# Patient Record
Sex: MALE | Race: WHITE | NOT HISPANIC OR LATINO | Employment: OTHER | URBAN - METROPOLITAN AREA
[De-identification: names, ages, dates, MRNs, and addresses within clinical notes are randomized per-mention and may not be internally consistent; named-entity substitution may affect disease eponyms.]

---

## 2017-02-13 ENCOUNTER — TELEPHONE (OUTPATIENT)
Dept: FAMILY MEDICINE | Facility: CLINIC | Age: 62
End: 2017-02-13

## 2017-02-13 NOTE — TELEPHONE ENCOUNTER
----- Message from Yinka Adkins sent at 2/13/2017  1:42 PM CST -----  Contact: Moustapha   Patient is wanting to be seen before Friday if possible. He is experiencing blood in his urine. No pain. Would prefer to only see his pcp. Please call him [phone]. Thank you!

## 2017-02-14 ENCOUNTER — OFFICE VISIT (OUTPATIENT)
Dept: FAMILY MEDICINE | Facility: CLINIC | Age: 62
End: 2017-02-14
Payer: COMMERCIAL

## 2017-02-14 ENCOUNTER — LAB VISIT (OUTPATIENT)
Dept: LAB | Facility: HOSPITAL | Age: 62
End: 2017-02-14
Attending: FAMILY MEDICINE
Payer: COMMERCIAL

## 2017-02-14 VITALS
WEIGHT: 225.5 LBS | BODY MASS INDEX: 32.28 KG/M2 | TEMPERATURE: 98 F | SYSTOLIC BLOOD PRESSURE: 168 MMHG | DIASTOLIC BLOOD PRESSURE: 80 MMHG | HEART RATE: 78 BPM | HEIGHT: 70 IN

## 2017-02-14 DIAGNOSIS — R31.9 HEMATURIA: Primary | ICD-10-CM

## 2017-02-14 DIAGNOSIS — R31.9 HEMATURIA: ICD-10-CM

## 2017-02-14 LAB
ALBUMIN SERPL BCP-MCNC: 3.8 G/DL
ALP SERPL-CCNC: 132 U/L
ALT SERPL W/O P-5'-P-CCNC: 29 U/L
ANION GAP SERPL CALC-SCNC: 8 MMOL/L
AST SERPL-CCNC: 26 U/L
BASOPHILS # BLD AUTO: 0.05 K/UL
BASOPHILS NFR BLD: 0.5 %
BILIRUB SERPL-MCNC: 0.5 MG/DL
BILIRUB SERPL-MCNC: ABNORMAL MG/DL
BLOOD URINE, POC: 250
BUN SERPL-MCNC: 13 MG/DL
CALCIUM SERPL-MCNC: 9.3 MG/DL
CHLORIDE SERPL-SCNC: 101 MMOL/L
CO2 SERPL-SCNC: 27 MMOL/L
COLOR, POC UA: ABNORMAL
CREAT SERPL-MCNC: 1.2 MG/DL
DIFFERENTIAL METHOD: ABNORMAL
EOSINOPHIL # BLD AUTO: 0.2 K/UL
EOSINOPHIL NFR BLD: 2.1 %
ERYTHROCYTE [DISTWIDTH] IN BLOOD BY AUTOMATED COUNT: 12.8 %
EST. GFR  (AFRICAN AMERICAN): >60 ML/MIN/1.73 M^2
EST. GFR  (NON AFRICAN AMERICAN): >60 ML/MIN/1.73 M^2
GLUCOSE SERPL-MCNC: 100 MG/DL
GLUCOSE UR QL STRIP: ABNORMAL
HCT VFR BLD AUTO: 43.3 %
HGB BLD-MCNC: 14.7 G/DL
KETONES UR QL STRIP: ABNORMAL
LEUKOCYTE ESTERASE URINE, POC: ABNORMAL
LYMPHOCYTES # BLD AUTO: 2.2 K/UL
LYMPHOCYTES NFR BLD: 22.4 %
MCH RBC QN AUTO: 31.2 PG
MCHC RBC AUTO-ENTMCNC: 33.9 %
MCV RBC AUTO: 92 FL
MONOCYTES # BLD AUTO: 0.7 K/UL
MONOCYTES NFR BLD: 7.4 %
NEUTROPHILS # BLD AUTO: 6.5 K/UL
NEUTROPHILS NFR BLD: 67.2 %
NITRITE, POC UA: ABNORMAL
PH, POC UA: 7
PLATELET # BLD AUTO: 265 K/UL
PMV BLD AUTO: 10.6 FL
POTASSIUM SERPL-SCNC: 4.4 MMOL/L
PROT SERPL-MCNC: 7.2 G/DL
PROTEIN, POC: ABNORMAL
RBC # BLD AUTO: 4.71 M/UL
SODIUM SERPL-SCNC: 136 MMOL/L
SPECIFIC GRAVITY, POC UA: 1.01
UROBILINOGEN, POC UA: ABNORMAL
WBC # BLD AUTO: 9.69 K/UL

## 2017-02-14 PROCEDURE — 99999 PR PBB SHADOW E&M-EST. PATIENT-LVL III: CPT | Mod: PBBFAC,,, | Performed by: FAMILY MEDICINE

## 2017-02-14 PROCEDURE — 36415 COLL VENOUS BLD VENIPUNCTURE: CPT | Mod: PO

## 2017-02-14 PROCEDURE — 81002 URINALYSIS NONAUTO W/O SCOPE: CPT | Mod: S$GLB,,, | Performed by: FAMILY MEDICINE

## 2017-02-14 PROCEDURE — 85025 COMPLETE CBC W/AUTO DIFF WBC: CPT

## 2017-02-14 PROCEDURE — 99213 OFFICE O/P EST LOW 20 MIN: CPT | Mod: S$GLB,,, | Performed by: FAMILY MEDICINE

## 2017-02-14 PROCEDURE — 87086 URINE CULTURE/COLONY COUNT: CPT

## 2017-02-14 PROCEDURE — 80053 COMPREHEN METABOLIC PANEL: CPT

## 2017-02-14 NOTE — PROGRESS NOTES
Subjective:       Patient ID: Moustapha Murray is a 61 y.o. male.    Chief Complaint: Hematuria (Since yesterday morning)    HPI Comments: Onset yesterday with some gross blood in the urine.  He may have passed a clot in the urine last night.  Denies any fever.  Denies back pain or abdominal pain.  Denies dysuria or frequency.        Past Medical History   Diagnosis Date    Depression     RLS (restless legs syndrome)        Past Surgical History   Procedure Laterality Date    Cholecystectomy      Cervical fusion      Carpal tunnel release         Review of patient's allergies indicates:   Allergen Reactions    Sulfa (sulfonamide antibiotics)      Other reaction(s): Rash       Social History     Social History    Marital status:      Spouse name: N/A    Number of children: N/A    Years of education: N/A     Occupational History    Not on file.     Social History Main Topics    Smoking status: Former Smoker     Packs/day: 1.50     Types: Cigarettes     Quit date: 10/16/2013    Smokeless tobacco: Never Used    Alcohol use Not on file    Drug use: Not on file    Sexual activity: Not on file     Other Topics Concern    Not on file     Social History Narrative       Current Outpatient Prescriptions on File Prior to Visit   Medication Sig Dispense Refill    omeprazole (PRILOSEC) 40 MG capsule Take 1 capsule (40 mg total) by mouth once daily. 90 capsule 3    pramipexole (MIRAPEX) 0.5 MG tablet Take 1 tablet (0.5 mg total) by mouth every evening. 90 tablet 3    TRANSDERM-SCOP 1.5 mg (1 mg over 3 days) PLACE 1 PATCH (1.5 MG TOTAL) ONTO THE SKIN EVERY 72 HOURS. 10 patch 0     No current facility-administered medications on file prior to visit.        No family history on file.    Review of Systems   Constitutional: Negative for appetite change, chills, fever and unexpected weight change.   HENT: Negative for sore throat and trouble swallowing.    Eyes: Negative for pain and visual disturbance.  "  Respiratory: Negative for cough, chest tightness, shortness of breath and wheezing.    Cardiovascular: Negative for chest pain, palpitations and leg swelling.   Gastrointestinal: Negative for abdominal pain, blood in stool, constipation, diarrhea and nausea.   Genitourinary: Positive for hematuria. Negative for difficulty urinating and dysuria.   Musculoskeletal: Negative for arthralgias, gait problem and neck pain.   Skin: Negative for rash and wound.   Neurological: Negative for dizziness, weakness, light-headedness and headaches.   Hematological: Negative for adenopathy.   Psychiatric/Behavioral: Negative for dysphoric mood.       Objective:      Visit Vitals    BP (!) 168/80 (BP Location: Left arm, Patient Position: Sitting, BP Method: Manual)    Pulse 78    Temp 97.9 °F (36.6 °C) (Oral)    Ht 5' 10" (1.778 m)    Wt 102.3 kg (225 lb 8.5 oz)    BMI 32.36 kg/m2     Physical Exam   Constitutional: He is oriented to person, place, and time. He appears well-developed and well-nourished. He is active. No distress.   HENT:   Head: Normocephalic and atraumatic.   Right Ear: External ear normal.   Left Ear: External ear normal.   Mouth/Throat: Uvula is midline, oropharynx is clear and moist and mucous membranes are normal. No oropharyngeal exudate.   Eyes: Conjunctivae, EOM and lids are normal. Pupils are equal, round, and reactive to light.   Neck: Normal range of motion, full passive range of motion without pain and phonation normal. Neck supple. No thyroid mass and no thyromegaly present.   Cardiovascular: Normal rate, regular rhythm, normal heart sounds and intact distal pulses.  Exam reveals no gallop and no friction rub.    No murmur heard.  Pulmonary/Chest: Effort normal and breath sounds normal. No respiratory distress. He has no wheezes. He has no rales.   Abdominal: Normal appearance. There is no hepatosplenomegaly. There is no tenderness. Hernia confirmed negative in the right inguinal area and " confirmed negative in the left inguinal area.   Genitourinary: Testes normal and penis normal. Right testis shows no mass and no tenderness. Left testis shows no mass and no tenderness. Circumcised.   Musculoskeletal: Normal range of motion.   Lymphadenopathy:     He has no cervical adenopathy.   Neurological: He is alert and oriented to person, place, and time. No cranial nerve deficit. Coordination normal.   Skin: Skin is warm and dry.   Psychiatric: He has a normal mood and affect. His speech is normal and behavior is normal. Judgment and thought content normal.       Assessment:       1. Hematuria        Plan:       Hematuria  -     POCT urine dipstick without microscope  -     CBC auto differential; Future; Expected date: 2/14/17  -     Comprehensive metabolic panel; Future; Expected date: 2/14/17  -     Urine culture; Future; Expected date: 2/14/17  -     US Retroperitoneal Complete (Kidney and; Future; Expected date: 2/14/17        Will get results and contact with further advice  Urology consultation likely needed

## 2017-02-14 NOTE — MR AVS SNAPSHOT
[facility]  [address] [facility] [address]  [place] [address]  Phone: [phone]  Fax: [phone]                  Moustapha Murray   2017 1:20 PM   Office Visit    Description:  Male : 1955   Provider:  Colton Johnson MD   Department:  [facility]           Reason for Visit     Hematuria           Diagnoses this Visit        Comments    Hematuria    -  Primary            To Do List           Future Appointments        Provider Department Dept Phone    2017 2:25 PM LAB, [place] [facility] [phone]    2017 12:30 PM [facility] [phone]      Goals (5 Years of Data)     None      [facility]     [facility] Care [facility] -  Assistance  Registered nurses in the [facility] provide clinical advisement, health education, appointment booking, and other advisory services.  Call for this free service at [phone].             Medications           Message regarding Medications     Verify the changes and/or additions to your medication regime listed below are the same as discussed with your clinician today.  If any of these changes or additions are incorrect, please notify your healthcare provider.        STOP taking these medications     meloxicam (MOBIC) 15 MG tablet Take 1 tablet (15 mg total) by mouth once daily.           Verify that the below list of medications is an accurate representation of the medications you are currently taking.  If none reported, the list may be blank. If incorrect, please contact your healthcare provider. Carry this list with you in case of emergency.           Current Medications     omeprazole (PRILOSEC) 40 MG capsule Take 1 capsule (40 mg total) by mouth once daily.    pramipexole (MIRAPEX) 0.5 MG tablet Take 1 tablet (0.5 mg total) by mouth every evening.    TRANSDERM-SCOP 1.5 mg (1 mg over 3 days) PLACE 1 PATCH (1.5 MG TOTAL) ONTO THE SKIN EVERY  "72 HOURS.           Clinical Reference Information           Your Vitals Were     BP Pulse Temp    168/80 (BP Location: Left arm, Patient Position: Sitting, BP Method: Manual) 78 97.9 °F (36.6 °C) (Oral)    Height Weight BMI    5' 10" (1.778 m) 102.3 kg (225 lb 8.5 oz) 32.36 kg/m2      Blood Pressure          Most Recent Value    BP  (!)  168/80      Allergies as of 2/14/2017     Sulfa (Sulfonamide Antibiotics)      Immunizations Administered on Date of Encounter - 2/14/2017     None      Orders Placed During Today's Visit      Normal Orders This Visit    POCT urine dipstick without microscope     Urine culture     Future Labs/Procedures Expected by Expires    CBC auto differential  2/14/2017 2/14/2018    Comprehensive metabolic panel  2/14/2017 2/14/2018    Urine culture  2/14/2017 4/15/2018    US Retroperitoneal Complete (Kidney and  2/14/2017 2/14/2018 2/14/2017  1:49 PM - Zeny Cabrera MA      Component Results     Component    Color, UA    Kiley    Spec Grav, UA    1.010    pH, UA    7    WBC, UA    Trace    Nitrite, UA    neg    Protein, UA    Trace    Glucose, UA    Norm    Ketones, UA    Neg    Urobilinogen, UA    Norm    Bilirubin, UA    +    Blood, UA    250            [facility] Sign-Up     Activating your [facility] account is as easy as 1-2-3!     1) Visit [email or web address], select Sign Up Now, enter this activation code and your date of birth, then select Next.  [email or web address]  Expires: 3/17/2017 12:21 PM      2) Create a username and password to use when you visit [facility] in the future and select a security question in case you lose your password and select Next.    3) Enter your e-mail address and click Sign Up!    Additional Information  If you have questions, please e-mail [email or web address] or call [phone] to talk to our [facility] staff. Remember, [facility] is NOT to be used for urgent needs. For medical emergencies, dial 911.         Language Assistance Services     ATTENTION: " Language assistance services are available, free of charge. Please call [phone].      ATENCIÓN: Si habla meryl, tiene a kasper disposición servicios gratuitos de asistencia lingüística. Llame al [phone].     CHÚ Ý: N?u b?n nói Ti?ng Vi?t, có các d?ch v? h? tr? ngôn ng? mi?n phí dành cho b?n. G?i s? [phone].         [facility] complies with applicable Federal civil rights laws and does not discriminate on the basis of race, color, national origin, age, disability, or sex.

## 2017-02-15 LAB — BACTERIA UR CULT: NO GROWTH

## 2017-02-16 ENCOUNTER — TELEPHONE (OUTPATIENT)
Dept: FAMILY MEDICINE | Facility: CLINIC | Age: 62
End: 2017-02-16

## 2017-02-16 ENCOUNTER — HOSPITAL ENCOUNTER (OUTPATIENT)
Dept: RADIOLOGY | Facility: HOSPITAL | Age: 62
Discharge: HOME | End: 2017-02-16
Attending: FAMILY MEDICINE
Payer: COMMERCIAL

## 2017-02-16 DIAGNOSIS — R31.9 HEMATURIA: ICD-10-CM

## 2017-02-16 DIAGNOSIS — N28.1 RENAL CYST: ICD-10-CM

## 2017-02-16 DIAGNOSIS — R31.9 HEMATURIA: Primary | ICD-10-CM

## 2017-02-16 PROCEDURE — 76770 US EXAM ABDO BACK WALL COMP: CPT | Mod: 26,,, | Performed by: RADIOLOGY

## 2017-02-16 PROCEDURE — 76770 US EXAM ABDO BACK WALL COMP: CPT | Mod: TC,PO

## 2017-02-16 NOTE — TELEPHONE ENCOUNTER
----- Message from Wendi Ryan sent at 2/15/2017  4:33 PM CST -----  Contact: self  Patient called regarding results from lab. Please contact [phone]

## 2017-02-16 NOTE — TELEPHONE ENCOUNTER
Inform patient that ultrasound showed a large cyst in the right kidney.  This may or may not be the source of the blood in the urine.  I do want him to see urology to evaluate this problem. Referral entered.

## 2017-03-08 ENCOUNTER — OFFICE VISIT (OUTPATIENT)
Dept: UROLOGY | Facility: CLINIC | Age: 62
End: 2017-03-08
Payer: COMMERCIAL

## 2017-03-08 VITALS
HEIGHT: 70 IN | BODY MASS INDEX: 32.54 KG/M2 | HEART RATE: 68 BPM | SYSTOLIC BLOOD PRESSURE: 173 MMHG | WEIGHT: 227.31 LBS | DIASTOLIC BLOOD PRESSURE: 91 MMHG

## 2017-03-08 DIAGNOSIS — N40.1 BENIGN NON-NODULAR PROSTATIC HYPERPLASIA WITH LOWER URINARY TRACT SYMPTOMS: ICD-10-CM

## 2017-03-08 DIAGNOSIS — N28.1 RENAL CYST, ACQUIRED: ICD-10-CM

## 2017-03-08 DIAGNOSIS — R31.9 HEMATURIA: Primary | ICD-10-CM

## 2017-03-08 LAB
BILIRUB SERPL-MCNC: NORMAL MG/DL
BLOOD URINE, POC: NORMAL
COLOR, POC UA: YELLOW
GLUCOSE UR QL STRIP: NORMAL
KETONES UR QL STRIP: NORMAL
LEUKOCYTE ESTERASE URINE, POC: NORMAL
NITRITE, POC UA: NORMAL
PH, POC UA: 5
PROTEIN, POC: NORMAL
SPECIFIC GRAVITY, POC UA: 1.01
UROBILINOGEN, POC UA: NORMAL

## 2017-03-08 PROCEDURE — 99204 OFFICE O/P NEW MOD 45 MIN: CPT | Mod: 25,S$GLB,, | Performed by: UROLOGY

## 2017-03-08 PROCEDURE — 88112 CYTOPATH CELL ENHANCE TECH: CPT | Performed by: PATHOLOGY

## 2017-03-08 PROCEDURE — 1160F RVW MEDS BY RX/DR IN RCRD: CPT | Mod: S$GLB,,, | Performed by: UROLOGY

## 2017-03-08 PROCEDURE — 99999 PR PBB SHADOW E&M-EST. PATIENT-LVL III: CPT | Mod: PBBFAC,,, | Performed by: UROLOGY

## 2017-03-08 PROCEDURE — 81002 URINALYSIS NONAUTO W/O SCOPE: CPT | Mod: S$GLB,,, | Performed by: UROLOGY

## 2017-03-08 PROCEDURE — 88112 CYTOPATH CELL ENHANCE TECH: CPT | Mod: 26,,, | Performed by: PATHOLOGY

## 2017-03-08 NOTE — LETTER
March 8, 2017      Colton Johnson MD  [address] [facility] [address] [place] [address]           [facility]  [address] [facility] [address] [place] [address]  Phone: [phone]          Patient: Moustapha Murray   MR Number: [id number]   YOB: 1955   Date of Visit: 3/8/2017       Dear Dr. Colton Johnson:    Thank you for referring Moustapha Murray to me for evaluation. Attached you will find relevant portions of my assessment and plan of care.    If you have questions, please do not hesitate to call me. I look forward to following Moustapha Murray along with you.    Sincerely,    Greg Claudio MD    Enclosure  CC:  No Recipients    If you would like to receive this communication electronically, please contact [email or web address] or [phone] to request more information on [employer] Link access.    For providers and/or their staff who would like to refer a patient to [facility], please contact us through our one-stop-shop provider referral line, [facility], at [phone].    If you feel you have received this communication in error or would no longer like to receive these types of communications, please e-mail [email or web address]

## 2017-03-08 NOTE — PROGRESS NOTES
URINALYSIS:  Color yellow, SG 10, pH 5.  All chemical determinations negative.    CHIEF COMPLAINT:  Hematuria, renal cyst.    A 61-year-old male who had gross hematuria about three weeks ago, which lasted   one and a half days.  The bleeding then subsided spontaneously.  It consisted of   pinkish to red urine.  There were no sensations of pain or burning during that   time.  The patient had no urination complaints.    Renal ultrasound order as part of the workup for his hematuria showed a 3.7 cm   Bosniak II cyst in the lower pole of the right kidney.  Otherwise, normal   ultrasound of the kidneys and bladder.    PAST MEDICAL HISTORY:  SURGICAL:  Circumcision, cholecystectomy, cervical fusion, carpal tunnel surgery   and appendectomy.    MEDICAL:  Restless legs syndrome.    FAMILIAL:  No family history of genitourinary malignancy or of nephrolithiasis.    SOCIAL:  The patient is a , , lives in [place], [place].    ALLERGIES:  Sulfa.    PHYSICAL EXAMINATION:  ABDOMEN:  Flat.  No masses.  CVA is negative.  No organomegaly or tenderness.    No hernias.  GENITOURINARY:  Penis circumcised.  Meatus normal.  Testes and epididymis   normal.  MARA:  Shows 30 g prostate without indurations or nodules.    IMPRESSION:  Gross hematuria, BPH, renal cyst.    PLAN:  Routine workup for hematuria including cystoscopy, urine cytology,   probably studies of the bladder outlet such as uroflowmetry and bladder scan and   eventually may need a CT scan with contrast.      ERR/PN  dd: 03/08/2017 16:46:38 (CST)  td: 03/09/2017 01:31:09 (CST)  Doc ID   #[id number]  Job ID #[id number]    CC:

## 2017-03-13 ENCOUNTER — TELEPHONE (OUTPATIENT)
Dept: UROLOGY | Facility: CLINIC | Age: 62
End: 2017-03-13

## 2017-03-23 ENCOUNTER — TELEPHONE (OUTPATIENT)
Dept: UROLOGY | Facility: CLINIC | Age: 62
End: 2017-03-23

## 2017-03-23 NOTE — TELEPHONE ENCOUNTER
----- Message from Sarah Stovall sent at 3/23/2017  1:30 PM CDT -----  Contact: self  Patient stated that Nurse does not need to call him back at this time/he is requesting to just cancel the Cystoscopy on Friday 03 31 17 at 9am/I was not sure if I could cancel/please cancel

## 2017-04-13 DIAGNOSIS — K21.9 GASTROESOPHAGEAL REFLUX DISEASE WITHOUT ESOPHAGITIS: ICD-10-CM

## 2017-04-13 RX ORDER — OMEPRAZOLE 40 MG/1
40 CAPSULE, DELAYED RELEASE ORAL DAILY
Qty: 90 CAPSULE | Refills: 3 | Status: SHIPPED | OUTPATIENT
Start: 2017-04-13 | End: 2018-04-14 | Stop reason: SDUPTHER

## 2017-07-07 ENCOUNTER — PATIENT MESSAGE (OUTPATIENT)
Dept: FAMILY MEDICINE | Facility: CLINIC | Age: 62
End: 2017-07-07

## 2017-07-07 RX ORDER — SCOLOPAMINE TRANSDERMAL SYSTEM 1 MG/1
1 PATCH, EXTENDED RELEASE TRANSDERMAL
Qty: 10 PATCH | Refills: 0 | Status: SHIPPED | OUTPATIENT
Start: 2017-07-07 | End: 2018-08-07

## 2017-10-11 RX ORDER — PRAMIPEXOLE DIHYDROCHLORIDE 0.5 MG/1
0.5 TABLET ORAL NIGHTLY
Qty: 90 TABLET | Refills: 3 | Status: SHIPPED | OUTPATIENT
Start: 2017-10-11 | End: 2018-10-12 | Stop reason: SDUPTHER

## 2017-10-18 ENCOUNTER — OFFICE VISIT (OUTPATIENT)
Dept: FAMILY MEDICINE | Facility: CLINIC | Age: 62
End: 2017-10-18
Payer: COMMERCIAL

## 2017-10-18 ENCOUNTER — LAB VISIT (OUTPATIENT)
Dept: LAB | Facility: HOSPITAL | Age: 62
End: 2017-10-18
Attending: FAMILY MEDICINE
Payer: COMMERCIAL

## 2017-10-18 VITALS
BODY MASS INDEX: 32.42 KG/M2 | HEIGHT: 70 IN | HEART RATE: 71 BPM | OXYGEN SATURATION: 95 % | SYSTOLIC BLOOD PRESSURE: 162 MMHG | WEIGHT: 226.44 LBS | DIASTOLIC BLOOD PRESSURE: 90 MMHG

## 2017-10-18 DIAGNOSIS — Z00.00 PREVENTATIVE HEALTH CARE: ICD-10-CM

## 2017-10-18 DIAGNOSIS — R03.0 ELEVATED BLOOD PRESSURE READING: ICD-10-CM

## 2017-10-18 DIAGNOSIS — Z00.00 PREVENTATIVE HEALTH CARE: Primary | ICD-10-CM

## 2017-10-18 DIAGNOSIS — B07.0 PLANTAR WART OF RIGHT FOOT: ICD-10-CM

## 2017-10-18 DIAGNOSIS — M72.0 DUPUYTREN'S CONTRACTURE OF RIGHT HAND: ICD-10-CM

## 2017-10-18 LAB
ALBUMIN SERPL BCP-MCNC: 3.8 G/DL
ALP SERPL-CCNC: 130 U/L
ALT SERPL W/O P-5'-P-CCNC: 30 U/L
ANION GAP SERPL CALC-SCNC: 7 MMOL/L
AST SERPL-CCNC: 27 U/L
BASOPHILS # BLD AUTO: 0.09 K/UL
BASOPHILS NFR BLD: 1.5 %
BILIRUB SERPL-MCNC: 0.8 MG/DL
BUN SERPL-MCNC: 17 MG/DL
CALCIUM SERPL-MCNC: 9.8 MG/DL
CHLORIDE SERPL-SCNC: 103 MMOL/L
CHOLEST SERPL-MCNC: 212 MG/DL
CHOLEST/HDLC SERPL: 4.3 {RATIO}
CO2 SERPL-SCNC: 30 MMOL/L
COMPLEXED PSA SERPL-MCNC: 0.26 NG/ML
CREAT SERPL-MCNC: 1.2 MG/DL
DIFFERENTIAL METHOD: NORMAL
EOSINOPHIL # BLD AUTO: 0.2 K/UL
EOSINOPHIL NFR BLD: 3.6 %
ERYTHROCYTE [DISTWIDTH] IN BLOOD BY AUTOMATED COUNT: 12.5 %
EST. GFR  (AFRICAN AMERICAN): >60 ML/MIN/1.73 M^2
EST. GFR  (NON AFRICAN AMERICAN): >60 ML/MIN/1.73 M^2
GLUCOSE SERPL-MCNC: 99 MG/DL
HCT VFR BLD AUTO: 43.7 %
HDLC SERPL-MCNC: 49 MG/DL
HDLC SERPL: 23.1 %
HGB BLD-MCNC: 14.7 G/DL
IMM GRANULOCYTES # BLD AUTO: 0.03 K/UL
IMM GRANULOCYTES NFR BLD AUTO: 0.5 %
LDLC SERPL CALC-MCNC: 148.8 MG/DL
LYMPHOCYTES # BLD AUTO: 1.6 K/UL
LYMPHOCYTES NFR BLD: 26.5 %
MCH RBC QN AUTO: 30.8 PG
MCHC RBC AUTO-ENTMCNC: 33.6 G/DL
MCV RBC AUTO: 92 FL
MONOCYTES # BLD AUTO: 0.5 K/UL
MONOCYTES NFR BLD: 8.9 %
NEUTROPHILS # BLD AUTO: 3.5 K/UL
NEUTROPHILS NFR BLD: 59 %
NONHDLC SERPL-MCNC: 163 MG/DL
NRBC BLD-RTO: 0 /100 WBC
PLATELET # BLD AUTO: 242 K/UL
PMV BLD AUTO: 11.3 FL
POTASSIUM SERPL-SCNC: 4.4 MMOL/L
PROT SERPL-MCNC: 7.5 G/DL
RBC # BLD AUTO: 4.77 M/UL
SODIUM SERPL-SCNC: 140 MMOL/L
TRIGL SERPL-MCNC: 71 MG/DL
WBC # BLD AUTO: 5.86 K/UL

## 2017-10-18 PROCEDURE — 99396 PREV VISIT EST AGE 40-64: CPT | Mod: 25,S$GLB,, | Performed by: FAMILY MEDICINE

## 2017-10-18 PROCEDURE — 84153 ASSAY OF PSA TOTAL: CPT

## 2017-10-18 PROCEDURE — 80061 LIPID PANEL: CPT

## 2017-10-18 PROCEDURE — 90662 IIV NO PRSV INCREASED AG IM: CPT | Mod: S$GLB,,, | Performed by: FAMILY MEDICINE

## 2017-10-18 PROCEDURE — 36415 COLL VENOUS BLD VENIPUNCTURE: CPT | Mod: PO

## 2017-10-18 PROCEDURE — 93005 ELECTROCARDIOGRAM TRACING: CPT | Mod: S$GLB,,, | Performed by: FAMILY MEDICINE

## 2017-10-18 PROCEDURE — 90471 IMMUNIZATION ADMIN: CPT | Mod: S$GLB,,, | Performed by: FAMILY MEDICINE

## 2017-10-18 PROCEDURE — 85025 COMPLETE CBC W/AUTO DIFF WBC: CPT

## 2017-10-18 PROCEDURE — 93010 ELECTROCARDIOGRAM REPORT: CPT | Mod: S$GLB,,, | Performed by: INTERNAL MEDICINE

## 2017-10-18 PROCEDURE — 80053 COMPREHEN METABOLIC PANEL: CPT

## 2017-10-18 PROCEDURE — 99999 PR PBB SHADOW E&M-EST. PATIENT-LVL IV: CPT | Mod: PBBFAC,,, | Performed by: FAMILY MEDICINE

## 2017-10-18 PROCEDURE — 83036 HEMOGLOBIN GLYCOSYLATED A1C: CPT

## 2017-10-18 NOTE — PROGRESS NOTES
Chief Complaint   Patient presents with    Preventative health care     annual exam         HISTORY OF PRESENT ILLNESS: This is a 62-year-old white male presents today for annual physical exam.   GERD -  He has uses Omeprazole 40mg daily with good control.   RLS - tolerating Mirapex 0.5 mg daily. This is controlled  BP has been elevated, but no related symptoms  Using vaping daily  C/o right 5ht finger progressive Duputreyns contracture with inability to extend finger now  C/o right 4th toe callus/wart that has become bothersome    PAST MEDICAL HISTORY:   restless legs syndrome   colon polyps (colonoscopy 8/27/09)  tinea corporis  GERD    PAST SURGICAL HISTORY:   Cholecystectomy  C4-C5 anterior cervical fusion 2004  right carpal tunnel release     FAMILY HISTORY: cancer in his grandparent.     SOCIAL HISTORY: The patient is .   He has children.   Ex-smoker  He drinks alcoholic beverages occasionally.   He works at a bank    REVIEW OF SYSTEMS:   GENERAL: No fever, chills, fatigability or weight changes.   SKIN/BREASTS: No rashes, sores, itching or changes in color or texture of skin. No changes in moles. No pain, swelling, tenderness, lumps, bleeding or discharge from nipples.   HEAD: No headaches or recent head trauma.   EYES: Visual acuity fine. Denies blurriness, tearing, itching, photophobia, diplopia, or visual changes.   EARS: Denies ear pain, discharge, tinnitus or vertigo. Denies hearing loss.   NOSE: No loss of smell, epistaxis, postnasal drip, discharge, obstruction, or sneezing.   MOUTH & THROAT: No hoarseness, change in voice, swallowing difficulty. No excessive gum bleeding.   HEMATOLOGICAL/NODES: Denies swollen glands. No bleeding or bruising.   CHEST: No CHANDRA, cyanosis, wheezing, cough or sputum production.   CARDIOVASCULAR: Denies chest pain, dyspnea, orthopnea, or palpitations.   GI/ABDOMEN: Appetite fine. No weight loss. Denies nausea, vomiting, diarrhea, constipation, abdominal pain,  "hematemesis or blood in stool.   URINARY: No dysuria,hematuria, nocturia, incontinence, flank pain, urgency, or urinary difficulty.   PERIPHERAL VASCULAR: No claudication, cold intolerance or cyanosis.   MUSCULOSKELETAL: Some intermittent right wrist pain.  NEUROLOGIC: No history of seizures, paralysis, alteration of gait or coordination. Some intermittent numbness in the left 2nd and 3rd fingers with driving that impoves with changes in position.  ENDOCRINE: Sexual maturation. Denies weight change, heat/cold intolerance, hair loss or gain, loss of libido, polyuria, polydipsia, polyphagia, pruritus, unexplained flushing, or excessive sweating.   PSYCH: No crying spells. Denies anxiety symptoms.     IMMUNIZATIONS: Up-to-date. Last tetanus shot was in 2004.     PHYSICAL EXAM:     BP (!) 162/90   Pulse 71   Ht 5' 10" (1.778 m)   Wt 102.7 kg (226 lb 6.6 oz)   SpO2 95%   BMI 32.49 kg/m²   GENERAL: This is a healthy-appearing 62-year-old white male, sitting   upright, in no apparent distress. Alert and oriented x4.   SKIN: Reveals erythematous rash over right breast. No suspicious neoplasms.   NEUROLOGICALLY: Cranial nerves 2-12 are grossly intact with no sensory or motor deficits.   PSYCHOLOGICALLY: Mood and affect are normal. Insight and judgment are   excellent. Appearance is well groomed.   HEENT: Tympanic membranes are clear. Nares are patent with no discharge.   Pupils are equal, round and reactive to light. Extraocular movements are intact.   NECK: Supple. There is no lymphadenopathy, thyromegaly or JVD.   CHEST: Clear to auscultation bilaterally with good respiratory movement.   CARDIOVASCULAR: S1, S2. Regular rate and rhythm. No murmurs, rubs or gallops.   ABDOMEN: Soft, nontender, nondistended. Positive bowel sounds with no hepatosplenomegaly.   EXTREMITIES: Show no cyanosis, clubbing or edema with 2+ distal pulses.   MUSCULOSKELETAL: Reveals full range of motion in all extremities.    RECTAL: " "deferred  Right Hand" 5th finger obvious Duputreyns contracture with inability to fully extend finger  Right foot: lateral 4th toe with 1cm verrucuous papule with surrounding callus formation    Results for orders placed or performed in visit on 03/08/17   POCT URINE DIPSTICK WITHOUT MICROSCOPE   Result Value Ref Range    Color, UA yellow     Spec Grav UA 1.010     pH, UA 5     WBC, UA neg     Nitrite, UA neg     Protein neg     Glucose, UA neg     Ketones, UA neg     Urobilinogen, UA neg     Bilirubin neg     Blood, UA neg      EKG: NSR at 75 bpm; no LVH    ASSESSMENT/PLAN:     Preventative health care  -     Comprehensive metabolic panel; Future; Expected date: 10/18/2017  -     Lipid panel; Future; Expected date: 10/18/2017  -     PSA, Screening; Future; Expected date: 10/18/2017  -     CBC auto differential; Future; Expected date: 10/18/2017  -     Hemoglobin A1c; Future; Expected date: 10/18/2017    Elevated blood pressure reading  -     IN OFFICE EKG 12-LEAD (to [facility])    Plantar wart of right foot  -     Ambulatory referral to Podiatry    Dupuytren's contracture of right hand  -     Ambulatory referral to Orthopedics    Other orders  -     Influenza - High Dose (65+) (PF) (IM)    flu shot  agree with sensible portions diet plan; goal weight of 210  Eliminate vapors  Trial of lifestyle for 3 months and then will schedule for BP recheck in 3 months  Discussed healthy lifestyle measures including quitting smoking, maintenance of a healthy   weight, increasing fruits and vegetables in the diet.   Continue Mirapex to 0.5mg daily  zostavax in 1 month  F/u 3 months or PRN          "

## 2017-10-19 ENCOUNTER — TELEPHONE (OUTPATIENT)
Dept: FAMILY MEDICINE | Facility: CLINIC | Age: 62
End: 2017-10-19

## 2017-10-19 LAB
ESTIMATED AVG GLUCOSE: 114 MG/DL
HBA1C MFR BLD HPLC: 5.6 %

## 2017-10-19 NOTE — TELEPHONE ENCOUNTER
Left message for call back to clinic need to rescheduled an apt for 11/7 needs to be re-scheduled for [facility] or [facility] main [facility] doesn't do hands.

## 2017-10-24 DIAGNOSIS — M79.641 RIGHT HAND PAIN: Primary | ICD-10-CM

## 2017-10-25 ENCOUNTER — OFFICE VISIT (OUTPATIENT)
Dept: ORTHOPEDICS | Facility: CLINIC | Age: 62
End: 2017-10-25
Payer: COMMERCIAL

## 2017-10-25 ENCOUNTER — HOSPITAL ENCOUNTER (OUTPATIENT)
Dept: RADIOLOGY | Facility: HOSPITAL | Age: 62
Discharge: HOME | End: 2017-10-25
Attending: ORTHOPAEDIC SURGERY
Payer: COMMERCIAL

## 2017-10-25 VITALS — HEIGHT: 70 IN | WEIGHT: 226 LBS | BODY MASS INDEX: 32.35 KG/M2

## 2017-10-25 DIAGNOSIS — M79.641 RIGHT HAND PAIN: ICD-10-CM

## 2017-10-25 DIAGNOSIS — M72.0 DUPUYTREN'S CONTRACTURE OF RIGHT HAND: ICD-10-CM

## 2017-10-25 DIAGNOSIS — M79.641 RIGHT HAND PAIN: Primary | ICD-10-CM

## 2017-10-25 PROCEDURE — 99999 PR PBB SHADOW E&M-EST. PATIENT-LVL II: CPT | Mod: PBBFAC,,, | Performed by: ORTHOPAEDIC SURGERY

## 2017-10-25 PROCEDURE — 99243 OFF/OP CNSLTJ NEW/EST LOW 30: CPT | Mod: S$GLB,,, | Performed by: ORTHOPAEDIC SURGERY

## 2017-10-25 PROCEDURE — 73130 X-RAY EXAM OF HAND: CPT | Mod: 26,RT,, | Performed by: RADIOLOGY

## 2017-10-25 PROCEDURE — 73110 X-RAY EXAM OF WRIST: CPT | Mod: TC,PO,RT

## 2017-10-25 PROCEDURE — 73110 X-RAY EXAM OF WRIST: CPT | Mod: 26,RT,, | Performed by: RADIOLOGY

## 2017-10-25 PROCEDURE — 73130 X-RAY EXAM OF HAND: CPT | Mod: TC,PO,RT

## 2017-10-25 NOTE — PROGRESS NOTES
Consult from Dr. Colton Johnson.  Comm. Via Epic    HISTORY OF PRESENT ILLNESS:  62 years old, complaining of a contracture of his   right fifth digit, which he has had now for a couple of years.  It has gotten   progressively worse.  No trauma.    PHYSICAL EXAMINATION:  Today shows he has a cord in line with the fifth digit   involving the MCP joint where the contracture to about 45 degrees.    ASSESSMENT:  Dupuytren disease involving the right fifth MCP joint.    PLAN:  We discussed with him Xiaflex injection.  I think he is a good candidate,   he is going to consider this, and we will be happy to offer him that anytime,   Xiaflex injection to the right fifth MCP.      PBB/HN  dd: 10/25/2017 08:38:23 (CDT)  td: 10/25/2017 23:52:17 (CDT)  Doc ID   #[id number]  Job ID #[id number]    CC:     Further History  Aching pain  Worse with activity  Relieved with rest  No other associated symptoms  No other radiation    Further Exam  Alert and oriented  Pleasant  Contralateral limb has appropriate range of motion for age and condition  Contralateral limb has appropriate strength for age and condition  Contralateral limb has appropriate stability  for age and condition  No adenopathy  Pulses are appropriate for current condition  Skin is intact        Chief Complaint    Chief Complaint   Patient presents with    Right Hand - Pain     dupuytrens contracture        HPI  Moustapha Murray is a 62 y.o.  male who presents with       Past Medical History  Past Medical History:   Diagnosis Date    Depression     RLS (restless legs syndrome)        Past Surgical History  Past Surgical History:   Procedure Laterality Date    APPENDECTOMY      CARPAL TUNNEL RELEASE      CERVICAL FUSION      CHOLECYSTECTOMY      CIRCUMCISION         Medications  Current Outpatient Prescriptions   Medication Sig    omeprazole (PRILOSEC) 40 MG capsule Take 1 capsule (40 mg total) by mouth once daily.    pramipexole (MIRAPEX) 0.5 MG tablet TAKE 1 TABLET  (0.5 MG TOTAL) BY MOUTH EVERY EVENING.    scopolamine (TRANSDERM-SCOP) 1.5 mg (1 mg over 3 days) Place 1 patch (1.5 mg total) onto the skin every 72 hours.     No current facility-administered medications for this visit.        Allergies  Review of patient's allergies indicates:   Allergen Reactions    Sulfa (sulfonamide antibiotics)      Other reaction(s): Rash       Family History  No family history on file.    Social History  Social History     Social History    Marital status:      Spouse name: N/A    Number of children: N/A    Years of education: N/A     Occupational History          Social History Main Topics    Smoking status: Former Smoker     Packs/day: 1.50     Types: Cigarettes     Quit date: 10/16/2013    Smokeless tobacco: Never Used    Alcohol use Not on file    Drug use: Unknown    Sexual activity: Not on file     Other Topics Concern    Not on file     Social History Narrative    No narrative on file               Review of Systems     Constitutional: Negative    HENT: Negative  Eyes: Negative  Respiratory: Negative  Cardiovascular: Negative  Musculoskeletal: HPI  Skin: Negative  Neurological: Negative  Hematological: Negative  Endocrine: Negative                 Physical Exam    There were no vitals filed for this visit.  Body mass index is 32.43 kg/m².  Physical Examination:     General appearance -  well appearing, and in no distress  Mental status - awake  Neck - supple  Chest -  symmetric air entry  Heart - normal rate   Abdomen - soft      Assessment     1. Right hand pain    2. Dupuytren's contracture of right hand          Plan

## 2017-10-25 NOTE — LETTER
October 25, 2017      Colton Johnson MD  [address] [facility] [address] [place] [address]           [facility]  [address] [facility] [address] [place] [address]  Phone: [phone]          Patient: Moustapha Murray   MR Number: [id number]   YOB: 1955   Date of Visit: 10/25/2017       Dear Dr. Colton Johnson:    Thank you for referring Moustapha Murray to me for evaluation. Attached you will find relevant portions of my assessment and plan of care.    If you have questions, please do not hesitate to call me. I look forward to following Moustapha Murray along with you.    Sincerely,    Tello Dos Santos MD    Enclosure  CC:  No Recipients    If you would like to receive this communication electronically, please contact [email or web address] or [phone] to request more information on [employer] Link access.    For providers and/or their staff who would like to refer a patient to [facility], please contact us through our one-stop-shop provider referral line, [facility] , at [phone].    If you feel you have received this communication in error or would no longer like to receive these types of communications, please e-mail [email or web address]

## 2017-11-17 PROCEDURE — 90471 IMMUNIZATION ADMIN: CPT | Mod: S$GLB,,, | Performed by: FAMILY MEDICINE

## 2017-11-17 PROCEDURE — 90736 HZV VACCINE LIVE SUBQ: CPT | Mod: S$GLB,,, | Performed by: FAMILY MEDICINE

## 2017-12-04 ENCOUNTER — TELEPHONE (OUTPATIENT)
Dept: ORTHOPEDICS | Facility: CLINIC | Age: 62
End: 2017-12-04

## 2017-12-04 NOTE — TELEPHONE ENCOUNTER
Called patient and let him know that his xiaflex was approved and that he will have a 20% copay and that I would contact patient when medication come in.

## 2017-12-27 ENCOUNTER — OFFICE VISIT (OUTPATIENT)
Dept: ORTHOPEDICS | Facility: CLINIC | Age: 62
End: 2017-12-27
Payer: COMMERCIAL

## 2017-12-27 VITALS — BODY MASS INDEX: 32.35 KG/M2 | HEIGHT: 70 IN | WEIGHT: 226 LBS

## 2017-12-27 DIAGNOSIS — M79.641 RIGHT HAND PAIN: ICD-10-CM

## 2017-12-27 DIAGNOSIS — Z71.89 ENCOUNTER FOR EDUCATION ABOUT INJECTION: Primary | ICD-10-CM

## 2017-12-27 DIAGNOSIS — M72.0 DUPUYTREN'S CONTRACTURE OF RIGHT HAND: ICD-10-CM

## 2017-12-27 PROCEDURE — 99213 OFFICE O/P EST LOW 20 MIN: CPT | Mod: 25,S$GLB,, | Performed by: ORTHOPAEDIC SURGERY

## 2017-12-27 PROCEDURE — 99999 PR PBB SHADOW E&M-EST. PATIENT-LVL II: CPT | Mod: PBBFAC,,, | Performed by: ORTHOPAEDIC SURGERY

## 2017-12-27 PROCEDURE — 20527 NJX NZM PALMAR FASCIAL CORD: CPT | Mod: RT,S$GLB,, | Performed by: ORTHOPAEDIC SURGERY

## 2017-12-27 NOTE — PROGRESS NOTES
HISTORY OF PRESENT ILLNESS:  Mr. Murray is seen here today as right fifth MCP   Dupuytren contracture.  We went ahead and injected them with Xiaflex today using   a direct approach after obtaining consent and sterile prep into the right hand   and into cord at the area of maximal bowstringing involving the right fifth MCP.    We will see him back in a couple of days' time for manipulation.  The patient   tolerated the procedure well.    Patient's pharmacy supplied the medication    HUGO/OLAF  dd: 12/27/2017 08:51:33 (CST)  td: 12/27/2017 21:51:40 (CST)  Doc ID   #[id number]  Job ID #[id number]    CC:

## 2017-12-29 ENCOUNTER — OFFICE VISIT (OUTPATIENT)
Dept: ORTHOPEDICS | Facility: CLINIC | Age: 62
End: 2017-12-29
Payer: COMMERCIAL

## 2017-12-29 VITALS
WEIGHT: 228.75 LBS | HEART RATE: 62 BPM | SYSTOLIC BLOOD PRESSURE: 185 MMHG | DIASTOLIC BLOOD PRESSURE: 90 MMHG | BODY MASS INDEX: 32.82 KG/M2

## 2017-12-29 DIAGNOSIS — M72.0 DUPUYTREN'S CONTRACTURE OF RIGHT HAND: Primary | ICD-10-CM

## 2017-12-29 PROCEDURE — 99213 OFFICE O/P EST LOW 20 MIN: CPT | Mod: S$GLB,,, | Performed by: ORTHOPAEDIC SURGERY

## 2017-12-29 PROCEDURE — 99999 PR PBB SHADOW E&M-EST. PATIENT-LVL II: CPT | Mod: PBBFAC,,, | Performed by: ORTHOPAEDIC SURGERY

## 2017-12-29 NOTE — PROGRESS NOTES
HISTORY OF PRESENT ILLNESS:  Mr. Murray is couple of days out from his Xiaflex   injection, comes in today for manipulation.  He had actually already moved his   hand and felt a pop.  His motion is already increased.  We did briefly try to   improve it slightly, but it was too uncomfortable for him and really his hand is   pretty close to get flat on the table.  So at this point, we will allow him to   continue with activities to tolerance, and we will see him back here as needed.      HUGO/OLAF  dd: 12/29/2017 08:55:32 (CST)  td: 12/30/2017 00:25:03 (CST)  Doc ID   #[id number]  Job ID #[id number]    CC:

## 2018-01-18 ENCOUNTER — OFFICE VISIT (OUTPATIENT)
Dept: FAMILY MEDICINE | Facility: CLINIC | Age: 63
End: 2018-01-18
Payer: COMMERCIAL

## 2018-01-18 VITALS
WEIGHT: 222.69 LBS | TEMPERATURE: 98 F | DIASTOLIC BLOOD PRESSURE: 92 MMHG | HEART RATE: 64 BPM | BODY MASS INDEX: 31.88 KG/M2 | SYSTOLIC BLOOD PRESSURE: 172 MMHG | HEIGHT: 70 IN

## 2018-01-18 DIAGNOSIS — I10 HYPERTENSION, UNSPECIFIED TYPE: Primary | ICD-10-CM

## 2018-01-18 PROCEDURE — 99213 OFFICE O/P EST LOW 20 MIN: CPT | Mod: S$GLB,,, | Performed by: FAMILY MEDICINE

## 2018-01-18 PROCEDURE — 99999 PR PBB SHADOW E&M-EST. PATIENT-LVL III: CPT | Mod: PBBFAC,,, | Performed by: FAMILY MEDICINE

## 2018-01-18 RX ORDER — LISINOPRIL AND HYDROCHLOROTHIAZIDE 12.5; 2 MG/1; MG/1
1 TABLET ORAL DAILY
Qty: 30 TABLET | Refills: 6 | Status: SHIPPED | OUTPATIENT
Start: 2018-01-18 | End: 2018-02-19 | Stop reason: SDUPTHER

## 2018-01-18 NOTE — PROGRESS NOTES
Chief Complaint   Patient presents with    Follow-up     blood pressure       HISTORY OF PRESENT ILLNESS: This is a 62-year-old white male presents today for f/u on high blood pressure.  He has been following Sensible portion diet for the past 3 months.  Denies HA, CP or dizziness.  Home /100  Some walking 20 minutes a day.  GERD -  He has uses Omeprazole 40mg daily with good control.   RLS - tolerating Mirapex 0.5 mg daily. This is controlled  He has quit vaping.    PAST MEDICAL HISTORY:   restless legs syndrome   colon polyps (colonoscopy 8/27/09)  tinea corporis  GERD    PAST SURGICAL HISTORY:   Cholecystectomy  C4-C5 anterior cervical fusion 2004  right carpal tunnel release     FAMILY HISTORY: cancer in his grandparent.     SOCIAL HISTORY: The patient is .   He has children.   Ex-smoker  He drinks alcoholic beverages occasionally.   He works at a bank    REVIEW OF SYSTEMS:   GENERAL: No fever, chills, fatigability or weight changes.   SKIN/BREASTS: No rashes, sores, itching or changes in color or texture of skin. No changes in moles. No pain, swelling, tenderness, lumps, bleeding or discharge from nipples.   HEAD: No headaches or recent head trauma.   EYES: Visual acuity fine. Denies blurriness, tearing, itching, photophobia, diplopia, or visual changes.   EARS: Denies ear pain, discharge, tinnitus or vertigo. Denies hearing loss.   NOSE: No loss of smell, epistaxis, postnasal drip, discharge, obstruction, or sneezing.   MOUTH & THROAT: No hoarseness, change in voice, swallowing difficulty. No excessive gum bleeding.   HEMATOLOGICAL/NODES: Denies swollen glands. No bleeding or bruising.   CHEST: No CHANDRA, cyanosis, wheezing, cough or sputum production.   CARDIOVASCULAR: Denies chest pain, dyspnea, orthopnea, or palpitations.   GI/ABDOMEN: Appetite fine. No weight loss. Denies nausea, vomiting, diarrhea, constipation, abdominal pain, hematemesis or blood in stool.   URINARY: No dysuria,hematuria,  "nocturia, incontinence, flank pain, urgency, or urinary difficulty.   PERIPHERAL VASCULAR: No claudication, cold intolerance or cyanosis.   MUSCULOSKELETAL: Some intermittent right wrist pain.  NEUROLOGIC: No history of seizures, paralysis, alteration of gait or coordination. Some intermittent numbness in the left 2nd and 3rd fingers with driving that impoves with changes in position.  ENDOCRINE: Sexual maturation. Denies weight change, heat/cold intolerance, hair loss or gain, loss of libido, polyuria, polydipsia, polyphagia, pruritus, unexplained flushing, or excessive sweating.   PSYCH: No crying spells. Denies anxiety symptoms.     IMMUNIZATIONS: Up-to-date. Last tetanus shot was in 2004.     PHYSICAL EXAM:     BP (!) 172/92   Pulse 64   Temp 98.3 °F (36.8 °C) (Oral)   Ht 5' 10" (1.778 m)   Wt 101 kg (222 lb 10.6 oz)   BMI 31.95 kg/m²   GENERAL: This is a healthy-appearing 62-year-old white male, sitting   upright, in no apparent distress. Alert and oriented x4.   SKIN: Reveals erythematous rash over right breast. No suspicious neoplasms.   NEUROLOGICALLY: Cranial nerves 2-12 are grossly intact with no sensory or motor deficits.   PSYCHOLOGICALLY: Mood and affect are normal. Insight and judgment are   excellent. Appearance is well groomed.   Pupils are equal, round and reactive to light. Extraocular movements are intact.   NECK: Supple. There is no lymphadenopathy, thyromegaly or JVD.   CHEST: Clear to auscultation bilaterally with good respiratory movement.   CARDIOVASCULAR: S1, S2. Regular rate and rhythm. No murmurs, rubs or gallops.   EXTREMITIES: Show no cyanosis, clubbing or edema with 2+ distal pulses.   MUSCULOSKELETAL: Reveals full range of motion in all extremities.      Results for orders placed or performed in visit on 10/18/17   Comprehensive metabolic panel   Result Value Ref Range    Sodium 140 136 - 145 mmol/L    Potassium 4.4 3.5 - 5.1 mmol/L    Chloride 103 95 - 110 mmol/L    CO2 30 (H) 23 " - 29 mmol/L    Glucose 99 70 - 110 mg/dL    BUN, Bld 17 6 - 20 mg/dL    Creatinine 1.2 0.5 - 1.4 mg/dL    Calcium 9.8 8.7 - 10.5 mg/dL    Total Protein 7.5 6.0 - 8.4 g/dL    Albumin 3.8 3.5 - 5.2 g/dL    Total Bilirubin 0.8 0.1 - 1.0 mg/dL    Alkaline Phosphatase 130 55 - 135 U/L    AST 27 10 - 40 U/L    ALT 30 10 - 44 U/L    Anion Gap 7 (L) 8 - 16 mmol/L    eGFR if African American >60.0 >60 mL/min/1.73 m^2    eGFR if non African American >60.0 >60 mL/min/1.73 m^2   Lipid panel   Result Value Ref Range    Cholesterol 212 (H) 120 - 199 mg/dL    Triglycerides 71 30 - 150 mg/dL    HDL 49 40 - 75 mg/dL    LDL Cholesterol 148.8 63.0 - 159.0 mg/dL    HDL/Chol Ratio 23.1 20.0 - 50.0 %    Total Cholesterol/HDL Ratio 4.3 2.0 - 5.0    Non-HDL Cholesterol 163 mg/dL   PSA, Screening   Result Value Ref Range    PSA, SCREEN 0.26 0.00 - 4.00 ng/mL   CBC auto differential   Result Value Ref Range    WBC 5.86 3.90 - 12.70 K/uL    RBC 4.77 4.60 - 6.20 M/uL    Hemoglobin 14.7 14.0 - 18.0 g/dL    Hematocrit 43.7 40.0 - 54.0 %    MCV 92 82 - 98 fL    MCH 30.8 27.0 - 31.0 pg    MCHC 33.6 32.0 - 36.0 g/dL    RDW 12.5 11.5 - 14.5 %    Platelets 242 150 - 350 K/uL    MPV 11.3 9.2 - 12.9 fL    Immature Granulocytes 0.5 0.0 - 0.5 %    Gran # 3.5 1.8 - 7.7 K/uL    Immature Grans (Abs) 0.03 0.00 - 0.04 K/uL    Lymph # 1.6 1.0 - 4.8 K/uL    Mono # 0.5 0.3 - 1.0 K/uL    Eos # 0.2 0.0 - 0.5 K/uL    Baso # 0.09 0.00 - 0.20 K/uL    nRBC 0 0 /100 WBC    Gran% 59.0 38.0 - 73.0 %    Lymph% 26.5 18.0 - 48.0 %    Mono% 8.9 4.0 - 15.0 %    Eosinophil% 3.6 0.0 - 8.0 %    Basophil% 1.5 0.0 - 1.9 %    Differential Method Automated    Hemoglobin A1c   Result Value Ref Range    Hemoglobin A1C 5.6 4.0 - 5.6 %    Estimated Avg Glucose 114 68 - 131 mg/dL     ASSESSMENT/PLAN:     Hypertension, unspecified type    Other orders  -     lisinopril-hydrochlorothiazide (PRINZIDE,ZESTORETIC) 20-12.5 mg per tablet; Take 1 tablet by mouth once daily.  Dispense: 30 tablet;  Refill: 6      Begin lisinopril HCT  Continue home BP monitoring and bring cuff to next appt  Discussed healthy lifestyle measures including maintenance of a healthy weight, increasing fruits and vegetables in the diet.   F/u 1 month or PRN

## 2018-02-19 ENCOUNTER — OFFICE VISIT (OUTPATIENT)
Dept: FAMILY MEDICINE | Facility: CLINIC | Age: 63
End: 2018-02-19
Payer: COMMERCIAL

## 2018-02-19 VITALS
RESPIRATION RATE: 18 BRPM | DIASTOLIC BLOOD PRESSURE: 78 MMHG | HEIGHT: 70 IN | WEIGHT: 222 LBS | OXYGEN SATURATION: 96 % | BODY MASS INDEX: 31.78 KG/M2 | SYSTOLIC BLOOD PRESSURE: 110 MMHG | HEART RATE: 63 BPM

## 2018-02-19 DIAGNOSIS — I10 HYPERTENSION, UNSPECIFIED TYPE: Primary | ICD-10-CM

## 2018-02-19 PROCEDURE — 99999 PR PBB SHADOW E&M-EST. PATIENT-LVL III: CPT | Mod: PBBFAC,,, | Performed by: FAMILY MEDICINE

## 2018-02-19 PROCEDURE — 99213 OFFICE O/P EST LOW 20 MIN: CPT | Mod: S$GLB,,, | Performed by: FAMILY MEDICINE

## 2018-02-19 PROCEDURE — 3008F BODY MASS INDEX DOCD: CPT | Mod: S$GLB,,, | Performed by: FAMILY MEDICINE

## 2018-02-19 RX ORDER — LISINOPRIL AND HYDROCHLOROTHIAZIDE 12.5; 2 MG/1; MG/1
1 TABLET ORAL DAILY
Qty: 90 TABLET | Refills: 3 | Status: SHIPPED | OUTPATIENT
Start: 2018-02-19 | End: 2018-11-06 | Stop reason: SDUPTHER

## 2018-02-19 NOTE — PROGRESS NOTES
Chief Complaint   Patient presents with    Follow-up     1 mos       HISTORY OF PRESENT ILLNESS: This is a 62-year-old white male presents today for f/u on high blood pressure.  He has been following Sensible portion diet and doing some walking.  weight stable  Tolerating lisinopril HCT daily for the past month  Denies HA, CP or dizziness.  Home /100  Some walking 20 minutes a day.  GERD -  He has uses Omeprazole 40mg daily with good control.   RLS - tolerating Mirapex 0.5 mg daily. This is controlled    PAST MEDICAL HISTORY:   restless legs syndrome   colon polyps (colonoscopy 8/27/09)  tinea corporis  GERD    PAST SURGICAL HISTORY:   Cholecystectomy  C4-C5 anterior cervical fusion 2004  right carpal tunnel release     FAMILY HISTORY: cancer in his grandparent.     SOCIAL HISTORY: The patient is .   He has children.   Ex-smoker  He drinks alcoholic beverages occasionally.   He works at a bank    REVIEW OF SYSTEMS:   GENERAL: No fever, chills, fatigability or weight changes.   SKIN/BREASTS: No rashes, sores, itching or changes in color or texture of skin. No changes in moles. No pain, swelling, tenderness, lumps, bleeding or discharge from nipples.   HEAD: No headaches or recent head trauma.   EYES: Visual acuity fine. Denies blurriness, tearing, itching, photophobia, diplopia, or visual changes.   EARS: Denies ear pain, discharge, tinnitus or vertigo. Denies hearing loss.   NOSE: No loss of smell, epistaxis, postnasal drip, discharge, obstruction, or sneezing.   MOUTH & THROAT: No hoarseness, change in voice, swallowing difficulty. No excessive gum bleeding.   HEMATOLOGICAL/NODES: Denies swollen glands. No bleeding or bruising.   CHEST: No CHANDRA, cyanosis, wheezing, cough or sputum production.   CARDIOVASCULAR: Denies chest pain, dyspnea, orthopnea, or palpitations.   GI/ABDOMEN: Appetite fine. No weight loss. Denies nausea, vomiting, diarrhea, constipation, abdominal pain, hematemesis or blood in  "stool.   URINARY: No dysuria,hematuria, nocturia, incontinence, flank pain, urgency, or urinary difficulty.   PERIPHERAL VASCULAR: No claudication, cold intolerance or cyanosis.   MUSCULOSKELETAL: Some intermittent right wrist pain.  NEUROLOGIC: No history of seizures, paralysis, alteration of gait or coordination. Some intermittent numbness in the left 2nd and 3rd fingers with driving that impoves with changes in position.  ENDOCRINE: Sexual maturation. Denies weight change, heat/cold intolerance, hair loss or gain, loss of libido, polyuria, polydipsia, polyphagia, pruritus, unexplained flushing, or excessive sweating.   PSYCH: No crying spells. Denies anxiety symptoms.     IMMUNIZATIONS: Up-to-date. Last tetanus shot was in 2004.     PHYSICAL EXAM:     /78   Pulse 63   Resp 18   Ht 5' 10" (1.778 m)   Wt 100.7 kg (222 lb 0.1 oz)   SpO2 96%   BMI 31.85 kg/m²   GENERAL: This is a healthy-appearing 62-year-old white male, sitting   upright, in no apparent distress. Alert and oriented x4.   SKIN: Reveals erythematous rash over right breast. No suspicious neoplasms.   NEUROLOGICALLY: Cranial nerves 2-12 are grossly intact with no sensory or motor deficits.   PSYCHOLOGICALLY: Mood and affect are normal. Insight and judgment are   excellent. Appearance is well groomed.   Pupils are equal, round and reactive to light. Extraocular movements are intact.   NECK: Supple. There is no lymphadenopathy, thyromegaly or JVD.   CHEST: Clear to auscultation bilaterally with good respiratory movement.   CARDIOVASCULAR: S1, S2. Regular rate and rhythm. No murmurs, rubs or gallops.   EXTREMITIES: Show no cyanosis, clubbing or edema with 2+ distal pulses.   MUSCULOSKELETAL: Reveals full range of motion in all extremities.      Results for orders placed or performed in visit on 10/18/17   Comprehensive metabolic panel   Result Value Ref Range    Sodium 140 136 - 145 mmol/L    Potassium 4.4 3.5 - 5.1 mmol/L    Chloride 103 95 - " 110 mmol/L    CO2 30 (H) 23 - 29 mmol/L    Glucose 99 70 - 110 mg/dL    BUN, Bld 17 6 - 20 mg/dL    Creatinine 1.2 0.5 - 1.4 mg/dL    Calcium 9.8 8.7 - 10.5 mg/dL    Total Protein 7.5 6.0 - 8.4 g/dL    Albumin 3.8 3.5 - 5.2 g/dL    Total Bilirubin 0.8 0.1 - 1.0 mg/dL    Alkaline Phosphatase 130 55 - 135 U/L    AST 27 10 - 40 U/L    ALT 30 10 - 44 U/L    Anion Gap 7 (L) 8 - 16 mmol/L    eGFR if African American >60.0 >60 mL/min/1.73 m^2    eGFR if non African American >60.0 >60 mL/min/1.73 m^2   Lipid panel   Result Value Ref Range    Cholesterol 212 (H) 120 - 199 mg/dL    Triglycerides 71 30 - 150 mg/dL    HDL 49 40 - 75 mg/dL    LDL Cholesterol 148.8 63.0 - 159.0 mg/dL    HDL/Chol Ratio 23.1 20.0 - 50.0 %    Total Cholesterol/HDL Ratio 4.3 2.0 - 5.0    Non-HDL Cholesterol 163 mg/dL   PSA, Screening   Result Value Ref Range    PSA, SCREEN 0.26 0.00 - 4.00 ng/mL   CBC auto differential   Result Value Ref Range    WBC 5.86 3.90 - 12.70 K/uL    RBC 4.77 4.60 - 6.20 M/uL    Hemoglobin 14.7 14.0 - 18.0 g/dL    Hematocrit 43.7 40.0 - 54.0 %    MCV 92 82 - 98 fL    MCH 30.8 27.0 - 31.0 pg    MCHC 33.6 32.0 - 36.0 g/dL    RDW 12.5 11.5 - 14.5 %    Platelets 242 150 - 350 K/uL    MPV 11.3 9.2 - 12.9 fL    Immature Granulocytes 0.5 0.0 - 0.5 %    Gran # (ANC) 3.5 1.8 - 7.7 K/uL    Immature Grans (Abs) 0.03 0.00 - 0.04 K/uL    Lymph # 1.6 1.0 - 4.8 K/uL    Mono # 0.5 0.3 - 1.0 K/uL    Eos # 0.2 0.0 - 0.5 K/uL    Baso # 0.09 0.00 - 0.20 K/uL    nRBC 0 0 /100 WBC    Gran% 59.0 38.0 - 73.0 %    Lymph% 26.5 18.0 - 48.0 %    Mono% 8.9 4.0 - 15.0 %    Eosinophil% 3.6 0.0 - 8.0 %    Basophil% 1.5 0.0 - 1.9 %    Differential Method Automated    Hemoglobin A1c   Result Value Ref Range    Hemoglobin A1C 5.6 4.0 - 5.6 %    Estimated Avg Glucose 114 68 - 131 mg/dL     ASSESSMENT/PLAN:     Hypertension, unspecified type  -     lisinopril-hydrochlorothiazide (PRINZIDE,ZESTORETIC) 20-12.5 mg per tablet; Take 1 tablet by mouth once daily.   Dispense: 90 tablet; Refill: 3      Continue lisinopril HCT  Continue home BP monitoring   Discussed healthy lifestyle measures including maintenance of a healthy weight, increasing fruits and vegetables in the diet.   F/u 8 months with labs for physical or PRN

## 2018-04-14 DIAGNOSIS — K21.9 GASTROESOPHAGEAL REFLUX DISEASE WITHOUT ESOPHAGITIS: ICD-10-CM

## 2018-04-14 RX ORDER — OMEPRAZOLE 40 MG/1
40 CAPSULE, DELAYED RELEASE ORAL DAILY
Qty: 90 CAPSULE | Refills: 3 | Status: SHIPPED | OUTPATIENT
Start: 2018-04-14 | End: 2019-03-30 | Stop reason: SDUPTHER

## 2018-08-07 ENCOUNTER — HOSPITAL ENCOUNTER (OUTPATIENT)
Dept: RADIOLOGY | Facility: HOSPITAL | Age: 63
Discharge: HOME | End: 2018-08-07
Attending: NURSE PRACTITIONER
Payer: COMMERCIAL

## 2018-08-07 ENCOUNTER — OFFICE VISIT (OUTPATIENT)
Dept: FAMILY MEDICINE | Facility: CLINIC | Age: 63
End: 2018-08-07
Payer: COMMERCIAL

## 2018-08-07 VITALS
DIASTOLIC BLOOD PRESSURE: 82 MMHG | OXYGEN SATURATION: 97 % | SYSTOLIC BLOOD PRESSURE: 130 MMHG | RESPIRATION RATE: 18 BRPM | WEIGHT: 231.06 LBS | HEART RATE: 67 BPM | TEMPERATURE: 98 F | HEIGHT: 70 IN | BODY MASS INDEX: 33.08 KG/M2

## 2018-08-07 DIAGNOSIS — M54.50 LUMBAR PAIN: ICD-10-CM

## 2018-08-07 DIAGNOSIS — M79.10 MYALGIA: ICD-10-CM

## 2018-08-07 DIAGNOSIS — M54.16 LUMBAR RADICULOPATHY: ICD-10-CM

## 2018-08-07 DIAGNOSIS — M54.16 LUMBAR RADICULOPATHY: Primary | ICD-10-CM

## 2018-08-07 DIAGNOSIS — I10 HYPERTENSION, UNSPECIFIED TYPE: ICD-10-CM

## 2018-08-07 PROCEDURE — 3075F SYST BP GE 130 - 139MM HG: CPT | Mod: CPTII,S$GLB,, | Performed by: NURSE PRACTITIONER

## 2018-08-07 PROCEDURE — 72110 X-RAY EXAM L-2 SPINE 4/>VWS: CPT | Mod: TC,FY,PO

## 2018-08-07 PROCEDURE — 99999 PR PBB SHADOW E&M-EST. PATIENT-LVL IV: CPT | Mod: PBBFAC,,, | Performed by: NURSE PRACTITIONER

## 2018-08-07 PROCEDURE — 3079F DIAST BP 80-89 MM HG: CPT | Mod: CPTII,S$GLB,, | Performed by: NURSE PRACTITIONER

## 2018-08-07 PROCEDURE — 72110 X-RAY EXAM L-2 SPINE 4/>VWS: CPT | Mod: 26,,, | Performed by: RADIOLOGY

## 2018-08-07 PROCEDURE — 99214 OFFICE O/P EST MOD 30 MIN: CPT | Mod: S$GLB,,, | Performed by: NURSE PRACTITIONER

## 2018-08-07 PROCEDURE — 3008F BODY MASS INDEX DOCD: CPT | Mod: CPTII,S$GLB,, | Performed by: NURSE PRACTITIONER

## 2018-08-07 RX ORDER — TIZANIDINE 4 MG/1
4 TABLET ORAL EVERY 8 HOURS PRN
Qty: 20 TABLET | Refills: 0 | Status: SHIPPED | OUTPATIENT
Start: 2018-08-07 | End: 2018-08-20 | Stop reason: SDUPTHER

## 2018-08-07 RX ORDER — METHYLPREDNISOLONE 4 MG/1
TABLET ORAL
Qty: 1 PACKAGE | Refills: 0 | Status: SHIPPED | OUTPATIENT
Start: 2018-08-07 | End: 2018-08-28

## 2018-08-07 NOTE — PROGRESS NOTES
Subjective:       Patient ID: Moustapha Murray is a 63 y.o. male.    Chief Complaint: Back Pain (for about a week, suddenly)    Back Pain   This is a new problem. The current episode started in the past 7 days (can not remember anything that he did that would cause pain - came on suddenly ). The problem occurs constantly. The problem has been gradually worsening since onset. The pain is present in the sacro-iliac and lumbar spine. The quality of the pain is described as shooting. The pain radiates to the right knee. The pain is at a severity of 8/10. The pain is severe. The pain is worse during the day. The symptoms are aggravated by position and standing. Stiffness is present all day. Associated symptoms include leg pain, numbness, paresis and tingling. Pertinent negatives include no abdominal pain, bladder incontinence, bowel incontinence, chest pain, dysuria, fever, headaches, paresthesias, pelvic pain, perianal numbness, weakness or weight loss. He has tried NSAIDs, home exercises and walking for the symptoms. The treatment provided no relief.     Vitals:    08/07/18 0818   BP: 130/82   Pulse: 67   Resp: 18   Temp: 97.5 °F (36.4 °C)     Review of Systems   Constitutional: Negative.  Negative for fatigue, fever and weight loss.   HENT: Negative.    Eyes: Negative.    Respiratory: Negative.  Negative for cough and shortness of breath.    Cardiovascular: Negative.  Negative for chest pain.   Gastrointestinal: Negative.  Negative for abdominal pain, bowel incontinence, diarrhea and nausea.   Endocrine: Negative.    Genitourinary: Negative.  Negative for bladder incontinence, dysuria, hematuria and pelvic pain.   Musculoskeletal: Positive for arthralgias, back pain and myalgias.   Skin: Negative.  Negative for color change and rash.   Allergic/Immunologic: Negative.    Neurological: Positive for tingling and numbness. Negative for weakness, headaches and paresthesias.   Hematological: Negative.        Past Medical  History:   Diagnosis Date    Depression     RLS (restless legs syndrome)      Objective:      Physical Exam   Constitutional: He is oriented to person, place, and time. He appears well-developed and well-nourished.   HENT:   Head: Normocephalic and atraumatic.   Mouth/Throat: Oropharynx is clear and moist.   Eyes: Conjunctivae and EOM are normal. Pupils are equal, round, and reactive to light.   Neck: Normal range of motion. Neck supple.   Cardiovascular: Normal rate, regular rhythm, normal heart sounds and intact distal pulses.  Exam reveals no friction rub.    No murmur heard.  Pulmonary/Chest: Effort normal and breath sounds normal.   Abdominal: Soft. Bowel sounds are normal.   Musculoskeletal:        Lumbar back: He exhibits decreased range of motion, tenderness, pain and spasm.   Pain radiating into right leg with L2-3 dermatome    Neurological: He is alert and oriented to person, place, and time.   Skin: Skin is warm and dry.   Psychiatric: He has a normal mood and affect. His behavior is normal.   Nursing note and vitals reviewed.      Assessment:       1. Lumbar radiculopathy    2. Lumbar pain    3. Myalgia    4. Hypertension, unspecified type        Plan:       Lumbar radiculopathy  -     X-Ray Lumbar Spine Complete 5 View; Future; Expected date: 08/07/2018  -     MRI Lumbar Spine Without Contrast; Future; Expected date: 08/07/2018  -     methylPREDNISolone (MEDROL DOSEPACK) 4 mg tablet; use as directed  Dispense: 1 Package; Refill: 0  -     tiZANidine (ZANAFLEX) 4 MG tablet; Take 1 tablet (4 mg total) by mouth every 8 (eight) hours as needed.  Dispense: 20 tablet; Refill: 0    Lumbar pain  -     X-Ray Lumbar Spine Complete 5 View; Future; Expected date: 08/07/2018  -     MRI Lumbar Spine Without Contrast; Future; Expected date: 08/07/2018  -     methylPREDNISolone (MEDROL DOSEPACK) 4 mg tablet; use as directed  Dispense: 1 Package; Refill: 0  -     tiZANidine (ZANAFLEX) 4 MG tablet; Take 1 tablet (4 mg  total) by mouth every 8 (eight) hours as needed.  Dispense: 20 tablet; Refill: 0    Myalgia  -     X-Ray Lumbar Spine Complete 5 View; Future; Expected date: 08/07/2018  -     MRI Lumbar Spine Without Contrast; Future; Expected date: 08/07/2018  -     methylPREDNISolone (MEDROL DOSEPACK) 4 mg tablet; use as directed  Dispense: 1 Package; Refill: 0  -     tiZANidine (ZANAFLEX) 4 MG tablet; Take 1 tablet (4 mg total) by mouth every 8 (eight) hours as needed.  Dispense: 20 tablet; Refill: 0    Hypertension, unspecified type  Stable   Discussed to monitor           FU if not better   Will call with imaging

## 2018-08-13 RX ORDER — LISINOPRIL AND HYDROCHLOROTHIAZIDE 12.5; 2 MG/1; MG/1
1 TABLET ORAL DAILY
Qty: 30 TABLET | Refills: 5 | Status: ON HOLD | OUTPATIENT
Start: 2018-08-13 | End: 2019-07-17 | Stop reason: SDUPTHER

## 2018-08-17 ENCOUNTER — HOSPITAL ENCOUNTER (OUTPATIENT)
Dept: RADIOLOGY | Facility: HOSPITAL | Age: 63
Discharge: HOME | End: 2018-08-17
Attending: NURSE PRACTITIONER
Payer: COMMERCIAL

## 2018-08-17 DIAGNOSIS — M79.10 MYALGIA: ICD-10-CM

## 2018-08-17 DIAGNOSIS — M54.16 LUMBAR RADICULOPATHY: ICD-10-CM

## 2018-08-17 DIAGNOSIS — M54.50 LUMBAR PAIN: ICD-10-CM

## 2018-08-17 PROCEDURE — 72148 MRI LUMBAR SPINE W/O DYE: CPT | Mod: 26,,, | Performed by: RADIOLOGY

## 2018-08-17 PROCEDURE — 72148 MRI LUMBAR SPINE W/O DYE: CPT | Mod: TC,PO

## 2018-08-19 ENCOUNTER — TELEPHONE (OUTPATIENT)
Dept: FAMILY MEDICINE | Facility: CLINIC | Age: 63
End: 2018-08-19

## 2018-08-19 DIAGNOSIS — M79.604 RIGHT LEG PAIN: ICD-10-CM

## 2018-08-19 DIAGNOSIS — M54.16 LUMBAR RADICULOPATHY: Primary | ICD-10-CM

## 2018-08-19 NOTE — TELEPHONE ENCOUNTER
Please call him and tell him that There is moderate-to-marked right foraminal stenosis and there is contact with the right L3 root by the foraminal disc protrusion.  This is the area that is giving him trouble.  He needs to see Pain mgt and referral to PT   Referral to pain clinic and [facility]

## 2018-08-20 ENCOUNTER — PATIENT MESSAGE (OUTPATIENT)
Dept: FAMILY MEDICINE | Facility: CLINIC | Age: 63
End: 2018-08-20

## 2018-08-20 DIAGNOSIS — M54.50 LUMBAR PAIN: ICD-10-CM

## 2018-08-20 DIAGNOSIS — M79.10 MYALGIA: ICD-10-CM

## 2018-08-20 DIAGNOSIS — M54.16 LUMBAR RADICULOPATHY: ICD-10-CM

## 2018-08-20 RX ORDER — TIZANIDINE 4 MG/1
TABLET ORAL
Qty: 20 TABLET | Refills: 0 | Status: ON HOLD | OUTPATIENT
Start: 2018-08-20 | End: 2018-09-17 | Stop reason: SDUPTHER

## 2018-08-20 NOTE — TELEPHONE ENCOUNTER
Notified patient of results and patient verbalized understanding. Scheduled with Dr Lam's first available.

## 2018-08-29 ENCOUNTER — OFFICE VISIT (OUTPATIENT)
Dept: PAIN MEDICINE | Facility: CLINIC | Age: 63
End: 2018-08-29
Payer: COMMERCIAL

## 2018-08-29 VITALS
HEIGHT: 70 IN | SYSTOLIC BLOOD PRESSURE: 107 MMHG | BODY MASS INDEX: 31.5 KG/M2 | HEART RATE: 76 BPM | DIASTOLIC BLOOD PRESSURE: 68 MMHG | WEIGHT: 220 LBS

## 2018-08-29 DIAGNOSIS — M54.16 LUMBAR RADICULITIS: ICD-10-CM

## 2018-08-29 DIAGNOSIS — M51.36 DDD (DEGENERATIVE DISC DISEASE), LUMBAR: Primary | ICD-10-CM

## 2018-08-29 PROCEDURE — 99244 OFF/OP CNSLTJ NEW/EST MOD 40: CPT | Mod: S$GLB,,, | Performed by: ANESTHESIOLOGY

## 2018-08-29 PROCEDURE — 99999 PR PBB SHADOW E&M-EST. PATIENT-LVL III: CPT | Mod: PBBFAC,,, | Performed by: ANESTHESIOLOGY

## 2018-08-29 RX ORDER — ZOSTER VACCINE RECOMBINANT, ADJUVANTED 50 MCG/0.5
KIT INTRAMUSCULAR
Refills: 0 | COMMUNITY
Start: 2018-08-16 | End: 2020-12-15

## 2018-08-29 NOTE — PROGRESS NOTES
This note was completed with dictation software and grammatical errors may exist.    Referring Physician: Yumiko Shelley, *    PCP: Colton Johnson MD      CC:  Right leg pain    HPI:   Moustapha Murray is a 63 y.o. male referred to us for right leg pain.  Pain has gradually worsened over the past 6 weeks.  No recent traumatic incident.  He has intermittent burning, sharp pain from his lower back.  Pain radiates to his anterior thigh.  Pain sometimes travels to his lateral thigh in his calf as well. Pain worsens with standing, getting up and walking.  Pain improves with rest.  He has tried physical therapy with minimal benefit.  He recently had MRI of the lumbar spine which showed L3-4 disc protrusion.  He has taken NSAIDs with minimal benefit.  He denies any worsening weakness.  No bowel bladder changes    ROS:  CONSTITUTIONAL: No fevers, chills, night sweats, wt. loss, appetite changes  SKIN: no rashes or itching  ENT: No headaches, head trauma, vision changes, or eye pain  LYMPH NODES: None noticed   CV: No chest pain, palpitations.   RESP: No shortness of breath, dyspnea on exertion, cough, wheezing, or hemoptysis  GI: No nausea, emesis, diarrhea, constipation, melena, hematochezia, pain.    : No dysuria, hematuria, urgency, or frequency   HEME: No easy bruising, bleeding problems  PSYCHIATRIC: No depression, anxiety, psychosis, hallucinations.  NEURO: No seizures, memory loss, dizziness or difficulty sleeping  MSK:  Positive HPI      Past Medical History:   Diagnosis Date    Depression     RLS (restless legs syndrome)      Past Surgical History:   Procedure Laterality Date    APPENDECTOMY      CARPAL TUNNEL RELEASE      CERVICAL FUSION      CHOLECYSTECTOMY      CIRCUMCISION       History reviewed. No pertinent family history.  Social History     Socioeconomic History    Marital status:      Spouse name: None    Number of children: None    Years of education: None    Highest education  "level: None   Social Needs    Financial resource strain: None    Food insecurity - worry: None    Food insecurity - inability: None    Transportation needs - medical: None    Transportation needs - non-medical: None   Occupational History    Occupation:    Tobacco Use    Smoking status: Former Smoker     Packs/day: 1.50     Types: Cigarettes     Last attempt to quit: 10/16/2013     Years since quittin.8    Smokeless tobacco: Never Used   Substance and Sexual Activity    Alcohol use: None    Drug use: None    Sexual activity: None   Other Topics Concern    None   Social History Narrative    None         Medications/Allergies: See med card    Vitals:    18 0840   BP: 107/68   Pulse: 76   Weight: 99.8 kg (220 lb)   Height: 5' 10" (1.778 m)   PainSc:   4   PainLoc: Back         Physical exam:    GENERAL: A and O x3, the patient appears well groomed and is in no acute distress.  Skin: No rashes or obvious lesions  HEENT: normocephalic, atraumatic  CARDIOVASCULAR:  Palpable peripheral pulses  LUNGS: easy work of breathing  ABDOMEN: soft, nontender   UPPER EXTREMITIES: Normal alignment, normal range of motion, no atrophy, no skin changes,  hair growth and nail growth normal and equal bilaterally. No swelling, no tenderness.    LOWER EXTREMITIES:  Normal alignment, normal range of motion, no atrophy, no skin changes,  hair growth and nail growth normal and equal bilaterally. No swelling, no tenderness.    LUMBAR SPINE  Lumbar spine: ROM is full with flexion extension and oblique extension with no increased pain.    Scot's test causes no increased pain on either side.    Supine straight leg raise is negative bilaterally.   +Femoral Stretch on right  Internal and external rotation of the hip causes no increased pain on either side.  Myofascial exam: No tenderness to palpation across lumbar paraspinous muscles.      MENTAL STATUS: normal orientation, speech, language, and fund of " knowledge for social situation.  Emotional state appropriate.    CRANIAL NERVES:  II:  PERRL bilaterally,   III,IV,VI: EOMI.    V:  Facial sensation equal bilaterally  VII:  Facial motor function normal.  VIII:  Hearing equal to finger rub bilaterally  IX/X: Gag normal, palate symmetric  XI:  Shoulder shrug equal, head turn equal  XII:  Tongue midline without fasciculations      MOTOR: Tone and bulk: normal bilateral upper and lower Strength: normal   Delt Bi Tri WE WF     R 5 5 5 5 5 5   L 5 5 5 5 5 5     IP ADD ABD Quad TA Gas HAM  R 5 5 5 5 5 5 5  L 5 5 5 5 5 5 5    SENSATION: Light touch and pinprick intact bilaterally  REFLEXES: normal, symmetric, nonbrisk.  Toes down, no clonus. No hoffmans.  GAIT: normal rise, base, steps, and arm swing.        Imaging:  MRI L-spine 8/17/18  L2-3: There is a mild disc bulge with dorsal annular fissure.  There is mild facet joint arthropathy.  There is no spinal canal or significant foraminal stenosis.    L3-4: There is a diffuse disc bulge with superimposed right foraminal disc protrusion.  There is mild facet joint arthropathy.  There is no spinal stenosis.  There is moderate-to-marked right foraminal stenosis and there is contact with the right L3 root by the foraminal disc protrusion.  There is mild left foraminal stenosis.    L4-5: There is trace retrolisthesis of L4 on L5.  There is a diffuse disc bulge with superimposed central disc protrusion with annular fissure.  There is mild facet joint arthropathy.  There is borderline spinal stenosis.  There is mild-to-moderate bilateral foraminal stenosis.    L5-S1: There is grade 1 spondylolisthesis with bilateral spondylolysis.  There is moderate-to-marked facet joint arthropathy.  There is unroofing of a diffuse disc bulge.  There is no significant spinal stenosis but there is severe bilateral foraminal stenosis.    Assessment:  Patient referred for right leg pain.  1. DDD (degenerative disc disease), lumbar    2. Lumbar  radiculitis          Plan:  1. I have stressed the importance of physical activity and exercise to improve overall health  2. I think that the patient's back pain and radicular leg symptoms are due to degenerative disc disease and have recommended a lumbar epidural steroid injection to the right L3-4 and L4-5 level(s).    3. Reviewed MRI findings with patient  4. Follow up after procedure      Thank you for referring this interesting patient, and I look forward to continuing to collaborate in his care.

## 2018-09-04 DIAGNOSIS — M54.16 LUMBAR RADICULOPATHY: Primary | ICD-10-CM

## 2018-09-11 ENCOUNTER — PATIENT MESSAGE (OUTPATIENT)
Dept: ADMINISTRATIVE | Facility: OTHER | Age: 63
End: 2018-09-11

## 2018-09-14 RX ORDER — NAPROXEN SODIUM 220 MG
220 TABLET ORAL 2 TIMES DAILY PRN
Status: ON HOLD | COMMUNITY
End: 2023-02-01 | Stop reason: HOSPADM

## 2018-09-17 ENCOUNTER — HOSPITAL ENCOUNTER (OUTPATIENT)
Facility: AMBULARY SURGERY CENTER | Age: 63
Discharge: HOME | End: 2018-09-17
Attending: ANESTHESIOLOGY | Admitting: ANESTHESIOLOGY
Payer: COMMERCIAL

## 2018-09-17 DIAGNOSIS — M54.16 LUMBAR RADICULOPATHY: ICD-10-CM

## 2018-09-17 DIAGNOSIS — M54.16 LUMBAR RADICULITIS: Primary | ICD-10-CM

## 2018-09-17 DIAGNOSIS — M54.50 LUMBAR PAIN: ICD-10-CM

## 2018-09-17 DIAGNOSIS — M79.10 MYALGIA: ICD-10-CM

## 2018-09-17 PROCEDURE — 64483 NJX AA&/STRD TFRM EPI L/S 1: CPT | Performed by: ANESTHESIOLOGY

## 2018-09-17 PROCEDURE — 64484 NJX AA&/STRD TFRM EPI L/S EA: CPT | Mod: RT,,, | Performed by: ANESTHESIOLOGY

## 2018-09-17 PROCEDURE — 64484 NJX AA&/STRD TFRM EPI L/S EA: CPT | Performed by: ANESTHESIOLOGY

## 2018-09-17 PROCEDURE — 64483 NJX AA&/STRD TFRM EPI L/S 1: CPT | Mod: RT,,, | Performed by: ANESTHESIOLOGY

## 2018-09-17 PROCEDURE — 99152 MOD SED SAME PHYS/QHP 5/>YRS: CPT | Mod: ,,, | Performed by: ANESTHESIOLOGY

## 2018-09-17 RX ORDER — MIDAZOLAM HYDROCHLORIDE 1 MG/ML
INJECTION INTRAMUSCULAR; INTRAVENOUS
Status: DISCONTINUED | OUTPATIENT
Start: 2018-09-17 | End: 2018-09-17 | Stop reason: HOSPADM

## 2018-09-17 RX ORDER — MIDAZOLAM HYDROCHLORIDE 1 MG/ML
INJECTION INTRAMUSCULAR; INTRAVENOUS
Status: DISCONTINUED
Start: 2018-09-17 | End: 2018-09-17 | Stop reason: HOSPADM

## 2018-09-17 RX ORDER — METHYLPREDNISOLONE ACETATE 80 MG/ML
INJECTION, SUSPENSION INTRA-ARTICULAR; INTRALESIONAL; INTRAMUSCULAR; SOFT TISSUE
Status: DISCONTINUED | OUTPATIENT
Start: 2018-09-17 | End: 2018-09-17 | Stop reason: HOSPADM

## 2018-09-17 RX ORDER — SODIUM CHLORIDE, SODIUM LACTATE, POTASSIUM CHLORIDE, CALCIUM CHLORIDE 600; 310; 30; 20 MG/100ML; MG/100ML; MG/100ML; MG/100ML
INJECTION, SOLUTION INTRAVENOUS ONCE AS NEEDED
Status: COMPLETED | OUTPATIENT
Start: 2018-09-17 | End: 2018-09-17

## 2018-09-17 RX ORDER — LIDOCAINE HYDROCHLORIDE 10 MG/ML
INJECTION, SOLUTION EPIDURAL; INFILTRATION; INTRACAUDAL; PERINEURAL
Status: DISCONTINUED
Start: 2018-09-17 | End: 2018-09-17 | Stop reason: HOSPADM

## 2018-09-17 RX ORDER — METHYLPREDNISOLONE ACETATE 80 MG/ML
INJECTION, SUSPENSION INTRA-ARTICULAR; INTRALESIONAL; INTRAMUSCULAR; SOFT TISSUE
Status: DISCONTINUED
Start: 2018-09-17 | End: 2018-09-17 | Stop reason: HOSPADM

## 2018-09-17 RX ORDER — TIZANIDINE 4 MG/1
4 TABLET ORAL EVERY 8 HOURS PRN
Qty: 60 TABLET | Refills: 0 | Status: SHIPPED | OUTPATIENT
Start: 2018-09-17 | End: 2018-11-06 | Stop reason: SDUPTHER

## 2018-09-17 RX ORDER — LIDOCAINE HYDROCHLORIDE 10 MG/ML
INJECTION, SOLUTION EPIDURAL; INFILTRATION; INTRACAUDAL; PERINEURAL
Status: DISCONTINUED | OUTPATIENT
Start: 2018-09-17 | End: 2018-09-17 | Stop reason: HOSPADM

## 2018-09-17 RX ORDER — FENTANYL CITRATE 50 UG/ML
INJECTION, SOLUTION INTRAMUSCULAR; INTRAVENOUS
Status: DISCONTINUED
Start: 2018-09-17 | End: 2018-09-17 | Stop reason: HOSPADM

## 2018-09-17 RX ORDER — FENTANYL CITRATE 50 UG/ML
INJECTION, SOLUTION INTRAMUSCULAR; INTRAVENOUS
Status: DISCONTINUED | OUTPATIENT
Start: 2018-09-17 | End: 2018-09-17 | Stop reason: HOSPADM

## 2018-09-17 RX ADMIN — SODIUM CHLORIDE, SODIUM LACTATE, POTASSIUM CHLORIDE, CALCIUM CHLORIDE: 600; 310; 30; 20 INJECTION, SOLUTION INTRAVENOUS at 01:09

## 2018-09-17 NOTE — INTERVAL H&P NOTE
The patient has been examined and the H&P has been reviewed:    I concur with the findings and no changes have occurred since H&P was written.   This patient has been cleared for surgery in an ambulatory surgical facility    ASA 3,  Mallampatti Score 3  No history of anesthetic complications  Plan for RN IV sedation      Anesthesia/Surgery risks, benefits and alternative options discussed and understood by patient/family.          Active Hospital Problems    Diagnosis  POA    Lumbar radiculitis [M54.16]  Yes      Resolved Hospital Problems   No resolved problems to display.     
Lee Torres), Orthopaedic Surgery  [address]  [address] [place] [address]  Phone: [phone]  Fax: [phone]

## 2018-09-17 NOTE — OP NOTE
PROCEDURE DATE: 9/17/2018    PROCEDURE: Right L3-4 and L4-5 transforaminal epidural steroid injection under fluoroscopy    DIAGNOSIS: Lumbar disc displacement without myelopathy  Post op diagnosis: Same    PHYSICIAN: Chris Palencia MD    MEDICATIONS INJECTED:  Dexamethasone 5mg (0.5ml) and 1.5ml 0.25% bupivicaine at each nerve root.     LOCAL ANESTHETIC INJECTED:  Lidocaine 1%. 2 ml per site.    SEDATION MEDICATIONS: RN IV sedation    ESTIMATED BLOOD LOSS:  None    COMPLICATIONS:  None    TECHNIQUE:   A time-out was taken to identify patient and procedure side prior to starting the procedure. The patient was placed in a prone position, prepped and draped in the usual sterile fashion using ChloraPrep and sterile towels.  The area to be injected was determined under fluoroscopic guidance in AP and oblique view.  Local anesthetic was given by raising a wheal and going down to the hub of a 25-gauge 1.5 inch needle.  In oblique view, a 3.5 inch 22-gauge bent-tip spinal needle was introduced towards 6 oclock position of the pedicle of each above named nerve root level.  The needle was walked medially then hinged into the neural foramen and position was confirmed in AP and lateral views.  1ml contrast dye was injected to confirm appropriate placement and that there was no vascular uptake.  After negative aspiration for blood or CSF, the medication was then injected. This was performed at the right L3-4 and L4-5 level(s). The patient tolerated the procedure well.    The patient was monitored after the procedure.  Patient was given post procedure and discharge instructions to follow at home. The patient was discharged in a stable condition.

## 2018-09-17 NOTE — DISCHARGE SUMMARY
[facility]  Discharge Note  Short Stay    Admit Date: 9/17/2018    Discharge Date and Time: 9/17/2018    Attending Physician: Chris Palencia MD     Discharge Provider: Chris Palencia    Diagnoses:  Active Hospital Problems    Diagnosis  POA    *Lumbar radiculitis [M54.16]  Yes      Resolved Hospital Problems   No resolved problems to display.       Hospital Course: Lumbar Shubham  Discharged Condition: Good    Final Diagnoses:   Active Hospital Problems    Diagnosis  POA    *Lumbar radiculitis [M54.16]  Yes      Resolved Hospital Problems   No resolved problems to display.       Disposition: Home or Self Care    Follow up/Patient Instructions:    Medications:  Reconciled Home Medications:      Medication List      CONTINUE taking these medications    * lisinopril-hydrochlorothiazide 20-12.5 mg per tablet  Commonly known as:  PRINZIDE,ZESTORETIC  Take 1 tablet by mouth once daily.     * lisinopril-hydrochlorothiazide 20-12.5 mg per tablet  Commonly known as:  PRINZIDE,ZESTORETIC  TAKE 1 TABLET BY MOUTH ONCE DAILY.     naproxen sodium 220 MG tablet  Commonly known as:  ANAPROX  Take 220 mg by mouth every 12 (twelve) hours.     omeprazole 40 MG capsule  Commonly known as:  PRILOSEC  TAKE 1 CAPSULE (40 MG TOTAL) BY MOUTH ONCE DAILY.     pramipexole 0.5 MG tablet  Commonly known as:  MIRAPEX  TAKE 1 TABLET (0.5 MG TOTAL) BY MOUTH EVERY EVENING.     SHINGRIX (PF) 50 mcg/0.5 mL injection  Generic drug:  varicella-zoster [id number] (PF)  TO BE ADMINISTERED BY PHARMACIST FOR IMMUNIZATION     tiZANidine 4 MG tablet  Commonly known as:  ZANAFLEX  TAKE 1 TABLET BY MOUTH EVERY 8 HOURS AS NEEDED         * This list has 2 medication(s) that are the same as other medications prescribed for you. Read the directions carefully, and ask your doctor or other care provider to review them with you.              Discharge Procedure Orders   Call MD for:  temperature >100.4     Call MD for:  persistent nausea and vomiting or diarrhea      Call MD for:  severe uncontrolled pain     Call MD for:  redness, tenderness, or signs of infection (pain, swelling, redness, odor or green/yellow discharge around incision site)     Call MD for:  difficulty breathing or increased cough     Call MD for:  severe persistent headache        Follow up with MD in 2-3 weeks    Discharge Procedure Orders (must include Diet, Follow-up, Activity):   Discharge Procedure Orders (must include Diet, Follow-up, Activity)   Call MD for:  temperature >100.4     Call MD for:  persistent nausea and vomiting or diarrhea     Call MD for:  severe uncontrolled pain     Call MD for:  redness, tenderness, or signs of infection (pain, swelling, redness, odor or green/yellow discharge around incision site)     Call MD for:  difficulty breathing or increased cough     Call MD for:  severe persistent headache

## 2018-09-17 NOTE — DISCHARGE INSTRUCTIONS
Recovery After Procedural Sedation (Adult)  You have been given medicine by vein to make you sleep during your surgery. This may have included both a pain medicine and sleeping medicine. Most of the effects have worn off. But you may still have some drowsiness for the next 6 to 8 hours.  Home care  Follow these guidelines when you get home:  · For the next 8 hours, you should be watched by a responsible adult. This person should make sure your condition is not getting worse.  · Don't drink any alcohol for the next 24 hours.  · Don't drive, operate dangerous machinery, or make important business or personal decisions during the next 24 hours.  Note: Your healthcare provider may tell you not to take any medicine by mouth for pain or sleep in the next 4 hours. These medicines may react with the medicines you were given in the hospital. This could cause a much stronger response than usual.  Follow-up care  Follow up with your healthcare provider if you are not alert and back to your usual level of activity within 12 hours.  When to seek medical advice  Call your healthcare provider right away if any of these occur:  · Drowsiness gets worse  · Weakness or dizziness gets worse  · Repeated vomiting  · You can't be awakened   Date Last Reviewed: 10/18/2016  © 4915-2544 [employer]. [address] [place] [address], [place] [address]. All rights reserved. This information is not intended as a substitute for professional medical care. Always follow your healthcare professional's instructions.      Pain injection instructions:     Steroids take about a 2 weeks to relieve pain.  Initially you may get pain relief from the local anesthetic but this may wear off before the steroid works.    No driving for 24 hrs.   Activity as tolerated- gradually increase activities.  Dont lift over 10 lbs for 24 hrs   No heat at injection sites x 2 days. No heating pads, hot tubs, saunas, or swimming in any body of water or pool for 2  days.  Use ice pack for mild swelling and for comfort , apply for 20 minutes, remove for 20 minute intervals. No direct contact of ice itself  to skin.  May shower today. No tub baths for two days.      Resume Aspirin, Plavix, or Coumadin the day after the procedure unless otherwise instructed.   If diabetic,monitor your glucose carefully as steroids can increase your glucose level    Seek immediate medical help for:   Severe increase in your usual pain or appearance of new pain.  Prolonged (mor than 8 hours) or increasing weakness or numbness in the legs or arms.    - Numbing medicine was injected that affects nerves that carry information from       muscles to the  brain and the brain to the muscles.  This numbness can last 6-8 hrs so be very careful and get assistance when standing or walking.    Fever above 101 ,Drainage,redness,active bleeding, or increased swelling at the injection site.  Headache, shortness of breath, chest pain, or breathing problems.

## 2018-09-18 VITALS
OXYGEN SATURATION: 93 % | BODY MASS INDEX: 31.5 KG/M2 | RESPIRATION RATE: 18 BRPM | SYSTOLIC BLOOD PRESSURE: 124 MMHG | TEMPERATURE: 98 F | HEIGHT: 70 IN | WEIGHT: 220 LBS | DIASTOLIC BLOOD PRESSURE: 65 MMHG | HEART RATE: 71 BPM

## 2018-10-12 RX ORDER — PRAMIPEXOLE DIHYDROCHLORIDE 0.5 MG/1
0.5 TABLET ORAL NIGHTLY
Qty: 90 TABLET | Refills: 3 | Status: SHIPPED | OUTPATIENT
Start: 2018-10-12 | End: 2019-10-16 | Stop reason: SDUPTHER

## 2018-10-26 ENCOUNTER — OFFICE VISIT (OUTPATIENT)
Dept: PAIN MEDICINE | Facility: CLINIC | Age: 63
End: 2018-10-26
Payer: COMMERCIAL

## 2018-10-26 VITALS
HEART RATE: 67 BPM | SYSTOLIC BLOOD PRESSURE: 127 MMHG | HEIGHT: 70 IN | BODY MASS INDEX: 31.5 KG/M2 | DIASTOLIC BLOOD PRESSURE: 74 MMHG | WEIGHT: 220 LBS

## 2018-10-26 DIAGNOSIS — M47.819 FACET ARTHROPATHY: ICD-10-CM

## 2018-10-26 DIAGNOSIS — M51.36 DDD (DEGENERATIVE DISC DISEASE), LUMBAR: ICD-10-CM

## 2018-10-26 DIAGNOSIS — M54.16 LUMBAR RADICULOPATHY: Primary | ICD-10-CM

## 2018-10-26 PROCEDURE — 99999 PR PBB SHADOW E&M-EST. PATIENT-LVL III: CPT | Mod: PBBFAC,,, | Performed by: PHYSICIAN ASSISTANT

## 2018-10-26 PROCEDURE — 99214 OFFICE O/P EST MOD 30 MIN: CPT | Mod: S$GLB,,, | Performed by: PHYSICIAN ASSISTANT

## 2018-10-26 PROCEDURE — 3074F SYST BP LT 130 MM HG: CPT | Mod: CPTII,S$GLB,, | Performed by: PHYSICIAN ASSISTANT

## 2018-10-26 PROCEDURE — 3008F BODY MASS INDEX DOCD: CPT | Mod: CPTII,S$GLB,, | Performed by: PHYSICIAN ASSISTANT

## 2018-10-26 PROCEDURE — 3078F DIAST BP <80 MM HG: CPT | Mod: CPTII,S$GLB,, | Performed by: PHYSICIAN ASSISTANT

## 2018-10-26 RX ORDER — TIZANIDINE 4 MG/1
4 TABLET ORAL EVERY 8 HOURS PRN
Qty: 60 TABLET | Refills: 0 | Status: SHIPPED | OUTPATIENT
Start: 2018-10-26 | End: 2018-11-25

## 2018-10-26 NOTE — PATIENT INSTRUCTIONS
Exercises To Strengthen Your Lower Back  Strong lower-back and abdominal muscles work together to support your spine. These exercises will help strengthen the muscles of the lower back. It is important that you begin exercising slowly and increase levels gradually.  Always begin any exercise program with stretching. If you feel pain while doing any of these exercises, stop and talk to your doctor about a more specific exercise program that suits your condition better.  Low Back Stretch  · Lie on your back with your knees bent and both feet on the ground.  ·   Slowly raise your left knee to your chest as you flatten your lower back against the floor. Hold for 5 seconds.  · Relax and repeat the exercise with your right knee.  · Do 10 of these exercises for each leg.  · Repeat hugging both knees to your chest at the same time.  Building Lower Back Strength  1. Kneeling Lumbar Extension: Begin on your hands and knees. Simultaneously raise and straighten your right arm and left leg until they are parallel to the ground. Hold for 2 seconds and come back slowly to a starting position. Repeat with left arm and right leg, alternating 10 times.  2. Prone Lumbar Extension: Lie face down, arms extended overhead, palms on the floor. Simultaneously raise your right arm and left leg as high as comfortably possible. Hold for 10 seconds and slowly return to start. Repeat with left arm and right leg, alternating 10 times. Gradually build up to 20 times. (Advanced: Repeat this exercise raising both arms and both legs a few inches off the floor at the same time. Hold for 5 seconds and release.)  3. Pelvic Tilt: Lie on the floor on your back with your knees bent at 90 degrees. Your feet should be flat on the floor. Inhale, exhale, then slowly contract your abdominal muscles bringing your navel toward your spine. Let your pelvis rock back until your lower back is flat on the floor. Hold for 10 seconds while breathing  smoothly.  4. Abdominal Crunch: Perform a Pelvic Tilt (above) flattening your lower back against the floor. Holding the tension in your abdominal muscles, take another breath and raise your shoulder blades off the ground (this is not a full sit-up). Keep your head in line with your body (dont bend your neck forward). Hold for 2 seconds, then slowly lower.      Back Exercises: Abdominal Lift  The Abdominal Lift strengthens your lower abdominal muscles, helping you keep your pelvis and back stable.  · Lie on the floor with both knees bent. Put your feet flat on the floor and your arms by your sides. Tighten your abdominal muscles.  · Lift one bent knee and move it toward your upper body. Keep your abdominal muscles tight and your back flat on the floor. Hold for 10 seconds.  · Repeat 3 times. Then, switch legs.         Back Exercises: Bridge  The Bridge exercise strengthens your abdominal, buttocks, and hamstring muscles. This helps keep your back stable and aligned when you walk.  · Lie on the floor with your back and palms flat. Bend your knees. Keep your feet flat on the floor.  · Contract your abdominal and buttocks muscles. Slowly lift your buttocks off the floor until there is a straight line from your knees to your shoulders.  · Hold for 5  seconds. Repeat 10 times.          Back Exercises: Hip Lift        To start, lie on your back with your knees bent and feet flat on the floor. Dont press your neck or lower back to the floor. Breathe deeply. You should feel comfortable and relaxed in this position.  · Slowly raise your hips upward.  · Tighten your abdomen and buttocks. Be careful not to arch your back.  · Hold for 5 seconds. Lower your hips to the floor.  · Repeat 10 times.  For your safety, check with your healthcare provider before starting an exercise program.       Back Exercises: Hip Rotator Stretch        To start, lie on your back with your knees bent and feet flat on the floor. Dont press your  neck or lower back to the floor. Breathe deeply. You should feel comfortable and relaxed in this position.  · Rest your right ankle on your left knee.  · Place a towel behind your left thigh and use it to pull the knee toward your chest. Feel the stretch in your buttocks.  · Hold for 30-60 seconds. Release.  · Repeat 2 times.  · Switch legs.   For your safety, check with your healthcare provider before starting an exercise program.       Back Exercises: Knee Lift        To start, lie on your back with your knees bent and feet flat on the floor. Dont press your neck or lower back to the floor. Breathe deeply. You should feel comfortable and relaxed in this position.  · Lift one bent knee and move it toward your upper body. Keep your abdominal muscles tight and your back flat on the floor.  · Hold for 10 seconds. Return to start position.  · Repeat 3 times.  · Switch legs.        Back Exercises: Leg Pull        To start, lie on your back with your knees bent and feet flat on the floor. Dont press your neck or lower back to the floor. Breathe deeply. You should feel comfortable and relaxed in this position.  · Pull one knee to your chest.  · Hold for 30-60 seconds. Return to starting position.  · Repeat 2 times.  · Switch legs.  · For a double leg pull, pull both legs to your chest at the same time. Repeat 2 times.  For your safety, check with your healthcare provider before starting an exercise program.       Back Exercises: Leg Reach        Do this exercise on your hands and knees. Keep your knees under your hips and your hands under your shoulders. Keep your spine in a neutral position (not arched or sagging). Be sure to maintain your necks natural curve.  · Extend one leg straight back. Dont arch your back or let your head or body sag.  · Hold for 5 seconds. Return to starting position.  · Repeat 5 times.  · Switch legs.       Back Exercises: Lower Back Rotation  To start, lie on your back with your knees bent  and feet flat on the floor. Dont press your neck or lower back to the floor. Breathe deeply. You should feel comfortable and relaxed in this position.  · Drop both knees to one side. Turn your head to the other side. Keep your shoulders flat on the floor.  · Hold for 20 seconds.  · Slowly switch sides.  · Repeat 2 times.                                   Back Exercises: Lower Back Stretch                        To start, sit in a chair with your feet flat on the floor. Shift your weight slightly forward to avoid rounding your back. Relax, and keep your ears, shoulders, and hips aligned.  · Sit with your feet well apart.  · Bend forward and touch the floor with the backs of your hands. Relax and let your body drop.  · Hold for 20 seconds. Return to starting position.  · Repeat 2 times.       Back Exercises: Partial Curl-Ups        To start, lie on your back with your knees bent and feet flat on the floor. Dont press your neck or lower back to the floor. Breathe deeply. You should feel comfortable and relaxed in this position.  · Cross your arms loosely.  · Tighten your abdomen and curl CH[facility] up, keeping your head in line with your shoulders.  · Hold for 5 seconds. Uncurl to lie down.  · Repeat 5 times.       Back Exercises: Pelvic Tilt  To start, lie on your back with your knees bent and feet flat on the floor. Dont press your neck or lower back to the floor. Breathe deeply. You should feel comfortable and relaxed in this position.  · Tighten your abdomen and buttocks, and press your lower back toward the floor. This should be a small, subtle movement.  · Hold for 5 seconds. Release.  · Repeat 5 times.                           Back Exercises: Seated Rotation      To start, sit in a chair with your feet flat on the floor. Shift your weight slightly forward to avoid rounding your back. Relax, and keep your ears, shoulders, and hips aligned.  · Fold your arms, elbows just below shoulder height.  · Turn from the  waist with hips forward. Turn your head last.  · Hold for a count of 5. Return to starting position.  · Repeat 5 times on one side. Then switch sides.          Back Exercises: Side Stretch  To start, sit in a chair with your feet flat on the floor. Shift your weight slightly forward to avoid rounding your back. Relax. Keep your ears, shoulders, and hips aligned.  · Stretch your right arm overhead.  · Slowly bend to the left. Dont twist your torso.  · Hold for 20 seconds. Return to starting position.  · Repeat 2 times. Then, switch to the other side.      © 0674-6206 [employer]. [address] [place] [address], [place] [address]. All rights reserved. This information is not intended as a substitute for professional medical care. Always follow your healthcare professional's instructions.

## 2018-10-26 NOTE — PROGRESS NOTES
Referring Physician: No ref. provider found    PCP: Colton Johnson MD      CC:  Right leg pain    Interval History:  Moustapha Murray is a 63 y.o. male with right leg and low back pain who presents today for f/u s/p right L3-4 and L4-5. Reports 70% relief. Continues to c/o some mild low back pain on the right. Pain is tolerable. He is happy with results. He takes Tizanidine with benefit. He does requests a refill. Denies worsening weakness or b/b changes. Pain today is rated 3/10.  Pt has been seen in the clinic before, however pt is new to me.     History below per Dr. Palencia    HPI:   Moustapha Murray is a 63 y.o. male referred to us for right leg pain.  Pain has gradually worsened over the past 6 weeks.  No recent traumatic incident.  He has intermittent burning, sharp pain from his lower back.  Pain radiates to his anterior thigh.  Pain sometimes travels to his lateral thigh in his calf as well. Pain worsens with standing, getting up and walking.  Pain improves with rest.  He has tried physical therapy with minimal benefit.  He recently had MRI of the lumbar spine which showed L3-4 disc protrusion.  He has taken NSAIDs with minimal benefit.  He denies any worsening weakness.  No bowel bladder changes    ROS:  CONSTITUTIONAL: No fevers, chills, night sweats, wt. loss, appetite changes  SKIN: no rashes or itching  ENT: No headaches, head trauma, vision changes, or eye pain  LYMPH NODES: None noticed   CV: No chest pain, palpitations.   RESP: No shortness of breath, dyspnea on exertion, cough, wheezing, or hemoptysis  GI: No nausea, emesis, diarrhea, constipation, melena, hematochezia, pain.    : No dysuria, hematuria, urgency, or frequency   HEME: No easy bruising, bleeding problems  PSYCHIATRIC: No depression, anxiety, psychosis, hallucinations.  NEURO: No seizures, memory loss, dizziness or difficulty sleeping  MSK:  Positive HPI      Past Medical History:   Diagnosis Date    Depression     RLS  "(restless legs syndrome)      Past Surgical History:   Procedure Laterality Date    APPENDECTOMY      CARPAL TUNNEL RELEASE      CERVICAL FUSION      CHOLECYSTECTOMY      CIRCUMCISION      Injection,steroid,epidural,transforaminal approach Right 2018    Performed by Chris Palencia MD at [facility] OR     History reviewed. No pertinent family history.  Social History     Socioeconomic History    Marital status:      Spouse name: None    Number of children: None    Years of education: None    Highest education level: None   Social Needs    Financial resource strain: None    Food insecurity - worry: None    Food insecurity - inability: None    Transportation needs - medical: None    Transportation needs - non-medical: None   Occupational History    Occupation: [employer]   Tobacco Use    Smoking status: Former Smoker     Packs/day: 1.50     Types: Cigarettes     Last attempt to quit: 10/16/2013     Years since quittin.0    Smokeless tobacco: Never Used   Substance and Sexual Activity    Alcohol use: None    Drug use: None    Sexual activity: None   Other Topics Concern    None   Social History Narrative    None         Medications/Allergies: See med card    Vitals:    10/26/18 0909   BP: 127/74   Pulse: 67   Weight: 99.8 kg (220 lb)   Height: 5' 10" (1.778 m)   PainSc:   3   PainLoc: Back         Physical exam:    GENERAL: A and O x3, the patient appears well groomed and is in no acute distress.  Skin: No rashes or obvious lesions  HEENT: normocephalic, atraumatic  CARDIOVASCULAR:  RRR  LUNGS: non labored breathing  ABDOMEN: soft, nontender   UPPER EXTREMITIES: Normal alignment, normal range of motion, no atrophy, no skin changes,  hair growth and nail growth normal and equal bilaterally. No swelling, no tenderness.    LOWER EXTREMITIES:  Normal alignment, normal range of motion, no atrophy, no skin changes,  hair growth and nail growth normal and equal bilaterally. No swelling, no " tenderness.    LUMBAR SPINE  Lumbar spine: ROM is full with flexion extension and oblique extension with no increased pain.    Scot's test causes no increased pain on either side.    Supine straight leg raise is negative bilaterally.     Internal and external rotation of the hip causes no increased pain on either side.  Myofascial exam: mild tenderness to palpation across lumbar paraspinous muscles on right      MENTAL STATUS: normal orientation, speech, language, and fund of knowledge for social situation.  Emotional state appropriate.    CRANIAL NERVES:  II:  PERRL bilaterally,   III,IV,VI: EOMI.    V:  Facial sensation equal bilaterally  VII:  Facial motor function normal.  VIII:  Hearing equal to finger rub bilaterally  IX/X: Gag normal, palate symmetric  XI:  Shoulder shrug equal, head turn equal  XII:  Tongue midline without fasciculations      MOTOR: Tone and bulk: normal bilateral upper and lower Strength: normal   Delt Bi Tri WE WF     R 5 5 5 5 5 5   L 5 5 5 5 5 5     IP ADD ABD Quad TA Gas HAM  R 5 5 5 5 5 5 5  L 5 5 5 5 5 5 5    SENSATION: Light touch and pinprick intact bilaterally  REFLEXES: normal, symmetric, nonbrisk.  Toes down, no clonus. No hoffmans.  GAIT: normal rise, base, steps, and arm swing.        Imaging:  MRI L-spine 8/17/18  L2-3: There is a mild disc bulge with dorsal annular fissure.  There is mild facet joint arthropathy.  There is no spinal canal or significant foraminal stenosis.    L3-4: There is a diffuse disc bulge with superimposed right foraminal disc protrusion.  There is mild facet joint arthropathy.  There is no spinal stenosis.  There is moderate-to-marked right foraminal stenosis and there is contact with the right L3 root by the foraminal disc protrusion.  There is mild left foraminal stenosis.    L4-5: There is trace retrolisthesis of L4 on L5.  There is a diffuse disc bulge with superimposed central disc protrusion with annular fissure.  There is mild facet joint  arthropathy.  There is borderline spinal stenosis.  There is mild-to-moderate bilateral foraminal stenosis.    L5-S1: There is grade 1 spondylolisthesis with bilateral spondylolysis.  There is moderate-to-marked facet joint arthropathy.  There is unroofing of a diffuse disc bulge.  There is no significant spinal stenosis but there is severe bilateral foraminal stenosis.    Assessment:  Moustapha Murray is a 63 y.o. male with   1. Lumbar radiculopathy    2. DDD (degenerative disc disease), lumbar    3. Facet arthropathy      Plan:  1. I have stressed the importance of physical activity and exercise to improve overall health  2. Monitor progress and consider repeat lumbar epidural steroid injection to the right L3-4 and L4-5 level in the future.    3. Discussed MBB for low back pain in the future. He will consider this.   4. Zanaflex 4 mg q 8 h prn #60 1 refill  5. F/u prn

## 2018-11-06 ENCOUNTER — LAB VISIT (OUTPATIENT)
Dept: LAB | Facility: HOSPITAL | Age: 63
End: 2018-11-06
Attending: FAMILY MEDICINE
Payer: COMMERCIAL

## 2018-11-06 ENCOUNTER — OFFICE VISIT (OUTPATIENT)
Dept: FAMILY MEDICINE | Facility: CLINIC | Age: 63
End: 2018-11-06
Payer: COMMERCIAL

## 2018-11-06 VITALS
RESPIRATION RATE: 18 BRPM | WEIGHT: 229.06 LBS | HEIGHT: 70 IN | HEART RATE: 82 BPM | SYSTOLIC BLOOD PRESSURE: 118 MMHG | DIASTOLIC BLOOD PRESSURE: 86 MMHG | BODY MASS INDEX: 32.79 KG/M2 | OXYGEN SATURATION: 95 %

## 2018-11-06 DIAGNOSIS — Z00.00 PREVENTATIVE HEALTH CARE: ICD-10-CM

## 2018-11-06 DIAGNOSIS — Z00.00 PREVENTATIVE HEALTH CARE: Primary | ICD-10-CM

## 2018-11-06 LAB
ALBUMIN SERPL BCP-MCNC: 3.9 G/DL
ALP SERPL-CCNC: 102 U/L
ALT SERPL W/O P-5'-P-CCNC: 35 U/L
ANION GAP SERPL CALC-SCNC: 6 MMOL/L
AST SERPL-CCNC: 28 U/L
BILIRUB SERPL-MCNC: 0.7 MG/DL
BUN SERPL-MCNC: 20 MG/DL
CALCIUM SERPL-MCNC: 9.4 MG/DL
CHLORIDE SERPL-SCNC: 104 MMOL/L
CHOLEST SERPL-MCNC: 220 MG/DL
CHOLEST/HDLC SERPL: 4.6 {RATIO}
CO2 SERPL-SCNC: 28 MMOL/L
COMPLEXED PSA SERPL-MCNC: 0.22 NG/ML
CREAT SERPL-MCNC: 1 MG/DL
EST. GFR  (AFRICAN AMERICAN): >60 ML/MIN/1.73 M^2
EST. GFR  (NON AFRICAN AMERICAN): >60 ML/MIN/1.73 M^2
ESTIMATED AVG GLUCOSE: 123 MG/DL
GLUCOSE SERPL-MCNC: 108 MG/DL
HBA1C MFR BLD HPLC: 5.9 %
HDLC SERPL-MCNC: 48 MG/DL
HDLC SERPL: 21.8 %
LDLC SERPL CALC-MCNC: 147 MG/DL
NONHDLC SERPL-MCNC: 172 MG/DL
POTASSIUM SERPL-SCNC: 3.8 MMOL/L
PROT SERPL-MCNC: 7.1 G/DL
SODIUM SERPL-SCNC: 138 MMOL/L
TRIGL SERPL-MCNC: 125 MG/DL

## 2018-11-06 PROCEDURE — 3079F DIAST BP 80-89 MM HG: CPT | Mod: CPTII,S$GLB,, | Performed by: FAMILY MEDICINE

## 2018-11-06 PROCEDURE — 83036 HEMOGLOBIN GLYCOSYLATED A1C: CPT

## 2018-11-06 PROCEDURE — 84153 ASSAY OF PSA TOTAL: CPT

## 2018-11-06 PROCEDURE — 80061 LIPID PANEL: CPT

## 2018-11-06 PROCEDURE — 99999 PR PBB SHADOW E&M-EST. PATIENT-LVL III: CPT | Mod: PBBFAC,,, | Performed by: FAMILY MEDICINE

## 2018-11-06 PROCEDURE — 36415 COLL VENOUS BLD VENIPUNCTURE: CPT | Mod: PO

## 2018-11-06 PROCEDURE — 90471 IMMUNIZATION ADMIN: CPT | Mod: S$GLB,,, | Performed by: FAMILY MEDICINE

## 2018-11-06 PROCEDURE — 99396 PREV VISIT EST AGE 40-64: CPT | Mod: 25,S$GLB,, | Performed by: FAMILY MEDICINE

## 2018-11-06 PROCEDURE — 90686 IIV4 VACC NO PRSV 0.5 ML IM: CPT | Mod: S$GLB,,, | Performed by: FAMILY MEDICINE

## 2018-11-06 PROCEDURE — 3074F SYST BP LT 130 MM HG: CPT | Mod: CPTII,S$GLB,, | Performed by: FAMILY MEDICINE

## 2018-11-06 PROCEDURE — 80053 COMPREHEN METABOLIC PANEL: CPT

## 2018-11-06 NOTE — PROGRESS NOTES
Chief Complaint   Patient presents with    Preventative health care     Flu shot       HISTORY OF PRESENT ILLNESS: This is a 62-year-old white male presents today for annual exam.    HTN - Tolerating lisinopril HCT daily. Denies HA, CP or dizziness. Home /80  GERD -  He has uses Omeprazole 40mg daily with good control.   RLS - tolerating Mirapex 0.5 mg daily. This is controlled  Lumbar DDD - stable  Walking daily    PAST MEDICAL HISTORY:   restless legs syndrome   colon polyps (colonoscopy 8/27/09)  tinea corporis  GERD    PAST SURGICAL HISTORY:   Cholecystectomy  C4-C5 anterior cervical fusion 2004  right carpal tunnel release     FAMILY HISTORY: cancer in his grandparent.     SOCIAL HISTORY: The patient is .   He has children.   Ex-smoker  He drinks alcoholic beverages occasionally.   He works at a bank    REVIEW OF SYSTEMS:   GENERAL: No fever, chills, fatigability or weight changes.   SKIN/BREASTS: No rashes, sores, itching or changes in color or texture of skin. No changes in moles. No pain, swelling, tenderness, lumps, bleeding or discharge from nipples.   HEAD: No headaches or recent head trauma.   EYES: Visual acuity fine. Denies blurriness, tearing, itching, photophobia, diplopia, or visual changes.   EARS: Denies ear pain, discharge, tinnitus or vertigo. Denies hearing loss.   NOSE: No loss of smell, epistaxis, postnasal drip, discharge, obstruction, or sneezing.   MOUTH & THROAT: No hoarseness, change in voice, swallowing difficulty. No excessive gum bleeding.   HEMATOLOGICAL/NODES: Denies swollen glands. No bleeding or bruising.   CHEST: No CHANDRA, cyanosis, wheezing, cough or sputum production.   CARDIOVASCULAR: Denies chest pain, dyspnea, orthopnea, or palpitations.   GI/ABDOMEN: Appetite fine. No weight loss. Denies nausea, vomiting, diarrhea, constipation, abdominal pain, hematemesis or blood in stool.   URINARY: No dysuria,hematuria, nocturia, incontinence, flank pain, urgency, or urinary  "difficulty.   PERIPHERAL VASCULAR: No claudication, cold intolerance or cyanosis.   MUSCULOSKELETAL: Some intermittent right wrist pain, low back pain.  NEUROLOGIC: No history of seizures, paralysis, alteration of gait or coordination. Some intermittent numbness in the left 2nd and 3rd fingers with driving that impoves with changes in position.  ENDOCRINE: Sexual maturation. Denies weight change, heat/cold intolerance, hair loss or gain, loss of libido, polyuria, polydipsia, polyphagia, pruritus, unexplained flushing, or excessive sweating.   PSYCH: No crying spells. Denies anxiety symptoms.     IMMUNIZATIONS: Up-to-date.    PHYSICAL EXAM:     /86   Pulse 82   Resp 18   Ht 5' 10" (1.778 m)   Wt 103.9 kg (229 lb 0.9 oz)   SpO2 95%   BMI 32.87 kg/m²   GENERAL: This is a healthy-appearing 63-year-old white male, sitting   upright, in no apparent distress. Alert and oriented x4.   SKIN: Reveals erythematous rash over right breast. No suspicious neoplasms.   NEUROLOGICALLY: Cranial nerves 2-12 are grossly intact with no sensory or motor deficits.   PSYCHOLOGICALLY: Mood and affect are normal. Insight and judgment are   excellent. Appearance is well groomed.   Pupils are equal, round and reactive to light. Extraocular movements are intact.   NECK: Supple. There is no lymphadenopathy, thyromegaly or JVD.   CHEST: Clear to auscultation bilaterally with good respiratory movement.   CARDIOVASCULAR: S1, S2. Regular rate and rhythm. No murmurs, rubs or gallops.   EXTREMITIES: Show no cyanosis, clubbing or edema with 2+ distal pulses.   MUSCULOSKELETAL: Reveals full range of motion in all extremities.      Results for orders placed or performed in visit on 10/18/17   Comprehensive metabolic panel   Result Value Ref Range    Sodium 140 136 - 145 mmol/L    Potassium 4.4 3.5 - 5.1 mmol/L    Chloride 103 95 - 110 mmol/L    CO2 30 (H) 23 - 29 mmol/L    Glucose 99 70 - 110 mg/dL    BUN, Bld 17 6 - 20 mg/dL    Creatinine " 1.2 0.5 - 1.4 mg/dL    Calcium 9.8 8.7 - 10.5 mg/dL    Total Protein 7.5 6.0 - 8.4 g/dL    Albumin 3.8 3.5 - 5.2 g/dL    Total Bilirubin 0.8 0.1 - 1.0 mg/dL    Alkaline Phosphatase 130 55 - 135 U/L    AST 27 10 - 40 U/L    ALT 30 10 - 44 U/L    Anion Gap 7 (L) 8 - 16 mmol/L    eGFR if African American >60.0 >60 mL/min/1.73 m^2    eGFR if non African American >60.0 >60 mL/min/1.73 m^2   Lipid panel   Result Value Ref Range    Cholesterol 212 (H) 120 - 199 mg/dL    Triglycerides 71 30 - 150 mg/dL    HDL 49 40 - 75 mg/dL    LDL Cholesterol 148.8 63.0 - 159.0 mg/dL    HDL/Chol Ratio 23.1 20.0 - 50.0 %    Total Cholesterol/HDL Ratio 4.3 2.0 - 5.0    Non-HDL Cholesterol 163 mg/dL   PSA, Screening   Result Value Ref Range    PSA, SCREEN 0.26 0.00 - 4.00 ng/mL   CBC auto differential   Result Value Ref Range    WBC 5.86 3.90 - 12.70 K/uL    RBC 4.77 4.60 - 6.20 M/uL    Hemoglobin 14.7 14.0 - 18.0 g/dL    Hematocrit 43.7 40.0 - 54.0 %    MCV 92 82 - 98 fL    MCH 30.8 27.0 - 31.0 pg    MCHC 33.6 32.0 - 36.0 g/dL    RDW 12.5 11.5 - 14.5 %    Platelets 242 150 - 350 K/uL    MPV 11.3 9.2 - 12.9 fL    Immature Granulocytes 0.5 0.0 - 0.5 %    Gran # (ANC) 3.5 1.8 - 7.7 K/uL    Immature Grans (Abs) 0.03 0.00 - 0.04 K/uL    Lymph # 1.6 1.0 - 4.8 K/uL    Mono # 0.5 0.3 - 1.0 K/uL    Eos # 0.2 0.0 - 0.5 K/uL    Baso # 0.09 0.00 - 0.20 K/uL    nRBC 0 0 /100 WBC    Gran% 59.0 38.0 - 73.0 %    Lymph% 26.5 18.0 - 48.0 %    Mono% 8.9 4.0 - 15.0 %    Eosinophil% 3.6 0.0 - 8.0 %    Basophil% 1.5 0.0 - 1.9 %    Differential Method Automated    Hemoglobin A1c   Result Value Ref Range    Hemoglobin A1C 5.6 4.0 - 5.6 %    Estimated Avg Glucose 114 68 - 131 mg/dL     ASSESSMENT/PLAN:     [facility]  -     Comprehensive metabolic panel; Future; Expected date: 11/06/2018  -     Hemoglobin A1c; Future; Expected date: 11/06/2018  -     Lipid panel; Future; Expected date: 11/06/2018  -     PSA, Screening; Future; Expected date:  11/06/2018      Continue lisinopril HCT  Continue omeprazole  Flu shot  Continue home BP monitoring   Discussed healthy lifestyle measures including maintenance of a healthy weight, increasing fruits and vegetables in the diet.   F/u 12 months with labs for physical or PRN

## 2019-03-30 DIAGNOSIS — K21.9 GASTROESOPHAGEAL REFLUX DISEASE WITHOUT ESOPHAGITIS: ICD-10-CM

## 2019-04-01 RX ORDER — OMEPRAZOLE 40 MG/1
40 CAPSULE, DELAYED RELEASE ORAL DAILY
Qty: 90 CAPSULE | Refills: 1 | Status: SHIPPED | OUTPATIENT
Start: 2019-04-01 | End: 2019-09-23 | Stop reason: SDUPTHER

## 2019-04-01 NOTE — PROGRESS NOTES
Refill Authorization Note     is requesting a refill authorization.    Brief assessment and rationale for refill: ROUTE: op   Name and strength of medication: omeprazole (PRILOSEC) 40 MG capsule       Medication Therapy Plan: GERD- tolerates omeprazole 40mg with good control; lco(lov);     Medication reconciliation completed: No                         Comments:   APPOINTMENTS (past 12 m or future 3m authorizing provider)  LAST VISIT DATE  Colton Johnson MD 11/6/2018         NEXT VISIT DATE  Colton Johnson MD Visit date not found

## 2019-05-08 DIAGNOSIS — I10 HYPERTENSION, UNSPECIFIED TYPE: ICD-10-CM

## 2019-05-08 RX ORDER — LISINOPRIL AND HYDROCHLOROTHIAZIDE 12.5; 2 MG/1; MG/1
1 TABLET ORAL DAILY
Qty: 90 TABLET | Refills: 1 | Status: SHIPPED | OUTPATIENT
Start: 2019-05-08 | End: 2019-08-28 | Stop reason: SDUPTHER

## 2019-07-01 ENCOUNTER — OFFICE VISIT (OUTPATIENT)
Dept: PAIN MEDICINE | Facility: CLINIC | Age: 64
End: 2019-07-01
Payer: COMMERCIAL

## 2019-07-01 VITALS
BODY MASS INDEX: 32.78 KG/M2 | DIASTOLIC BLOOD PRESSURE: 74 MMHG | HEART RATE: 68 BPM | HEIGHT: 70 IN | WEIGHT: 229 LBS | SYSTOLIC BLOOD PRESSURE: 137 MMHG

## 2019-07-01 DIAGNOSIS — M47.896 OTHER SPONDYLOSIS, LUMBAR REGION: ICD-10-CM

## 2019-07-01 DIAGNOSIS — M54.16 LUMBAR RADICULITIS: ICD-10-CM

## 2019-07-01 DIAGNOSIS — M54.16 LUMBAR RADICULOPATHY: Primary | ICD-10-CM

## 2019-07-01 DIAGNOSIS — M51.36 DDD (DEGENERATIVE DISC DISEASE), LUMBAR: Primary | ICD-10-CM

## 2019-07-01 PROCEDURE — 99214 PR OFFICE/OUTPT VISIT, EST, LEVL IV, 30-39 MIN: ICD-10-PCS | Mod: S$GLB,,, | Performed by: ANESTHESIOLOGY

## 2019-07-01 PROCEDURE — 99214 OFFICE O/P EST MOD 30 MIN: CPT | Mod: S$GLB,,, | Performed by: ANESTHESIOLOGY

## 2019-07-01 PROCEDURE — 3075F SYST BP GE 130 - 139MM HG: CPT | Mod: CPTII,S$GLB,, | Performed by: ANESTHESIOLOGY

## 2019-07-01 PROCEDURE — 3008F PR BODY MASS INDEX (BMI) DOCUMENTED: ICD-10-PCS | Mod: CPTII,S$GLB,, | Performed by: ANESTHESIOLOGY

## 2019-07-01 PROCEDURE — 99999 PR PBB SHADOW E&M-EST. PATIENT-LVL III: CPT | Mod: PBBFAC,,, | Performed by: ANESTHESIOLOGY

## 2019-07-01 PROCEDURE — 3008F BODY MASS INDEX DOCD: CPT | Mod: CPTII,S$GLB,, | Performed by: ANESTHESIOLOGY

## 2019-07-01 PROCEDURE — 99999 PR PBB SHADOW E&M-EST. PATIENT-LVL III: ICD-10-PCS | Mod: PBBFAC,,, | Performed by: ANESTHESIOLOGY

## 2019-07-01 PROCEDURE — 3078F PR MOST RECENT DIASTOLIC BLOOD PRESSURE < 80 MM HG: ICD-10-PCS | Mod: CPTII,S$GLB,, | Performed by: ANESTHESIOLOGY

## 2019-07-01 PROCEDURE — 3075F PR MOST RECENT SYSTOLIC BLOOD PRESS GE 130-139MM HG: ICD-10-PCS | Mod: CPTII,S$GLB,, | Performed by: ANESTHESIOLOGY

## 2019-07-01 PROCEDURE — 3078F DIAST BP <80 MM HG: CPT | Mod: CPTII,S$GLB,, | Performed by: ANESTHESIOLOGY

## 2019-07-01 NOTE — H&P (VIEW-ONLY)
Referring Physician: No ref. provider found    PCP: Colton Johnson MD      CC:  Right leg pain    Interval History:  Moustapha Murray is a 64 y.o. male returns to our clinic.  His last seen October 2018.  He had radiating right leg pain.  Pain improved with lumbar CHANEL.  Pain has since recurred over the past 2 months.  Presents status with radiating pain down his anterior thigh into his right lateral calf.  Pain worsens standing and walking.  Pain improves with rest.  He desires repeat procedure with us.  He denies any worsening of his pain no bowel bladder changes    Prior HPI:   Moustapha Murray is a 64 y.o. male referred to us for right leg pain.  Pain has gradually worsened over the past 6 weeks.  No recent traumatic incident.  He has intermittent burning, sharp pain from his lower back.  Pain radiates to his anterior thigh.  Pain sometimes travels to his lateral thigh in his calf as well. Pain worsens with standing, getting up and walking.  Pain improves with rest.  He has tried physical therapy with minimal benefit.  He recently had MRI of the lumbar spine which showed L3-4 disc protrusion.  He has taken NSAIDs with minimal benefit.  He denies any worsening weakness.  No bowel bladder changes    Interventional history:  Status post right L3-4 and L4-5 TFESI on 09/2018 with 70% relief.    ROS:  CONSTITUTIONAL: No fevers, chills, night sweats, wt. loss, appetite changes  SKIN: no rashes or itching  ENT: No headaches, head trauma, vision changes, or eye pain  LYMPH NODES: None noticed   CV: No chest pain, palpitations.   RESP: No shortness of breath, dyspnea on exertion, cough, wheezing, or hemoptysis  GI: No nausea, emesis, diarrhea, constipation, melena, hematochezia, pain.    : No dysuria, hematuria, urgency, or frequency   HEME: No easy bruising, bleeding problems  PSYCHIATRIC: No depression, anxiety, psychosis, hallucinations.  NEURO: No seizures, memory loss, dizziness or difficulty  "sleeping  MSK:  Positive HPI      Past Medical History:   Diagnosis Date    DDD (degenerative disc disease), lumbar     Depression     RLS (restless legs syndrome)      Past Surgical History:   Procedure Laterality Date    APPENDECTOMY      CARPAL TUNNEL RELEASE      CERVICAL FUSION      CHOLECYSTECTOMY      CIRCUMCISION      Injection,steroid,epidural,transforaminal approach Right 2018    [facility]     History reviewed. No pertinent family history.  Social History     Socioeconomic History    Marital status:      Spouse name: Not on file    Number of children: Not on file    Years of education: Not on file    Highest education level: Not on file   Occupational History    Occupation: [employer]   Social Needs    Financial resource strain: Not on file    Food insecurity:     Worry: Not on file     Inability: Not on file    Transportation needs:     Medical: Not on file     Non-medical: Not on file   Tobacco Use    Smoking status: Former Smoker     Packs/day: 1.50     Types: Cigarettes     Last attempt to quit: 10/16/2013     Years since quittin.7    Smokeless tobacco: Never Used   Substance and Sexual Activity    Alcohol use: Not on file    Drug use: Not on file    Sexual activity: Not on file   Lifestyle    Physical activity:     Days per week: Not on file     Minutes per session: Not on file    Stress: Not on file   Relationships    Social connections:     Talks on phone: Not on file     Gets together: Not on file     Attends Sabianism service: Not on file     Active member of club or organization: Not on file     Attends meetings of clubs or organizations: Not on file     Relationship status: Not on file   Other Topics Concern    Not on file   Social History Narrative    Not on file         Medications/Allergies: See med card    Vitals:    19 1028   BP: 137/74   Pulse: 68   Weight: 103.9 kg (229 lb)   Height: 5' 10" (1.778 m) "   PainSc:   5   PainLoc: Back         Physical exam:    GENERAL: A and O x3, the patient appears well groomed and is in no acute distress.  Skin: No rashes or obvious lesions  HEENT: normocephalic, atraumatic  CARDIOVASCULAR:  RRR  LUNGS: non labored breathing  ABDOMEN: soft, nontender   UPPER EXTREMITIES: Normal alignment, normal range of motion, no atrophy, no skin changes,  hair growth and nail growth normal and equal bilaterally. No swelling, no tenderness.    LOWER EXTREMITIES:  Normal alignment, normal range of motion, no atrophy, no skin changes,  hair growth and nail growth normal and equal bilaterally. No swelling, no tenderness.    LUMBAR SPINE  Lumbar spine: ROM is full with flexion extension and oblique extension with no increased pain.    Scot's test causes no increased pain on either side.    Supine straight leg raise is negative bilaterally.     Internal and external rotation of the hip causes no increased pain on either side.  Myofascial exam: mild tenderness to palpation across lumbar paraspinous muscles on right      MENTAL STATUS: normal orientation, speech, language, and fund of knowledge for social situation.  Emotional state appropriate.    CRANIAL NERVES:  II:  PERRL bilaterally,   III,IV,VI: EOMI.    V:  Facial sensation equal bilaterally  VII:  Facial motor function normal.  VIII:  Hearing equal to finger rub bilaterally  IX/X: Gag normal, palate symmetric  XI:  Shoulder shrug equal, head turn equal  XII:  Tongue midline without fasciculations      MOTOR: Tone and bulk: normal bilateral upper and lower Strength: normal   Delt Bi Tri WE WF     R 5 5 5 5 5 5   L 5 5 5 5 5 5     IP ADD ABD Quad TA Gas HAM  R 5 5 5 5 5 5 5  L 5 5 5 5 5 5 5    SENSATION: Light touch and pinprick intact bilaterally  REFLEXES: normal, symmetric, nonbrisk.  Toes down, no clonus. No hoffmans.  GAIT: normal rise, base, steps, and arm swing.        Imaging:  MRI L-spine 8/17/18  L2-3: There is a mild disc bulge  with dorsal annular fissure.  There is mild facet joint arthropathy.  There is no spinal canal or significant foraminal stenosis.    L3-4: There is a diffuse disc bulge with superimposed right foraminal disc protrusion.  There is mild facet joint arthropathy.  There is no spinal stenosis.  There is moderate-to-marked right foraminal stenosis and there is contact with the right L3 root by the foraminal disc protrusion.  There is mild left foraminal stenosis.    L4-5: There is trace retrolisthesis of L4 on L5.  There is a diffuse disc bulge with superimposed central disc protrusion with annular fissure.  There is mild facet joint arthropathy.  There is borderline spinal stenosis.  There is mild-to-moderate bilateral foraminal stenosis.    L5-S1: There is grade 1 spondylolisthesis with bilateral spondylolysis.  There is moderate-to-marked facet joint arthropathy.  There is unroofing of a diffuse disc bulge.  There is no significant spinal stenosis but there is severe bilateral foraminal stenosis.    Assessment:  Moustapha Murray is a 64 y.o. male with   1. DDD (degenerative disc disease), lumbar    2. Lumbar radiculitis    3. Other spondylosis, lumbar region      Plan:  1. I have stressed the importance of physical activity and exercise to improve overall health  2.  Reviewed imaging with patient.  3.  Schedule repeat right L3-4 and L4-5 TFESI.  4.  Follow-up after the procedure

## 2019-07-01 NOTE — PROGRESS NOTES
Referring Physician: No ref. provider found    PCP: Colton Johnson MD      CC:  Right leg pain    Interval History:  Moustapha Murray is a 64 y.o. male returns to our clinic.  His last seen October 2018.  He had radiating right leg pain.  Pain improved with lumbar CHANEL.  Pain has since recurred over the past 2 months.  Presents status with radiating pain down his anterior thigh into his right lateral calf.  Pain worsens standing and walking.  Pain improves with rest.  He desires repeat procedure with us.  He denies any worsening of his pain no bowel bladder changes    Prior HPI:   Moustapha Murray is a 64 y.o. male referred to us for right leg pain.  Pain has gradually worsened over the past 6 weeks.  No recent traumatic incident.  He has intermittent burning, sharp pain from his lower back.  Pain radiates to his anterior thigh.  Pain sometimes travels to his lateral thigh in his calf as well. Pain worsens with standing, getting up and walking.  Pain improves with rest.  He has tried physical therapy with minimal benefit.  He recently had MRI of the lumbar spine which showed L3-4 disc protrusion.  He has taken NSAIDs with minimal benefit.  He denies any worsening weakness.  No bowel bladder changes    Interventional history:  Status post right L3-4 and L4-5 TFESI on 09/2018 with 70% relief.    ROS:  CONSTITUTIONAL: No fevers, chills, night sweats, wt. loss, appetite changes  SKIN: no rashes or itching  ENT: No headaches, head trauma, vision changes, or eye pain  LYMPH NODES: None noticed   CV: No chest pain, palpitations.   RESP: No shortness of breath, dyspnea on exertion, cough, wheezing, or hemoptysis  GI: No nausea, emesis, diarrhea, constipation, melena, hematochezia, pain.    : No dysuria, hematuria, urgency, or frequency   HEME: No easy bruising, bleeding problems  PSYCHIATRIC: No depression, anxiety, psychosis, hallucinations.  NEURO: No seizures, memory loss, dizziness or difficulty  "sleeping  MSK:  Positive HPI      Past Medical History:   Diagnosis Date    DDD (degenerative disc disease), lumbar     Depression     RLS (restless legs syndrome)      Past Surgical History:   Procedure Laterality Date    APPENDECTOMY      CARPAL TUNNEL RELEASE      CERVICAL FUSION      CHOLECYSTECTOMY      CIRCUMCISION      Injection,steroid,epidural,transforaminal approach Right 2018    Performed by Chris Palencia MD at [facility]     History reviewed. No pertinent family history.  Social History     Socioeconomic History    Marital status:      Spouse name: Not on file    Number of children: Not on file    Years of education: Not on file    Highest education level: Not on file   Occupational History    Occupation: [employer]   Social Needs    Financial resource strain: Not on file    Food insecurity:     Worry: Not on file     Inability: Not on file    Transportation needs:     Medical: Not on file     Non-medical: Not on file   Tobacco Use    Smoking status: Former Smoker     Packs/day: 1.50     Types: Cigarettes     Last attempt to quit: 10/16/2013     Years since quittin.7    Smokeless tobacco: Never Used   Substance and Sexual Activity    Alcohol use: Not on file    Drug use: Not on file    Sexual activity: Not on file   Lifestyle    Physical activity:     Days per week: Not on file     Minutes per session: Not on file    Stress: Not on file   Relationships    Social connections:     Talks on phone: Not on file     Gets together: Not on file     Attends Latter-day service: Not on file     Active member of club or organization: Not on file     Attends meetings of clubs or organizations: Not on file     Relationship status: Not on file   Other Topics Concern    Not on file   Social History Narrative    Not on file         Medications/Allergies: See med card    Vitals:    19 1028   BP: 137/74   Pulse: 68   Weight: 103.9 kg (229 lb)   Height: 5' 10" (1.778 m) "   PainSc:   5   PainLoc: Back         Physical exam:    GENERAL: A and O x3, the patient appears well groomed and is in no acute distress.  Skin: No rashes or obvious lesions  HEENT: normocephalic, atraumatic  CARDIOVASCULAR:  RRR  LUNGS: non labored breathing  ABDOMEN: soft, nontender   UPPER EXTREMITIES: Normal alignment, normal range of motion, no atrophy, no skin changes,  hair growth and nail growth normal and equal bilaterally. No swelling, no tenderness.    LOWER EXTREMITIES:  Normal alignment, normal range of motion, no atrophy, no skin changes,  hair growth and nail growth normal and equal bilaterally. No swelling, no tenderness.    LUMBAR SPINE  Lumbar spine: ROM is full with flexion extension and oblique extension with no increased pain.    Scot's test causes no increased pain on either side.    Supine straight leg raise is negative bilaterally.     Internal and external rotation of the hip causes no increased pain on either side.  Myofascial exam: mild tenderness to palpation across lumbar paraspinous muscles on right      MENTAL STATUS: normal orientation, speech, language, and fund of knowledge for social situation.  Emotional state appropriate.    CRANIAL NERVES:  II:  PERRL bilaterally,   III,IV,VI: EOMI.    V:  Facial sensation equal bilaterally  VII:  Facial motor function normal.  VIII:  Hearing equal to finger rub bilaterally  IX/X: Gag normal, palate symmetric  XI:  Shoulder shrug equal, head turn equal  XII:  Tongue midline without fasciculations      MOTOR: Tone and bulk: normal bilateral upper and lower Strength: normal   Delt Bi Tri WE WF     R 5 5 5 5 5 5   L 5 5 5 5 5 5     IP ADD ABD Quad TA Gas HAM  R 5 5 5 5 5 5 5  L 5 5 5 5 5 5 5    SENSATION: Light touch and pinprick intact bilaterally  REFLEXES: normal, symmetric, nonbrisk.  Toes down, no clonus. No hoffmans.  GAIT: normal rise, base, steps, and arm swing.        Imaging:  MRI L-spine 8/17/18  L2-3: There is a mild disc bulge  with dorsal annular fissure.  There is mild facet joint arthropathy.  There is no spinal canal or significant foraminal stenosis.    L3-4: There is a diffuse disc bulge with superimposed right foraminal disc protrusion.  There is mild facet joint arthropathy.  There is no spinal stenosis.  There is moderate-to-marked right foraminal stenosis and there is contact with the right L3 root by the foraminal disc protrusion.  There is mild left foraminal stenosis.    L4-5: There is trace retrolisthesis of L4 on L5.  There is a diffuse disc bulge with superimposed central disc protrusion with annular fissure.  There is mild facet joint arthropathy.  There is borderline spinal stenosis.  There is mild-to-moderate bilateral foraminal stenosis.    L5-S1: There is grade 1 spondylolisthesis with bilateral spondylolysis.  There is moderate-to-marked facet joint arthropathy.  There is unroofing of a diffuse disc bulge.  There is no significant spinal stenosis but there is severe bilateral foraminal stenosis.    Assessment:  Moustapha Murray is a 64 y.o. male with   1. DDD (degenerative disc disease), lumbar    2. Lumbar radiculitis    3. Other spondylosis, lumbar region      Plan:  1. I have stressed the importance of physical activity and exercise to improve overall health  2.  Reviewed imaging with patient.  3.  Schedule repeat right L3-4 and L4-5 TFESI.  4.  Follow-up after the procedure

## 2019-07-16 NOTE — DISCHARGE INSTRUCTIONS
Recovery After Procedural Sedation (Adult)  You have been given medicine by vein to make you sleep during your surgery. This may have included both a pain medicine and sleeping medicine. Most of the effects have worn off. But you may still have some drowsiness for the next 6 to 8 hours.  Home care  Follow these guidelines when you get home:  · For the next 8 hours, you should be watched by a responsible adult. This person should make sure your condition is not getting worse.  · Don't drink any alcohol for the next 24 hours.  · Don't drive, operate dangerous machinery, or make important business or personal decisions during the next 24 hours.  Note: Your healthcare provider may tell you not to take any medicine by mouth for pain or sleep in the next 4 hours. These medicines may react with the medicines you were given in the hospital. This could cause a much stronger response than usual.  Follow-up care  Follow up with your healthcare provider if you are not alert and back to your usual level of activity within 12 hours.  When to seek medical advice  Call your healthcare provider right away if any of these occur:  · Drowsiness gets worse  · Weakness or dizziness gets worse  · Repeated vomiting  · You can't be awakened   Date Last Reviewed: 10/18/2016  © 5869-8619 [employer]. [address] [place] [address], [place] [address]. All rights reserved. This information is not intended as a substitute for professional medical care. Always follow your healthcare professional's instructions.      Pain injection instructions:     This procedure may take a couple weeks to relieve pain  You may get some pain relief from the local anesthetic initally.    No driving for 24 hrs.   Activity as tolerated- gradually increase activities.  Dont lift over 10 lbs for 24 hrs   No heat at injection sites x 2 days. No heating pads, hot tubs, saunas, or swimming in any body of water or pool for 2 days.  Use ice pack for mild swelling  and for comfort , apply for 20 minutes, remove for 20 minute intervals. No direct contact of ice itself  to skin.  May shower today. No tub baths for two days.      Resume Aspirin, Plavix, or Coumadin the day after the procedure unless otherwise instructed.   If diabetic,monitor your glucose carefully as steroids can increase your glucose level    Seek immediate medical help for:   Severe increase in your usual pain or appearance of new pain.  Prolonged (mor than 8 hours) or increasing weakness or numbness in the legs or arms. Numbing medicine was injected and can affect the messages to and from the brain and legs or arms.  .    Fever above 101 ,Drainage,redness,active bleeding, or increased swelling at the injection site.  Headache, shortness of breath, chest pain, or breathing problems.  Before leaving, please make sure you have all your personal belongings such as glasses, purses, wallets, keys, cell phones, jewelry, jackets etc

## 2019-07-17 ENCOUNTER — HOSPITAL ENCOUNTER (OUTPATIENT)
Facility: AMBULARY SURGERY CENTER | Age: 64
Discharge: HOME | End: 2019-07-17
Attending: ANESTHESIOLOGY | Admitting: ANESTHESIOLOGY
Payer: COMMERCIAL

## 2019-07-17 DIAGNOSIS — M54.16 LUMBAR RADICULITIS: Primary | ICD-10-CM

## 2019-07-17 PROCEDURE — 64483 PR EPIDURAL INJ, ANES/STEROID, TRANSFORAMINAL, LUMB/SACR, SNGL LEVL: ICD-10-PCS | Mod: RT,,, | Performed by: ANESTHESIOLOGY

## 2019-07-17 PROCEDURE — 64484 PRA INJECT ANES/STEROID FORAMEN LUMBAR/SACRAL W IMG GUIDE ,EA ADD LEVEL: ICD-10-PCS | Mod: RT,,, | Performed by: ANESTHESIOLOGY

## 2019-07-17 PROCEDURE — 64484 NJX AA&/STRD TFRM EPI L/S EA: CPT | Mod: RT,,, | Performed by: ANESTHESIOLOGY

## 2019-07-17 PROCEDURE — 64483 NJX AA&/STRD TFRM EPI L/S 1: CPT | Performed by: ANESTHESIOLOGY

## 2019-07-17 PROCEDURE — 99152 MOD SED SAME PHYS/QHP 5/>YRS: CPT | Mod: ,,, | Performed by: ANESTHESIOLOGY

## 2019-07-17 PROCEDURE — 64483 NJX AA&/STRD TFRM EPI L/S 1: CPT | Mod: RT,,, | Performed by: ANESTHESIOLOGY

## 2019-07-17 PROCEDURE — 99152 PR MOD CONSCIOUS SEDATION, SAME PHYS, 5+ YRS, FIRST 15 MIN: ICD-10-PCS | Mod: ,,, | Performed by: ANESTHESIOLOGY

## 2019-07-17 PROCEDURE — 64484 NJX AA&/STRD TFRM EPI L/S EA: CPT | Performed by: ANESTHESIOLOGY

## 2019-07-17 RX ORDER — MIDAZOLAM HYDROCHLORIDE 1 MG/ML
INJECTION INTRAMUSCULAR; INTRAVENOUS
Status: DISCONTINUED | OUTPATIENT
Start: 2019-07-17 | End: 2019-07-17 | Stop reason: HOSPADM

## 2019-07-17 RX ORDER — LIDOCAINE HYDROCHLORIDE 10 MG/ML
INJECTION, SOLUTION EPIDURAL; INFILTRATION; INTRACAUDAL; PERINEURAL
Status: DISCONTINUED | OUTPATIENT
Start: 2019-07-17 | End: 2019-07-17 | Stop reason: HOSPADM

## 2019-07-17 RX ORDER — SODIUM CHLORIDE, SODIUM LACTATE, POTASSIUM CHLORIDE, CALCIUM CHLORIDE 600; 310; 30; 20 MG/100ML; MG/100ML; MG/100ML; MG/100ML
INJECTION, SOLUTION INTRAVENOUS ONCE AS NEEDED
Status: COMPLETED | OUTPATIENT
Start: 2019-07-17 | End: 2019-07-17

## 2019-07-17 RX ORDER — BUPIVACAINE HYDROCHLORIDE 2.5 MG/ML
INJECTION, SOLUTION EPIDURAL; INFILTRATION; INTRACAUDAL
Status: DISCONTINUED | OUTPATIENT
Start: 2019-07-17 | End: 2019-07-17 | Stop reason: HOSPADM

## 2019-07-17 RX ORDER — METHYLPREDNISOLONE ACETATE 80 MG/ML
INJECTION, SUSPENSION INTRA-ARTICULAR; INTRALESIONAL; INTRAMUSCULAR; SOFT TISSUE
Status: DISCONTINUED | OUTPATIENT
Start: 2019-07-17 | End: 2019-07-17 | Stop reason: HOSPADM

## 2019-07-17 RX ORDER — FENTANYL CITRATE 50 UG/ML
INJECTION, SOLUTION INTRAMUSCULAR; INTRAVENOUS
Status: DISCONTINUED | OUTPATIENT
Start: 2019-07-17 | End: 2019-07-17 | Stop reason: HOSPADM

## 2019-07-17 RX ADMIN — SODIUM CHLORIDE, SODIUM LACTATE, POTASSIUM CHLORIDE, CALCIUM CHLORIDE: 600; 310; 30; 20 INJECTION, SOLUTION INTRAVENOUS at 11:07

## 2019-07-17 NOTE — OP NOTE
PROCEDURE DATE: 7/17/2019    PROCEDURE: Right L3-4 and L4-5 transforaminal epidural steroid injection under fluoroscopy    DIAGNOSIS: Lumbar disc displacement without myelopathy  Post op diagnosis: Same    PHYSICIAN: Chris Palencia MD    MEDICATIONS INJECTED:  Dexamethasone 5mg (0.5ml) and 1.5ml 0.25% bupivicaine at each nerve root.     LOCAL ANESTHETIC INJECTED:  Lidocaine 1%. 2 ml per site.    SEDATION MEDICATIONS: RN IV sedation    ESTIMATED BLOOD LOSS:  None    COMPLICATIONS:  None    TECHNIQUE:   A time-out was taken to identify patient and procedure side prior to starting the procedure. The patient was placed in a prone position, prepped and draped in the usual sterile fashion using ChloraPrep and sterile towels.  The area to be injected was determined under fluoroscopic guidance in AP and oblique view.  Local anesthetic was given by raising a wheal and going down to the hub of a 25-gauge 1.5 inch needle.  In oblique view, a 3.5 inch 22-gauge bent-tip spinal needle was introduced towards 6 oclock position of the pedicle of each above named nerve root level.  The needle was walked medially then hinged into the neural foramen and position was confirmed in AP and lateral views.  1ml contrast dye was injected to confirm appropriate placement and that there was no vascular uptake.  After negative aspiration for blood or CSF, the medication was then injected. This was performed at the right L3-4 and L4-5 level(s). The patient tolerated the procedure well.    The patient was monitored after the procedure.  Patient was given post procedure and discharge instructions to follow at home. The patient was discharged in a stable condition.

## 2019-07-17 NOTE — DISCHARGE SUMMARY
[facility]  Discharge Note  Short Stay    Admit Date: 7/17/2019    Discharge Date and Time: 7/17/2019    Attending Physician: Chris Palencia MD     Discharge Provider: Chris Palencia    Diagnoses:  Active Hospital Problems    Diagnosis  POA    *Lumbar radiculitis [M54.16]  Yes      Resolved Hospital Problems   No resolved problems to display.       Hospital Course: Lumbar CHANEL  Discharged Condition: Good    Final Diagnoses:   Active Hospital Problems    Diagnosis  POA    *Lumbar radiculitis [M54.16]  Yes      Resolved Hospital Problems   No resolved problems to display.       Disposition: Home or Self Care    Follow up/Patient Instructions:    Medications:  Reconciled Home Medications:      Medication List      CONTINUE taking these medications    lisinopril-hydrochlorothiazide 20-12.5 mg per tablet  Commonly known as:  PRINZIDE,ZESTORETIC  TAKE 1 TABLET BY MOUTH ONCE DAILY.     naproxen sodium 220 MG tablet  Commonly known as:  ANAPROX  Take 220 mg by mouth every 12 (twelve) hours.     omeprazole 40 MG capsule  Commonly known as:  PRILOSEC  TAKE 1 CAPSULE (40 MG TOTAL) BY MOUTH ONCE DAILY.     pramipexole 0.5 MG tablet  Commonly known as:  MIRAPEX  TAKE 1 TABLET (0.5 MG TOTAL) BY MOUTH EVERY EVENING.     SHINGRIX (PF) 50 mcg/0.5 mL injection  Generic drug:  varicella-zoster [id number] (PF)  TO BE ADMINISTERED BY PHARMACIST FOR IMMUNIZATION          Discharge Procedure Orders   Call MD for:  temperature >100.4     Call MD for:  persistent nausea and vomiting or diarrhea     Call MD for:  severe uncontrolled pain     Call MD for:  redness, tenderness, or signs of infection (pain, swelling, redness, odor or green/yellow discharge around incision site)     Call MD for:  difficulty breathing or increased cough     Call MD for:  severe persistent headache        Follow up with MD in 2-3 weeks    Discharge Procedure Orders (must include Diet, Follow-up, Activity):   Discharge Procedure Orders (must include Diet,  Follow-up, Activity)   Call MD for:  temperature >100.4     Call MD for:  persistent nausea and vomiting or diarrhea     Call MD for:  severe uncontrolled pain     Call MD for:  redness, tenderness, or signs of infection (pain, swelling, redness, odor or green/yellow discharge around incision site)     Call MD for:  difficulty breathing or increased cough     Call MD for:  severe persistent headache

## 2019-07-17 NOTE — PLAN OF CARE
Pt states ready to go home , stable, armida po fluids, ambulatory, denies pain, Leg raises performed in bed without diff and instructed on fall risk. PT  verbalized understanding

## 2019-07-18 VITALS
BODY MASS INDEX: 32.79 KG/M2 | HEART RATE: 74 BPM | RESPIRATION RATE: 18 BRPM | TEMPERATURE: 98 F | OXYGEN SATURATION: 96 % | WEIGHT: 229.06 LBS | HEIGHT: 70 IN | SYSTOLIC BLOOD PRESSURE: 125 MMHG | DIASTOLIC BLOOD PRESSURE: 67 MMHG

## 2019-08-07 ENCOUNTER — OFFICE VISIT (OUTPATIENT)
Dept: PAIN MEDICINE | Facility: CLINIC | Age: 64
End: 2019-08-07
Payer: COMMERCIAL

## 2019-08-07 VITALS
WEIGHT: 229 LBS | BODY MASS INDEX: 32.78 KG/M2 | HEIGHT: 70 IN | DIASTOLIC BLOOD PRESSURE: 64 MMHG | HEART RATE: 73 BPM | SYSTOLIC BLOOD PRESSURE: 110 MMHG

## 2019-08-07 DIAGNOSIS — M47.896 OTHER SPONDYLOSIS, LUMBAR REGION: ICD-10-CM

## 2019-08-07 DIAGNOSIS — M51.36 DDD (DEGENERATIVE DISC DISEASE), LUMBAR: ICD-10-CM

## 2019-08-07 DIAGNOSIS — M54.16 LUMBAR RADICULOPATHY: Primary | ICD-10-CM

## 2019-08-07 PROCEDURE — 3008F PR BODY MASS INDEX (BMI) DOCUMENTED: ICD-10-PCS | Mod: CPTII,S$GLB,, | Performed by: PHYSICIAN ASSISTANT

## 2019-08-07 PROCEDURE — 3008F BODY MASS INDEX DOCD: CPT | Mod: CPTII,S$GLB,, | Performed by: PHYSICIAN ASSISTANT

## 2019-08-07 PROCEDURE — 3074F PR MOST RECENT SYSTOLIC BLOOD PRESSURE < 130 MM HG: ICD-10-PCS | Mod: CPTII,S$GLB,, | Performed by: PHYSICIAN ASSISTANT

## 2019-08-07 PROCEDURE — 99213 PR OFFICE/OUTPT VISIT, EST, LEVL III, 20-29 MIN: ICD-10-PCS | Mod: S$GLB,,, | Performed by: PHYSICIAN ASSISTANT

## 2019-08-07 PROCEDURE — 99213 OFFICE O/P EST LOW 20 MIN: CPT | Mod: S$GLB,,, | Performed by: PHYSICIAN ASSISTANT

## 2019-08-07 PROCEDURE — 3074F SYST BP LT 130 MM HG: CPT | Mod: CPTII,S$GLB,, | Performed by: PHYSICIAN ASSISTANT

## 2019-08-07 PROCEDURE — 99999 PR PBB SHADOW E&M-EST. PATIENT-LVL III: ICD-10-PCS | Mod: PBBFAC,,, | Performed by: PHYSICIAN ASSISTANT

## 2019-08-07 PROCEDURE — 3078F DIAST BP <80 MM HG: CPT | Mod: CPTII,S$GLB,, | Performed by: PHYSICIAN ASSISTANT

## 2019-08-07 PROCEDURE — 99999 PR PBB SHADOW E&M-EST. PATIENT-LVL III: CPT | Mod: PBBFAC,,, | Performed by: PHYSICIAN ASSISTANT

## 2019-08-07 PROCEDURE — 3078F PR MOST RECENT DIASTOLIC BLOOD PRESSURE < 80 MM HG: ICD-10-PCS | Mod: CPTII,S$GLB,, | Performed by: PHYSICIAN ASSISTANT

## 2019-08-07 NOTE — H&P (VIEW-ONLY)
Referring Physician: No ref. provider found    PCP: Colton Johnson MD      CC:  Right leg pain    Interval History:  Moustapha Murray is a 64 y.o. male with chronic recurrent radiating right leg braeden who presents for f/u s/p right TF CHANEL at L3-4 and L4-5. He has experienced 40% relief.He continues to have radiating pain down his anterior thigh into his right lateral calf.  Pain worsens standing and walking.  Pain improves with rest.  He desires repeat procedure with us.  He denies any worsening of his pain no bowel bladder changes. Pain today is rated 4/10.    Prior HPI:   Moustapha Murray is a 64 y.o. male referred to us for right leg pain.  Pain has gradually worsened over the past 6 weeks.  No recent traumatic incident.  He has intermittent burning, sharp pain from his lower back.  Pain radiates to his anterior thigh.  Pain sometimes travels to his lateral thigh in his calf as well. Pain worsens with standing, getting up and walking.  Pain improves with rest.  He has tried physical therapy with minimal benefit.  He recently had MRI of the lumbar spine which showed L3-4 disc protrusion.  He has taken NSAIDs with minimal benefit.  He denies any worsening weakness.  No bowel bladder changes    Interventional history:  Status post right L3-4 and L4-5 TFESI on 09/2018 with 70% relief.  S/p right TF CHANEL L3-4 and L4-5 on 7/17/19 with 40% relief    ROS:  CONSTITUTIONAL: No fevers, chills, night sweats, wt. loss, appetite changes  SKIN: no rashes or itching  ENT: No headaches, head trauma, vision changes, or eye pain  LYMPH NODES: None noticed   CV: No chest pain, palpitations.   RESP: No shortness of breath, dyspnea on exertion, cough, wheezing, or hemoptysis  GI: No nausea, emesis, diarrhea, constipation, melena, hematochezia, pain.    : No dysuria, hematuria, urgency, or frequency   HEME: No easy bruising, bleeding problems  PSYCHIATRIC: No depression, anxiety, psychosis, hallucinations.  NEURO: No  seizures, memory loss, dizziness or difficulty sleeping  MSK:  Positive HPI      Past Medical History:   Diagnosis Date    DDD (degenerative disc disease), lumbar     Depression     RLS (restless legs syndrome)      Past Surgical History:   Procedure Laterality Date    APPENDECTOMY      CARPAL TUNNEL RELEASE      CERVICAL FUSION      CHOLECYSTECTOMY      CIRCUMCISION      Injection,steroid,epidural,transforaminal approach Right 2018    Performed by Chris Palencia MD at [facility]    Injection,steroid,epidural,transforaminal approach L3-4, L4-5 Right 2019    Performed by Chris Palencia MD at [facility]     History reviewed. No pertinent family history.  Social History     Socioeconomic History    Marital status:      Spouse name: Not on file    Number of children: Not on file    Years of education: Not on file    Highest education level: Not on file   Occupational History    Occupation: [employer]   Social Needs    Financial resource strain: Not on file    Food insecurity:     Worry: Not on file     Inability: Not on file    Transportation needs:     Medical: Not on file     Non-medical: Not on file   Tobacco Use    Smoking status: Former Smoker     Packs/day: 1.50     Types: Cigarettes     Last attempt to quit: 10/16/2013     Years since quittin.8    Smokeless tobacco: Never Used   Substance and Sexual Activity    Alcohol use: Not on file    Drug use: Not on file    Sexual activity: Not on file   Lifestyle    Physical activity:     Days per week: Not on file     Minutes per session: Not on file    Stress: Not on file   Relationships    Social connections:     Talks on phone: Not on file     Gets together: Not on file     Attends Gnosticist service: Not on file     Active member of club or organization: Not on file     Attends meetings of clubs or organizations: Not on file     Relationship status: Not on file   Other Topics Concern    Not on file   Social History Narrative  "   Not on file         Medications/Allergies: See med card    Vitals:    08/07/19 0825   BP: 110/64   Pulse: 73   Weight: 103.9 kg (229 lb)   Height: 5' 10" (1.778 m)   PainSc:   4   PainLoc: Back         Physical exam:    GENERAL: A and O x3, the patient appears well groomed and is in no acute distress.  Skin: No rashes or obvious lesions  HEENT: normocephalic, atraumatic  CARDIOVASCULAR:  RRR  LUNGS: non labored breathing  ABDOMEN: soft, nontender   UPPER EXTREMITIES: Normal alignment, normal range of motion, no atrophy, no skin changes,  hair growth and nail growth normal and equal bilaterally. No swelling, no tenderness.    LOWER EXTREMITIES:  Normal alignment, normal range of motion, no atrophy, no skin changes,  hair growth and nail growth normal and equal bilaterally. No swelling, no tenderness.    LUMBAR SPINE  Lumbar spine: ROM is full with flexion extension and oblique extension with no increased pain.    Scot's test causes no increased pain on either side.    Supine straight leg raise is negative bilaterally.     Internal and external rotation of the hip causes no increased pain on either side.  Myofascial exam: mild tenderness to palpation across lumbar paraspinous muscles on right      MENTAL STATUS: normal orientation, speech, language, and fund of knowledge for social situation.  Emotional state appropriate.    CRANIAL NERVES:  II:  PERRL bilaterally,   III,IV,VI: EOMI.    V:  Facial sensation equal bilaterally  VII:  Facial motor function normal.  VIII:  Hearing equal to finger rub bilaterally  IX/X: Gag normal, palate symmetric  XI:  Shoulder shrug equal, head turn equal  XII:  Tongue midline without fasciculations      MOTOR: Tone and bulk: normal bilateral upper and lower Strength: normal   Delt Bi Tri WE WF     R 5 5 5 5 5 5   L 5 5 5 5 5 5     IP ADD ABD Quad TA Gas HAM  R 5 5 5 5 5 5 5  L 5 5 5 5 5 5 5    SENSATION: Light touch and pinprick intact bilaterally  REFLEXES: normal, " symmetric, nonbrisk.  Toes down, no clonus. No hoffmans.  GAIT: normal rise, base, steps, and arm swing.        Imaging:  MRI L-spine 8/17/18  L2-3: There is a mild disc bulge with dorsal annular fissure.  There is mild facet joint arthropathy.  There is no spinal canal or significant foraminal stenosis.    L3-4: There is a diffuse disc bulge with superimposed right foraminal disc protrusion.  There is mild facet joint arthropathy.  There is no spinal stenosis.  There is moderate-to-marked right foraminal stenosis and there is contact with the right L3 root by the foraminal disc protrusion.  There is mild left foraminal stenosis.    L4-5: There is trace retrolisthesis of L4 on L5.  There is a diffuse disc bulge with superimposed central disc protrusion with annular fissure.  There is mild facet joint arthropathy.  There is borderline spinal stenosis.  There is mild-to-moderate bilateral foraminal stenosis.    L5-S1: There is grade 1 spondylolisthesis with bilateral spondylolysis.  There is moderate-to-marked facet joint arthropathy.  There is unroofing of a diffuse disc bulge.  There is no significant spinal stenosis but there is severe bilateral foraminal stenosis.    Assessment:  Moustapha Murray is a 64 y.o. male with   1. Lumbar radiculopathy    2. DDD (degenerative disc disease), lumbar    3. Other spondylosis, lumbar region      Plan:  1. I have stressed the importance of physical activity and exercise to improve overall health  2.  Reviewed imaging with patient.  3.  Schedule repeat right L3-4 and L4-5 TFESI. I have explained the risks, benefits, and alternatives of the procedure in detail. The patient voices understanding and all questions have been answered. The patient agrees to proceed as planned. Written Consent obtained.   4.  Discussed MBB workup for continued low back pain. Back pain is tolerable.   5. If no further improvement with TF CHANEL, will update imaging and refer to neurosurgery for  consult  6. F/u s/p TF CHANEL

## 2019-08-07 NOTE — PROGRESS NOTES
Referring Physician: No ref. provider found    PCP: Colton Johnson MD      CC:  Right leg pain    Interval History:  Moustapha uMrray is a 64 y.o. male with chronic recurrent radiating right leg braeden who presents for f/u s/p right TF CHANEL at L3-4 and L4-5. He has experienced 40% relief.He continues to have radiating pain down his anterior thigh into his right lateral calf.  Pain worsens standing and walking.  Pain improves with rest.  He desires repeat procedure with us.  He denies any worsening of his pain no bowel bladder changes. Pain today is rated 4/10.    Prior HPI:   Moustapha Murray is a 64 y.o. male referred to us for right leg pain.  Pain has gradually worsened over the past 6 weeks.  No recent traumatic incident.  He has intermittent burning, sharp pain from his lower back.  Pain radiates to his anterior thigh.  Pain sometimes travels to his lateral thigh in his calf as well. Pain worsens with standing, getting up and walking.  Pain improves with rest.  He has tried physical therapy with minimal benefit.  He recently had MRI of the lumbar spine which showed L3-4 disc protrusion.  He has taken NSAIDs with minimal benefit.  He denies any worsening weakness.  No bowel bladder changes    Interventional history:  Status post right L3-4 and L4-5 TFESI on 09/2018 with 70% relief.  S/p right TF CHANEL L3-4 and L4-5 on 7/17/19 with 40% relief    ROS:  CONSTITUTIONAL: No fevers, chills, night sweats, wt. loss, appetite changes  SKIN: no rashes or itching  ENT: No headaches, head trauma, vision changes, or eye pain  LYMPH NODES: None noticed   CV: No chest pain, palpitations.   RESP: No shortness of breath, dyspnea on exertion, cough, wheezing, or hemoptysis  GI: No nausea, emesis, diarrhea, constipation, melena, hematochezia, pain.    : No dysuria, hematuria, urgency, or frequency   HEME: No easy bruising, bleeding problems  PSYCHIATRIC: No depression, anxiety, psychosis, hallucinations.  NEURO: No  seizures, memory loss, dizziness or difficulty sleeping  MSK:  Positive HPI      Past Medical History:   Diagnosis Date    DDD (degenerative disc disease), lumbar     Depression     RLS (restless legs syndrome)      Past Surgical History:   Procedure Laterality Date    APPENDECTOMY      CARPAL TUNNEL RELEASE      CERVICAL FUSION      CHOLECYSTECTOMY      CIRCUMCISION      Injection,steroid,epidural,transforaminal approach Right 2018    Performed by [facility] at [facility]    Injection,steroid,epidural,transforaminal approach L3-4, L4-5 Right 2019    Performed by Chris [facility]encia [facility] at [facility]     History reviewed. No pertinent family history.  Social History     Socioeconomic History    Marital status:      Spouse name: Not on file    Number of children: Not on file    Years of education: Not on file    Highest education level: Not on file   Occupational History    Occupation: [employer]   Social Needs    Financial resource strain: Not on file    Food insecurity:     Worry: Not on file     Inability: Not on file    Transportation needs:     Medical: Not on file     Non-medical: Not on file   Tobacco Use    Smoking status: Former Smoker     Packs/day: 1.50     Types: Cigarettes     Last attempt to quit: 10/16/2013     Years since quittin.8    Smokeless tobacco: Never Used   Substance and Sexual Activity    Alcohol use: Not on file    Drug use: Not on file    Sexual activity: Not on file   Lifestyle    Physical activity:     Days per week: Not on file     Minutes per session: Not on file    Stress: Not on file   Relationships    Social connections:     Talks on phone: Not on file     Gets together: Not on file     Attends Yazidi service: Not on file     Active member of club or organization: Not on file     Attends meetings of clubs or organizations: Not on file     Relationship status: Not on file   Other Topics Concern    Not on file   Social History Narrative  "   Not on file         Medications/Allergies: See med card    Vitals:    08/07/19 0825   BP: 110/64   Pulse: 73   Weight: 103.9 kg (229 lb)   Height: 5' 10" (1.778 m)   PainSc:   4   PainLoc: Back         Physical exam:    GENERAL: A and O x3, the patient appears well groomed and is in no acute distress.  Skin: No rashes or obvious lesions  HEENT: normocephalic, atraumatic  CARDIOVASCULAR:  RRR  LUNGS: non labored breathing  ABDOMEN: soft, nontender   UPPER EXTREMITIES: Normal alignment, normal range of motion, no atrophy, no skin changes,  hair growth and nail growth normal and equal bilaterally. No swelling, no tenderness.    LOWER EXTREMITIES:  Normal alignment, normal range of motion, no atrophy, no skin changes,  hair growth and nail growth normal and equal bilaterally. No swelling, no tenderness.    LUMBAR SPINE  Lumbar spine: ROM is full with flexion extension and oblique extension with no increased pain.    Scot's test causes no increased pain on either side.    Supine straight leg raise is negative bilaterally.     Internal and external rotation of the hip causes no increased pain on either side.  Myofascial exam: mild tenderness to palpation across lumbar paraspinous muscles on right      MENTAL STATUS: normal orientation, speech, language, and fund of knowledge for social situation.  Emotional state appropriate.    CRANIAL NERVES:  II:  PERRL bilaterally,   III,IV,VI: EOMI.    V:  Facial sensation equal bilaterally  VII:  Facial motor function normal.  VIII:  Hearing equal to finger rub bilaterally  IX/X: Gag normal, palate symmetric  XI:  Shoulder shrug equal, head turn equal  XII:  Tongue midline without fasciculations      MOTOR: Tone and bulk: normal bilateral upper and lower Strength: normal   Delt Bi Tri WE WF     R 5 5 5 5 5 5   L 5 5 5 5 5 5     IP ADD ABD Quad TA Gas HAM  R 5 5 5 5 5 5 5  L 5 5 5 5 5 5 5    SENSATION: Light touch and pinprick intact bilaterally  REFLEXES: normal, " symmetric, nonbrisk.  Toes down, no clonus. No hoffmans.  GAIT: normal rise, base, steps, and arm swing.        Imaging:  MRI L-spine 8/17/18  L2-3: There is a mild disc bulge with dorsal annular fissure.  There is mild facet joint arthropathy.  There is no spinal canal or significant foraminal stenosis.    L3-4: There is a diffuse disc bulge with superimposed right foraminal disc protrusion.  There is mild facet joint arthropathy.  There is no spinal stenosis.  There is moderate-to-marked right foraminal stenosis and there is contact with the right L3 root by the foraminal disc protrusion.  There is mild left foraminal stenosis.    L4-5: There is trace retrolisthesis of L4 on L5.  There is a diffuse disc bulge with superimposed central disc protrusion with annular fissure.  There is mild facet joint arthropathy.  There is borderline spinal stenosis.  There is mild-to-moderate bilateral foraminal stenosis.    L5-S1: There is grade 1 spondylolisthesis with bilateral spondylolysis.  There is moderate-to-marked facet joint arthropathy.  There is unroofing of a diffuse disc bulge.  There is no significant spinal stenosis but there is severe bilateral foraminal stenosis.    Assessment:  Moustapha Murray is a 64 y.o. male with   1. Lumbar radiculopathy    2. DDD (degenerative disc disease), lumbar    3. Other spondylosis, lumbar region      Plan:  1. I have stressed the importance of physical activity and exercise to improve overall health  2.  Reviewed imaging with patient.  3.  Schedule repeat right L3-4 and L4-5 TFESI. I have explained the risks, benefits, and alternatives of the procedure in detail. The patient voices understanding and all questions have been answered. The patient agrees to proceed as planned. Written Consent obtained.   4.  Discussed MBB workup for continued low back pain. Back pain is tolerable.   5. If no further improvement with TF CHANEL, will update imaging and refer to neurosurgery for  consult  6. F/u s/p TF CHANEL

## 2019-08-14 DIAGNOSIS — M54.16 LUMBAR RADICULOPATHY: Primary | ICD-10-CM

## 2019-08-28 ENCOUNTER — HOSPITAL ENCOUNTER (OUTPATIENT)
Facility: AMBULARY SURGERY CENTER | Age: 64
Discharge: HOME | End: 2019-08-28
Attending: ANESTHESIOLOGY | Admitting: ANESTHESIOLOGY
Payer: COMMERCIAL

## 2019-08-28 DIAGNOSIS — I10 HYPERTENSION, UNSPECIFIED TYPE: ICD-10-CM

## 2019-08-28 DIAGNOSIS — M54.16 LUMBAR RADICULITIS: Primary | ICD-10-CM

## 2019-08-28 PROCEDURE — 99152 MOD SED SAME PHYS/QHP 5/>YRS: CPT | Mod: ,,, | Performed by: ANESTHESIOLOGY

## 2019-08-28 PROCEDURE — 64484 NJX AA&/STRD TFRM EPI L/S EA: CPT | Mod: RT,,, | Performed by: ANESTHESIOLOGY

## 2019-08-28 PROCEDURE — 64483 PR EPIDURAL INJ, ANES/STEROID, TRANSFORAMINAL, LUMB/SACR, SNGL LEVL: ICD-10-PCS | Mod: RT,,, | Performed by: ANESTHESIOLOGY

## 2019-08-28 PROCEDURE — 64484 NJX AA&/STRD TFRM EPI L/S EA: CPT | Performed by: ANESTHESIOLOGY

## 2019-08-28 PROCEDURE — 64484 PRA INJECT ANES/STEROID FORAMEN LUMBAR/SACRAL W IMG GUIDE ,EA ADD LEVEL: ICD-10-PCS | Mod: RT,,, | Performed by: ANESTHESIOLOGY

## 2019-08-28 PROCEDURE — 99152 PR MOD CONSCIOUS SEDATION, SAME PHYS, 5+ YRS, FIRST 15 MIN: ICD-10-PCS | Mod: ,,, | Performed by: ANESTHESIOLOGY

## 2019-08-28 PROCEDURE — 64483 NJX AA&/STRD TFRM EPI L/S 1: CPT | Mod: RT,,, | Performed by: ANESTHESIOLOGY

## 2019-08-28 PROCEDURE — 64483 NJX AA&/STRD TFRM EPI L/S 1: CPT | Performed by: ANESTHESIOLOGY

## 2019-08-28 RX ORDER — FENTANYL CITRATE 50 UG/ML
INJECTION, SOLUTION INTRAMUSCULAR; INTRAVENOUS
Status: DISCONTINUED | OUTPATIENT
Start: 2019-08-28 | End: 2019-08-28 | Stop reason: HOSPADM

## 2019-08-28 RX ORDER — SODIUM CHLORIDE, SODIUM LACTATE, POTASSIUM CHLORIDE, CALCIUM CHLORIDE 600; 310; 30; 20 MG/100ML; MG/100ML; MG/100ML; MG/100ML
INJECTION, SOLUTION INTRAVENOUS ONCE AS NEEDED
Status: COMPLETED | OUTPATIENT
Start: 2019-08-28 | End: 2019-08-28

## 2019-08-28 RX ORDER — BUPIVACAINE HYDROCHLORIDE 2.5 MG/ML
INJECTION, SOLUTION EPIDURAL; INFILTRATION; INTRACAUDAL
Status: DISCONTINUED | OUTPATIENT
Start: 2019-08-28 | End: 2019-08-28 | Stop reason: HOSPADM

## 2019-08-28 RX ORDER — MIDAZOLAM HYDROCHLORIDE 1 MG/ML
INJECTION INTRAMUSCULAR; INTRAVENOUS
Status: DISCONTINUED | OUTPATIENT
Start: 2019-08-28 | End: 2019-08-28 | Stop reason: HOSPADM

## 2019-08-28 RX ORDER — LIDOCAINE HYDROCHLORIDE 10 MG/ML
INJECTION, SOLUTION EPIDURAL; INFILTRATION; INTRACAUDAL; PERINEURAL
Status: DISCONTINUED | OUTPATIENT
Start: 2019-08-28 | End: 2019-08-28 | Stop reason: HOSPADM

## 2019-08-28 RX ORDER — DEXAMETHASONE SODIUM PHOSPHATE 10 MG/ML
INJECTION INTRAMUSCULAR; INTRAVENOUS
Status: DISCONTINUED | OUTPATIENT
Start: 2019-08-28 | End: 2019-08-28 | Stop reason: HOSPADM

## 2019-08-28 RX ADMIN — SODIUM CHLORIDE, SODIUM LACTATE, POTASSIUM CHLORIDE, CALCIUM CHLORIDE: 600; 310; 30; 20 INJECTION, SOLUTION INTRAVENOUS at 12:08

## 2019-08-28 NOTE — OP NOTE
PROCEDURE DATE: 8/28/2019    PROCEDURE: Right L3-4 and L4-5 transforaminal epidural steroid injection under fluoroscopy    DIAGNOSIS: Lumbar disc displacement without myelopathy  Post op diagnosis: Same    PHYSICIAN: Chris Palencia MD    MEDICATIONS INJECTED:  Dexamethasone 5mg (0.5ml) and 1.5ml 0.25% bupivicaine at each nerve root.     LOCAL ANESTHETIC INJECTED:  Lidocaine 1%. 2 ml per site.    SEDATION MEDICATIONS: RN IV sedation    ESTIMATED BLOOD LOSS:  None    COMPLICATIONS:  None    TECHNIQUE:   A time-out was taken to identify patient and procedure side prior to starting the procedure. The patient was placed in a prone position, prepped and draped in the usual sterile fashion using ChloraPrep and sterile towels.  The area to be injected was determined under fluoroscopic guidance in AP and oblique view.  Local anesthetic was given by raising a wheal and going down to the hub of a 25-gauge 1.5 inch needle.  In oblique view, a 3.5 inch 22-gauge bent-tip spinal needle was introduced towards 6 oclock position of the pedicle of each above named nerve root level.  The needle was walked medially then hinged into the neural foramen and position was confirmed in AP and lateral views.  1ml contrast dye was injected to confirm appropriate placement and that there was no vascular uptake.  After negative aspiration for blood or CSF, the medication was then injected. This was performed at the right L3-4 and L4-5 level(s). The patient tolerated the procedure well.    The patient was monitored after the procedure.  Patient was given post procedure and discharge instructions to follow at home. The patient was discharged in a stable condition.

## 2019-08-28 NOTE — DISCHARGE SUMMARY
[facility]  Discharge Note  Short Stay    Admit Date: 8/28/2019    Discharge Date and Time: 8/28/2019    Attending Physician: Chris Palencia MD     Discharge Provider: Chris Palencia    Diagnoses:  Active Hospital Problems    Diagnosis  POA    *Lumbar radiculitis [M54.16]  Yes      Resolved Hospital Problems   No resolved problems to display.       Hospital Course: Lumbar CHANEL  Discharged Condition: Good    Final Diagnoses:   Active Hospital Problems    Diagnosis  POA    *Lumbar radiculitis [M54.16]  Yes      Resolved Hospital Problems   No resolved problems to display.       Disposition: Home or Self Care    Follow up/Patient Instructions:    Medications:  Reconciled Home Medications:      Medication List      CONTINUE taking these medications    lisinopril-hydrochlorothiazide 20-12.5 mg per tablet  Commonly known as:  PRINZIDE,ZESTORETIC  TAKE 1 TABLET BY MOUTH ONCE DAILY.     naproxen sodium 220 MG tablet  Commonly known as:  ANAPROX  Take 220 mg by mouth every 12 (twelve) hours.     omeprazole 40 MG capsule  Commonly known as:  PRILOSEC  TAKE 1 CAPSULE (40 MG TOTAL) BY MOUTH ONCE DAILY.     pramipexole 0.5 MG tablet  Commonly known as:  MIRAPEX  TAKE 1 TABLET (0.5 MG TOTAL) BY MOUTH EVERY EVENING.     SHINGRIX (PF) 50 mcg/0.5 mL injection  Generic drug:  varicella-zoster [id number] (PF)  TO BE ADMINISTERED BY PHARMACIST FOR IMMUNIZATION          Discharge Procedure Orders   Call MD for:  temperature >100.4     Call MD for:  persistent nausea and vomiting or diarrhea     Call MD for:  severe uncontrolled pain     Call MD for:  redness, tenderness, or signs of infection (pain, swelling, redness, odor or green/yellow discharge around incision site)     Call MD for:  difficulty breathing or increased cough     Call MD for:  severe persistent headache        Follow up with MD in 2-3 weeks    Discharge Procedure Orders (must include Diet, Follow-up, Activity):   Discharge Procedure Orders (must include Diet,  Follow-up, Activity)   Call MD for:  temperature >100.4     Call MD for:  persistent nausea and vomiting or diarrhea     Call MD for:  severe uncontrolled pain     Call MD for:  redness, tenderness, or signs of infection (pain, swelling, redness, odor or green/yellow discharge around incision site)     Call MD for:  difficulty breathing or increased cough     Call MD for:  severe persistent headache

## 2019-08-28 NOTE — DISCHARGE INSTRUCTIONS
Before leaving, please make sure you have all your personal belongings such as glasses, purses, wallets, keys, cell phones, jewelry, jackets etc    Anesthesia information    Anesthesia Safety      You have been given medicine  to sedate you during your procedure today. This may have included both a pain medicine and sleeping medicine. Most of the effects have worn off; however, you may continue to have some drowsiness for the next  24 hours. Anesthesia and pain medicines can cause nausea, sleepiness, dizziness and  constipation.    HOME CARE:  1) For the next EIGHT HOURS, you should be watched by a responsible adult to look for any worsening of your condition.  2) DO NOT DRINK any ALCOHOL for the next 24 HOURS.  3) DO NOT DRIVE or operate dangerous machinery during the next 24 HOURS.  FOLLOW UP with your doctor or this facility if you are not alert and back to your usual level of activity within 24 hrs.  GET PROMPT MEDICAL ATTENTION if any of the following occur:  -- Increased drowsiness  -- Increased weakness or dizziness  -- Repeated vomiting  -- If you cannot be awakened      Pain injection instructions:     This procedure may take a couple weeks to relieve pain  You may get some pain relief from the local anesthetic initally.    No driving for 24 hrs.   Activity as tolerated- gradually increase activities.  Dont lift over 10 lbs for 24 hrs   No heat at injection sites x 2 days. No heating pads, hot tubs, saunas, or swimming in any body of water or pool for 2 days.  Use ice pack for mild swelling and for comfort , apply for 20 minutes, remove for 20 minute intervals. No direct contact of ice itself  to skin.  May shower today. No tub baths for two days.      Resume Aspirin, Plavix, or Coumadin the day after the procedure unless otherwise instructed.   If diabetic,monitor your glucose carefully as steroids can increase your glucose level    Seek immediate medical help for:   Severe increase in your usual pain or  appearance of new pain.  Prolonged (mor than 8 hours) or increasing weakness or numbness in the legs or arms. Numbing medicine was injected and can affect the messages to and from the brain and legs or arms.  .    Fever above 101 ,Drainage,redness,active bleeding, or increased swelling at the injection site.  Headache, shortness of breath, chest pain, or breathing problems.

## 2019-08-29 VITALS
DIASTOLIC BLOOD PRESSURE: 60 MMHG | TEMPERATURE: 98 F | WEIGHT: 229.06 LBS | BODY MASS INDEX: 32.79 KG/M2 | HEIGHT: 70 IN | SYSTOLIC BLOOD PRESSURE: 111 MMHG | RESPIRATION RATE: 18 BRPM | HEART RATE: 74 BPM | OXYGEN SATURATION: 96 %

## 2019-08-29 RX ORDER — LISINOPRIL AND HYDROCHLOROTHIAZIDE 12.5; 2 MG/1; MG/1
1 TABLET ORAL DAILY
Qty: 90 TABLET | Refills: 0 | Status: SHIPPED | OUTPATIENT
Start: 2019-08-29 | End: 2019-10-24

## 2019-08-29 NOTE — PROGRESS NOTES
Refill Authorization Note     is requesting a refill authorization.    Brief assessment and rationale for refill: APPROVE; prr  Name and strength of medication: LISINOPRIL-HCTZ 20-12.5 MG TAB       Medication Therapy Plan: BP controlled; labs wnl; donna 3 more                   How patient will take medication: t1t po daily           Comments:   BP Readings from Last 3 Encounters:   08/28/19 111/60   08/07/19 110/64   07/17/19 125/67     Lab Results   Component Value Date    CREATININE 1.0 11/06/2018    BUN 20 11/06/2018     11/06/2018    K 3.8 11/06/2018     11/06/2018    CO2 28 11/06/2018

## 2019-09-23 DIAGNOSIS — K21.9 GASTROESOPHAGEAL REFLUX DISEASE WITHOUT ESOPHAGITIS: ICD-10-CM

## 2019-09-23 RX ORDER — OMEPRAZOLE 40 MG/1
40 CAPSULE, DELAYED RELEASE ORAL DAILY
Qty: 90 CAPSULE | Refills: 1 | Status: SHIPPED | OUTPATIENT
Start: 2019-09-23 | End: 2020-07-08 | Stop reason: SDUPTHER

## 2019-09-23 NOTE — PROGRESS NOTES
Refill Authorization Note     is requesting a refill authorization.    Brief assessment and rationale for refill: ROUTE: op   Name and strength of medication: OMEPRAZOLE DR 40 MG CAPSULE       Medication Therapy Plan: LOV/LCO-GERD- tolerates omeprazole 40mg with good control(11/18); medication outside protocol; route to you     Medication reconciliation completed: No              How patient will take medication: t1c po qd           Comments:   Appointments past 12m or future 3m    Date Provider   Last Visit   11/6/2018 Colton Johnson MD   Next Visit   Visit date not found Colton Johnson MD

## 2019-09-23 NOTE — TELEPHONE ENCOUNTER
Please see the following assessment. This refill request still requires some action on your part. Omeprazole is outside of our protocol. Routing to you.  Thank you.

## 2019-10-16 RX ORDER — PRAMIPEXOLE DIHYDROCHLORIDE 0.5 MG/1
0.5 TABLET ORAL NIGHTLY
Qty: 90 TABLET | Refills: 3 | Status: SHIPPED | OUTPATIENT
Start: 2019-10-16 | End: 2021-02-11 | Stop reason: SDUPTHER

## 2019-10-16 NOTE — PROGRESS NOTES
Refill Authorization Note     is requesting a refill authorization.    Brief assessment and rationale for refill: ROUTE: op   Name and strength of medication: PRAMIPEXOLE 0.5 MG TABLET       Medication Therapy Plan: LCO/LOV-RLS controlled(11/18); medication outside of protocol; route to you     Medication reconciliation completed: No              How patient will take medication: t1t po qpm          Comments:   Appointments past 12m or future 3m    Date Provider   Last Visit   11/6/2018 Colton Johnson MD   Next Visit   10/24/2019 Colton Johnson MD   \

## 2019-10-24 ENCOUNTER — LAB VISIT (OUTPATIENT)
Dept: LAB | Facility: HOSPITAL | Age: 64
End: 2019-10-24
Attending: FAMILY MEDICINE
Payer: COMMERCIAL

## 2019-10-24 ENCOUNTER — OFFICE VISIT (OUTPATIENT)
Dept: FAMILY MEDICINE | Facility: CLINIC | Age: 64
End: 2019-10-24
Payer: COMMERCIAL

## 2019-10-24 VITALS
WEIGHT: 234.13 LBS | OXYGEN SATURATION: 98 % | HEART RATE: 70 BPM | BODY MASS INDEX: 33.52 KG/M2 | SYSTOLIC BLOOD PRESSURE: 128 MMHG | DIASTOLIC BLOOD PRESSURE: 78 MMHG | HEIGHT: 70 IN | TEMPERATURE: 98 F

## 2019-10-24 DIAGNOSIS — Z00.00 PREVENTATIVE HEALTH CARE: ICD-10-CM

## 2019-10-24 DIAGNOSIS — Z12.11 COLON CANCER SCREENING: ICD-10-CM

## 2019-10-24 DIAGNOSIS — F32.A DEPRESSION, UNSPECIFIED DEPRESSION TYPE: ICD-10-CM

## 2019-10-24 DIAGNOSIS — I10 HYPERTENSION, UNSPECIFIED TYPE: ICD-10-CM

## 2019-10-24 DIAGNOSIS — Z00.00 PREVENTATIVE HEALTH CARE: Primary | ICD-10-CM

## 2019-10-24 LAB
ALBUMIN SERPL BCP-MCNC: 4 G/DL (ref 3.5–5.2)
ALP SERPL-CCNC: 101 U/L (ref 55–135)
ALT SERPL W/O P-5'-P-CCNC: 41 U/L (ref 10–44)
ANION GAP SERPL CALC-SCNC: 10 MMOL/L (ref 8–16)
AST SERPL-CCNC: 29 U/L (ref 10–40)
BILIRUB SERPL-MCNC: 0.7 MG/DL (ref 0.1–1)
BUN SERPL-MCNC: 15 MG/DL (ref 8–23)
CALCIUM SERPL-MCNC: 9.9 MG/DL (ref 8.7–10.5)
CHLORIDE SERPL-SCNC: 101 MMOL/L (ref 95–110)
CHOLEST SERPL-MCNC: 207 MG/DL (ref 120–199)
CHOLEST/HDLC SERPL: 4.1 {RATIO} (ref 2–5)
CO2 SERPL-SCNC: 28 MMOL/L (ref 23–29)
COMPLEXED PSA SERPL-MCNC: 0.25 NG/ML (ref 0–4)
CREAT SERPL-MCNC: 1.1 MG/DL (ref 0.5–1.4)
EST. GFR  (AFRICAN AMERICAN): >60 ML/MIN/1.73 M^2
EST. GFR  (NON AFRICAN AMERICAN): >60 ML/MIN/1.73 M^2
ESTIMATED AVG GLUCOSE: 120 MG/DL (ref 68–131)
GLUCOSE SERPL-MCNC: 106 MG/DL (ref 70–110)
HBA1C MFR BLD HPLC: 5.8 % (ref 4–5.6)
HDLC SERPL-MCNC: 50 MG/DL (ref 40–75)
HDLC SERPL: 24.2 % (ref 20–50)
LDLC SERPL CALC-MCNC: 140.2 MG/DL (ref 63–159)
NONHDLC SERPL-MCNC: 157 MG/DL
POTASSIUM SERPL-SCNC: 4.7 MMOL/L (ref 3.5–5.1)
PROT SERPL-MCNC: 7.1 G/DL (ref 6–8.4)
SODIUM SERPL-SCNC: 139 MMOL/L (ref 136–145)
TRIGL SERPL-MCNC: 84 MG/DL (ref 30–150)

## 2019-10-24 PROCEDURE — 83036 HEMOGLOBIN GLYCOSYLATED A1C: CPT

## 2019-10-24 PROCEDURE — 3074F SYST BP LT 130 MM HG: CPT | Mod: CPTII,S$GLB,, | Performed by: FAMILY MEDICINE

## 2019-10-24 PROCEDURE — 3074F PR MOST RECENT SYSTOLIC BLOOD PRESSURE < 130 MM HG: ICD-10-PCS | Mod: CPTII,S$GLB,, | Performed by: FAMILY MEDICINE

## 2019-10-24 PROCEDURE — 90471 IMMUNIZATION ADMIN: CPT | Mod: S$GLB,,, | Performed by: FAMILY MEDICINE

## 2019-10-24 PROCEDURE — 99396 PR PREVENTIVE VISIT,EST,40-64: ICD-10-PCS | Mod: 25,S$GLB,, | Performed by: FAMILY MEDICINE

## 2019-10-24 PROCEDURE — 99999 PR PBB SHADOW E&M-EST. PATIENT-LVL III: CPT | Mod: PBBFAC,,, | Performed by: FAMILY MEDICINE

## 2019-10-24 PROCEDURE — 84153 ASSAY OF PSA TOTAL: CPT

## 2019-10-24 PROCEDURE — 3078F PR MOST RECENT DIASTOLIC BLOOD PRESSURE < 80 MM HG: ICD-10-PCS | Mod: CPTII,S$GLB,, | Performed by: FAMILY MEDICINE

## 2019-10-24 PROCEDURE — 90471 FLU VACCINE (QUAD) GREATER THAN OR EQUAL TO 3YO PRESERVATIVE FREE IM: ICD-10-PCS | Mod: S$GLB,,, | Performed by: FAMILY MEDICINE

## 2019-10-24 PROCEDURE — 80061 LIPID PANEL: CPT

## 2019-10-24 PROCEDURE — 80053 COMPREHEN METABOLIC PANEL: CPT

## 2019-10-24 PROCEDURE — 36415 COLL VENOUS BLD VENIPUNCTURE: CPT | Mod: PO

## 2019-10-24 PROCEDURE — 90686 FLU VACCINE (QUAD) GREATER THAN OR EQUAL TO 3YO PRESERVATIVE FREE IM: ICD-10-PCS | Mod: S$GLB,,, | Performed by: FAMILY MEDICINE

## 2019-10-24 PROCEDURE — 99396 PREV VISIT EST AGE 40-64: CPT | Mod: 25,S$GLB,, | Performed by: FAMILY MEDICINE

## 2019-10-24 PROCEDURE — 3078F DIAST BP <80 MM HG: CPT | Mod: CPTII,S$GLB,, | Performed by: FAMILY MEDICINE

## 2019-10-24 PROCEDURE — 99999 PR PBB SHADOW E&M-EST. PATIENT-LVL III: ICD-10-PCS | Mod: PBBFAC,,, | Performed by: FAMILY MEDICINE

## 2019-10-24 PROCEDURE — 90686 IIV4 VACC NO PRSV 0.5 ML IM: CPT | Mod: S$GLB,,, | Performed by: FAMILY MEDICINE

## 2019-10-24 RX ORDER — TELMISARTAN AND HYDROCHLORTHIAZIDE 40; 12.5 MG/1; MG/1
1 TABLET ORAL DAILY
Qty: 90 TABLET | Refills: 3 | Status: SHIPPED | OUTPATIENT
Start: 2019-10-24 | End: 2020-11-22 | Stop reason: SDUPTHER

## 2019-10-24 RX ORDER — ESCITALOPRAM OXALATE 10 MG/1
10 TABLET ORAL DAILY
Qty: 90 TABLET | Refills: 3 | Status: SHIPPED | OUTPATIENT
Start: 2019-10-24 | End: 2020-11-16 | Stop reason: SDUPTHER

## 2019-10-24 NOTE — PROGRESS NOTES
Chief Complaint   Patient presents with    Annual Exam       HISTORY OF PRESENT ILLNESS: This is a 64-year-old white male presents today for annual exam.    HTN - Tolerating lisinopril HCT daily. Denies HA, CP or dizziness. Home /80. C/o some regular cough for the last year.  GERD -  He has uses Omeprazole 40mg daily with good control.   RLS - tolerating Mirapex 0.5 mg daily. This is controlled  Lumbar DDD - stable  Walking daily  C/o some recurrence of depressed mood and apathy since death of mother 3 months ago.    PAST MEDICAL HISTORY:   restless legs syndrome   colon polyps (colonoscopy 8/27/09)  GERD  Lumbar DDD    PAST SURGICAL HISTORY:   Cholecystectomy  C4-C5 anterior cervical fusion 2004  right carpal tunnel release     FAMILY HISTORY: cancer in his grandparent.     SOCIAL HISTORY: The patient is .   He has children.   Ex-smoker  He drinks alcoholic beverages occasionally.   He works at a bank    REVIEW OF SYSTEMS:   GENERAL: No fever, chills, fatigability or weight changes.   SKIN/BREASTS: No rashes, sores, itching or changes in color or texture of skin. No changes in moles. No pain, swelling, tenderness, lumps, bleeding or discharge from nipples.   HEAD: No headaches or recent head trauma.   EYES: Visual acuity fine. Denies blurriness, tearing, itching, photophobia, diplopia, or visual changes.   EARS: Denies ear pain, discharge, tinnitus or vertigo. Denies hearing loss.   NOSE: No loss of smell, epistaxis, postnasal drip, discharge, obstruction, or sneezing.   MOUTH & THROAT: No hoarseness, change in voice, swallowing difficulty. No excessive gum bleeding.   HEMATOLOGICAL/NODES: Denies swollen glands. No bleeding or bruising.   CHEST: No CHANDRA, cyanosis, wheezing, cough or sputum production.   CARDIOVASCULAR: Denies chest pain, dyspnea, orthopnea, or palpitations.   GI/ABDOMEN: Appetite fine. No weight loss. Denies nausea, vomiting, diarrhea, constipation, abdominal pain, hematemesis or blood  "in stool.   URINARY: No dysuria,hematuria, nocturia, incontinence, flank pain, urgency, or urinary difficulty.   PERIPHERAL VASCULAR: No claudication, cold intolerance or cyanosis.   MUSCULOSKELETAL: Some intermittent right wrist pain, low back pain.  NEUROLOGIC: No history of seizures, paralysis, alteration of gait or coordination. Some intermittent numbness in the left 2nd and 3rd fingers with driving that impoves with changes in position.  ENDOCRINE: Sexual maturation. Denies weight change, heat/cold intolerance, hair loss or gain, loss of libido, polyuria, polydipsia, polyphagia, pruritus, unexplained flushing, or excessive sweating.   PSYCH: No crying spells. Denies anxiety symptoms.     IMMUNIZATIONS: Up-to-date.    PHYSICAL EXAM:     /78 (BP Location: Right arm, Patient Position: Sitting)   Pulse 70   Temp 97.7 °F (36.5 °C) (Oral)   Ht 5' 10" (1.778 m)   Wt 106.2 kg (234 lb 2.1 oz)   SpO2 98%   BMI 33.59 kg/m²   GENERAL: This is a healthy-appearing 64-year-old white male, sitting   upright, in no apparent distress. Alert and oriented x4.   SKIN: Reveals erythematous rash over right breast. No suspicious neoplasms.   NEUROLOGICALLY: Cranial nerves 2-12 are grossly intact with no sensory or motor deficits.   PSYCHOLOGICALLY: Mood and affect are normal. Insight and judgment are   excellent. Appearance is well groomed.   Pupils are equal, round and reactive to light. Extraocular movements are intact.   NECK: Supple. There is no lymphadenopathy, thyromegaly or JVD.   CHEST: Clear to auscultation bilaterally with good respiratory movement.   CARDIOVASCULAR: S1, S2. Regular rate and rhythm. No murmurs, rubs or gallops.   EXTREMITIES: Show no cyanosis, clubbing or edema with 2+ distal pulses.   MUSCULOSKELETAL: Reveals full range of motion in all extremities.      Results for orders placed or performed in visit on 11/06/18   Comprehensive metabolic panel   Result Value Ref Range    Sodium 138 136 - 145 " mmol/L    Potassium 3.8 3.5 - 5.1 mmol/L    Chloride 104 95 - 110 mmol/L    CO2 28 23 - 29 mmol/L    Glucose 108 70 - 110 mg/dL    BUN, Bld 20 8 - 23 mg/dL    Creatinine 1.0 0.5 - 1.4 mg/dL    Calcium 9.4 8.7 - 10.5 mg/dL    Total Protein 7.1 6.0 - 8.4 g/dL    Albumin 3.9 3.5 - 5.2 g/dL    Total Bilirubin 0.7 0.1 - 1.0 mg/dL    Alkaline Phosphatase 102 55 - 135 U/L    AST 28 10 - 40 U/L    ALT 35 10 - 44 U/L    Anion Gap 6 (L) 8 - 16 mmol/L    eGFR if African American >60.0 >60 mL/min/1.73 m^2    eGFR if non African American >60.0 >60 mL/min/1.73 m^2   Hemoglobin A1c   Result Value Ref Range    Hemoglobin A1C 5.9 (H) 4.0 - 5.6 %    Estimated Avg Glucose 123 68 - 131 mg/dL   Lipid panel   Result Value Ref Range    Cholesterol 220 (H) 120 - 199 mg/dL    Triglycerides 125 30 - 150 mg/dL    HDL 48 40 - 75 mg/dL    LDL Cholesterol 147.0 63.0 - 159.0 mg/dL    Hdl/Cholesterol Ratio 21.8 20.0 - 50.0 %    Total Cholesterol/HDL Ratio 4.6 2.0 - 5.0    Non-HDL Cholesterol 172 mg/dL   PSA, Screening   Result Value Ref Range    PSA, SCREEN 0.22 0.00 - 4.00 ng/mL     ASSESSMENT/PLAN:     Preventative health care  -     Hemoglobin A1c; Future; Expected date: 10/24/2019  -     Comprehensive metabolic panel; Future; Expected date: 10/24/2019  -     Lipid panel; Future; Expected date: 10/24/2019  -     PSA, Screening; Future; Expected date: 10/24/2019    Hypertension, unspecified type  -     telmisartan-hydrochlorothiazide (MICARDIS HCT) 40-12.5 mg per tablet; Take 1 tablet by mouth once daily.  Dispense: 90 tablet; Refill: 3    Colon cancer screening  -     Case request GI: COLONOSCOPY    Depression, unspecified depression type  -     escitalopram oxalate (LEXAPRO) 10 MG tablet; Take 1 tablet (10 mg total) by mouth once daily.  Dispense: 90 tablet; Refill: 3      discontinue lisinopril HCT due to cough  Begin micardis HCT  Begin Lexapro 10mg  Continue omeprazole  Flu shot  Continue home BP monitoring   Discussed healthy lifestyle  measures including maintenance of a healthy weight, increasing fruits and vegetables in the diet.   F/u 1 months

## 2019-10-29 ENCOUNTER — OFFICE VISIT (OUTPATIENT)
Dept: DERMATOLOGY | Facility: CLINIC | Age: 64
End: 2019-10-29
Payer: COMMERCIAL

## 2019-10-29 VITALS — HEIGHT: 70 IN | BODY MASS INDEX: 33.5 KG/M2 | WEIGHT: 234 LBS

## 2019-10-29 DIAGNOSIS — L40.0 PSORIASIS VULGARIS: Primary | ICD-10-CM

## 2019-10-29 PROCEDURE — 99999 PR PBB SHADOW E&M-EST. PATIENT-LVL III: ICD-10-PCS | Mod: PBBFAC,,, | Performed by: DERMATOLOGY

## 2019-10-29 PROCEDURE — 99203 OFFICE O/P NEW LOW 30 MIN: CPT | Mod: S$GLB,,, | Performed by: DERMATOLOGY

## 2019-10-29 PROCEDURE — 99203 PR OFFICE/OUTPT VISIT, NEW, LEVL III, 30-44 MIN: ICD-10-PCS | Mod: S$GLB,,, | Performed by: DERMATOLOGY

## 2019-10-29 PROCEDURE — 3008F BODY MASS INDEX DOCD: CPT | Mod: CPTII,S$GLB,, | Performed by: DERMATOLOGY

## 2019-10-29 PROCEDURE — 3008F PR BODY MASS INDEX (BMI) DOCUMENTED: ICD-10-PCS | Mod: CPTII,S$GLB,, | Performed by: DERMATOLOGY

## 2019-10-29 PROCEDURE — 99999 PR PBB SHADOW E&M-EST. PATIENT-LVL III: CPT | Mod: PBBFAC,,, | Performed by: DERMATOLOGY

## 2019-10-29 RX ORDER — FLUOCINONIDE TOPICAL SOLUTION USP, 0.05% 0.5 MG/ML
SOLUTION TOPICAL 2 TIMES DAILY
Qty: 30 ML | Refills: 1 | Status: SHIPPED | OUTPATIENT
Start: 2019-10-29 | End: 2019-12-16 | Stop reason: SDUPTHER

## 2019-10-29 RX ORDER — TRIAMCINOLONE ACETONIDE 1 MG/G
CREAM TOPICAL 2 TIMES DAILY
Qty: 45 G | Refills: 1 | Status: ON HOLD | OUTPATIENT
Start: 2019-10-29 | End: 2023-02-01 | Stop reason: SDUPTHER

## 2019-11-11 ENCOUNTER — PATIENT OUTREACH (OUTPATIENT)
Dept: ADMINISTRATIVE | Facility: HOSPITAL | Age: 64
End: 2019-11-11

## 2019-11-19 ENCOUNTER — TELEPHONE (OUTPATIENT)
Dept: FAMILY MEDICINE | Facility: CLINIC | Age: 64
End: 2019-11-19

## 2019-11-25 ENCOUNTER — OFFICE VISIT (OUTPATIENT)
Dept: FAMILY MEDICINE | Facility: CLINIC | Age: 64
End: 2019-11-25
Payer: COMMERCIAL

## 2019-11-25 ENCOUNTER — TELEPHONE (OUTPATIENT)
Dept: GASTROENTEROLOGY | Facility: CLINIC | Age: 64
End: 2019-11-25

## 2019-11-25 VITALS
DIASTOLIC BLOOD PRESSURE: 78 MMHG | TEMPERATURE: 98 F | BODY MASS INDEX: 33.21 KG/M2 | WEIGHT: 231.94 LBS | SYSTOLIC BLOOD PRESSURE: 124 MMHG | HEIGHT: 70 IN | HEART RATE: 69 BPM | OXYGEN SATURATION: 97 %

## 2019-11-25 DIAGNOSIS — I10 HYPERTENSION, UNSPECIFIED TYPE: Primary | ICD-10-CM

## 2019-11-25 DIAGNOSIS — F32.A DEPRESSION, UNSPECIFIED DEPRESSION TYPE: ICD-10-CM

## 2019-11-25 PROCEDURE — 3008F PR BODY MASS INDEX (BMI) DOCUMENTED: ICD-10-PCS | Mod: CPTII,S$GLB,, | Performed by: FAMILY MEDICINE

## 2019-11-25 PROCEDURE — 3078F PR MOST RECENT DIASTOLIC BLOOD PRESSURE < 80 MM HG: ICD-10-PCS | Mod: CPTII,S$GLB,, | Performed by: FAMILY MEDICINE

## 2019-11-25 PROCEDURE — 3078F DIAST BP <80 MM HG: CPT | Mod: CPTII,S$GLB,, | Performed by: FAMILY MEDICINE

## 2019-11-25 PROCEDURE — 3074F SYST BP LT 130 MM HG: CPT | Mod: CPTII,S$GLB,, | Performed by: FAMILY MEDICINE

## 2019-11-25 PROCEDURE — 3008F BODY MASS INDEX DOCD: CPT | Mod: CPTII,S$GLB,, | Performed by: FAMILY MEDICINE

## 2019-11-25 PROCEDURE — 99213 PR OFFICE/OUTPT VISIT, EST, LEVL III, 20-29 MIN: ICD-10-PCS | Mod: S$GLB,,, | Performed by: FAMILY MEDICINE

## 2019-11-25 PROCEDURE — 99213 OFFICE O/P EST LOW 20 MIN: CPT | Mod: S$GLB,,, | Performed by: FAMILY MEDICINE

## 2019-11-25 PROCEDURE — 99999 PR PBB SHADOW E&M-EST. PATIENT-LVL III: ICD-10-PCS | Mod: PBBFAC,,, | Performed by: FAMILY MEDICINE

## 2019-11-25 PROCEDURE — 99999 PR PBB SHADOW E&M-EST. PATIENT-LVL III: CPT | Mod: PBBFAC,,, | Performed by: FAMILY MEDICINE

## 2019-11-25 PROCEDURE — 3074F PR MOST RECENT SYSTOLIC BLOOD PRESSURE < 130 MM HG: ICD-10-PCS | Mod: CPTII,S$GLB,, | Performed by: FAMILY MEDICINE

## 2019-11-25 NOTE — TELEPHONE ENCOUNTER
----- Message from Colton Johnson MD sent at 11/25/2019  7:52 AM CST -----  Patient still waiting to schedule colonoscopy.  Please call him to give him an update on this or schedule him

## 2019-11-25 NOTE — PROGRESS NOTES
Chief Complaint   Patient presents with    Follow-up     New Meds       HISTORY OF PRESENT ILLNESS: This is a 64-year-old white male presents today for 1 month f/u    HTN - stopped lisinopril HCT daily and switched to Micardis HCT. Denies HA, CP or dizziness. Home /80.  GERD -  He has uses Omeprazole 40mg daily with good control.   RLS - tolerating Mirapex 0.5 mg daily. This is controlled  Lumbar DDD - stable  Walking daily  Depression - mood improved on Lexapro 10mg; tolerating this without side effects    PAST MEDICAL HISTORY:   restless legs syndrome   colon polyps (colonoscopy 8/27/09)  GERD  Lumbar DDD    PAST SURGICAL HISTORY:   Cholecystectomy  C4-C5 anterior cervical fusion 2004  right carpal tunnel release     FAMILY HISTORY: cancer in his grandparent.     SOCIAL HISTORY: The patient is .   He has children.   Ex-smoker  He drinks alcoholic beverages occasionally.   He works at a bank    REVIEW OF SYSTEMS:   GENERAL: No fever, chills, fatigability or weight changes.   SKIN/BREASTS: No rashes, sores, itching or changes in color or texture of skin. No changes in moles. No pain, swelling, tenderness, lumps, bleeding or discharge from nipples.   HEAD: No headaches or recent head trauma.   EYES: Visual acuity fine. Denies blurriness, tearing, itching, photophobia, diplopia, or visual changes.   EARS: Denies ear pain, discharge, tinnitus or vertigo. Denies hearing loss.   NOSE: No loss of smell, epistaxis, postnasal drip, discharge, obstruction, or sneezing.   MOUTH & THROAT: No hoarseness, change in voice, swallowing difficulty. No excessive gum bleeding.   HEMATOLOGICAL/NODES: Denies swollen glands. No bleeding or bruising.   CHEST: No CHANDRA, cyanosis, wheezing, cough or sputum production.   CARDIOVASCULAR: Denies chest pain, dyspnea, orthopnea, or palpitations.   GI/ABDOMEN: Appetite fine. No weight loss. Denies nausea, vomiting, diarrhea, constipation, abdominal pain, hematemesis or blood in stool.  "  URINARY: No dysuria,hematuria, nocturia, incontinence, flank pain, urgency, or urinary difficulty.   PERIPHERAL VASCULAR: No claudication, cold intolerance or cyanosis.   MUSCULOSKELETAL: Some intermittent right wrist pain, low back pain.  NEUROLOGIC: No history of seizures, paralysis, alteration of gait or coordination. Some intermittent numbness in the left 2nd and 3rd fingers with driving that impoves with changes in position.  ENDOCRINE: Sexual maturation. Denies weight change, heat/cold intolerance, hair loss or gain, loss of libido, polyuria, polydipsia, polyphagia, pruritus, unexplained flushing, or excessive sweating.   PSYCH: No crying spells. Denies anxiety symptoms.     IMMUNIZATIONS: Up-to-date.    PHYSICAL EXAM:     /78 (BP Location: Left arm, Patient Position: Sitting)   Pulse 69   Temp 97.8 °F (36.6 °C) (Oral)   Ht 5' 10" (1.778 m)   Wt 105.2 kg (231 lb 14.8 oz)   SpO2 97%   BMI 33.28 kg/m²   GENERAL: This is a healthy-appearing 64-year-old white male, sitting   upright, in no apparent distress. Alert and oriented x4.   SKIN: Reveals erythematous rash over right breast. No suspicious neoplasms.   NEUROLOGICALLY: Cranial nerves 2-12 are grossly intact with no sensory or motor deficits.   PSYCHOLOGICALLY: Mood and affect are normal. Insight and judgment are   excellent. Appearance is well groomed.   Pupils are equal, round and reactive to light. Extraocular movements are intact.   NECK: Supple. There is no lymphadenopathy, thyromegaly or JVD.   CHEST: Clear to auscultation bilaterally with good respiratory movement.   CARDIOVASCULAR: S1, S2. Regular rate and rhythm. No murmurs, rubs or gallops.   EXTREMITIES: Show no cyanosis, clubbing or edema with 2+ distal pulses.   MUSCULOSKELETAL: Reveals full range of motion in all extremities.      Results for orders placed or performed in visit on 10/24/19   Hemoglobin A1c   Result Value Ref Range    Hemoglobin A1C 5.8 (H) 4.0 - 5.6 %    Estimated " Avg Glucose 120 68 - 131 mg/dL   Comprehensive metabolic panel   Result Value Ref Range    Sodium 139 136 - 145 mmol/L    Potassium 4.7 3.5 - 5.1 mmol/L    Chloride 101 95 - 110 mmol/L    CO2 28 23 - 29 mmol/L    Glucose 106 70 - 110 mg/dL    BUN, Bld 15 8 - 23 mg/dL    Creatinine 1.1 0.5 - 1.4 mg/dL    Calcium 9.9 8.7 - 10.5 mg/dL    Total Protein 7.1 6.0 - 8.4 g/dL    Albumin 4.0 3.5 - 5.2 g/dL    Total Bilirubin 0.7 0.1 - 1.0 mg/dL    Alkaline Phosphatase 101 55 - 135 U/L    AST 29 10 - 40 U/L    ALT 41 10 - 44 U/L    Anion Gap 10 8 - 16 mmol/L    eGFR if African American >60.0 >60 mL/min/1.73 m^2    eGFR if non African American >60.0 >60 mL/min/1.73 m^2   Lipid panel   Result Value Ref Range    Cholesterol 207 (H) 120 - 199 mg/dL    Triglycerides 84 30 - 150 mg/dL    HDL 50 40 - 75 mg/dL    LDL Cholesterol 140.2 63.0 - 159.0 mg/dL    Hdl/Cholesterol Ratio 24.2 20.0 - 50.0 %    Total Cholesterol/HDL Ratio 4.1 2.0 - 5.0    Non-HDL Cholesterol 157 mg/dL   PSA, Screening   Result Value Ref Range    PSA, SCREEN 0.25 0.00 - 4.00 ng/mL     ASSESSMENT/PLAN:     Hypertension, unspecified type    Depression, unspecified depression type        continue micardis HCT  continue Lexapro 10mg  Continue omeprazole  Continue home BP monitoring   Discussed healthy lifestyle measures including maintenance of a healthy weight, increasing fruits and vegetables in the diet.   F/u 10 months

## 2019-11-25 NOTE — TELEPHONE ENCOUNTER
Spoke with pt. Scheduled ordered c-scope. Pt verbalized understanding to date. Prep instructions & reminder letter placed in mail.

## 2019-11-25 NOTE — Clinical Note
Patient still waiting to schedule colonoscopy.  Please call him to give him an update on this or schedule him

## 2019-12-02 DIAGNOSIS — I10 HYPERTENSION, UNSPECIFIED TYPE: ICD-10-CM

## 2019-12-04 RX ORDER — LISINOPRIL AND HYDROCHLOROTHIAZIDE 12.5; 2 MG/1; MG/1
1 TABLET ORAL DAILY
Qty: 90 TABLET | Refills: 0 | OUTPATIENT
Start: 2019-12-04 | End: 2020-12-03

## 2019-12-04 NOTE — TELEPHONE ENCOUNTER
I have reviewed and agree with the assessment below with changes. Zestoretic discontinued in favor of Micardis HCT 10/24/19. Destiny SCHNEIDER.

## 2019-12-04 NOTE — PROGRESS NOTES
Refill Authorization Note     is requesting a refill authorization.    Brief assessment and rationale for refill: REVIEW: last commented as no longer taking          Medication Therapy Plan: d'cd lisinopril hctz and switched telmisartan-hctz due to cough (10/19) by Dr. Johnsno                              Comments:   Requested Prescriptions   Pending Prescriptions Disp Refills    lisinopril-hydrochlorothiazide (PRINZIDE,ZESTORETIC) 20-12.5 mg per tablet [Pharmacy Med Name: LISINOPRIL-HCTZ 20-12.5 MG TAB] 90 tablet 0     Sig: TAKE 1 TABLET BY MOUTH ONCE DAILY.       Cardiovascular:  ACEI + Diuretic Combos Passed - 12/2/2019  2:57 AM        Passed - Patient is at least 18 years old        Passed - Last BP in normal range within 360 days     BP Readings from Last 3 Encounters:   11/25/19 124/78   10/24/19 128/78   08/28/19 111/60              Passed - Office visit in past 12 months or future 90 days     Recent Outpatient Visits            1 week ago Hypertension, unspecified type    [facility] Colton Johnson MD    1 month ago Psoriasis vulgaris    [facility] Pam Méndez MD    1 month ago Preventative health care    [facility] Colton Johnson MD    3 months ago Lumbar radiculopathy    [place] [facility] Donya Julian PA-C    5 months ago DDD (degenerative disc disease), lumbar    [facility] [facility] Chris Palencia MD          Future Appointments              [facility] 1 week [facility]                Passed - Na in normal range and within 180 days     Sodium   Date Value Ref Range Status   10/24/2019 139 136 - 145 mmol/L Final   11/06/2018 138 136 - 145 mmol/L Final   10/18/2017 140 136 - 145 mmol/L Final              Passed - K in normal range and within 180 days     Potassium   Date Value Ref Range Status   10/24/2019 4.7 3.5 - 5.1 mmol/L Final   11/06/2018 3.8 3.5 - 5.1 mmol/L Final    10/18/2017 4.4 3.5 - 5.1 mmol/L Final              Passed - Cr is 1.4 or below and within 180 days     Creatinine   Date Value Ref Range Status   10/24/2019 1.1 0.5 - 1.4 mg/dL Final   11/06/2018 1.0 0.5 - 1.4 mg/dL Final   10/18/2017 1.2 0.5 - 1.4 mg/dL Final              Passed - Ca in normal range and within 360 days     Calcium   Date Value Ref Range Status   10/24/2019 9.9 8.7 - 10.5 mg/dL Final   11/06/2018 9.4 8.7 - 10.5 mg/dL Final   10/18/2017 9.8 8.7 - 10.5 mg/dL Final              Passed - eGFR within 360 days     eGFR if non    Date Value Ref Range Status   10/24/2019 >60.0 >60 mL/min/1.73 m^2 Final     Comment:     Calculation used to obtain the estimated glomerular filtration  rate (eGFR) is the CKD-EPI equation.      11/06/2018 >60.0 >60 mL/min/1.73 m^2 Final     Comment:     Calculation used to obtain the estimated glomerular filtration  rate (eGFR) is the CKD-EPI equation.      10/18/2017 >60.0 >60 mL/min/1.73 m^2 Final     Comment:     Calculation used to obtain the estimated glomerular filtration  rate (eGFR) is the CKD-EPI equation. Since race is unknown   in our information system, the eGFR values for   -American and Non--American patients are given   for each creatinine result.

## 2019-12-16 ENCOUNTER — OFFICE VISIT (OUTPATIENT)
Dept: DERMATOLOGY | Facility: CLINIC | Age: 64
End: 2019-12-16
Payer: COMMERCIAL

## 2019-12-16 VITALS — HEIGHT: 70 IN | BODY MASS INDEX: 33.21 KG/M2 | RESPIRATION RATE: 18 BRPM | WEIGHT: 231.94 LBS

## 2019-12-16 DIAGNOSIS — L40.0 PSORIASIS VULGARIS: ICD-10-CM

## 2019-12-16 DIAGNOSIS — L84 CORN OR CALLUS: Primary | ICD-10-CM

## 2019-12-16 PROCEDURE — 3008F PR BODY MASS INDEX (BMI) DOCUMENTED: ICD-10-PCS | Mod: CPTII,S$GLB,, | Performed by: DERMATOLOGY

## 2019-12-16 PROCEDURE — 99999 PR PBB SHADOW E&M-EST. PATIENT-LVL III: ICD-10-PCS | Mod: PBBFAC,,, | Performed by: DERMATOLOGY

## 2019-12-16 PROCEDURE — 99999 PR PBB SHADOW E&M-EST. PATIENT-LVL III: CPT | Mod: PBBFAC,,, | Performed by: DERMATOLOGY

## 2019-12-16 PROCEDURE — 99213 OFFICE O/P EST LOW 20 MIN: CPT | Mod: S$GLB,,, | Performed by: DERMATOLOGY

## 2019-12-16 PROCEDURE — 3008F BODY MASS INDEX DOCD: CPT | Mod: CPTII,S$GLB,, | Performed by: DERMATOLOGY

## 2019-12-16 PROCEDURE — 99213 PR OFFICE/OUTPT VISIT, EST, LEVL III, 20-29 MIN: ICD-10-PCS | Mod: S$GLB,,, | Performed by: DERMATOLOGY

## 2019-12-16 RX ORDER — FLUOCINONIDE TOPICAL SOLUTION USP, 0.05% 0.5 MG/ML
SOLUTION TOPICAL 2 TIMES DAILY
Qty: 30 ML | Refills: 1 | Status: SHIPPED | OUTPATIENT
Start: 2019-12-16 | End: 2020-06-10 | Stop reason: SDUPTHER

## 2019-12-16 NOTE — PROGRESS NOTES
Subjective:       Patient ID:  Moustapha Murray is a 64 y.o. male who presents for   Chief Complaint   Patient presents with    Follow-up     Psoriasis Vulgaris     Warts     R pinky toe     64 y.o. M who presents for a follow up appointment for his Psoriasis. Patient was last seen on 10- where he was prescribed:    fluocinonide (LIDEX) 0.05 % external solution; Apply topically 2 (two) times daily scalp.  Dispense: 30 mL; Refill: 1  -     triamcinolone acetonide 0.1% (KENALOG) 0.1 % cream; Apply topically 2 (two) times daily abdomen.  Dispense: 45 g; Refill: 1    Patient states he is doing great and has no questions or concerns regarding his psoriasis at this time.     Patient would like to talk about a wart on his R pinky toe that has been present for at least a year. Patient has tried compound W on the wart and it does go away but comes back.            Review of Systems   Musculoskeletal: Positive for arthralgias (hands;worse in the mornings).   Skin: Positive for activity-related sunscreen use and wears hat. Negative for daily sunscreen use.   Hematologic/Lymphatic: Does not bruise/bleed easily.       Past Medical History:   Diagnosis Date    DDD (degenerative disc disease), lumbar     Depression     RLS (restless legs syndrome)          Objective:    Physical Exam   Constitutional: He appears well-developed and well-nourished.   HENT:   Mouth/Throat: Lips normal.    Eyes: Lids are normal.    Neurological: He is alert and oriented to person, place, and time.   Psychiatric: He has a normal mood and affect.   Skin:   Areas Examined (abnormalities noted in diagram):   Scalp / Hair Palpated and Inspected  Head / Face Inspection Performed  Neck Inspection Performed  Abdomen Inspection Performed  RLE Inspected                       Diagram Legend     Erythematous scaling macule/papule c/w actinic keratosis       Vascular papule c/w angioma      Pigmented verrucoid papule/plaque c/w seborrheic  keratosis      Yellow umbilicated papule c/w sebaceous hyperplasia      Irregularly shaped tan macule c/w lentigo     1-2 mm smooth white papules consistent with Milia      Movable subcutaneous cyst with punctum c/w epidermal inclusion cyst      Subcutaneous movable cyst c/w pilar cyst      Firm pink to brown papule c/w dermatofibroma      Pedunculated fleshy papule(s) c/w skin tag(s)      Evenly pigmented macule c/w junctional nevus     Mildly variegated pigmented, slightly irregular-bordered macule c/w mildly atypical nevus      Flesh colored to evenly pigmented papule c/w intradermal nevus       Pink pearly papule/plaque c/w basal cell carcinoma      Erythematous hyperkeratotic cursted plaque c/w SCC      Surgical scar with no sign of skin cancer recurrence      Open and closed comedones      Inflammatory papules and pustules      Verrucoid papule consistent consistent with wart     Erythematous eczematous patches and plaques     Dystrophic onycholytic nail with subungual debris c/w onychomycosis     Umbilicated papule    Erythematous-base heme-crusted tan verrucoid plaque consistent with inflamed seborrheic keratosis     Erythematous Silvery Scaling Plaque c/w Psoriasis     See annotation      Assessment / Plan:        Corn or callus  [place] pad daily  Urea, lactic acid, sal acid qhs to soften lesion    Psoriasis vulgaris-2-3 % TBSA; nail; + arthritic symptoms; skin involvement improved after 2 months of topical treatments  -     fluocinonide (LIDEX) 0.05 % external solution; Apply topically 2 (two) times daily scalp.  Dispense: 30 mL; Refill: 1  TAC cream bid prn to abdomen  AAD brochure provided at last OV         Follow up if symptoms worsen or fail to improve on foot.

## 2020-01-27 ENCOUNTER — TELEPHONE (OUTPATIENT)
Dept: GASTROENTEROLOGY | Facility: CLINIC | Age: 65
End: 2020-01-27

## 2020-01-27 NOTE — TELEPHONE ENCOUNTER
----- Message from Danette Verdugo sent at 1/27/2020  9:37 AM CST -----  Contact: Moustapha silva  Type: Needs Medical Advice    Who Called:  Moustapha Ruiz Call Back Number: [phone]  Additional Information: Pls call pt regarding rescheduling his colonoscopy

## 2020-03-16 ENCOUNTER — ANESTHESIA (OUTPATIENT)
Dept: ENDOSCOPY | Facility: HOSPITAL | Age: 65
End: 2020-03-16
Payer: COMMERCIAL

## 2020-03-16 ENCOUNTER — HOSPITAL ENCOUNTER (OUTPATIENT)
Facility: HOSPITAL | Age: 65
Discharge: HOME | End: 2020-03-16
Attending: INTERNAL MEDICINE | Admitting: INTERNAL MEDICINE
Payer: COMMERCIAL

## 2020-03-16 ENCOUNTER — ANESTHESIA EVENT (OUTPATIENT)
Dept: ENDOSCOPY | Facility: HOSPITAL | Age: 65
End: 2020-03-16
Payer: COMMERCIAL

## 2020-03-16 DIAGNOSIS — Z12.11 SCREEN FOR COLON CANCER: ICD-10-CM

## 2020-03-16 PROCEDURE — 88305 TISSUE EXAM BY PATHOLOGIST: ICD-10-PCS | Mod: 26,,, | Performed by: PATHOLOGY

## 2020-03-16 PROCEDURE — 45385 COLONOSCOPY W/LESION REMOVAL: CPT | Mod: 33,,, | Performed by: INTERNAL MEDICINE

## 2020-03-16 PROCEDURE — D9220A PRA ANESTHESIA: Mod: 33,ANES,, | Performed by: ANESTHESIOLOGY

## 2020-03-16 PROCEDURE — 63600175 PHARM REV CODE 636 W HCPCS: Mod: PO | Performed by: INTERNAL MEDICINE

## 2020-03-16 PROCEDURE — 45385 PR COLONOSCOPY,REMV LESN,SNARE: ICD-10-PCS | Mod: 33,,, | Performed by: INTERNAL MEDICINE

## 2020-03-16 PROCEDURE — 88305 TISSUE EXAM BY PATHOLOGIST: CPT | Mod: 26,,, | Performed by: PATHOLOGY

## 2020-03-16 PROCEDURE — 88305 TISSUE EXAM BY PATHOLOGIST: CPT | Performed by: PATHOLOGY

## 2020-03-16 PROCEDURE — 37000008 HC ANESTHESIA 1ST 15 MINUTES: Mod: PO | Performed by: INTERNAL MEDICINE

## 2020-03-16 PROCEDURE — D9220A PRA ANESTHESIA: ICD-10-PCS | Mod: 33,ANES,, | Performed by: ANESTHESIOLOGY

## 2020-03-16 PROCEDURE — D9220A PRA ANESTHESIA: ICD-10-PCS | Mod: 33,CRNA,, | Performed by: NURSE ANESTHETIST, CERTIFIED REGISTERED

## 2020-03-16 PROCEDURE — 45385 COLONOSCOPY W/LESION REMOVAL: CPT | Mod: PO | Performed by: INTERNAL MEDICINE

## 2020-03-16 PROCEDURE — D9220A PRA ANESTHESIA: Mod: 33,CRNA,, | Performed by: NURSE ANESTHETIST, CERTIFIED REGISTERED

## 2020-03-16 PROCEDURE — 27201089 HC SNARE, DISP (ANY): Mod: PO | Performed by: INTERNAL MEDICINE

## 2020-03-16 PROCEDURE — 37000009 HC ANESTHESIA EA ADD 15 MINS: Mod: PO | Performed by: INTERNAL MEDICINE

## 2020-03-16 PROCEDURE — 63600175 PHARM REV CODE 636 W HCPCS: Mod: PO | Performed by: NURSE ANESTHETIST, CERTIFIED REGISTERED

## 2020-03-16 RX ORDER — LIDOCAINE HCL/PF 100 MG/5ML
SYRINGE (ML) INTRAVENOUS
Status: DISCONTINUED | OUTPATIENT
Start: 2020-03-16 | End: 2020-03-16

## 2020-03-16 RX ORDER — SODIUM CHLORIDE, SODIUM LACTATE, POTASSIUM CHLORIDE, CALCIUM CHLORIDE 600; 310; 30; 20 MG/100ML; MG/100ML; MG/100ML; MG/100ML
INJECTION, SOLUTION INTRAVENOUS CONTINUOUS
Status: DISCONTINUED | OUTPATIENT
Start: 2020-03-16 | End: 2020-03-16 | Stop reason: HOSPADM

## 2020-03-16 RX ORDER — PROPOFOL 10 MG/ML
VIAL (ML) INTRAVENOUS
Status: DISCONTINUED | OUTPATIENT
Start: 2020-03-16 | End: 2020-03-16

## 2020-03-16 RX ORDER — SODIUM CHLORIDE 0.9 % (FLUSH) 0.9 %
10 SYRINGE (ML) INJECTION
Status: DISCONTINUED | OUTPATIENT
Start: 2020-03-16 | End: 2020-03-16 | Stop reason: HOSPADM

## 2020-03-16 RX ADMIN — PROPOFOL 25 MG: 10 INJECTION, EMULSION INTRAVENOUS at 07:03

## 2020-03-16 RX ADMIN — PROPOFOL 100 MG: 10 INJECTION, EMULSION INTRAVENOUS at 07:03

## 2020-03-16 RX ADMIN — LIDOCAINE HYDROCHLORIDE 100 MG: 20 INJECTION, SOLUTION INTRAVENOUS at 07:03

## 2020-03-16 RX ADMIN — PROPOFOL 50 MG: 10 INJECTION, EMULSION INTRAVENOUS at 07:03

## 2020-03-16 RX ADMIN — SODIUM CHLORIDE, SODIUM LACTATE, POTASSIUM CHLORIDE, AND CALCIUM CHLORIDE: .6; .31; .03; .02 INJECTION, SOLUTION INTRAVENOUS at 06:03

## 2020-03-16 NOTE — TRANSFER OF CARE
"Anesthesia Transfer of Care Note    Patient: Moustapha Murray    Procedure(s) Performed: Procedure(s) (LRB):  COLONOSCOPY (N/A)    Patient location: PACU    Anesthesia Type: general    Transport from OR: Transported from OR on room air with adequate spontaneous ventilation    Post pain: adequate analgesia    Post assessment: no apparent anesthetic complications and tolerated procedure well    Post vital signs: stable    Level of consciousness: sedated and responds to stimulation    Nausea/Vomiting: no nausea/vomiting    Complications: none    Transfer of care protocol was followed      Last vitals:   Visit Vitals  BP (!) 84/60   Pulse 70   Temp 36.3 °C (97.3 °F) (Skin)   Resp 12   Ht 5' 10" (1.778 m)   Wt 104.3 kg (230 lb)   SpO2 95%   BMI 33.00 kg/m²     "

## 2020-03-16 NOTE — H&P
History & Physical - Short Stay  Gastroenterology      SUBJECTIVE:     Procedure: Colonoscopy    Chief Complaint/Indication for Procedure: Screening    PTA Medications   Medication Sig    escitalopram oxalate (LEXAPRO) 10 MG tablet Take 1 tablet (10 mg total) by mouth once daily.    fluocinonide (LIDEX) 0.05 % external solution Apply topically 2 (two) times daily.    naproxen sodium (ANAPROX) 220 MG tablet Take 220 mg by mouth every 12 (twelve) hours.    omeprazole (PRILOSEC) 40 MG capsule TAKE 1 CAPSULE (40 MG TOTAL) BY MOUTH ONCE DAILY.    pramipexole (MIRAPEX) 0.5 MG tablet TAKE 1 TABLET (0.5 MG TOTAL) BY MOUTH EVERY EVENING.    telmisartan-hydrochlorothiazide (MICARDIS HCT) 40-12.5 mg per tablet Take 1 tablet by mouth once daily.    triamcinolone acetonide 0.1% (KENALOG) 0.1 % cream Apply topically 2 (two) times daily.    SHINGRIX, PF, 50 mcg/0.5 mL injection TO BE ADMINISTERED BY PHARMACIST FOR IMMUNIZATION       Review of patient's allergies indicates:   Allergen Reactions    Sulfa (sulfonamide antibiotics)      Other reaction(s): Rash        Past Medical History:   Diagnosis Date    DDD (degenerative disc disease), lumbar     Depression     Hypertension     RLS (restless legs syndrome)      Past Surgical History:   Procedure Laterality Date    APPENDECTOMY      CARPAL TUNNEL RELEASE      CERVICAL FUSION      CHOLECYSTECTOMY      CIRCUMCISION      TRANSFORAMINAL EPIDURAL INJECTION OF STEROID Right 7/17/2019    Procedure: Injection,steroid,epidural,transforaminal approach L3-4, L4-5;  Surgeon: Chris Palencia MD;  Location: [facility] OR;  Service: Pain Management;  Laterality: Right;    TRANSFORAMINAL EPIDURAL INJECTION OF STEROID Right 8/28/2019    Procedure: Injection,steroid,epidural,transforaminal approach;  Surgeon: Chris Palencia MD;  Location: [facility] OR;  Service: Pain Management;  Laterality: Right;  L3-4, L4-L5     Family History   Problem Relation Age of Onset    Stroke Mother     Cancer Neg  Hx      Social History     Tobacco Use    Smoking status: Former Smoker     Packs/day: 1.50     Types: Cigarettes     Last attempt to quit: 10/16/2013     Years since quittin.4    Smokeless tobacco: Never Used   Substance Use Topics    Alcohol use: Yes     Frequency: 2-4 times a month     Drinks per session: 3 or 4     Binge frequency: Less than monthly     Comment: 2    Drug use: Not Currently         OBJECTIVE:     Vital Signs (Most Recent)  Temp: 97.2 °F (36.2 °C) (20)  Pulse: 65 (20)  Resp: 15 (20)  BP: 135/72 (20)  SpO2: 96 % (20)    Physical Exam                                                        GENERAL:  Comfortable, in no acute distress.                                 HEENT EXAM:  Nonicteric.  No adenopathy.  Oropharynx is clear.               NECK:  Supple.                                                               LUNGS:  Clear.                                                               CARDIAC:  Regular rate and rhythm.  S1, S2.  No murmur.                      ABDOMEN:  Soft, positive bowel sounds, nontender.  No hepatosplenomegaly or masses.  No rebound or guarding.                                             EXTREMITIES:  No edema.     MENTAL STATUS:  Normal, alert and oriented.      ASSESSMENT/PLAN:     Assessment: Colorectal cancer screening    Plan: Colonoscopy    Anesthesia Plan: General    ASA Grade: ASA 2 - Patient with mild systemic disease with no functional limitations    MALLAMPATI SCORE:  I (soft palate, uvula, fauces, and tonsillar pillars visible)

## 2020-03-16 NOTE — DISCHARGE SUMMARY
Discharge Note  Short Stay      SUMMARY     Admit Date: 3/16/2020    Attending Physician: Braulio Best MD     Discharge Physician: Braulio Best MD    Discharge Date: 3/16/2020 7:24 AM    Final Diagnosis: Screening [Z13.9]    Disposition: HOME OR SELF CARE    Patient Instructions:   Current Discharge Medication List      CONTINUE these medications which have NOT CHANGED    Details   escitalopram oxalate (LEXAPRO) 10 MG tablet Take 1 tablet (10 mg total) by mouth once daily.  Qty: 90 tablet, Refills: 3    Associated Diagnoses: Depression, unspecified depression type      fluocinonide (LIDEX) 0.05 % external solution Apply topically 2 (two) times daily.  Qty: 30 mL, Refills: 1    Associated Diagnoses: Psoriasis vulgaris      naproxen sodium (ANAPROX) 220 MG tablet Take 220 mg by mouth every 12 (twelve) hours.      omeprazole (PRILOSEC) 40 MG capsule TAKE 1 CAPSULE (40 MG TOTAL) BY MOUTH ONCE DAILY.  Qty: 90 capsule, Refills: 1    Associated Diagnoses: Gastroesophageal reflux disease without esophagitis      pramipexole (MIRAPEX) 0.5 MG tablet TAKE 1 TABLET (0.5 MG TOTAL) BY MOUTH EVERY EVENING.  Qty: 90 tablet, Refills: 3      telmisartan-hydrochlorothiazide (MICARDIS HCT) 40-12.5 mg per tablet Take 1 tablet by mouth once daily.  Qty: 90 tablet, Refills: 3    Associated Diagnoses: Hypertension, unspecified type      triamcinolone acetonide 0.1% (KENALOG) 0.1 % cream Apply topically 2 (two) times daily.  Qty: 45 g, Refills: 1    Associated Diagnoses: Psoriasis vulgaris      SHINGRIX, PF, 50 mcg/0.5 mL injection TO BE ADMINISTERED BY PHARMACIST FOR IMMUNIZATION  Refills: 0             Discharge Procedure Orders (must include Diet, Follow-up, Activity)    Follow Up:  Follow up with PCP as previously scheduled  Resume routine diet.  Activity as tolerated.    No driving day of procedure.

## 2020-03-16 NOTE — ANESTHESIA POSTPROCEDURE EVALUATION
Anesthesia Post Evaluation    Patient: Moustapha Murray    Procedure(s) Performed: Procedure(s) (LRB):  COLONOSCOPY (N/A)    Final Anesthesia Type: general    Patient location during evaluation: PACU  Patient participation: Yes- Able to Participate  Level of consciousness: awake and alert  Post-procedure vital signs: reviewed and stable  Pain management: adequate  Airway patency: patent    PONV status at discharge: No PONV  Anesthetic complications: no      Cardiovascular status: blood pressure returned to baseline and hemodynamically stable  Respiratory status: unassisted  Hydration status: euvolemic  Follow-up not needed.          Vitals Value Taken Time   BP 92/53 3/16/2020  7:28 AM   Temp 36.3 °C (97.3 °F) 3/16/2020  7:24 AM   Pulse 69 3/16/2020  7:28 AM   Resp 12 3/16/2020  7:28 AM   SpO2 95 % 3/16/2020  7:28 AM         No case tracking events are documented in the log.      Pain/Myles Score: Myles Score: 10 (3/16/2020  7:27 AM)

## 2020-03-16 NOTE — ANESTHESIA PREPROCEDURE EVALUATION
03/16/2020  Moustapha Murray is a 64 y.o., male.    Anesthesia Evaluation    I have reviewed the Patient Summary Reports.    I have reviewed the Nursing Notes.      Review of Systems  Anesthesia Hx:  No problems with previous Anesthesia    Cardiovascular:   Hypertension, well controlled        Physical Exam  General:  Obesity    Airway/Jaw/Neck:  Airway Findings: Mallampati: II                Anesthesia Plan  Type of Anesthesia, risks & benefits discussed:  Anesthesia Type:  general  Patient's Preference:   Intra-op Monitoring Plan:   Intra-op Monitoring Plan Comments:   Post Op Pain Control Plan:   Post Op Pain Control Plan Comments:   Induction:    Beta Blocker:  Patient is not currently on a Beta-Blocker (No further documentation required).       Informed Consent: Patient understands risks and agrees with Anesthesia plan.  Questions answered. Anesthesia consent signed with patient.  ASA Score: 2     Day of Surgery Review of History & Physical:    H&P update referred to the surgeon.         Ready For Surgery From Anesthesia Perspective.

## 2020-03-16 NOTE — DISCHARGE INSTRUCTIONS

## 2020-03-16 NOTE — [employer] PATIENT INSTRUCTIONS
Discharge Summary/Instructions after an Endoscopic Procedure  Patient Name: Moustapha Murray  Patient MRN: [id number]  Patient YOB: 1955  Monday, March 16, 2020  Braulio Best MD  RESTRICTIONS:  During your procedure today, you received medications for sedation.  These   medications may affect your judgment, balance and coordination.  Therefore,   for 24 hours, you have the following restrictions:   - DO NOT drive a car, operate machinery, make legal/financial decisions,   sign important papers or drink alcohol.    ACTIVITY:  Today: no heavy lifting, straining or running due to procedural   sedation/anesthesia.  The following day: return to full activity including work.  DIET:  Eat and drink normally unless instructed otherwise.     TREATMENT FOR COMMON SIDE EFFECTS:  - Mild abdominal pain, nausea, belching, bloating or excessive gas:  rest,   eat lightly and use a heating pad.  - Sore Throat: treat with throat lozenges and/or gargle with warm salt   water.  - Because air was used during the procedure, expelling large amounts of air   from your rectum or belching is normal.  - If a bowel prep was taken, you may not have a bowel movement for 1-3 days.    This is normal.  SYMPTOMS TO WATCH FOR AND REPORT TO YOUR PHYSICIAN:  1. Abdominal pain or bloating, other than gas cramps.  2. Chest pain.  3. Back pain.  4. Signs of infection such as: chills or fever occurring within 24 hours   after the procedure.  5. Rectal bleeding, which would show as bright red, maroon, or black stools.   (A tablespoon of blood from the rectum is not serious, especially if   hemorrhoids are present.)  6. Vomiting.  7. Weakness or dizziness.  GO DIRECTLY TO THE NEAREST EMERGENCY ROOM IF YOU HAVE ANY OF THE FOLLOWING:      Difficulty breathing              Chills and/or fever over 101 F   Persistent vomiting and/or vomiting blood   Severe abdominal pain   Severe chest pain   Black, tarry stools   Bleeding- more than one  tablespoon   Any other symptom or condition that you feel may need urgent attention  Your doctor recommends these additional instructions:  If any biopsies were taken, your doctors clinic will contact you in 1 to 2   weeks with any results.  We are waiting for your pathology results.   Your physician has recommended a repeat colonoscopy in five years for   surveillance based on pathology results.   You are being discharged to home.  For questions, problems or results please call your physician - Braulio Best MD at Work:  [phone].  EMERGENCY PHONE NUMBER: [phone], LAB RESULTS: [phone]  IF A COMPLICATION OR EMERGENCY SITUATION ARISES AND YOU ARE UNABLE TO REACH   YOUR PHYSICIAN - GO DIRECTLY TO THE EMERGENCY ROOM.  ___________________________________________  Nurse Signature  ___________________________________________  Patient/Designated Responsible Party Signature  Braulio Best MD  3/16/2020 7:24:08 AM  This report has been verified and signed electronically.  [employer]

## 2020-03-17 VITALS
DIASTOLIC BLOOD PRESSURE: 70 MMHG | OXYGEN SATURATION: 97 % | BODY MASS INDEX: 32.93 KG/M2 | HEIGHT: 70 IN | SYSTOLIC BLOOD PRESSURE: 126 MMHG | RESPIRATION RATE: 12 BRPM | HEART RATE: 69 BPM | TEMPERATURE: 97 F | WEIGHT: 230 LBS

## 2020-03-23 LAB — FINAL PATHOLOGIC DIAGNOSIS: NORMAL

## 2020-05-05 ENCOUNTER — PATIENT MESSAGE (OUTPATIENT)
Dept: ADMINISTRATIVE | Facility: HOSPITAL | Age: 65
End: 2020-05-05

## 2020-06-10 DIAGNOSIS — L40.0 PSORIASIS VULGARIS: ICD-10-CM

## 2020-06-10 RX ORDER — FLUOCINONIDE TOPICAL SOLUTION USP, 0.05% 0.5 MG/ML
SOLUTION TOPICAL 2 TIMES DAILY
Qty: 30 ML | Refills: 1 | Status: SHIPPED | OUTPATIENT
Start: 2020-06-10 | End: 2022-06-20

## 2020-07-08 ENCOUNTER — PATIENT MESSAGE (OUTPATIENT)
Dept: FAMILY MEDICINE | Facility: CLINIC | Age: 65
End: 2020-07-08

## 2020-07-08 ENCOUNTER — OFFICE VISIT (OUTPATIENT)
Dept: FAMILY MEDICINE | Facility: CLINIC | Age: 65
End: 2020-07-08
Payer: COMMERCIAL

## 2020-07-08 VITALS
DIASTOLIC BLOOD PRESSURE: 84 MMHG | WEIGHT: 240.75 LBS | TEMPERATURE: 98 F | SYSTOLIC BLOOD PRESSURE: 134 MMHG | BODY MASS INDEX: 34.47 KG/M2 | HEART RATE: 63 BPM | HEIGHT: 70 IN

## 2020-07-08 DIAGNOSIS — K21.9 GASTROESOPHAGEAL REFLUX DISEASE WITHOUT ESOPHAGITIS: ICD-10-CM

## 2020-07-08 DIAGNOSIS — R10.32 LLQ ABDOMINAL PAIN: ICD-10-CM

## 2020-07-08 DIAGNOSIS — R19.7 DIARRHEA, UNSPECIFIED TYPE: Primary | ICD-10-CM

## 2020-07-08 PROCEDURE — 99999 PR PBB SHADOW E&M-EST. PATIENT-LVL IV: CPT | Mod: PBBFAC,,, | Performed by: NURSE PRACTITIONER

## 2020-07-08 PROCEDURE — 99214 PR OFFICE/OUTPT VISIT, EST, LEVL IV, 30-39 MIN: ICD-10-PCS | Mod: S$GLB,,, | Performed by: NURSE PRACTITIONER

## 2020-07-08 PROCEDURE — 99214 OFFICE O/P EST MOD 30 MIN: CPT | Mod: S$GLB,,, | Performed by: NURSE PRACTITIONER

## 2020-07-08 PROCEDURE — 99999 PR PBB SHADOW E&M-EST. PATIENT-LVL IV: ICD-10-PCS | Mod: PBBFAC,,, | Performed by: NURSE PRACTITIONER

## 2020-07-08 RX ORDER — OMEPRAZOLE 40 MG/1
40 CAPSULE, DELAYED RELEASE ORAL DAILY
Qty: 90 CAPSULE | Refills: 1 | Status: SHIPPED | OUTPATIENT
Start: 2020-07-08 | End: 2021-01-25

## 2020-07-08 NOTE — PROGRESS NOTES
Subjective:       Patient ID: Moustapha Murray is a 65 y.o. male.    Chief Complaint: Diarrhea    Patient says he started with diarrhea about 6 weeks ago. No other abnormal symptoms. Some abdominal cramps, no abdominal pain, no fever or chills, no appetite change, no nausea or vomiting. No medicine changes since November. Had colonoscopy in March and had no issues after that.   Has diarrhea 5-6 times daily, generally liquid, some days it is more solid but the majority of the time it is liquid. No mucus in the stool, no blood. He has taken imodium and kaopectate with no relief.    No history of diverticulitis. No antibiotics in last 6 months. No travel in last 6 months.   Needs omeprazole refill, controls gerd well.   Past Medical History:  No date: DDD (degenerative disc disease), lumbar  No date: Depression  No date: Hypertension  No date: RLS (restless legs syndrome)    Past Surgical History:  No date: APPENDECTOMY  No date: CARPAL TUNNEL RELEASE  No date: CERVICAL FUSION  No date: CHOLECYSTECTOMY  No date: CIRCUMCISION  3/16/2020: COLONOSCOPY; N/A      Comment:  Procedure: COLONOSCOPY;  Surgeon: Braulio Best MD;  Location: [facility];  Service: Endoscopy;                 Laterality: N/A;  7/17/2019: TRANSFORAMINAL EPIDURAL INJECTION OF STEROID; Right      Comment:  Procedure: Injection,steroid,epidural,transforaminal                approach L3-4, L4-5;  Surgeon: Chris Palencia MD;  Location:               [facility] OR;  Service: Pain Management;  Laterality: Right;  8/28/2019: TRANSFORAMINAL EPIDURAL INJECTION OF STEROID; Right      Comment:  Procedure: Injection,steroid,epidural,transforaminal                approach;  Surgeon: Chris Palencia MD;  Location: [facility] OR;                 Service: Pain Management;  Laterality: Right;  L3-4,                L4-L5    Review of patient's family history indicates:  Problem: Stroke      Relation: Mother          Age of Onset: (Not Specified)  Problem: Cancer       Relation: Neg Hx          Age of Onset: (Not Specified)      Social History    Socioeconomic History      Marital status:       Spouse name: Not on file      Number of children: Not on file      Years of education: Not on file      Highest education level: Not on file    Occupational History      Occupation:     Social Needs      Financial resource strain: Not hard at all      Food insecurity        Worry: Never true        Inability: Never true      Transportation needs        Medical: No        Non-medical: No    Tobacco Use      Smoking status: Former Smoker        Packs/day: 1.50        Types: Cigarettes        Quit date: 10/16/2013        Years since quittin.7      Smokeless tobacco: Never Used    Substance and Sexual Activity      Alcohol use: Yes        Frequency: 2-4 times a month        Drinks per session: 3 or 4        Binge frequency: Less than monthly        Comment: 2      Drug use: Not Currently      Sexual activity: Not on file    Lifestyle      Physical activity        Days per week: 5 days        Minutes per session: 20 min      Stress: Rather much    Relationships      Social connections        Talks on phone: Twice a week        Gets together: Once a week        Attends [employer] service: Not on file        Active member of club or organization: No        Attends meetings of clubs or organizations: Never        Relationship status:     Other Topics      Concerns:        Not on file    Social History Narrative      Not on file      Current Outpatient Medications:  escitalopram oxalate (LEXAPRO) 10 MG tablet, Take 1 tablet (10 mg total) by mouth once daily., Disp: 90 tablet, Rfl: 3  fluocinonide (LIDEX) 0.05 % external solution, Apply topically 2 (two) times daily., Disp: 30 mL, Rfl: 1  naproxen sodium (ANAPROX) 220 MG tablet, Take 220 mg by mouth every 12 (twelve) hours., Disp: , Rfl:   omeprazole (PRILOSEC) 40 MG capsule, TAKE 1 CAPSULE (40 MG TOTAL) BY MOUTH  ONCE DAILY., Disp: 90 capsule, Rfl: 1  pramipexole (MIRAPEX) 0.5 MG tablet, TAKE 1 TABLET (0.5 MG TOTAL) BY MOUTH EVERY EVENING., Disp: 90 tablet, Rfl: 3  telmisartan-hydrochlorothiazide (MICARDIS HCT) 40-12.5 mg per tablet, Take 1 tablet by mouth once daily., Disp: 90 tablet, Rfl: 3  triamcinolone acetonide 0.1% (KENALOG) 0.1 % cream, Apply topically 2 (two) times daily., Disp: 45 g, Rfl: 1  SHINGRIX, PF, 50 mcg/0.5 mL injection, TO BE ADMINISTERED BY PHARMACIST FOR IMMUNIZATION, Disp: , Rfl: 0    No current facility-administered medications for this visit.       Review of patient's allergies indicates:   -- Sulfa (sulfonamide antibiotics)     --  Other reaction(s): Rash     Review of Systems   Constitutional: Positive for fatigue. Negative for chills, fever and unexpected weight change.   HENT: Negative.  Negative for sore throat.    Respiratory: Negative.  Negative for cough and shortness of breath.    Cardiovascular: Negative.  Negative for chest pain and leg swelling.   Gastrointestinal: Positive for change in bowel habit, diarrhea and change in bowel habit. Negative for abdominal pain, anal bleeding, blood in stool, constipation, nausea, rectal pain, vomiting, reflux and fecal incontinence.   Genitourinary: Negative.    Neurological: Negative.          Objective:      Physical Exam  Constitutional:       General: He is not in acute distress.     Appearance: He is not ill-appearing or toxic-appearing.   HENT:      Head: Normocephalic and atraumatic.   Cardiovascular:      Rate and Rhythm: Normal rate and regular rhythm.      Heart sounds: No murmur.   Pulmonary:      Effort: Pulmonary effort is normal. No respiratory distress.      Breath sounds: No stridor.   Abdominal:      General: Bowel sounds are normal.      Tenderness: There is abdominal tenderness in the left lower quadrant.       Musculoskeletal:         General: No swelling.   Psychiatric:         Mood and Affect: Mood normal.         Behavior:  Behavior normal.         Assessment:       1. Diarrhea, unspecified type    2. Gastroesophageal reflux disease without esophagitis    3. LLQ abdominal pain        Plan:     Ct scheudled for am, go to ed immediately for new or worsening symptoms. Start probiotic.   1. Gastroesophageal reflux disease without esophagitis    - omeprazole (PRILOSEC) 40 MG capsule; Take 1 capsule (40 mg total) by mouth once daily.  Dispense: 90 capsule; Refill: 1    2. Diarrhea, unspecified type    - CT Abdomen Pelvis With Contrast; Future  - Stool culture; Future  - Giardia / Cryptosporidum, EIA; Future  - Occult blood x 1, stool; Future  - Stool Exam-Ova,Cysts,Parasites; Future  - Clostridium difficile EIA; Future  - Creatinine, serum; Future    3. LLQ abdominal pain    - CT Abdomen Pelvis With Contrast; Future  - Stool culture; Future  - Giardia / Cryptosporidum, EIA; Future  - Occult blood x 1, stool; Future  - Stool Exam-Ova,Cysts,Parasites; Future  - Clostridium difficile EIA; Future

## 2020-07-09 ENCOUNTER — HOSPITAL ENCOUNTER (OUTPATIENT)
Dept: RADIOLOGY | Facility: HOSPITAL | Age: 65
Discharge: HOME | End: 2020-07-09
Attending: NURSE PRACTITIONER
Payer: COMMERCIAL

## 2020-07-09 DIAGNOSIS — R19.7 DIARRHEA, UNSPECIFIED TYPE: ICD-10-CM

## 2020-07-09 DIAGNOSIS — N32.89 BLADDER MASS: ICD-10-CM

## 2020-07-09 DIAGNOSIS — R10.32 LLQ ABDOMINAL PAIN: ICD-10-CM

## 2020-07-09 DIAGNOSIS — K52.9 COLITIS: Primary | ICD-10-CM

## 2020-07-09 PROCEDURE — 74177 CT ABD & PELVIS W/CONTRAST: CPT | Mod: TC,PO

## 2020-07-09 PROCEDURE — 25500020 PHARM REV CODE 255: Mod: PO | Performed by: NURSE PRACTITIONER

## 2020-07-09 PROCEDURE — 74177 CT ABD & PELVIS W/CONTRAST: CPT | Mod: 26,,, | Performed by: RADIOLOGY

## 2020-07-09 PROCEDURE — 74177 CT ABDOMEN PELVIS WITH CONTRAST: ICD-10-PCS | Mod: 26,,, | Performed by: RADIOLOGY

## 2020-07-09 RX ORDER — METRONIDAZOLE 500 MG/1
500 TABLET ORAL EVERY 12 HOURS
Qty: 20 TABLET | Refills: 0 | Status: SHIPPED | OUTPATIENT
Start: 2020-07-09 | End: 2020-07-19

## 2020-07-09 RX ORDER — CIPROFLOXACIN 500 MG/1
500 TABLET ORAL EVERY 12 HOURS
Qty: 20 TABLET | Refills: 0 | Status: SHIPPED | OUTPATIENT
Start: 2020-07-09 | End: 2020-07-19

## 2020-07-09 RX ADMIN — IOHEXOL 100 ML: 350 INJECTION, SOLUTION INTRAVENOUS at 09:07

## 2020-07-09 RX ADMIN — IOHEXOL 1000 ML: 12 SOLUTION ORAL at 09:07

## 2020-07-14 ENCOUNTER — LAB VISIT (OUTPATIENT)
Dept: LAB | Facility: HOSPITAL | Age: 65
End: 2020-07-14
Attending: NURSE PRACTITIONER
Payer: COMMERCIAL

## 2020-07-14 ENCOUNTER — OFFICE VISIT (OUTPATIENT)
Dept: GASTROENTEROLOGY | Facility: CLINIC | Age: 65
End: 2020-07-14
Payer: COMMERCIAL

## 2020-07-14 ENCOUNTER — OFFICE VISIT (OUTPATIENT)
Dept: UROLOGY | Facility: CLINIC | Age: 65
End: 2020-07-14
Payer: COMMERCIAL

## 2020-07-14 VITALS
WEIGHT: 235.69 LBS | HEIGHT: 70 IN | HEART RATE: 68 BPM | DIASTOLIC BLOOD PRESSURE: 80 MMHG | BODY MASS INDEX: 33.74 KG/M2 | SYSTOLIC BLOOD PRESSURE: 136 MMHG

## 2020-07-14 VITALS — BODY MASS INDEX: 33.74 KG/M2 | WEIGHT: 235.69 LBS | HEIGHT: 70 IN | RESPIRATION RATE: 19 BRPM

## 2020-07-14 DIAGNOSIS — N32.89 BLADDER MASS: Primary | ICD-10-CM

## 2020-07-14 DIAGNOSIS — Z83.79 FAMILY HISTORY OF ULCERATIVE COLITIS: ICD-10-CM

## 2020-07-14 DIAGNOSIS — K52.9 COLITIS: ICD-10-CM

## 2020-07-14 DIAGNOSIS — R10.9 ABDOMINAL PAIN, UNSPECIFIED ABDOMINAL LOCATION: ICD-10-CM

## 2020-07-14 DIAGNOSIS — R10.9 LEFT SIDED ABDOMINAL PAIN: ICD-10-CM

## 2020-07-14 DIAGNOSIS — R93.3 ABNORMAL CT SCAN, GASTROINTESTINAL TRACT: ICD-10-CM

## 2020-07-14 DIAGNOSIS — R19.7 DIARRHEA, UNSPECIFIED TYPE: Primary | ICD-10-CM

## 2020-07-14 LAB
CRP SERPL-MCNC: 5.1 MG/L (ref 0–8.2)
ERYTHROCYTE [DISTWIDTH] IN BLOOD BY AUTOMATED COUNT: 12.8 % (ref 11.5–14.5)
HCT VFR BLD AUTO: 43.1 % (ref 40–54)
HGB BLD-MCNC: 13.7 G/DL (ref 14–18)
MCH RBC QN AUTO: 30.9 PG (ref 27–31)
MCHC RBC AUTO-ENTMCNC: 31.8 G/DL (ref 32–36)
MCV RBC AUTO: 97 FL (ref 82–98)
PLATELET # BLD AUTO: 282 K/UL (ref 150–350)
PMV BLD AUTO: 11.3 FL (ref 9.2–12.9)
RBC # BLD AUTO: 4.43 M/UL (ref 4.6–6.2)
WBC # BLD AUTO: 6.87 K/UL (ref 3.9–12.7)

## 2020-07-14 PROCEDURE — 85027 COMPLETE CBC AUTOMATED: CPT

## 2020-07-14 PROCEDURE — 86140 C-REACTIVE PROTEIN: CPT

## 2020-07-14 PROCEDURE — 99999 PR PBB SHADOW E&M-EST. PATIENT-LVL III: CPT | Mod: PBBFAC,,, | Performed by: UROLOGY

## 2020-07-14 PROCEDURE — 36415 COLL VENOUS BLD VENIPUNCTURE: CPT | Mod: PO

## 2020-07-14 PROCEDURE — 99999 PR PBB SHADOW E&M-EST. PATIENT-LVL III: ICD-10-PCS | Mod: PBBFAC,,, | Performed by: UROLOGY

## 2020-07-14 PROCEDURE — 99999 PR PBB SHADOW E&M-EST. PATIENT-LVL IV: ICD-10-PCS | Mod: PBBFAC,,, | Performed by: NURSE PRACTITIONER

## 2020-07-14 PROCEDURE — 99214 OFFICE O/P EST MOD 30 MIN: CPT | Mod: S$GLB,,, | Performed by: NURSE PRACTITIONER

## 2020-07-14 PROCEDURE — 99214 PR OFFICE/OUTPT VISIT, EST, LEVL IV, 30-39 MIN: ICD-10-PCS | Mod: S$GLB,,, | Performed by: NURSE PRACTITIONER

## 2020-07-14 PROCEDURE — 99999 PR PBB SHADOW E&M-EST. PATIENT-LVL IV: CPT | Mod: PBBFAC,,, | Performed by: NURSE PRACTITIONER

## 2020-07-14 PROCEDURE — 99204 PR OFFICE/OUTPT VISIT, NEW, LEVL IV, 45-59 MIN: ICD-10-PCS | Mod: S$GLB,,, | Performed by: UROLOGY

## 2020-07-14 PROCEDURE — 99204 OFFICE O/P NEW MOD 45 MIN: CPT | Mod: S$GLB,,, | Performed by: UROLOGY

## 2020-07-14 NOTE — PROGRESS NOTES
"UROLOGY [place]  7 14 20    Cc abnormal CT image     Age 65. Sent here today for abnormal bladder image in a recent CT scan done mostly for intestinal problems. Pt has a two-month hx of diarrhea and is being studied for that. The CT scan showed extensive inflammatory changes in the ileum and colon. In addition to that, the study shows an incidental nodule in the bladder lumen near the R uvj. Urologic consultation was recommended.     Pt was seen 3 years ago with a hx of gross hematuria three years previously (ie, 6 years ago), which lasted for a day or so and then subsided on its own. It looks like there was a recurrence three years ago, and again it subsided. At the time, pt was scheduled for a urologic workup for gross hematuria but pt says 'he chickened out".     Pt denies any current problem with hematuria or any pains or burning when voiding. Nocturia x 1-2.        PAST MEDICAL HISTORY:    SURGICAL:  Circumcision, cholecystectomy, cervical fusion, carpal tunnel surgery and appendectomy.     MEDICAL:  Restless legs syndrome.     FAMILIAL:  No family history of genitourinary malignancy or of nephrolithiasis.     SOCIAL:  The patient is a , , lives in [place], [place].     ALLERGIES:  Sulfa.    Current Outpatient Medications on File Prior to Visit   Medication Sig Dispense Refill    ciprofloxacin HCl (CIPRO) 500 MG tablet Take 1 tablet (500 mg ys 20 tablet 0    escitalopram oxalate (LEXAPRO) 10 MG tablet Take 1 tablet (10 mg total) by filiberto 90 tablet 3    fluocinonide (LIDEX) 0.05 % external solution Apply topically 2 (two) times daily. 30 mL 1    Lactobac no.41/Bifidobact no.7 (PROBIOTIC-10 ORAL) Take by mouth once daily.      metroNIDAZOLE (FLA Take 1 tablet (50ays 20 tablet 0    naproxen sodium (ANAPROX) 220 MG tablet Take 220 mg by mouth every      omeprazole (PRILOSEC) 40 MG capsule Take 1 capsule (40 mg tot 90 capsule 1    pramipexole (MIRAPEX) 0.5 MG tablet TAKE 1 " TABLET (0.5 MG  90 tablet 3    SHINGRIX, PF, 50 mcg/0.5 mL injection TO BE ADMI  0    telmisartan-hydrochlorothiazide (MICARDIS HCT) 40-12.5 mg per tablet Take 1 tablet by mouth once daily. 90 tablet 3    triamcinolone acetonide 0.1% (KENALOG) 0.1 % cream Apply topically 2 (two) times daily. 45 g 1     PHYSICAL EXAMINATION:    Pt alert, oriented, no distress  HEENT:  wnl.  Neck: supple, no JVD, no lymphadenopathy  Chest: CV NSR  Lungs: normal chest expansion  ABDOMEN:  Flat.  No masses.  CVA is negative.  No organomegaly or tenderness.    No hernias.  GENITOURINARY:  Penis circumcised.  Meatus normal.  Testes and epididymis   normal.  MARA:  Shows 30 g prostate without indurations or nodules. Tolerated poorly  Extremities: no edema, peripheral pulses nl  Neuro: preserved    CT I have reviewed the images and indeed the bladder shows a wall-dependent soft tissue mass within the bladder on the R side.      IMPRESSION:   Bladder mass  Hx of gross hematuria, transient  BPH, renal renal cyst.     PLAN:  Routine workup for hematuria including cystoscopy, urine cytology, bladder scan

## 2020-07-14 NOTE — LETTER
July 14, 2020      Rahel Kelley NP  [address] [facility] [address] [place] [address]           [facility]  [address] [place] [address]  Phone: [phone]          Patient: Moustapha Murray   MR Number: [id number]   YOB: 1955   Date of Visit: 7/14/2020       Dear Rahel Kelley:    Thank you for referring Moustapha Murray to me for evaluation. Attached you will find relevant portions of my assessment and plan of care.    If you have questions, please do not hesitate to call me. I look forward to following Moustapha Murray along with you.    Sincerely,    Magi Medina, [facility]    Enclosure  CC:  No Recipients    If you would like to receive this communication electronically, please contact [email or web address] or [phone] to request more information on [employer] access.    For providers and/or their staff who would like to refer a patient to [facility], please contact us through our one-stop-shop provider referral line, Christo Stephen, at [phone].    If you feel you have received this communication in error or would no longer like to receive these types of communications, please e-mail [email or web address]

## 2020-07-14 NOTE — PROGRESS NOTES
Subjective:       Patient ID: Moustapha Murray is a 65 y.o. male Body mass index is 33.82 kg/m².    Chief Complaint: Diarrhea (abn ct scan)    This patient is new to me.  Referring Provider: Rahel Kelley NP for [facility].  Established patient of Dr. Best.     Abdominal Pain  The current episode started more than 1 month ago (started ~5/2020 with diarrhea and mild abdominal cramping). The problem occurs rarely. The problem has been rapidly improving. The pain is located in the LUQ and LLQ. The pain is at a severity of 0/10 (currently). Quality: slight cramping. Associated symptoms include diarrhea (reports diarrhea was worse than abdominal pain; improving since on antibiotic; bowel movements 3 times a day (typically after he eats) of soft pasty stool (when diarrhea first started it was watery stool 6 times a day)) and flatus (frequent). Pertinent negatives include no belching, constipation, dysuria, fever, frequency, hematochezia, melena, nausea, vomiting or weight loss. Associated symptoms comments: Denies any recent antibiotics (prior to cipro and flagyl), recent hospitalization, foreign travel, ill contacts, or suspect food intake; reports he takes naproxen prn- rarely. Nothing aggravates the pain. He has tried antibiotics (currently taking cipro and flagyl 10 day courses) for the symptoms. The treatment provided significant relief. Prior diagnostic workup includes CT scan. His past medical history is significant for abdominal surgery and GERD (controlled on prilosec 40 mg daily). There is no history of Crohn's disease, pancreatitis, PUD or ulcerative colitis.     Review of Systems   Constitutional: Negative for appetite change, chills, fatigue, fever and weight loss.   HENT: Negative for sore throat and trouble swallowing.    Respiratory: Negative for cough, choking and shortness of breath.    Cardiovascular: Negative for chest pain.   Gastrointestinal: Positive for abdominal pain, diarrhea (reports  diarrhea was worse than abdominal pain; improving since on antibiotic; bowel movements 3 times a day (typically after he eats) of soft pasty stool (when diarrhea first started it was watery stool 6 times a day)) and flatus (frequent). Negative for anal bleeding, blood in stool, constipation, hematochezia, melena, nausea, rectal pain and vomiting.   Genitourinary: Negative for difficulty urinating, dysuria, flank pain and frequency.   Neurological: Negative for weakness.       Past Medical History:   Diagnosis Date    Colon polyps     DDD (degenerative disc disease), lumbar     Depression     Fatty liver     Hypertension     RLS (restless legs syndrome)      Past Surgical History:   Procedure Laterality Date    APPENDECTOMY      CARPAL TUNNEL RELEASE      CERVICAL FUSION      CHOLECYSTECTOMY      CIRCUMCISION      COLONOSCOPY N/A 3/16/2020    Procedure: COLONOSCOPY;  Surgeon: Braulio Best MD;  Location: [facility];  Service: Endoscopy;  Laterality: N/A; 3 colon polyps removed; biopsy: tubular adenoma and hyperplastic; repeat in 5 years for surveillance    TRANSFORAMINAL EPIDURAL INJECTION OF STEROID Right 7/17/2019    Procedure: Injection,steroid,epidural,transforaminal approach L3-4, L4-5;  Surgeon: Chris Palencia MD;  Location: [facility] OR;  Service: Pain Management;  Laterality: Right;    TRANSFORAMINAL EPIDURAL INJECTION OF STEROID Right 8/28/2019    Procedure: Injection,steroid,epidural,transforaminal approach;  Surgeon: Chris Palencia MD;  Location: [facility] OR;  Service: Pain Management;  Laterality: Right;  L3-4, L4-L5     Family History   Problem Relation Age of Onset    Stroke Mother     Ulcerative colitis Mother     Cancer Neg Hx     Colon cancer Neg Hx     Crohn's disease Neg Hx     Celiac disease Neg Hx      Wt Readings from Last 10 Encounters:   07/14/20 106.9 kg (235 lb 10.8 oz)   07/14/20 106.9 kg (235 lb 10.8 oz)   07/08/20 109.2 kg (240 lb 11.9 oz)   03/13/20 104.3 kg (230 lb)   12/16/19  105.2 kg (231 lb 14.8 oz)   11/25/19 105.2 kg (231 lb 14.8 oz)   10/29/19 106.1 kg (234 lb)   10/24/19 106.2 kg (234 lb 2.1 oz)   08/26/19 103.9 kg (229 lb 0.9 oz)   08/07/19 103.9 kg (229 lb)     Lab Results   Component Value Date    WBC 5.86 10/18/2017    HGB 14.7 10/18/2017    HCT 43.7 10/18/2017    MCV 92 10/18/2017     10/18/2017     CMP  Sodium   Date Value Ref Range Status   10/24/2019 139 136 - 145 mmol/L Final     Potassium   Date Value Ref Range Status   10/24/2019 4.7 3.5 - 5.1 mmol/L Final     Chloride   Date Value Ref Range Status   10/24/2019 101 95 - 110 mmol/L Final     CO2   Date Value Ref Range Status   10/24/2019 28 23 - 29 mmol/L Final     Glucose   Date Value Ref Range Status   10/24/2019 106 70 - 110 mg/dL Final     BUN, Bld   Date Value Ref Range Status   10/24/2019 15 8 - 23 mg/dL Final     Creatinine   Date Value Ref Range Status   07/09/2020 1.3 0.5 - 1.4 mg/dL Final     Calcium   Date Value Ref Range Status   10/24/2019 9.9 8.7 - 10.5 mg/dL Final     Total Protein   Date Value Ref Range Status   10/24/2019 7.1 6.0 - 8.4 g/dL Final     Albumin   Date Value Ref Range Status   10/24/2019 4.0 3.5 - 5.2 g/dL Final     Total Bilirubin   Date Value Ref Range Status   10/24/2019 0.7 0.1 - 1.0 mg/dL Final     Comment:     For infants and newborns, interpretation of results should be based  on gestational age, weight and in agreement with clinical  observations.  Premature Infant recommended reference ranges:  Up to 24 hours.............<8.0 mg/dL  Up to 48 hours............<12.0 mg/dL  3-5 days..................<15.0 mg/dL  6-29 days.................<15.0 mg/dL       Alkaline Phosphatase   Date Value Ref Range Status   10/24/2019 101 55 - 135 U/L Final     AST   Date Value Ref Range Status   10/24/2019 29 10 - 40 U/L Final     ALT   Date Value Ref Range Status   10/24/2019 41 10 - 44 U/L Final     Anion Gap   Date Value Ref Range Status   10/24/2019 10 8 - 16 mmol/L Final     eGFR if African  American   Date Value Ref Range Status   07/09/2020 >60 >60 mL/min/1.73 m^2 Final     eGFR if non    Date Value Ref Range Status   07/09/2020 58 (A) >60 mL/min/1.73 m^2 Final     Comment:     Calculation used to obtain the estimated glomerular filtration  rate (eGFR) is the CKD-EPI equation.        Lab Results   Component Value Date    TSH 1.63 07/06/2011     Lab Visit on 07/09/2020   Component Date Value Ref Range Status    Creatinine 07/09/2020 1.3  0.5 - 1.4 mg/dL Final    eGFR if African American 07/09/2020 >60  >60 mL/min/1.73 m^2 Final    eGFR if non African American 07/09/2020 58* >60 mL/min/1.73 m^2 Final    Comment: Calculation used to obtain the estimated glomerular filtration  rate (eGFR) is the CKD-EPI equation.       Stool Culture 07/09/2020 No Salmonella,Shigella,Vibrio,Campylobacter,Yersinia isolated.   Final    Giardia Antigen - EIA 07/09/2020 Negative  Negative Final    Cryptosporidium Antigen 07/09/2020 Negative  Negative Final    Occult Blood 07/09/2020 Negative  Negative Final    C. diff Antigen 07/09/2020 Negative  Negative Final    C difficile Toxins A+B, EIA 07/09/2020 Negative  Negative Final    Testing not recommended for children <24 months old.    Shiga Toxin 1 E.coli 07/09/2020 Negative   Final    Shiga Toxin 2 E.coli 07/09/2020 Negative   Final     Reviewed prior medical records including radiology report of 7/9/2020 CT abdomen pelvis; & endoscopy history (see surgical history).    Objective:      Physical Exam  Vitals signs and nursing note reviewed.   Constitutional:       General: He is not in acute distress.     Appearance: Normal appearance. He is well-developed. He is not diaphoretic.   HENT:      Mouth/Throat:      Comments: Patient is wearing a face mask, which covers patient's mouth and nose, due to COVID 19 concerns.  Eyes:      General: No scleral icterus.     Conjunctiva/sclera: Conjunctivae normal.      Pupils: Pupils are equal, round, and reactive  to light.   Pulmonary:      Effort: Pulmonary effort is normal. No respiratory distress.      Breath sounds: Normal breath sounds. No wheezing.   Abdominal:      General: Bowel sounds are normal. There is no distension or abdominal bruit.      Palpations: Abdomen is soft. Abdomen is not rigid. There is no mass.      Tenderness: There is no abdominal tenderness. There is no guarding or rebound. Negative signs include Rodriguez's sign and McBurney's sign.   Skin:     General: Skin is warm and dry.      Coloration: Skin is not pale.      Findings: No erythema or rash.      Comments: Non-jaundiced   Neurological:      Mental Status: He is alert and oriented to person, place, and time.   Psychiatric:         Behavior: Behavior normal.         Thought Content: Thought content normal.         Judgment: Judgment normal.         Assessment:       1. Diarrhea, unspecified type    2. Left sided abdominal pain    3. Abnormal CT scan, gastrointestinal tract    4. Colitis    5. Family history of ulcerative colitis        Plan:     discussed case with Dr. Best and he recommended scheduling colonoscopy if patient's symptoms do not continue to improve/worsen; if symptoms continue to improve and resolve, then can hold off on colonoscopy. Discussed recommendations with patient, patient verbalized understanding and patient agreed with management plan. Patient wants to hold off on colonoscopy at this time; informed patient to contact/follow-up with us if his symptoms persist/worsen; patient verbalized understanding and agreed with management plan.    Diarrhea, unspecified type  -     Calprotectin; Future; Expected date: 07/14/2020  -     CBC Without Differential; Future; Expected date: 07/14/2020  -     C-reactive protein; Future; Expected date: 07/14/2020  - CONTINUE OTC probiotic as directed on packaging  - avoid lactose  - Possible colonoscopy pending results of testing and if symptoms persist    Left sided abdominal pain  -     CBC  Without Differential; Future; Expected date: 07/14/2020  - CONTINUE PRILOSEC 40 MG ONCE DAILY AS DIRECTED  - avoid/minimize use of NSAIDs- since they can cause GI upset, bleeding and/or ulcers. If NSAID must be taken, recommend take with food.    Abnormal CT scan, gastrointestinal tract: Colitis  -     Calprotectin; Future; Expected date: 07/14/2020  -     CBC Without Differential; Future; Expected date: 07/14/2020  -     C-reactive protein; Future; Expected date: 07/14/2020  - COMPLETE CIPRO AS PRESCRIBED  - COMPLETE FLAGYL AS PRESCRIBED  - Possible colonoscopy pending results of testing and if symptoms persist    Family history of ulcerative colitis  - Possible colonoscopy pending results of testing and if symptoms persist    Follow up in about 1 month (around 8/14/2020), or if symptoms worsen or fail to improve.      If no improvement in symptoms or symptoms worsen, call/follow-up at clinic or go to ER.

## 2020-07-14 NOTE — PATIENT INSTRUCTIONS
Uncertain Causes of Diarrhea (Adult)    Diarrhea is when stools are loose and watery. This can be caused by:  · Viral infections  · Bacterial infections  · Food poisoning  · Parasites  · Irritable bowel syndrome (IBS)  · Inflammatory bowel diseases such as ulcerative colitis, Crohn's disease, and celiac disease  · Food intolerance, such as to lactose, the sugar found in milk and milk products  · Reaction to medicines like antibiotics, laxatives, cancer drugs, and antacids  Along with diarrhea, you may also have:  · Abdominal pain and cramping  · Nausea and vomiting  · Loss of bowel control  · Fever and chills  · Bloody stools  In some cases, antibiotics may help to treat diarrhea. You may have a stool sample test. This is done to see what is causing your diarrhea, and if antibiotics will help treat it. The results of a stool sample test may take up to 2 days. The healthcare provider may not give you antibiotics until he or she has the stool test results.  Diarrhea can cause dehydration. This is the loss of too much water and other fluids from the body. When this occurs, body fluid must be replaced. This can be done with oral rehydration solutions. Oral rehydration solutions are available at drugstores and grocery stores without a prescription.  Home care  Follow all instructions given by your healthcare provider. Rest at home for the next 24 hours, or until you feel better. Avoid caffeine, tobacco, and alcohol. These can make diarrhea, cramping, and pain worse.  If taking medicines:  · Dont take over-the-counter diarrhea or nausea medicines unless your healthcare provider tells you to.  · You may use acetaminophen or NSAID medicines like ibuprofen or naproxen to reduce pain and fever. Dont use these if you have chronic liver or kidney disease, or ever had a stomach ulcer or gastrointestinal bleeding. Don't use NSAID medicines if you are already taking one for another condition (like arthritis) or are on daily  aspirin therapy (such as for heart disease or after a stroke). Talk with your healthcare provider first.  · If antibiotics were prescribed, be sure you take them until they are finished. Dont stop taking them even when you feel better. Antibiotics must be taken as a full course.  To prevent the spread of illness:  · Remember that washing with soap and water and using alcohol-based  is the best way to prevent the spread of infection.  · Clean the toilet after each use.  · Wash your hands before eating.  · Wash your hands before and after preparing food. Keep in mind that people with diarrhea or vomiting should not prepare food for others.  · Wash your hands after using cutting boards, countertops, and knives that have been in contact with raw foods.  · Wash and then peel fruits and vegetables.  · Keep uncooked meats away from cooked and ready-to-eat foods.  · Use a food thermometer when cooking. Cook poultry to at least 165°F (74°C). Cook ground meat (beef, veal, pork, lamb) to at least 160°F (71°C). Cook fresh beef, veal, lamb, and pork to at least 145°F (63°C).  · Dont eat raw or undercooked eggs (poached or donnie side up), poultry, meat, or unpasteurized milk and juices.  Food and drinks  The main goal while treating vomiting or diarrhea is to prevent dehydration. This is done by taking small amounts of liquids often.  · Keep in mind that liquids are more important than food right now.  · Drink only small amounts of liquids at a time.  · Dont force yourself to eat, especially if you are having cramping, vomiting, or diarrhea. Dont eat large amounts at a time, even if you are hungry.  · If you eat, avoid fatty, greasy, spicy, or fried foods.  · Dont eat dairy foods or drink milk if you have diarrhea. These can make diarrhea worse.  During the first 24 hours you can try:  · Oral rehydration solutions. Do not use sports drinks. They have too much sugar and not enough electrolytes.  · Soft drinks without  caffeine  · Ginger ale  · Water (plain or flavored)  · Decaf tea or coffee  · Clear broth, consommé, or bouillon  · Gelatin, popsicles, or frozen fruit juice bars  The second 24 hours, if you are feeling better, you can add:  · Hot cereal, plain toast, bread, rolls, or crackers  · Plain noodles, rice, mashed potatoes, chicken noodle soup, or rice soup  · Unsweetened canned fruit (no pineapple)  · Bananas  As you recover:  · Limit fat intake to less than 15 grams per day. Dont eat margarine, butter, oils, mayonnaise, sauces, gravies, fried foods, peanut butter, meat, poultry, or fish.  · Limit fiber. Dont eat raw or cooked vegetables, fresh fruits except bananas, or bran cereals.  · Limit caffeine and chocolate.  · Limit dairy.  · Dont use spices or seasonings except salt.  · Go back to your normal diet over time, as you feel better and your symptoms improve.  · If the symptoms come back, go back to a simple diet or clear liquids.  Follow-up care  Follow up with your healthcare provider, or as advised. If a stool sample was taken or cultures were done, call the healthcare provider for the results as instructed.  Call 911  Call 911 if you have any of these symptoms:  · Trouble breathing  · Confusion  · Extreme drowsiness or trouble walking  · Loss of consciousness  · Rapid heart rate  · Chest pain  · Stiff neck  · Seizure  When to seek medical advice  Call your healthcare provider right away if any of these occur:  · Abdominal pain that gets worse  · Constant lower right abdominal pain  · Continued vomiting and inability to keep liquids down  · Diarrhea more than 5 times a day  · Blood in vomit or stool  · Dark urine or no urine for 8 hours, dry mouth and tongue, tiredness, weakness, or dizziness  · Drowsiness  · New rash  · You dont get better in 2 to 3 days  · Fever of 100.4°F (38°C) or higher that doesnt get lower with medicine  Date Last Reviewed: 1/3/2016  © 0696-7740 [employer]. [address] [place] [address]  [place] [address]. All rights reserved. This information is not intended as a substitute for professional medical care. Always follow your healthcare professional's instructions.        GERD (Adult)    The esophagus is a tube that carries food from the mouth to the stomach. A valve at the lower end of the esophagus prevents stomach acid from flowing upward. When this valve doesn't work properly, stomach contents may repeatedly flow back up (reflux) into the esophagus. This is called gastroesophageal reflux disease (GERD). GERD can irritate the esophagus. It can cause problems with swallowing or breathing. In severe cases, GERD can cause recurrent pneumonia or other serious problems.  Symptoms of reflux include burning, pressure or sharp pain in the upper abdomen or mid to lower chest. The pain can spread to the neck, back, or shoulder. There may be belching, an acid taste in the back of the throat, chronic cough, or sore throat or hoarseness. GERD symptoms often occur during the day after a big meal. They can also occur at night when lying down.   Home care  Lifestyle changes can help reduce symptoms. If needed, medicines may be prescribed. Symptoms often improve with treatment, but if treatment is stopped, the symptoms often return after a few months. So most persons with GERD will need to continue treatment.  Lifestyle changes  · Limit or avoid fatty, fried, and spicy foods, as well as coffee, chocolate, mint, and foods with high acid content such as tomatoes and citrus fruit and juices (orange, grapefruit, lemon).  · Dont eat large meals, especially at night. Frequent, smaller meals are best. Do not lie down right after eating. And dont eat anything 3 hours before going to bed.  · Avoid drinking alcohol and smoking. As much as possible, stay away from second hand smoke.  · If you are overweight, losing weight will reduce symptoms.   · Avoid wearing tight clothing around your stomach area.  · If your  "symptoms occur during sleep, use a foam wedge to elevate your upper body (not just your head.) Or, place 4" blocks under the head of your bed.  Medicines  If needed, medicines can help relieve the symptoms of GERD and prevent damage to the esophagus. Discuss a medicine plan with your healthcare provider. This may include one or more of the following medicines:  · Antacids to help neutralize the normal acids in your stomach.  · Acid blockers (H2 blockers) to decrease acid production.  · Acid inhibitors (PPIs) to decrease acid production in a different way than the blockers. They may work better, but can take a little longer to take effect.  Take an antacid 30-60 minutes after eating and at bedtime, but not at the same time as an acid blocker.  Try not to take medicines such as ibuprofen and aspirin. If you are taking aspirin for your heart or other medical reasons, talk to your healthcare provider about stopping it.  Follow-up care  Follow up with your healthcare provider or as advised by our staff.  When to seek medical advice  Call your healthcare provider if any of the following occur:  · Stomach pain gets worse or moves to the lower right abdomen (appendix area)  · Chest pain appears or gets worse, or spreads to the back, neck, shoulder, or arm  · Frequent vomiting (cant keep down liquids)  · Blood in the stool or vomit (red or black in color)  · Feeling weak or dizzy  · Fever of 100.4ºF (38ºC) or higher, or as directed by your healthcare provider  Date Last Reviewed: 6/23/2015  © 8048-3843 [employer]. [address] [place] [address], [place] [address]. All rights reserved. This information is not intended as a substitute for professional medical care. Always follow your healthcare professional's instructions.          What Is a Hiatal Hernia?    Hiatal hernia is when the area where the stomach and esophagus meet bulges up through the diaphragm into the chest cavity. In some cases, part of the stomach may " bulge above the diaphragm. Stomach acid may move up into the esophagus and cause symptoms. The symptoms are often blamed on gastroesophageal reflux disease (GERD). You may only know about the hernia when it shows up on an X-ray taken for other reasons.   What you may feel  The hiatus is a normal hole in the diaphragm. The esophagus passes through this hole and leads to the stomach. In some cases, part of the stomach may bulge above the diaphragm. This bulge is called a hernia. Stomach acid may move up into the esophagus and cause symptoms.  When you eat, the muscle at the hiatus relaxes to allow food to pass into the stomach. It tightens again to keep food and digestive acids in the stomach.  Many people with hiatal hernias have mild symptoms. You may notice the following GERD symptoms:  · Heartburn or other chest discomfort  · A feeling of chest fullness after a meal  · Frequent burping  · Acid taste in the mouth  · Trouble swallowing  Treating symptoms  If you have been diagnosed with hiatal hernia, these suggestions may help improve symptoms:  · Lose excess weight. Extra weight puts pressure on the stomach and esophagus.  · Dont lie down after eating. Sit up for at least an hour after eating. Lying down after eating can increase symptoms.  · Avoid certain foods and drinks. These include fatty foods, chocolate, coffee, mint, and other foods that cause symptoms for you.  · Dont smoke or drink alcohol. These can worsen symptoms.  · Look at your medicines. Discuss your medicines with your healthcare provider. Many medicines can cause symptoms.  · Consider an antacid medicine. Ask your healthcare provider about over-the-counter and prescription medicines that may help.  · Ask about surgery, if needed. Surgery is a treatment choice for some people. Your healthcare provider can determine if surgery is an option for you.    Date Last Reviewed: 10/1/2016  © 6214-7404 [employer]. [address] [place] [address],  [place] [address]. All rights reserved. This information is not intended as a substitute for professional medical care. Always follow your healthcare professional's instructions.

## 2020-07-15 ENCOUNTER — TELEPHONE (OUTPATIENT)
Dept: GASTROENTEROLOGY | Facility: CLINIC | Age: 65
End: 2020-07-15

## 2020-07-15 DIAGNOSIS — Z86.2 HISTORY OF ANEMIA: Primary | ICD-10-CM

## 2020-07-15 NOTE — TELEPHONE ENCOUNTER
Please call to inform & review the results with the patient- blood work showed normal inflammatory marker and blood counts showed normal white blood cell count and mild decreases in red blood cell count and hemoglobin level.  Recommend repeating blood work on 1 month to monitor mild anemia.    Continue with previous recommendations. If no improvement in symptoms or symptoms worsen, call/follow-up at clinic or go to ER.  Please release results to patient's mychart once you have discussed results and recommendations with patient.  Thanks,

## 2020-07-17 ENCOUNTER — LAB VISIT (OUTPATIENT)
Dept: LAB | Facility: HOSPITAL | Age: 65
End: 2020-07-17
Attending: FAMILY MEDICINE
Payer: COMMERCIAL

## 2020-07-17 DIAGNOSIS — R19.7 DIARRHEA, UNSPECIFIED TYPE: ICD-10-CM

## 2020-07-17 DIAGNOSIS — K52.9 COLITIS: ICD-10-CM

## 2020-07-17 PROCEDURE — 83993 ASSAY FOR CALPROTECTIN FECAL: CPT

## 2020-07-20 ENCOUNTER — PATIENT MESSAGE (OUTPATIENT)
Dept: HOME HEALTH SERVICES | Facility: CLINIC | Age: 65
End: 2020-07-20

## 2020-07-20 NOTE — TELEPHONE ENCOUNTER
Please see email sent from patient.  You have provided these for him in the past - med keyed up for refill

## 2020-07-21 RX ORDER — SCOLOPAMINE TRANSDERMAL SYSTEM 1 MG/1
1 PATCH, EXTENDED RELEASE TRANSDERMAL
Qty: 10 PATCH | Refills: 1 | Status: ON HOLD | OUTPATIENT
Start: 2020-07-21 | End: 2023-02-01 | Stop reason: SDUPTHER

## 2020-07-22 ENCOUNTER — TELEPHONE (OUTPATIENT)
Dept: GASTROENTEROLOGY | Facility: CLINIC | Age: 65
End: 2020-07-22

## 2020-07-22 DIAGNOSIS — K52.9 COLITIS: Primary | ICD-10-CM

## 2020-07-22 DIAGNOSIS — R19.5 ABNORMAL STOOL TEST: ICD-10-CM

## 2020-07-22 LAB — CALPROTECTIN STL-MCNT: 112.4 MCG/G

## 2020-07-22 NOTE — TELEPHONE ENCOUNTER
Please call to inform & review the results with the patient- stool study showed borderline elevation in calprotectin. This can indicate inflammation of the colon, likely from the colitis. Recommend repeat in 4-6 weeks. If it remains elevated then we will need to proceed with scheduling colonoscopy.    Continue with previous recommendations. If no improvement in symptoms or symptoms worsen, call/follow-up at clinic or go to ER.  Please release results to patient's mychart once you have discussed results and recommendations with patient.  Thanks,

## 2020-07-28 ENCOUNTER — PROCEDURE VISIT (OUTPATIENT)
Dept: UROLOGY | Facility: CLINIC | Age: 65
End: 2020-07-28
Payer: COMMERCIAL

## 2020-07-28 VITALS
DIASTOLIC BLOOD PRESSURE: 71 MMHG | HEIGHT: 70 IN | HEART RATE: 74 BPM | SYSTOLIC BLOOD PRESSURE: 126 MMHG | BODY MASS INDEX: 33.74 KG/M2 | WEIGHT: 235.69 LBS

## 2020-07-28 DIAGNOSIS — N32.89 BLADDER MASS: ICD-10-CM

## 2020-07-28 DIAGNOSIS — N35.116 POSTINFECTIVE STRICTURE OF OVERLAPPING SITES OF URETHRA IN MALE: Primary | ICD-10-CM

## 2020-07-28 PROCEDURE — 52000 PR CYSTOURETHROSCOPY: ICD-10-PCS | Mod: S$GLB,,, | Performed by: UROLOGY

## 2020-07-28 PROCEDURE — 52000 CYSTOURETHROSCOPY: CPT | Mod: S$GLB,,, | Performed by: UROLOGY

## 2020-07-28 NOTE — PROCEDURES
Procedures   UROLOGY [place]  7 28 20     Cc abnormal CT image     Age 65. Sent here earlier this month for abnormal bladder image in a recent CT scan done mostly for intestinal problems. Pt has a two-month hx of diarrhea and is being studied for that. The CT scan showed extensive inflammatory changes in the ileum and colon. In addition to that, the study shows an incidental nodule in the bladder lumen near the R uvj.     Gives hx of gross hematuria 6 years ago and then again 3 years ago. At the time pt was recommended to have a urologic w/u, and was scheduled for it but did not show.     Pt denies having hematuria that he can see currently.    CT I have reviewed the images and indeed the bladder shows a wall-dependent soft tissue mass within the bladder on the R side.     CYSTOSCOPY olympus flexible. Anterior urethra shows multiple fibrotic rings one after the other. We are able to traverse each one (with minimal resistance), and reach the posterior urethra. The posterior urethra does not show any prostatic blockage.  Bladder cavity distends equally in all directions. Multiple exophytic growths are seen, with papillary surface. I count 15 separate tumors, with the largest being about 6 cm on the R lateral wall of the bladder There is mild trabeculation but no cellulae formation or diverticula. Pt tolerated the procedure well.         IMPRESSION:   Bladder mass  Hx of gross hematuria, transient  BPH, renal renal cyst.     PLAN:  will need cysto, dilatation of urethral strictures, possible visual internal urethrotomy of strictures, turbt, bilateral retro (if possible). will need instillation of mitomycin C. Will need overnight stay.

## 2020-07-31 NOTE — PROCEDURES
Procedures   UROLOGY [place]  7 28 20    Cc bladder mass      CYSTOSCOPY olympus flexible. Anterior urethra shows multiple urethral strictures, but all can be overcome by the scope. Posterior urethra 4 cm, with bilobar hypertrophy. Bladder cavity distends well. There is a large tumor on the R lateral wall, and multiple smaller tumors around. I count a total of 15 exophytic growths, all of them sessile and papillary. There is no bleeding at this time. The L trigone is normal, the r trigone is not well visible. No other lesions seen. Pt tolerated the procedure well.    IMP  Turp at [facility]  Will also need dilatation of multiple urethral strictures, possible viu  Bilateral retrograde pyelography  Instillation of mitomycin C  Renal cyst

## 2020-08-21 ENCOUNTER — LAB VISIT (OUTPATIENT)
Dept: FAMILY MEDICINE | Facility: CLINIC | Age: 65
End: 2020-08-21
Payer: COMMERCIAL

## 2020-08-21 DIAGNOSIS — N32.89 BLADDER MASS: ICD-10-CM

## 2020-08-21 LAB — SARS-COV-2 RNA RESP QL NAA+PROBE: NOT DETECTED

## 2020-08-21 PROCEDURE — U0003 INFECTIOUS AGENT DETECTION BY NUCLEIC ACID (DNA OR RNA); SEVERE ACUTE RESPIRATORY SYNDROME CORONAVIRUS 2 (SARS-COV-2) (CORONAVIRUS DISEASE [COVID-19]), AMPLIFIED PROBE TECHNIQUE, MAKING USE OF HIGH THROUGHPUT TECHNOLOGIES AS DESCRIBED BY CMS-2020-01-R: HCPCS

## 2020-08-24 PROBLEM — N32.89 BLADDER MASS: Status: ACTIVE | Noted: 2020-08-24

## 2020-08-25 ENCOUNTER — TELEPHONE (OUTPATIENT)
Dept: UROLOGY | Facility: CLINIC | Age: 65
End: 2020-08-25

## 2020-08-25 NOTE — TELEPHONE ENCOUNTER
Spoke to pt and booked post op and f/u to have hendricks removed next week. Pt verbalized understanding.

## 2020-08-25 NOTE — TELEPHONE ENCOUNTER
----- Message from Velia James sent at 8/25/2020  1:03 PM CDT -----  Regarding: Sooner Appointment Request  Contact: GULSHAN HERNANDEZ [[id number]]  Type:  Sooner Appointment Request    Caller is requesting a sooner appointment.  Caller  is requesting a message be sent to doctor.    Name of Caller: GULSHAN HERNANDEZ [[id number]]  When is the first available appointment? 9/22/2020  Symptoms: was advised to have catheter removed in 1 week   Would the patient rather a call back or a response via [facility]? Call back   Best Call Back Number: [phone]

## 2020-09-01 ENCOUNTER — PATIENT MESSAGE (OUTPATIENT)
Dept: UROLOGY | Facility: CLINIC | Age: 65
End: 2020-09-01

## 2020-09-01 ENCOUNTER — TELEPHONE (OUTPATIENT)
Dept: UROLOGY | Facility: CLINIC | Age: 65
End: 2020-09-01

## 2020-09-01 ENCOUNTER — CLINICAL SUPPORT (OUTPATIENT)
Dept: UROLOGY | Facility: CLINIC | Age: 65
End: 2020-09-01
Payer: COMMERCIAL

## 2020-09-01 DIAGNOSIS — N32.89 BLADDER MASS: Primary | ICD-10-CM

## 2020-09-01 NOTE — PROGRESS NOTES
Pt was placed on table in supine position draped appropriately and catheter balloon was drained of 30 mL of clear fluid, catheter was then removed and pt was able to site up. Pt tolerated procedure well.

## 2020-09-01 NOTE — TELEPHONE ENCOUNTER
I called pt to let pt know the results of his recent turbt (path report). I left message saying that I would like to talk to him.     What I want to tell him is that the growth he had in his bladder is cancer and it will require initial follow up with cystoscopy in 3 months in the office.     I also left a stent in the ureter and this can stay two weeks and be removed then. I believe he already has an appointment to see me, make sure it is a cysto appointment.

## 2020-09-02 NOTE — TELEPHONE ENCOUNTER
Spoke to pt and rescheduled post-op apt to include a cysto stent removal. Pt verbalized understanding of upcoming apt. I will send this message back to Dr. Claudio to call pt with results. Pt is unaware of path results.

## 2020-09-03 ENCOUNTER — TELEPHONE (OUTPATIENT)
Dept: UROLOGY | Facility: CLINIC | Age: 65
End: 2020-09-03

## 2020-09-03 DIAGNOSIS — N32.89 BLADDER MASS: Primary | ICD-10-CM

## 2020-09-03 NOTE — TELEPHONE ENCOUNTER
----- Message from Alber Williamson sent at 9/3/2020 10:19 AM CDT -----  Type: Needs Medical Advice  Who Called: Patient    Best Call Back Number: [phone]  Additional Information:    Patient states been waiting on a call back regarding lab results from the procedure.

## 2020-09-04 ENCOUNTER — PATIENT MESSAGE (OUTPATIENT)
Dept: UROLOGY | Facility: CLINIC | Age: 65
End: 2020-09-04

## 2020-09-04 NOTE — TELEPHONE ENCOUNTER
Had a long discussion with pt about his pathologic findings. His bladder growth was malignant (as suspected from the very beginning), and I explained that it as described as 'low grade'. Also, that it was invasive from the mucosa to the underlying layer (lamina propria), but not to the layer after that (the muscularis layer or detrusor), which would be more dangerous. I also pointed out that during the procedure we instilled topical chemotherapy to the bladder (mitomycin C) as a form of prevention of future tumors.     Pt was pleased with the report and will be seeing us in the clinic next week for removal of the ureteral stent

## 2020-09-08 ENCOUNTER — PATIENT OUTREACH (OUTPATIENT)
Dept: ADMINISTRATIVE | Facility: OTHER | Age: 65
End: 2020-09-08

## 2020-09-09 ENCOUNTER — PROCEDURE VISIT (OUTPATIENT)
Dept: UROLOGY | Facility: CLINIC | Age: 65
End: 2020-09-09
Payer: COMMERCIAL

## 2020-09-09 VITALS
DIASTOLIC BLOOD PRESSURE: 81 MMHG | HEART RATE: 71 BPM | RESPIRATION RATE: 18 BRPM | BODY MASS INDEX: 33 KG/M2 | SYSTOLIC BLOOD PRESSURE: 141 MMHG | WEIGHT: 230 LBS

## 2020-09-09 DIAGNOSIS — N32.89 BLADDER MASS: ICD-10-CM

## 2020-09-09 DIAGNOSIS — C67.2 MALIGNANT NEOPLASM OF LATERAL WALL OF URINARY BLADDER: Primary | ICD-10-CM

## 2020-09-09 PROCEDURE — 52310 PR CYSTOSCOPY,REMV CALCULUS,SIMPLE: ICD-10-PCS | Mod: S$GLB,,, | Performed by: UROLOGY

## 2020-09-09 PROCEDURE — 52310 CYSTOSCOPY AND TREATMENT: CPT | Mod: S$GLB,,, | Performed by: UROLOGY

## 2020-09-09 NOTE — PROGRESS NOTES
Health Maintenance Due   Topic Date Due    HIV Screening  03/28/1970    Influenza Vaccine (1) 08/01/2020     Updates were requested from care everywhere.  Chart was reviewed for overdue Proactive [facility] Encounters (SUKHJINDER) topics (CRS, Breast Cancer Screening, Eye exam)  Health Maintenance has been updated.  LINKS immunization registry triggered.  Immunizations were reconciled.

## 2020-09-09 NOTE — PROCEDURES
Procedures   UROLOGY [place]  9 9 20    Cc removal of stent    Age 65, had turbt on 8 24 2020 at [facility]. His main tumor was R lateral wall extending into the R trigone, and several smaller 1-cm tumors in the same lateral wall and in posterior wall. The stent was placed to ensure proper healing of the tissue around the resected tumor. We also dilated a urethral stricture and we also instilled mitomycin C in bladder.     CYSTOSCOPY and stent extraction: flexible cystoscope olympus inserted into the urethra following prepping of the genital area and lubrication with xylocaine jelly. Anterior urethra showed various successive fibrotic rings, none of which interfered with entry of the scope into the bladder. Posterior urethra without abnormalities. The bladder cavity distended uniformly in all directions. There is evidence of recent surgery in the bladder on the R lateral wall. The stent was immediately visible and I was able to grab it with a grasping forceps and gently pulled it down and out and removed the stent. The stent was intact and was discarded. The pt tolerated the procedure well.    Pt warned that he might have a bit of blood in his urine for a few days. Can use analgesics as needed.     IMP bladder ca, this was his first finding of a bladder tumor. He gives hx of having bled several times 6 years ago and then 3 years ago, but then there was no bleeding in between so he didn't pursue it. He also had lost his insurance coverage at the time and that worked against pt seeking medical services.     PATH REPORT 8 24 20--PAPILLARY UROTHELIAL CARCINOMA, LOW GRADE, WITH INVASION INTO LAMINA    PROPRIA (SUBUROTHELIAL CONNECTIVE TISSUE).   --MUSCULARIS PROPRIA (DETRUSOR MUSCLE) IS PRESENT AND IS NOT INVOLVED BY NEOPLASM.    will need his next cysto in 3 months in the office.

## 2020-11-16 DIAGNOSIS — F32.A DEPRESSION, UNSPECIFIED DEPRESSION TYPE: ICD-10-CM

## 2020-11-16 RX ORDER — ESCITALOPRAM OXALATE 10 MG/1
10 TABLET ORAL DAILY
Qty: 90 TABLET | Refills: 3 | Status: SHIPPED | OUTPATIENT
Start: 2020-11-16 | End: 2021-11-15

## 2020-11-16 NOTE — TELEPHONE ENCOUNTER
No new care gaps identified.  Powered by [employer]. Reference number: [id number]. 11/16/2020 10:12:54 AM   CST

## 2020-11-22 DIAGNOSIS — I10 HYPERTENSION, UNSPECIFIED TYPE: ICD-10-CM

## 2020-11-23 RX ORDER — TELMISARTAN AND HYDROCHLORTHIAZIDE 40; 12.5 MG/1; MG/1
1 TABLET ORAL DAILY
Qty: 90 TABLET | Refills: 3 | Status: SHIPPED | OUTPATIENT
Start: 2020-11-23 | End: 2021-11-26 | Stop reason: SDUPTHER

## 2020-11-23 NOTE — TELEPHONE ENCOUNTER
Provider Staff:  Action is required for this patient.   Please schedule patient for the following      Appointment:  Annual     Thanks!  [facility]      Appointments past 12m or future 3m with pcp    Date Provider   Last Visit   11/25/2019 Colton Johnson MD   Next Visit   Visit date not found Colton Johnson MD     Note composed: 11/23/2020 7:51 AM

## 2020-11-23 NOTE — TELEPHONE ENCOUNTER
Care Due:                  Date            Visit Type   Department     Provider  --------------------------------------------------------------------------------                                ESTABLISHED                              PATIENT      [facility] FAMILY  Last Visit: 11-      [facility]       KELVIN LUNA  Next Visit: None Scheduled  None         None Found                                                            Last  Test          Frequency    Reason                     Performed    Due Date  --------------------------------------------------------------------------------    Office Visit  12 months..  escitalopram.............  11- 11-    Powered by [employer]. Reference number: [id number]. 11/22/2020 8:46:44 PM CST

## 2020-11-23 NOTE — PROGRESS NOTES
Refill Routing Note   Medication(s) are not appropriate for processing by [facility] for the following reason(s):     - PCP signed     [facility] action(s):  Defer Medication-related problems identified: Requires appointment   Medication Therapy Plan: CDMR. APPT(annual); pcp signed before assessment complete  Medication reconciliation completed: No   Automatic Epic Generated Protocol Data:    Orders Placed This Encounter    telmisartan-hydrochlorothiazide (MICARDIS HCT) 40-12.5 mg per tablet      Requested Prescriptions   Signed Prescriptions Disp Refills    telmisartan-hydrochlorothiazide (MICARDIS HCT) 40-12.5 mg per tablet 90 tablet 3     Sig: Take 1 tablet by mouth once daily.       Cardiovascular: ARB + Diuretic Combos Failed - 11/23/2020  6:13 AM        Failed - Last BP in normal range within 360 days.     BP Readings from Last 3 Encounters:   09/09/20 (!) 141/81   08/25/20 120/74   08/21/20 123/81              Passed - Patient is at least 18 years old        Passed - Office visit in past 12 months or future 90 days     Recent Outpatient Visits            4 months ago Bladder mass    [place] - Urology Greg Claudio MD    4 months ago Diarrhea, unspecified type    [place] - Gastroenterology ELAINE Whyte    4 months ago Diarrhea, unspecified type    [place] - Family Medicine Rahel Kelley NP    11 months ago Curlew or callus    [place] - Dermatology Pam Méndez MD    12 months ago Hypertension, unspecified type    [place] - [facility] Colton Johnson MD          Future Appointments              In 3 weeks Greg Claudio MD [place] [facility] [place]                Passed - K in normal range and within 360 days     Potassium   Date Value Ref Range Status   08/21/2020 4.2 3.5 - 5.1 mmol/L Final   10/24/2019 4.7 3.5 - 5.1 mmol/L Final   11/06/2018 3.8 3.5 - 5.1 mmol/L Final              Passed - Na is between 130 and 148 and within 360 days     Sodium   Date  Value Ref Range Status   08/21/2020 136 136 - 145 mmol/L Final   10/24/2019 139 136 - 145 mmol/L Final   11/06/2018 138 136 - 145 mmol/L Final              Passed - Cr is 1.4 or below and within 360 days     Creatinine   Date Value Ref Range Status   08/21/2020 1.05 0.50 - 1.40 mg/dL Final   07/09/2020 1.3 0.5 - 1.4 mg/dL Final   10/24/2019 1.1 0.5 - 1.4 mg/dL Final              Passed - eGFR within 360 days     eGFR if non    Date Value Ref Range Status   08/21/2020 >60 >60 mL/min/1.73 m^2 Final     Comment:     Calculation used to obtain the estimated glomerular filtration  rate (eGFR) is the CKD-EPI equation.      07/09/2020 58 (A) >60 mL/min/1.73 m^2 Final     Comment:     Calculation used to obtain the estimated glomerular filtration  rate (eGFR) is the CKD-EPI equation.      10/24/2019 >60.0 >60 mL/min/1.73 m^2 Final     Comment:     Calculation used to obtain the estimated glomerular filtration  rate (eGFR) is the CKD-EPI equation.        eGFR if    Date Value Ref Range Status   08/21/2020 >60 >60 mL/min/1.73 m^2 Final   07/09/2020 >60 >60 mL/min/1.73 m^2 Final   10/24/2019 >60.0 >60 mL/min/1.73 m^2 Final                    Appointments  past 12m or future 3m with PCP    Date Provider   Last Visit   11/25/2019 Colton Johnson MD   Next Visit   Visit date not found Colton Johnson MD   ED visits in past 90 days: 0        Note composed:7:51 AM 11/23/2020

## 2020-12-11 ENCOUNTER — OFFICE VISIT (OUTPATIENT)
Dept: FAMILY MEDICINE | Facility: CLINIC | Age: 65
End: 2020-12-11
Payer: COMMERCIAL

## 2020-12-11 ENCOUNTER — LAB VISIT (OUTPATIENT)
Dept: LAB | Facility: HOSPITAL | Age: 65
End: 2020-12-11
Attending: FAMILY MEDICINE
Payer: COMMERCIAL

## 2020-12-11 VITALS
DIASTOLIC BLOOD PRESSURE: 76 MMHG | HEART RATE: 80 BPM | BODY MASS INDEX: 34.97 KG/M2 | HEIGHT: 70 IN | WEIGHT: 244.25 LBS | OXYGEN SATURATION: 96 % | TEMPERATURE: 98 F | SYSTOLIC BLOOD PRESSURE: 130 MMHG

## 2020-12-11 DIAGNOSIS — S46.212A TEAR OF LEFT BICEPS MUSCLE, INITIAL ENCOUNTER: Primary | ICD-10-CM

## 2020-12-11 DIAGNOSIS — Z00.00 PREVENTATIVE HEALTH CARE: ICD-10-CM

## 2020-12-11 DIAGNOSIS — S46.212A TEAR OF LEFT BICEPS MUSCLE, INITIAL ENCOUNTER: ICD-10-CM

## 2020-12-11 DIAGNOSIS — Z00.00 PREVENTATIVE HEALTH CARE: Primary | ICD-10-CM

## 2020-12-11 LAB
ALBUMIN SERPL BCP-MCNC: 3.9 G/DL (ref 3.5–5.2)
ALP SERPL-CCNC: 122 U/L (ref 55–135)
ALT SERPL W/O P-5'-P-CCNC: 37 U/L (ref 10–44)
ANION GAP SERPL CALC-SCNC: 9 MMOL/L (ref 8–16)
AST SERPL-CCNC: 32 U/L (ref 10–40)
BASOPHILS # BLD AUTO: 0.07 K/UL (ref 0–0.2)
BASOPHILS NFR BLD: 0.9 % (ref 0–1.9)
BILIRUB SERPL-MCNC: 0.8 MG/DL (ref 0.1–1)
BUN SERPL-MCNC: 17 MG/DL (ref 8–23)
CALCIUM SERPL-MCNC: 9 MG/DL (ref 8.7–10.5)
CHLORIDE SERPL-SCNC: 100 MMOL/L (ref 95–110)
CHOLEST SERPL-MCNC: 190 MG/DL (ref 120–199)
CHOLEST/HDLC SERPL: 3.9 {RATIO} (ref 2–5)
CO2 SERPL-SCNC: 27 MMOL/L (ref 23–29)
COMPLEXED PSA SERPL-MCNC: 0.29 NG/ML (ref 0–4)
CREAT SERPL-MCNC: 1.4 MG/DL (ref 0.5–1.4)
DIFFERENTIAL METHOD: ABNORMAL
EOSINOPHIL # BLD AUTO: 0.4 K/UL (ref 0–0.5)
EOSINOPHIL NFR BLD: 4.7 % (ref 0–8)
ERYTHROCYTE [DISTWIDTH] IN BLOOD BY AUTOMATED COUNT: 12.7 % (ref 11.5–14.5)
EST. GFR  (AFRICAN AMERICAN): >60 ML/MIN/1.73 M^2
EST. GFR  (NON AFRICAN AMERICAN): 53.1 ML/MIN/1.73 M^2
ESTIMATED AVG GLUCOSE: 123 MG/DL (ref 68–131)
GLUCOSE SERPL-MCNC: 112 MG/DL (ref 70–110)
HBA1C MFR BLD HPLC: 5.9 % (ref 4–5.6)
HCT VFR BLD AUTO: 41.8 % (ref 40–54)
HDLC SERPL-MCNC: 49 MG/DL (ref 40–75)
HDLC SERPL: 25.8 % (ref 20–50)
HGB BLD-MCNC: 13.4 G/DL (ref 14–18)
IMM GRANULOCYTES # BLD AUTO: 0.04 K/UL (ref 0–0.04)
IMM GRANULOCYTES NFR BLD AUTO: 0.5 % (ref 0–0.5)
LDLC SERPL CALC-MCNC: 124.6 MG/DL (ref 63–159)
LYMPHOCYTES # BLD AUTO: 1.4 K/UL (ref 1–4.8)
LYMPHOCYTES NFR BLD: 18.5 % (ref 18–48)
MCH RBC QN AUTO: 31.2 PG (ref 27–31)
MCHC RBC AUTO-ENTMCNC: 32.1 G/DL (ref 32–36)
MCV RBC AUTO: 97 FL (ref 82–98)
MONOCYTES # BLD AUTO: 0.9 K/UL (ref 0.3–1)
MONOCYTES NFR BLD: 11.3 % (ref 4–15)
NEUTROPHILS # BLD AUTO: 4.8 K/UL (ref 1.8–7.7)
NEUTROPHILS NFR BLD: 64.1 % (ref 38–73)
NONHDLC SERPL-MCNC: 141 MG/DL
NRBC BLD-RTO: 0 /100 WBC
PLATELET # BLD AUTO: 241 K/UL (ref 150–350)
PMV BLD AUTO: 11.4 FL (ref 9.2–12.9)
POTASSIUM SERPL-SCNC: 4 MMOL/L (ref 3.5–5.1)
PROT SERPL-MCNC: 7.2 G/DL (ref 6–8.4)
RBC # BLD AUTO: 4.3 M/UL (ref 4.6–6.2)
SODIUM SERPL-SCNC: 136 MMOL/L (ref 136–145)
TRIGL SERPL-MCNC: 82 MG/DL (ref 30–150)
WBC # BLD AUTO: 7.51 K/UL (ref 3.9–12.7)

## 2020-12-11 PROCEDURE — 84153 ASSAY OF PSA TOTAL: CPT

## 2020-12-11 PROCEDURE — 85025 COMPLETE CBC W/AUTO DIFF WBC: CPT

## 2020-12-11 PROCEDURE — 99999 PR PBB SHADOW E&M-EST. PATIENT-LVL IV: ICD-10-PCS | Mod: PBBFAC,,, | Performed by: FAMILY MEDICINE

## 2020-12-11 PROCEDURE — 36415 COLL VENOUS BLD VENIPUNCTURE: CPT | Mod: PO

## 2020-12-11 PROCEDURE — 80061 LIPID PANEL: CPT

## 2020-12-11 PROCEDURE — 99396 PREV VISIT EST AGE 40-64: CPT | Mod: S$GLB,,, | Performed by: FAMILY MEDICINE

## 2020-12-11 PROCEDURE — 99396 PR PREVENTIVE VISIT,EST,40-64: ICD-10-PCS | Mod: S$GLB,,, | Performed by: FAMILY MEDICINE

## 2020-12-11 PROCEDURE — 83036 HEMOGLOBIN GLYCOSYLATED A1C: CPT

## 2020-12-11 PROCEDURE — 80053 COMPREHEN METABOLIC PANEL: CPT

## 2020-12-11 PROCEDURE — 99999 PR PBB SHADOW E&M-EST. PATIENT-LVL IV: CPT | Mod: PBBFAC,,, | Performed by: FAMILY MEDICINE

## 2020-12-11 RX ORDER — INFLUENZA A VIRUS A/VICTORIA/2454/2019 IVR-207 (H1N1) ANTIGEN (PROPIOLACTONE INACTIVATED), INFLUENZA A VIRUS A/HONG KONG/2671/2019 IVR-208 (H3N2) ANTIGEN (PROPIOLACTONE INACTIVATED), INFLUENZA B VIRUS B/VICTORIA/705/2018 BVR-11 ANTIGEN (PROPIOLACTONE INACTIVATED), INFLUENZA B VIRUS B/PHUKET/3073/2013 BVR-1B ANTIGEN (PROPIOLACTONE INACTIVATED) 15; 15; 15; 15 UG/.5ML; UG/.5ML; UG/.5ML; UG/.5ML
INJECTION, SUSPENSION INTRAMUSCULAR
COMMUNITY
Start: 2020-11-22 | End: 2020-12-15
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
## 2020-12-14 ENCOUNTER — HOSPITAL ENCOUNTER (OUTPATIENT)
Dept: RADIOLOGY | Facility: HOSPITAL | Age: 65
Discharge: HOME | End: 2020-12-14
Attending: ORTHOPAEDIC SURGERY
Payer: COMMERCIAL

## 2020-12-14 ENCOUNTER — OFFICE VISIT (OUTPATIENT)
Dept: ORTHOPEDICS | Facility: CLINIC | Age: 65
End: 2020-12-14
Payer: COMMERCIAL

## 2020-12-14 VITALS — HEIGHT: 70 IN | WEIGHT: 244 LBS | BODY MASS INDEX: 34.93 KG/M2

## 2020-12-14 DIAGNOSIS — S46.212A TEAR OF LEFT BICEPS MUSCLE, INITIAL ENCOUNTER: ICD-10-CM

## 2020-12-14 PROCEDURE — 99999 PR PBB SHADOW E&M-EST. PATIENT-LVL IV: CPT | Mod: PBBFAC,,, | Performed by: ORTHOPAEDIC SURGERY

## 2020-12-14 PROCEDURE — 99243 PR OFFICE CONSULTATION,LEVEL III: ICD-10-PCS | Mod: S$GLB,,, | Performed by: ORTHOPAEDIC SURGERY

## 2020-12-14 PROCEDURE — 99999 PR PBB SHADOW E&M-EST. PATIENT-LVL IV: ICD-10-PCS | Mod: PBBFAC,,, | Performed by: ORTHOPAEDIC SURGERY

## 2020-12-14 PROCEDURE — 73060 X-RAY EXAM OF HUMERUS: CPT | Mod: TC,PO,LT

## 2020-12-14 PROCEDURE — 73060 X-RAY EXAM OF HUMERUS: CPT | Mod: 26,LT,, | Performed by: RADIOLOGY

## 2020-12-14 PROCEDURE — 99243 OFF/OP CNSLTJ NEW/EST LOW 30: CPT | Mod: S$GLB,,, | Performed by: ORTHOPAEDIC SURGERY

## 2020-12-14 PROCEDURE — 73060 XR HUMERUS 2 VIEW LEFT: ICD-10-PCS | Mod: 26,LT,, | Performed by: RADIOLOGY

## 2020-12-14 NOTE — PROGRESS NOTES
65 years old complaining of a deformity in his left arm which is nose for 6 months time at a fall still some bruising the forearm shortly thereafter comes in today complaining some decreased strength an arm and cosmetic deformity that is present    Exam shows findings consistent with distal biceps rupture in the left elbow, he is able flex the elbow nicely as well as supinate the forearm well, does have some decreased strength compared to the contralateral side    X-rays are negative    Assessment:  6-month-old distal biceps rupture left    Plan:  We discussed with the patient conservative care, strengthening over time, he may call back to get set up with physical therapy at a later date    Patient seen as a consult from Dr. Martinez, communication via epic    Further History  Aching pain  Worse with activity  Relieved with rest  No other associated symptoms  No other radiation    Further Exam  Alert and oriented  Pleasant  Contralateral limb has appropriate range of motion for age and condition  Contralateral limb has appropriate strength for age and condition  Contralateral limb has appropriate stability  for age and condition  No adenopathy  Pulses are appropriate for current condition  Skin is intact        Chief Complaint    Chief Complaint   Patient presents with    Left Forearm - Injury     Left bicep muscle tear.DOI- 6-8 months ago       HPI  Moustapha Murray is a 65 y.o.  male who presents with       Past Medical History  Past Medical History:   Diagnosis Date    Colon polyps     DDD (degenerative disc disease), lumbar     Depression     Fatty liver     GERD (gastroesophageal reflux disease)     H/O motion sickness     Hypertension     PONV (postoperative nausea and vomiting)     RLS (restless legs syndrome)        Past Surgical History  Past Surgical History:   Procedure Laterality Date    APPENDECTOMY      CARPAL TUNNEL RELEASE      CERVICAL FUSION      CHOLECYSTECTOMY       CIRCUMCISION      COLONOSCOPY N/A 3/16/2020    Procedure: COLONOSCOPY;  Surgeon: Braulio Best MD;  Location: [facility] ENDO;  Service: Endoscopy;  Laterality: N/A; 3 colon polyps removed; biopsy: tubular adenoma and hyperplastic; repeat in 5 years for surveillance    CYSTOSCOPY W/ RETROGRADES Bilateral 8/24/2020    Procedure: CYSTOSCOPY, WITH RETROGRADE PYELOGRAM -WITH URETHRAL DILATION;  Surgeon: Greg Claudio MD;  Location: [facility] OR;  Service: Urology;  Laterality: Bilateral;    CYSTOSCOPY W/ URETERAL STENT PLACEMENT Right 8/24/2020    Procedure: CYSTOSCOPY, WITH URETERAL STENT INSERTION;  Surgeon: Greg Claudio MD;  Location: [facility] OR;  Service: Urology;  Laterality: Right;    INSTILLATION OF URINARY BLADDER N/A 8/24/2020    Procedure: INSTILLATION, BLADDER - Mitomycin;  Surgeon: Grge Claudio MD;  Location: [facility] OR;  Service: Urology;  Laterality: N/A;    TRANSFORAMINAL EPIDURAL INJECTION OF STEROID Right 7/17/2019    Procedure: Injection,steroid,epidural,transforaminal approach L3-4, L4-5;  Surgeon: Chris Palencia MD;  Location: [facility] OR;  Service: Pain Management;  Laterality: Right;    TRANSFORAMINAL EPIDURAL INJECTION OF STEROID Right 8/28/2019    Procedure: Injection,steroid,epidural,transforaminal approach;  Surgeon: Chris Palencia MD;  Location: [facility] OR;  Service: Pain Management;  Laterality: Right;  L3-4, L4-L5    WISDOM TOOTH EXTRACTION         Medications  Current Outpatient Medications   Medication Sig    AFLURIA QD 2020-21,3YR UP,,PF, 60 mcg (15 mcg x 4)/0.5 mL Syrg PHARMACY ADMINISTERED    escitalopram oxalate (LEXAPRO) 10 MG tablet Take 1 tablet (10 mg total) by mouth once daily.    fluocinonide (LIDEX) 0.05 % external solution Apply topically 2 (two) times daily.    Lactobac no.41/Bifidobact no.7 (PROBIOTIC-10 ORAL) Take by mouth once daily. HOLD MORNING OF SURGERY    melatonin 5 mg Cap Take by mouth nightly as needed. TAKE AS SCHEDULED    multivitamin (ONE DAILY  MULTIVITAMIN) per tablet Take 1 tablet by mouth once daily. HOLD MORNING OF SURGERY    naproxen sodium (ANAPROX) 220 MG tablet Take 220 mg by mouth 2 (two) times daily as needed. HOLD ACCORDING TO MD INSTRUCTIONS    omeprazole (PRILOSEC) 40 MG capsule Take 1 capsule (40 mg total) by mouth once daily. (Patient taking differently: Take 40 mg by mouth once daily. TAKE MORNING OF SURGERY)    pramipexole (MIRAPEX) 0.5 MG tablet TAKE 1 TABLET (0.5 MG TOTAL) BY MOUTH EVERY EVENING. (Patient taking differently: Take 0.5 mg by mouth every evening. TAKE AS SCHEDULED)    scopolamine (TRANSDERM-SCOP) 1.3-1.5 mg (1 mg over 3 days) Place 1 patch onto the skin every 72 hours.    SHINGRIX, PF, 50 mcg/0.5 mL injection TO BE ADMINISTERED BY PHARMACIST FOR IMMUNIZATION    telmisartan-hydrochlorothiazide (MICARDIS HCT) 40-12.5 mg per tablet Take 1 tablet by mouth once daily.    triamcinolone acetonide 0.1% (KENALOG) 0.1 % cream Apply topically 2 (two) times daily.     No current facility-administered medications for this visit.      Facility-Administered Medications Ordered in Other Visits   Medication    ondansetron injection 4 mg       Allergies  Review of patient's allergies indicates:   Allergen Reactions    Sulfa (sulfonamide antibiotics) Anaphylaxis       Family History  Family History   Problem Relation Age of Onset    Stroke Mother     Ulcerative colitis Mother     Cancer Neg Hx     Colon cancer Neg Hx     Crohn's disease Neg Hx     Celiac disease Neg Hx        Social History  Social History     Socioeconomic History    Marital status:      Spouse name: Not on file    Number of children: Not on file    Years of education: Not on file    Highest education level: Not on file   Occupational History    Occupation:    Social Needs    Financial resource strain: Not hard at all    Food insecurity     Worry: Never true     Inability: Never true    Transportation needs     Medical: No      Non-medical: No   Tobacco Use    Smoking status: Former Smoker     Packs/day: 1.50     Types: Cigarettes     Quit date: 10/16/2013     Years since quittin.1    Smokeless tobacco: Never Used   Substance and Sexual Activity    Alcohol use: Yes     Alcohol/week: 2.0 standard drinks     Types: 2 Cans of beer per week     Frequency: 2-4 times a month     Drinks per session: 3 or 4     Binge frequency: Less than monthly    Drug use: Not Currently    Sexual activity: Not on file   Lifestyle    Physical activity     Days per week: 5 days     Minutes per session: 20 min    Stress: Rather much   Relationships    Social connections     Talks on phone: Twice a week     Gets together: Once a week     Attends Jew service: Not on file     Active member of club or organization: No     Attends meetings of clubs or organizations: Never     Relationship status:    Other Topics Concern    Not on file   Social History Narrative    Not on file               Review of Systems     Constitutional: Negative    HENT: Negative  Eyes: Negative  Respiratory: Negative  Cardiovascular: Negative  Musculoskeletal: HPI  Skin: Negative  Neurological: Negative  Hematological: Negative  Endocrine: Negative                 Physical Exam    There were no vitals filed for this visit.  Body mass index is 35.01 kg/m².  Physical Examination:     General appearance -  well appearing, and in no distress  Mental status - awake  Neck - supple  Chest -  symmetric air entry  Heart - normal rate   Abdomen - soft      Assessment     1. Tear of left biceps muscle, initial encounter          Plan

## 2020-12-14 NOTE — LETTER
December 14, 2020      Colton Johnson MD  [address] [facility] [address] [place] [address]           [facility] [place]  [address] [place] [address]  Phone: [phone]          Patient: Moustapha Murray   MR Number: [id number]   YOB: 1955   Date of Visit: 12/14/2020       Dear Dr. Colton Johnson:    Thank you for referring Moustapha Murray to me for evaluation. Attached you will find relevant portions of my assessment and plan of care.    If you have questions, please do not hesitate to call me. I look forward to following Moustapha Murray along with you.    Sincerely,    Tello Dos Santos MD    Enclosure  CC:  No Recipients    If you would like to receive this communication electronically, please contact [email or web address] or [phone] to request more information on [employer] access.    For providers and/or their staff who would like to refer a patient to [facility], please contact us through our one-stop-shop provider referral line, [facility] [place], at [phone].    If you feel you have received this communication in error or would no longer like to receive these types of communications, please e-mail [email or web address]

## 2020-12-15 ENCOUNTER — PROCEDURE VISIT (OUTPATIENT)
Dept: UROLOGY | Facility: CLINIC | Age: 65
End: 2020-12-15
Payer: COMMERCIAL

## 2020-12-15 VITALS
SYSTOLIC BLOOD PRESSURE: 118 MMHG | HEIGHT: 70 IN | HEART RATE: 70 BPM | WEIGHT: 243.38 LBS | DIASTOLIC BLOOD PRESSURE: 69 MMHG | BODY MASS INDEX: 34.84 KG/M2

## 2020-12-15 DIAGNOSIS — N32.89 BLADDER MASS: ICD-10-CM

## 2020-12-15 DIAGNOSIS — N40.1 BENIGN PROSTATIC HYPERPLASIA WITH WEAK URINARY STREAM: Primary | ICD-10-CM

## 2020-12-15 DIAGNOSIS — N28.1 RENAL CYST: ICD-10-CM

## 2020-12-15 DIAGNOSIS — R39.12 BENIGN PROSTATIC HYPERPLASIA WITH WEAK URINARY STREAM: Primary | ICD-10-CM

## 2020-12-15 PROCEDURE — 52000 PR CYSTOURETHROSCOPY: ICD-10-PCS | Mod: S$GLB,,, | Performed by: UROLOGY

## 2020-12-15 PROCEDURE — 52000 CYSTOURETHROSCOPY: CPT | Mod: S$GLB,,, | Performed by: UROLOGY

## 2020-12-15 NOTE — PROCEDURES
Procedures     UROLOGY [place]  12 15 20    Cc here for cysto    Age 65, started with gross painless hematuria around feb 2017, which cleared spontaneously. Plans were made for cystoscopy and other routine diagnostic studies but pt did not follow up because of lack of medical insurance coverage. In July 2020 presented with an abnormal bladder image in a CT scan done mostly for intestinal problems. Cystoscopy showed multiple bladder tumors.     On 8 24 20, had surgery at [facility], consisting of turbt of 4 cm R lateral wall tumor with involvement of trigone, and multiple small tumors on R lateral wall and posterior wall. Also had bilateral retrograde  pyelography (which was normal), and needed dilatation of multiple urethral strictures. A stent was left in the R ureter to help heal the ureteral orifice; stent was taken out 2 weeks later.     PATH REPORT 8 24 20--PAPILLARY UROTHELIAL CARCINOMA, LOW GRADE, WITH INVASION INTO LAMINA    PROPRIA (SUBUROTHELIAL CONNECTIVE TISSUE).   --MUSCULARIS PROPRIA (DETRUSOR MUSCLE) IS PRESENT AND IS NOT INVOLVED BY NEOPLASM.     Here for cystoscopy    CYSTOSCOPY [facility] flexible. Anterior urethra shows multiple successive stricture rings. The scope is able to pass through all of those strictures without difficulty. Posterior urethra 4 cm, with bilobar hypertrophy. Bladder cavity distends equally and symmetrically when filled with water. The trigone shows the R orifice to be lateralized and round, consistent with previous resection. L orifice is normallly positioned and shaped. There are areas of whitish scar tissue revealing previous resection. No tumoral recurrences found in the bladder today. There is mild trabeculation. Pt tolerated the procedure well.     IMPRESSION:   Bladder tumor, resected aug 2020, no recurrences today. Can have next cystoscopy in 6 mo.    BPH, on observation    Renal renal cyst,  No action needed.    RTC 6 mo

## 2021-02-11 RX ORDER — PRAMIPEXOLE DIHYDROCHLORIDE 0.5 MG/1
0.5 TABLET ORAL NIGHTLY
Qty: 90 TABLET | Refills: 3 | Status: SHIPPED | OUTPATIENT
Start: 2021-02-11 | End: 2021-11-26 | Stop reason: SDUPTHER

## 2021-03-08 ENCOUNTER — PATIENT MESSAGE (OUTPATIENT)
Dept: FAMILY MEDICINE | Facility: CLINIC | Age: 66
End: 2021-03-08

## 2021-03-09 RX ORDER — ACETAZOLAMIDE 250 MG/1
250 TABLET ORAL 2 TIMES DAILY
Qty: 60 TABLET | Refills: 0 | Status: SHIPPED | OUTPATIENT
Start: 2021-03-09 | End: 2023-01-25

## 2021-03-10 ENCOUNTER — OFFICE VISIT (OUTPATIENT)
Dept: ORTHOPEDICS | Facility: CLINIC | Age: 66
End: 2021-03-10
Payer: COMMERCIAL

## 2021-03-10 VITALS — HEART RATE: 63 BPM | SYSTOLIC BLOOD PRESSURE: 130 MMHG | DIASTOLIC BLOOD PRESSURE: 79 MMHG

## 2021-03-10 DIAGNOSIS — M72.0 DUPUYTREN'S CONTRACTURE OF LEFT HAND: Primary | ICD-10-CM

## 2021-03-10 PROCEDURE — 99203 PR OFFICE/OUTPT VISIT, NEW, LEVL III, 30-44 MIN: ICD-10-PCS | Mod: S$GLB,,, | Performed by: ORTHOPAEDIC SURGERY

## 2021-03-10 PROCEDURE — 99999 PR PBB SHADOW E&M-EST. PATIENT-LVL III: ICD-10-PCS | Mod: PBBFAC,,, | Performed by: ORTHOPAEDIC SURGERY

## 2021-03-10 PROCEDURE — 99999 PR PBB SHADOW E&M-EST. PATIENT-LVL III: CPT | Mod: PBBFAC,,, | Performed by: ORTHOPAEDIC SURGERY

## 2021-03-10 PROCEDURE — 99203 OFFICE O/P NEW LOW 30 MIN: CPT | Mod: S$GLB,,, | Performed by: ORTHOPAEDIC SURGERY

## 2021-03-11 ENCOUNTER — PATIENT MESSAGE (OUTPATIENT)
Dept: FAMILY MEDICINE | Facility: CLINIC | Age: 66
End: 2021-03-11

## 2021-03-11 ENCOUNTER — PATIENT MESSAGE (OUTPATIENT)
Dept: ORTHOPEDICS | Facility: CLINIC | Age: 66
End: 2021-03-11

## 2021-04-09 ENCOUNTER — PATIENT MESSAGE (OUTPATIENT)
Dept: FAMILY MEDICINE | Facility: CLINIC | Age: 66
End: 2021-04-09

## 2021-04-21 ENCOUNTER — CLINICAL SUPPORT (OUTPATIENT)
Dept: REHABILITATION | Facility: HOSPITAL | Age: 66
End: 2021-04-21
Payer: COMMERCIAL

## 2021-04-21 ENCOUNTER — OFFICE VISIT (OUTPATIENT)
Dept: ORTHOPEDICS | Facility: CLINIC | Age: 66
End: 2021-04-21
Payer: COMMERCIAL

## 2021-04-21 VITALS
HEIGHT: 70 IN | SYSTOLIC BLOOD PRESSURE: 123 MMHG | WEIGHT: 243 LBS | DIASTOLIC BLOOD PRESSURE: 77 MMHG | BODY MASS INDEX: 34.79 KG/M2 | HEART RATE: 67 BPM

## 2021-04-21 DIAGNOSIS — M72.0 DUPUYTREN'S CONTRACTURE OF HAND: ICD-10-CM

## 2021-04-21 DIAGNOSIS — M72.0 DUPUYTREN'S CONTRACTURE OF HAND: Primary | ICD-10-CM

## 2021-04-21 DIAGNOSIS — M24.541 CONTRACTURE OF JOINT OF FINGER, RIGHT: Primary | ICD-10-CM

## 2021-04-21 PROCEDURE — 99999 PR PBB SHADOW E&M-EST. PATIENT-LVL IV: ICD-10-PCS | Mod: PBBFAC,,, | Performed by: ORTHOPAEDIC SURGERY

## 2021-04-21 PROCEDURE — 99213 OFFICE O/P EST LOW 20 MIN: CPT | Mod: 57,S$GLB,, | Performed by: ORTHOPAEDIC SURGERY

## 2021-04-21 PROCEDURE — 26040 PR RELEASE PALM CONTRACT,PERCUTANEOUS: ICD-10-PCS | Mod: F4,S$GLB,, | Performed by: ORTHOPAEDIC SURGERY

## 2021-04-21 PROCEDURE — 26040 RELEASE PALM CONTRACTURE: CPT | Mod: F4,S$GLB,, | Performed by: ORTHOPAEDIC SURGERY

## 2021-04-21 PROCEDURE — 99213 PR OFFICE/OUTPT VISIT, EST, LEVL III, 20-29 MIN: ICD-10-PCS | Mod: 57,S$GLB,, | Performed by: ORTHOPAEDIC SURGERY

## 2021-04-21 PROCEDURE — 97110 THERAPEUTIC EXERCISES: CPT | Mod: PO

## 2021-04-21 PROCEDURE — 99999 PR PBB SHADOW E&M-EST. PATIENT-LVL IV: CPT | Mod: PBBFAC,,, | Performed by: ORTHOPAEDIC SURGERY

## 2021-04-21 PROCEDURE — L3913 HFO W/O JOINTS CF: HCPCS | Mod: PO

## 2021-04-21 RX ORDER — LIDOCAINE HYDROCHLORIDE 10 MG/ML
1 INJECTION, SOLUTION EPIDURAL; INFILTRATION; INTRACAUDAL; PERINEURAL
Status: COMPLETED | OUTPATIENT
Start: 2021-04-21 | End: 2021-04-21

## 2021-04-21 RX ADMIN — LIDOCAINE HYDROCHLORIDE 10 MG: 10 INJECTION, SOLUTION EPIDURAL; INFILTRATION; INTRACAUDAL; PERINEURAL at 08:04

## 2021-05-05 ENCOUNTER — OFFICE VISIT (OUTPATIENT)
Dept: ORTHOPEDICS | Facility: CLINIC | Age: 66
End: 2021-05-05
Payer: COMMERCIAL

## 2021-05-05 VITALS
WEIGHT: 243 LBS | BODY MASS INDEX: 34.79 KG/M2 | SYSTOLIC BLOOD PRESSURE: 140 MMHG | DIASTOLIC BLOOD PRESSURE: 78 MMHG | HEART RATE: 95 BPM | HEIGHT: 70 IN

## 2021-05-05 DIAGNOSIS — M24.541 CONTRACTURE OF JOINT OF FINGER, RIGHT: Primary | ICD-10-CM

## 2021-05-05 PROCEDURE — 99213 OFFICE O/P EST LOW 20 MIN: CPT | Mod: S$GLB,,, | Performed by: ORTHOPAEDIC SURGERY

## 2021-05-05 PROCEDURE — 99999 PR PBB SHADOW E&M-EST. PATIENT-LVL III: CPT | Mod: PBBFAC,,, | Performed by: ORTHOPAEDIC SURGERY

## 2021-05-05 PROCEDURE — 99213 PR OFFICE/OUTPT VISIT, EST, LEVL III, 20-29 MIN: ICD-10-PCS | Mod: S$GLB,,, | Performed by: ORTHOPAEDIC SURGERY

## 2021-05-05 PROCEDURE — 99999 PR PBB SHADOW E&M-EST. PATIENT-LVL III: ICD-10-PCS | Mod: PBBFAC,,, | Performed by: ORTHOPAEDIC SURGERY

## 2021-06-08 ENCOUNTER — PROCEDURE VISIT (OUTPATIENT)
Dept: UROLOGY | Facility: CLINIC | Age: 66
End: 2021-06-08
Payer: COMMERCIAL

## 2021-06-08 VITALS — BODY MASS INDEX: 34.78 KG/M2 | WEIGHT: 242.94 LBS | HEIGHT: 70 IN

## 2021-06-08 DIAGNOSIS — N32.89 BLADDER MASS: ICD-10-CM

## 2021-06-08 PROCEDURE — 52000 PR CYSTOURETHROSCOPY: ICD-10-PCS | Mod: S$GLB,,, | Performed by: UROLOGY

## 2021-06-08 PROCEDURE — 52000 CYSTOURETHROSCOPY: CPT | Mod: S$GLB,,, | Performed by: UROLOGY

## 2021-06-08 RX ORDER — METHOCARBAMOL 500 MG/1
500 TABLET, FILM COATED ORAL DAILY PRN
COMMUNITY
Start: 2021-04-06

## 2021-11-13 DIAGNOSIS — F32.A DEPRESSION, UNSPECIFIED DEPRESSION TYPE: ICD-10-CM

## 2021-11-15 ENCOUNTER — TELEPHONE (OUTPATIENT)
Dept: UROLOGY | Facility: CLINIC | Age: 66
End: 2021-11-15
Payer: COMMERCIAL

## 2021-11-15 RX ORDER — ESCITALOPRAM OXALATE 10 MG/1
10 TABLET ORAL DAILY
Qty: 90 TABLET | Refills: 0 | Status: SHIPPED | OUTPATIENT
Start: 2021-11-15 | End: 2022-02-08

## 2021-11-26 ENCOUNTER — OFFICE VISIT (OUTPATIENT)
Dept: FAMILY MEDICINE | Facility: CLINIC | Age: 66
End: 2021-11-26
Payer: COMMERCIAL

## 2021-11-26 ENCOUNTER — LAB VISIT (OUTPATIENT)
Dept: LAB | Facility: HOSPITAL | Age: 66
End: 2021-11-26
Attending: FAMILY MEDICINE
Payer: COMMERCIAL

## 2021-11-26 VITALS
OXYGEN SATURATION: 96 % | BODY MASS INDEX: 35.24 KG/M2 | HEART RATE: 70 BPM | SYSTOLIC BLOOD PRESSURE: 130 MMHG | DIASTOLIC BLOOD PRESSURE: 78 MMHG | TEMPERATURE: 98 F | WEIGHT: 245.56 LBS

## 2021-11-26 DIAGNOSIS — K21.9 GASTROESOPHAGEAL REFLUX DISEASE WITHOUT ESOPHAGITIS: ICD-10-CM

## 2021-11-26 DIAGNOSIS — G25.81 RLS (RESTLESS LEGS SYNDROME): ICD-10-CM

## 2021-11-26 DIAGNOSIS — Z00.00 PREVENTATIVE HEALTH CARE: Primary | ICD-10-CM

## 2021-11-26 DIAGNOSIS — Z00.00 PREVENTATIVE HEALTH CARE: ICD-10-CM

## 2021-11-26 DIAGNOSIS — C67.9 MALIGNANT NEOPLASM OF URINARY BLADDER, UNSPECIFIED SITE: ICD-10-CM

## 2021-11-26 DIAGNOSIS — I10 HYPERTENSION, UNSPECIFIED TYPE: ICD-10-CM

## 2021-11-26 PROBLEM — N32.89 BLADDER MASS: Status: RESOLVED | Noted: 2020-08-24 | Resolved: 2021-11-26

## 2021-11-26 LAB
ALBUMIN SERPL BCP-MCNC: 3.8 G/DL (ref 3.5–5.2)
ALP SERPL-CCNC: 117 U/L (ref 55–135)
ALT SERPL W/O P-5'-P-CCNC: 39 U/L (ref 10–44)
ANION GAP SERPL CALC-SCNC: 8 MMOL/L (ref 8–16)
AST SERPL-CCNC: 31 U/L (ref 10–40)
BASOPHILS # BLD AUTO: 0.09 K/UL (ref 0–0.2)
BASOPHILS NFR BLD: 1.1 % (ref 0–1.9)
BILIRUB SERPL-MCNC: 0.7 MG/DL (ref 0.1–1)
BUN SERPL-MCNC: 19 MG/DL (ref 8–23)
CALCIUM SERPL-MCNC: 9.8 MG/DL (ref 8.7–10.5)
CHLORIDE SERPL-SCNC: 99 MMOL/L (ref 95–110)
CO2 SERPL-SCNC: 26 MMOL/L (ref 23–29)
COMPLEXED PSA SERPL-MCNC: 0.19 NG/ML (ref 0–4)
CREAT SERPL-MCNC: 1.1 MG/DL (ref 0.5–1.4)
DIFFERENTIAL METHOD: ABNORMAL
EOSINOPHIL # BLD AUTO: 0.4 K/UL (ref 0–0.5)
EOSINOPHIL NFR BLD: 5.1 % (ref 0–8)
ERYTHROCYTE [DISTWIDTH] IN BLOOD BY AUTOMATED COUNT: 12.8 % (ref 11.5–14.5)
EST. GFR  (AFRICAN AMERICAN): >60 ML/MIN/1.73 M^2
EST. GFR  (NON AFRICAN AMERICAN): >60 ML/MIN/1.73 M^2
GLUCOSE SERPL-MCNC: 111 MG/DL (ref 70–110)
HCT VFR BLD AUTO: 41.7 % (ref 40–54)
HGB BLD-MCNC: 13.6 G/DL (ref 14–18)
IMM GRANULOCYTES # BLD AUTO: 0.04 K/UL (ref 0–0.04)
IMM GRANULOCYTES NFR BLD AUTO: 0.5 % (ref 0–0.5)
LYMPHOCYTES # BLD AUTO: 1.6 K/UL (ref 1–4.8)
LYMPHOCYTES NFR BLD: 19.2 % (ref 18–48)
MCH RBC QN AUTO: 31.6 PG (ref 27–31)
MCHC RBC AUTO-ENTMCNC: 32.6 G/DL (ref 32–36)
MCV RBC AUTO: 97 FL (ref 82–98)
MONOCYTES # BLD AUTO: 0.8 K/UL (ref 0.3–1)
MONOCYTES NFR BLD: 9.7 % (ref 4–15)
NEUTROPHILS # BLD AUTO: 5.3 K/UL (ref 1.8–7.7)
NEUTROPHILS NFR BLD: 64.4 % (ref 38–73)
NRBC BLD-RTO: 0 /100 WBC
PLATELET # BLD AUTO: 252 K/UL (ref 150–450)
PMV BLD AUTO: 11.3 FL (ref 9.2–12.9)
POTASSIUM SERPL-SCNC: 4.2 MMOL/L (ref 3.5–5.1)
PROT SERPL-MCNC: 6.9 G/DL (ref 6–8.4)
RBC # BLD AUTO: 4.31 M/UL (ref 4.6–6.2)
SODIUM SERPL-SCNC: 133 MMOL/L (ref 136–145)
WBC # BLD AUTO: 8.18 K/UL (ref 3.9–12.7)

## 2021-11-26 PROCEDURE — 99999 PR PBB SHADOW E&M-EST. PATIENT-LVL III: CPT | Mod: PBBFAC,,, | Performed by: FAMILY MEDICINE

## 2021-11-26 PROCEDURE — 85025 COMPLETE CBC W/AUTO DIFF WBC: CPT | Performed by: FAMILY MEDICINE

## 2021-11-26 PROCEDURE — 90471 IMMUNIZATION ADMIN: CPT | Mod: S$GLB,,, | Performed by: FAMILY MEDICINE

## 2021-11-26 PROCEDURE — 99396 PREV VISIT EST AGE 40-64: CPT | Mod: 25,S$GLB,, | Performed by: FAMILY MEDICINE

## 2021-11-26 PROCEDURE — 84153 ASSAY OF PSA TOTAL: CPT | Performed by: FAMILY MEDICINE

## 2021-11-26 PROCEDURE — 90471 FLU VACCINE (QUAD) GREATER THAN OR EQUAL TO 3YO PRESERVATIVE FREE IM: ICD-10-PCS | Mod: S$GLB,,, | Performed by: FAMILY MEDICINE

## 2021-11-26 PROCEDURE — 36415 COLL VENOUS BLD VENIPUNCTURE: CPT | Mod: PO | Performed by: FAMILY MEDICINE

## 2021-11-26 PROCEDURE — 90686 FLU VACCINE (QUAD) GREATER THAN OR EQUAL TO 3YO PRESERVATIVE FREE IM: ICD-10-PCS | Mod: S$GLB,,, | Performed by: FAMILY MEDICINE

## 2021-11-26 PROCEDURE — 80053 COMPREHEN METABOLIC PANEL: CPT | Performed by: FAMILY MEDICINE

## 2021-11-26 PROCEDURE — 90686 IIV4 VACC NO PRSV 0.5 ML IM: CPT | Mod: S$GLB,,, | Performed by: FAMILY MEDICINE

## 2021-11-26 PROCEDURE — 99999 PR PBB SHADOW E&M-EST. PATIENT-LVL III: ICD-10-PCS | Mod: PBBFAC,,, | Performed by: FAMILY MEDICINE

## 2021-11-26 PROCEDURE — 99396 PR PREVENTIVE VISIT,EST,40-64: ICD-10-PCS | Mod: 25,S$GLB,, | Performed by: FAMILY MEDICINE

## 2021-11-26 RX ORDER — OMEPRAZOLE 40 MG/1
40 CAPSULE, DELAYED RELEASE ORAL DAILY
Qty: 90 CAPSULE | Refills: 3 | Status: SHIPPED | OUTPATIENT
Start: 2021-11-26 | End: 2022-12-09

## 2021-11-26 RX ORDER — PRAMIPEXOLE DIHYDROCHLORIDE 1 MG/1
1 TABLET ORAL NIGHTLY
Qty: 90 TABLET | Refills: 3 | Status: SHIPPED | OUTPATIENT
Start: 2021-11-26 | End: 2022-12-07

## 2021-11-26 RX ORDER — TELMISARTAN AND HYDROCHLORTHIAZIDE 40; 12.5 MG/1; MG/1
1 TABLET ORAL DAILY
Qty: 90 TABLET | Refills: 3 | Status: SHIPPED | OUTPATIENT
Start: 2021-11-26 | End: 2022-06-20

## 2021-12-10 ENCOUNTER — IMMUNIZATION (OUTPATIENT)
Dept: FAMILY MEDICINE | Facility: CLINIC | Age: 66
End: 2021-12-10
Payer: COMMERCIAL

## 2021-12-10 DIAGNOSIS — Z23 NEED FOR VACCINATION: Primary | ICD-10-CM

## 2021-12-10 PROCEDURE — 0004A COVID-19, MRNA, LNP-S, PF, 30 MCG/0.3 ML DOSE VACCINE: CPT | Mod: PBBFAC | Performed by: FAMILY MEDICINE

## 2022-01-25 ENCOUNTER — PROCEDURE VISIT (OUTPATIENT)
Dept: UROLOGY | Facility: CLINIC | Age: 67
End: 2022-01-25
Payer: COMMERCIAL

## 2022-01-25 VITALS — HEIGHT: 70 IN | BODY MASS INDEX: 35.24 KG/M2

## 2022-01-25 DIAGNOSIS — R33.9 INCOMPLETE BLADDER EMPTYING: ICD-10-CM

## 2022-01-25 DIAGNOSIS — N40.1 BENIGN PROSTATIC HYPERPLASIA WITH WEAK URINARY STREAM: Primary | ICD-10-CM

## 2022-01-25 DIAGNOSIS — C67.2 MALIGNANT NEOPLASM OF LATERAL WALL OF URINARY BLADDER: ICD-10-CM

## 2022-01-25 DIAGNOSIS — N32.89 BLADDER MASS: ICD-10-CM

## 2022-01-25 DIAGNOSIS — R39.12 BENIGN PROSTATIC HYPERPLASIA WITH WEAK URINARY STREAM: Primary | ICD-10-CM

## 2022-01-25 LAB — POC RESIDUAL URINE VOLUME: 0 ML (ref 0–100)

## 2022-01-25 PROCEDURE — 52000 CYSTOURETHROSCOPY: CPT | Mod: S$GLB,,, | Performed by: UROLOGY

## 2022-01-25 PROCEDURE — 52000 PR CYSTOURETHROSCOPY: ICD-10-PCS | Mod: S$GLB,,, | Performed by: UROLOGY

## 2022-01-25 PROCEDURE — 51798 US URINE CAPACITY MEASURE: CPT | Mod: S$GLB,,, | Performed by: UROLOGY

## 2022-01-25 PROCEDURE — 51798 PR MEAS,POST-VOID RES,US,NON-IMAGING: ICD-10-PCS | Mod: S$GLB,,, | Performed by: UROLOGY

## 2022-01-25 NOTE — PROCEDURES
Procedures     UROLOGY [place]  1 25 22     Cc here for cysto     Age 66, last seen six months ago, when he was here for routine cystoscopy.     Comes in for another cystoscopic surveillance today.     Pt started with gross painless hematuria feb 2017, which cleared spontaneously. Plans were made for cystoscopy and other routine diagnostic studies but pt did not follow up because of lack of medical insurance coverage. In July 2020 presented with an abnormal bladder image on a CT scan done mostly for intestinal problems. Cystoscopy showed multiple bladder tumors.      On 8 24 20, had surgery at [facility], consisting of turbt of 4 cm R lateral wall tumor with involvement of trigone, and multiple small tumors on R lateral wall and posterior wall. Also had bilateral retrograde  pyelography (which was normal), and needed dilatation of multiple urethral strictures. A stent was left in the R ureter to help heal the ureteral orifice; stent was taken out 2 weeks later.     PATH REPORT 8 24 20--PAPILLARY UROTHELIAL CARCINOMA, LOW GRADE, WITH INVASION INTO LAMINA PROPRIA (SUBUROTHELIAL CONNECTIVE TISSUE).   --MUSCULARIS PROPRIA (DETRUSOR MUSCLE) IS PRESENT AND IS NOT INVOLVED BY NEOPLASM.        CYSTOSCOPY 1 25 22 [facility] flexible. Anterior urethra shows multiple successive stricture rings without need for dilatation. Posterior urethra 4 cm, with bilobar hypertrophy. Bladder cavity distends equally and symmetrically when filled with water. The trigone shows the R orifice to be lateralized and round, consistent with previous resection. L orifice is normallly positioned and shaped. There are flat areas of papillary growth at the bladder base anteriorly and on both lateral walls. These areas are not bleeding. There are no exophytic growths. No ulcerations are noted. Some whitish scar tissue areas reveal previous resections. There is mild trabeculation. Pt tolerated the procedure well.       IMPRESSION:   Bladder tumor, resected aug 2020, several flat recurrences today. This represents the first recurrence of his bladder tumor since originally resected 1.5 years ago.     BPH, on observation     Known renal cyst, no action needed.     Will need turbt, with use of mitomycin C in the OR.    Pt will then be a candidate of BCG, with 6 weekly instillations and possible subsequent maintenance therapy.

## 2022-02-03 ENCOUNTER — TELEPHONE (OUTPATIENT)
Dept: UROLOGY | Facility: CLINIC | Age: 67
End: 2022-02-03
Payer: COMMERCIAL

## 2022-02-03 DIAGNOSIS — C67.2 MALIGNANT NEOPLASM OF LATERAL WALL OF URINARY BLADDER: Primary | ICD-10-CM

## 2022-02-03 DIAGNOSIS — C67.9 BLADDER CANCER: ICD-10-CM

## 2022-02-07 ENCOUNTER — TELEPHONE (OUTPATIENT)
Dept: UROLOGY | Facility: CLINIC | Age: 67
End: 2022-02-07
Payer: COMMERCIAL

## 2022-02-07 NOTE — TELEPHONE ENCOUNTER
Spoke with patient, TURBT procedure rescheduled for 3/15/2022 per patient request. Pre op appointment changed to 3/11/2022 @810 am. Patient expressed understanding.

## 2022-02-07 NOTE — TELEPHONE ENCOUNTER
----- Message from Dinah Lemos sent at 2/7/2022  9:43 AM CST -----  Contact: Patient  Type:  Needs Medical Advice    Who Called:  Patient     Would the patient rather a call back or a response via [facility]?  Call    Best Call Back Number:  [phone] (home)     Additional Information:  Patient needs to reschedule his procedure for 03/08 to 03/15     Please call to advise

## 2022-03-17 ENCOUNTER — TELEPHONE (OUTPATIENT)
Dept: UROLOGY | Facility: CLINIC | Age: 67
End: 2022-03-17
Payer: COMMERCIAL

## 2022-03-17 NOTE — TELEPHONE ENCOUNTER
----- Message from Get Alston sent at 3/17/2022 12:28 PM CDT -----  Contact: Self  Type:  Sooner Apoointment Request    Caller is requesting a sooner appointment.  Caller declined first available appointment listed below.  Caller will not accept being placed on the waitlist and is requesting a message be sent to doctor.    Name of Caller:  Patient  When is the first available appointment?  4/8  Symptoms:  2wk follow up  Best Call Back Number:  [phone]   Additional Information:   Also wanted to note he passed clots yesterday, but has been urinating normally since.

## 2022-03-18 ENCOUNTER — TELEPHONE (OUTPATIENT)
Dept: UROLOGY | Facility: CLINIC | Age: 67
End: 2022-03-18
Payer: COMMERCIAL

## 2022-03-18 NOTE — TELEPHONE ENCOUNTER
Pathology report confirms recurrence of bladder cancer as expected.  Pathology is still doing special stains and has not yet given a final stage. I will discuss these results in his follow-up visit next week

## 2022-03-18 NOTE — TELEPHONE ENCOUNTER
Spoke with patient , advised him of pathology finding  as per Dr Lr , and to keep scheduled follow up with Dr Lr next week. Patient expressed understanding.

## 2022-03-18 NOTE — TELEPHONE ENCOUNTER
----- Message from Effie Hyatt sent at 3/18/2022 11:17 AM CDT -----  Regarding: advice  Contact: pt  Type: Needs Medical Advice    Who Called:  pt  Symptoms (please be specific):  n/a  How long has patient had these symptoms:  n/a  Pharmacy name and phone #:  n/a  Best Call Back Number: [phone]    Additional Information: please call to explain pathology report

## 2022-03-25 ENCOUNTER — TELEPHONE (OUTPATIENT)
Dept: UROLOGY | Facility: CLINIC | Age: 67
End: 2022-03-25
Payer: COMMERCIAL

## 2022-03-28 ENCOUNTER — OFFICE VISIT (OUTPATIENT)
Dept: UROLOGY | Facility: CLINIC | Age: 67
End: 2022-03-28
Payer: COMMERCIAL

## 2022-03-28 VITALS — WEIGHT: 243.19 LBS | BODY MASS INDEX: 34.82 KG/M2 | HEIGHT: 70 IN

## 2022-03-28 DIAGNOSIS — Z85.51 HISTORY OF BLADDER CANCER: Primary | ICD-10-CM

## 2022-03-28 PROCEDURE — 99999 PR PBB SHADOW E&M-EST. PATIENT-LVL III: CPT | Mod: PBBFAC,,, | Performed by: UROLOGY

## 2022-03-28 PROCEDURE — 99999 PR PBB SHADOW E&M-EST. PATIENT-LVL III: ICD-10-PCS | Mod: PBBFAC,,, | Performed by: UROLOGY

## 2022-03-28 PROCEDURE — 99213 PR OFFICE/OUTPT VISIT, EST, LEVL III, 20-29 MIN: ICD-10-PCS | Mod: S$GLB,,, | Performed by: UROLOGY

## 2022-03-28 PROCEDURE — 99213 OFFICE O/P EST LOW 20 MIN: CPT | Mod: S$GLB,,, | Performed by: UROLOGY

## 2022-03-28 NOTE — PROGRESS NOTES
Subjective:       Patient ID: Moustapha Murray is a 67 y.o. male.    Chief Complaint: Post-op Evaluation    HPI     67-year-old with a history of T1 urothelial cancer.  He underwent TURBT with instillation of mitomycin-C in 2020. He has a recurrence and underwent repeat TURBT 2 weeks ago.  He is overall doing well.  His repeat path again shows low-grade noninvasive urothelial carcinoma.  This was his 1st recurrence    Review of Systems   Constitutional: Negative for fever.   Genitourinary: Negative for dysuria and hematuria.       Objective:      Physical Exam  Vitals reviewed.   Constitutional:       Appearance: He is well-developed.   Pulmonary:      Effort: Pulmonary effort is normal.   Skin:     Findings: No rash.   Neurological:      Mental Status: He is alert and oriented to person, place, and time.         Assessment:       1. History of bladder cancer        Plan:       History of bladder cancer      plan induction course of BCG

## 2022-04-07 ENCOUNTER — TELEPHONE (OUTPATIENT)
Dept: UROLOGY | Facility: CLINIC | Age: 67
End: 2022-04-07
Payer: COMMERCIAL

## 2022-04-07 DIAGNOSIS — Z85.51 HISTORY OF BLADDER CANCER: Primary | ICD-10-CM

## 2022-04-07 DIAGNOSIS — C67.9 MALIGNANT NEOPLASM OF URINARY BLADDER, UNSPECIFIED SITE: ICD-10-CM

## 2022-04-07 NOTE — TELEPHONE ENCOUNTER
----- Message from Raya Cuello sent at 4/7/2022 11:11 AM CDT -----  Contact: patient  Type: Needs Medical Advice  Who Called:  patient   Symptoms (please be specific):    How long has patient had these symptoms:    Pharmacy name and phone #:    Best Call Back Number: [phone] (home)     Additional Information: requesting to discuss follow up treatment, states was to hear form the office to schedule.

## 2022-04-11 ENCOUNTER — PATIENT MESSAGE (OUTPATIENT)
Dept: PAIN MEDICINE | Facility: CLINIC | Age: 67
End: 2022-04-11
Payer: COMMERCIAL

## 2022-04-13 ENCOUNTER — PATIENT MESSAGE (OUTPATIENT)
Dept: PAIN MEDICINE | Facility: CLINIC | Age: 67
End: 2022-04-13
Payer: COMMERCIAL

## 2022-04-14 ENCOUNTER — PATIENT MESSAGE (OUTPATIENT)
Dept: GASTROENTEROLOGY | Facility: CLINIC | Age: 67
End: 2022-04-14
Payer: COMMERCIAL

## 2022-05-03 ENCOUNTER — PROCEDURE VISIT (OUTPATIENT)
Dept: UROLOGY | Facility: CLINIC | Age: 67
End: 2022-05-03
Payer: COMMERCIAL

## 2022-05-03 VITALS — TEMPERATURE: 98 F

## 2022-05-03 DIAGNOSIS — Z85.51 HISTORY OF BLADDER CANCER: Primary | ICD-10-CM

## 2022-05-03 LAB
BILIRUB SERPL-MCNC: ABNORMAL MG/DL
BLOOD URINE, POC: ABNORMAL
CLARITY, POC UA: CLEAR
COLOR, POC UA: YELLOW
GLUCOSE UR QL STRIP: ABNORMAL
KETONES UR QL STRIP: ABNORMAL
LEUKOCYTE ESTERASE URINE, POC: ABNORMAL
NITRITE, POC UA: ABNORMAL
PH, POC UA: 7
PROTEIN, POC: ABNORMAL
SPECIFIC GRAVITY, POC UA: 1.02
UROBILINOGEN, POC UA: 0.2

## 2022-05-03 PROCEDURE — 81002 URINALYSIS NONAUTO W/O SCOPE: CPT | Mod: S$GLB,,, | Performed by: UROLOGY

## 2022-05-03 PROCEDURE — 51720 PR INSTILL, ANTICANCER AGENT, BLADDER: ICD-10-PCS | Mod: S$GLB,,, | Performed by: UROLOGY

## 2022-05-03 PROCEDURE — 51720 TREATMENT OF BLADDER LESION: CPT | Mod: S$GLB,,, | Performed by: UROLOGY

## 2022-05-03 PROCEDURE — 81002 POCT URINE DIPSTICK WITHOUT MICROSCOPE: ICD-10-PCS | Mod: S$GLB,,, | Performed by: UROLOGY

## 2022-05-03 NOTE — PROGRESS NOTES
The patient was placed on exam table in supine position, draped appropriately and catheterized sing sterile technique. Bladder emptied, BCG solution was instilled via catheter and 60 ml syringe. Catheter was then removed and patient was able to sit up. Patient tolerated procedure well.

## 2022-05-09 ENCOUNTER — PATIENT MESSAGE (OUTPATIENT)
Dept: SMOKING CESSATION | Facility: CLINIC | Age: 67
End: 2022-05-09
Payer: COMMERCIAL

## 2022-05-11 ENCOUNTER — PROCEDURE VISIT (OUTPATIENT)
Dept: UROLOGY | Facility: CLINIC | Age: 67
End: 2022-05-11
Payer: COMMERCIAL

## 2022-05-11 VITALS — TEMPERATURE: 98 F

## 2022-05-11 DIAGNOSIS — Z85.51 HISTORY OF BLADDER CANCER: Primary | ICD-10-CM

## 2022-05-11 LAB
BILIRUB SERPL-MCNC: ABNORMAL MG/DL
BLOOD URINE, POC: ABNORMAL
CLARITY, POC UA: CLEAR
COLOR, POC UA: YELLOW
GLUCOSE UR QL STRIP: ABNORMAL
KETONES UR QL STRIP: ABNORMAL
LEUKOCYTE ESTERASE URINE, POC: ABNORMAL
NITRITE, POC UA: ABNORMAL
PH, POC UA: 7.5
PROTEIN, POC: ABNORMAL
SPECIFIC GRAVITY, POC UA: 1.02
UROBILINOGEN, POC UA: 1

## 2022-05-11 PROCEDURE — 51720 PR INSTILL, ANTICANCER AGENT, BLADDER: ICD-10-PCS | Mod: S$GLB,,, | Performed by: UROLOGY

## 2022-05-11 PROCEDURE — 81002 POCT URINE DIPSTICK WITHOUT MICROSCOPE: ICD-10-PCS | Mod: S$GLB,,, | Performed by: UROLOGY

## 2022-05-11 PROCEDURE — 51720 TREATMENT OF BLADDER LESION: CPT | Mod: S$GLB,,, | Performed by: UROLOGY

## 2022-05-11 PROCEDURE — 81002 URINALYSIS NONAUTO W/O SCOPE: CPT | Mod: S$GLB,,, | Performed by: UROLOGY

## 2022-05-11 NOTE — PROGRESS NOTES
Patient was placed on table in supine position, draped appropriately and catheterized using sterile technique. Bladder was drained, BCG solution was instilled via catheter and 60ml syringe. Catheter was then removed and patient was able to sit up.  Patient tolerated procedure well.

## 2022-05-17 ENCOUNTER — PROCEDURE VISIT (OUTPATIENT)
Dept: UROLOGY | Facility: CLINIC | Age: 67
End: 2022-05-17
Payer: COMMERCIAL

## 2022-05-17 DIAGNOSIS — Z85.51 HISTORY OF BLADDER CANCER: Primary | ICD-10-CM

## 2022-05-17 LAB
BILIRUB SERPL-MCNC: ABNORMAL MG/DL
BLOOD URINE, POC: ABNORMAL
CLARITY, POC UA: CLEAR
COLOR, POC UA: YELLOW
GLUCOSE UR QL STRIP: ABNORMAL
KETONES UR QL STRIP: ABNORMAL
LEUKOCYTE ESTERASE URINE, POC: ABNORMAL
NITRITE, POC UA: ABNORMAL
PH, POC UA: 6
PROTEIN, POC: ABNORMAL
SPECIFIC GRAVITY, POC UA: 1.02
UROBILINOGEN, POC UA: 0.2

## 2022-05-17 PROCEDURE — 81002 URINALYSIS NONAUTO W/O SCOPE: CPT | Mod: S$GLB,,, | Performed by: UROLOGY

## 2022-05-17 PROCEDURE — 51720 PR INSTILL, ANTICANCER AGENT, BLADDER: ICD-10-PCS | Mod: S$GLB,,, | Performed by: UROLOGY

## 2022-05-17 PROCEDURE — 51720 TREATMENT OF BLADDER LESION: CPT | Mod: S$GLB,,, | Performed by: UROLOGY

## 2022-05-17 PROCEDURE — 81002 POCT URINE DIPSTICK WITHOUT MICROSCOPE: ICD-10-PCS | Mod: S$GLB,,, | Performed by: UROLOGY

## 2022-05-24 ENCOUNTER — PROCEDURE VISIT (OUTPATIENT)
Dept: UROLOGY | Facility: CLINIC | Age: 67
End: 2022-05-24
Payer: COMMERCIAL

## 2022-05-24 VITALS — TEMPERATURE: 98 F

## 2022-05-24 DIAGNOSIS — C67.2 MALIGNANT NEOPLASM OF LATERAL WALL OF URINARY BLADDER: Primary | ICD-10-CM

## 2022-05-24 PROCEDURE — 51720 PR INSTILL, ANTICANCER AGENT, BLADDER: ICD-10-PCS | Mod: S$GLB,,, | Performed by: UROLOGY

## 2022-05-24 PROCEDURE — 51720 TREATMENT OF BLADDER LESION: CPT | Mod: S$GLB,,, | Performed by: UROLOGY

## 2022-05-31 ENCOUNTER — PROCEDURE VISIT (OUTPATIENT)
Dept: UROLOGY | Facility: CLINIC | Age: 67
End: 2022-05-31
Payer: COMMERCIAL

## 2022-05-31 VITALS — HEART RATE: 98 BPM

## 2022-05-31 DIAGNOSIS — C67.2 MALIGNANT NEOPLASM OF LATERAL WALL OF URINARY BLADDER: Primary | ICD-10-CM

## 2022-05-31 LAB
BILIRUB SERPL-MCNC: NORMAL MG/DL
BLOOD URINE, POC: NORMAL
CLARITY, POC UA: CLEAR
COLOR, POC UA: YELLOW
GLUCOSE UR QL STRIP: NORMAL
KETONES UR QL STRIP: NORMAL
LEUKOCYTE ESTERASE URINE, POC: NORMAL
NITRITE, POC UA: NORMAL
PH, POC UA: 5.5
PROTEIN, POC: NORMAL
SPECIFIC GRAVITY, POC UA: 1.02
UROBILINOGEN, POC UA: 0.2

## 2022-05-31 PROCEDURE — 81002 POCT URINE DIPSTICK WITHOUT MICROSCOPE: ICD-10-PCS | Mod: S$GLB,,, | Performed by: UROLOGY

## 2022-05-31 PROCEDURE — 51720 TREATMENT OF BLADDER LESION: CPT | Mod: S$GLB,,, | Performed by: UROLOGY

## 2022-05-31 PROCEDURE — 51720 PR INSTILL, ANTICANCER AGENT, BLADDER: ICD-10-PCS | Mod: S$GLB,,, | Performed by: UROLOGY

## 2022-05-31 PROCEDURE — 81002 URINALYSIS NONAUTO W/O SCOPE: CPT | Mod: S$GLB,,, | Performed by: UROLOGY

## 2022-06-07 ENCOUNTER — PROCEDURE VISIT (OUTPATIENT)
Dept: UROLOGY | Facility: CLINIC | Age: 67
End: 2022-06-07
Payer: COMMERCIAL

## 2022-06-07 VITALS — TEMPERATURE: 98 F

## 2022-06-07 DIAGNOSIS — C67.2 MALIGNANT NEOPLASM OF LATERAL WALL OF URINARY BLADDER: Primary | ICD-10-CM

## 2022-06-07 LAB
BILIRUB SERPL-MCNC: NORMAL MG/DL
BLOOD URINE, POC: NORMAL
CLARITY, POC UA: CLEAR
COLOR, POC UA: YELLOW
GLUCOSE UR QL STRIP: NORMAL
KETONES UR QL STRIP: NORMAL
LEUKOCYTE ESTERASE URINE, POC: NORMAL
NITRITE, POC UA: NORMAL
PH, POC UA: 6
PROTEIN, POC: NORMAL
SPECIFIC GRAVITY, POC UA: 1.02
UROBILINOGEN, POC UA: 0.2

## 2022-06-07 PROCEDURE — 81002 POCT URINE DIPSTICK WITHOUT MICROSCOPE: ICD-10-PCS | Mod: S$GLB,,, | Performed by: UROLOGY

## 2022-06-07 PROCEDURE — 51720 PR INSTILL, ANTICANCER AGENT, BLADDER: ICD-10-PCS | Mod: S$GLB,,, | Performed by: UROLOGY

## 2022-06-07 PROCEDURE — 51720 TREATMENT OF BLADDER LESION: CPT | Mod: S$GLB,,, | Performed by: UROLOGY

## 2022-06-07 PROCEDURE — 81002 URINALYSIS NONAUTO W/O SCOPE: CPT | Mod: S$GLB,,, | Performed by: UROLOGY

## 2022-06-20 ENCOUNTER — PATIENT MESSAGE (OUTPATIENT)
Dept: FAMILY MEDICINE | Facility: CLINIC | Age: 67
End: 2022-06-20
Payer: COMMERCIAL

## 2022-06-20 DIAGNOSIS — I10 HYPERTENSION, UNSPECIFIED TYPE: Primary | ICD-10-CM

## 2022-06-20 RX ORDER — HYDROCHLOROTHIAZIDE 12.5 MG/1
12.5 TABLET ORAL DAILY
Qty: 90 TABLET | Refills: 3 | Status: SHIPPED | OUTPATIENT
Start: 2022-06-20 | End: 2023-07-03

## 2022-06-20 RX ORDER — TELMISARTAN 40 MG/1
40 TABLET ORAL DAILY
Qty: 90 TABLET | Refills: 3 | Status: SHIPPED | OUTPATIENT
Start: 2022-06-20 | End: 2023-04-23

## 2022-08-31 ENCOUNTER — PATIENT MESSAGE (OUTPATIENT)
Dept: UROLOGY | Facility: CLINIC | Age: 67
End: 2022-08-31
Payer: COMMERCIAL

## 2022-10-13 ENCOUNTER — PROCEDURE VISIT (OUTPATIENT)
Dept: UROLOGY | Facility: CLINIC | Age: 67
End: 2022-10-13
Payer: MEDICARE

## 2022-10-13 VITALS — BODY MASS INDEX: 34.36 KG/M2 | HEIGHT: 70 IN | WEIGHT: 240 LBS

## 2022-10-13 DIAGNOSIS — C67.2 MALIGNANT NEOPLASM OF LATERAL WALL OF URINARY BLADDER: ICD-10-CM

## 2022-10-13 LAB
BILIRUB SERPL-MCNC: NORMAL MG/DL
BLOOD URINE, POC: NORMAL
CLARITY, POC UA: CLEAR
COLOR, POC UA: YELLOW
GLUCOSE UR QL STRIP: NORMAL
KETONES UR QL STRIP: NORMAL
LEUKOCYTE ESTERASE URINE, POC: NORMAL
NITRITE, POC UA: NORMAL
PH, POC UA: 7
PROTEIN, POC: NORMAL
SPECIFIC GRAVITY, POC UA: 1.01
UROBILINOGEN, POC UA: 0.2

## 2022-10-13 PROCEDURE — 52000 CYSTOSCOPY: ICD-10-PCS | Mod: S$PBB,,, | Performed by: UROLOGY

## 2022-10-13 PROCEDURE — 81002 URINALYSIS NONAUTO W/O SCOPE: CPT | Mod: PBBFAC,PO | Performed by: UROLOGY

## 2022-10-13 PROCEDURE — 52000 CYSTOURETHROSCOPY: CPT | Mod: PBBFAC,PO | Performed by: UROLOGY

## 2022-10-13 NOTE — PROCEDURES
Cystoscopy    Date/Time: 10/13/2022 2:30 PM  Performed by: ROSA Lr MD  Authorized by: ROSA Lr MD     Consent Done?:  Yes (Written)  Timeout: prior to procedure the correct patient, procedure, and site was verified    Prep: patient was prepped and draped in usual sterile fashion    Anesthesia:  Lidocaine jelly  Indications: history bladder cancer    Position:  Supine  Anesthesia:  Lidocaine jelly  Patient sedated?: No    Preparation: Patient was prepped and draped in usual sterile fashion    Scope type:  Flexible cystoscope   patient tolerated the procedure well with no immediate complications    Blood Loss:  None    67-year-old with a history of T1 urothelial cancer.  He underwent TURBT with instillation of mitomycin-C in 2020. He has a recurrence and underwent repeat TURBT 10/2022.  Pepeat path again shows low-grade noninvasive urothelial carcinoma.  This was his 1st recurrence.  He now has completed an induction course of BCG.  He is here for cystoscopy      The flexible cystoscope was placed into the urethra and carefully advanced into the bladder.  A careful cystoscopic exam was then performed.  The entire bladder mucosa was systematically visualized.  Findings include moderate bladder wall trabeculation.  There were no lesions, masses foreign bodies or stones.   TUR scar was evident on the right lateral wall.  The right ureteral orifice was deviated laterally.  Both ureteral orifices were visualized and both had clear efflux of urine.  On retroflexion there was a small intravesical gland.  The cystoscope was then removed and I examined the entire length of the urethra.  There was moderate bilobar enlargement of the prostate.  In the anterior urethra there was multiple areas of narrowing which were not thought to be clinically significant.  He tolerated the procedure well.  There were no complications.    Impression:  No evidence of recurrence    Plan:  Follow-up 6 months for cystoscopy

## 2022-10-31 ENCOUNTER — HOSPITAL ENCOUNTER (OUTPATIENT)
Dept: RADIOLOGY | Facility: HOSPITAL | Age: 67
Discharge: HOME | End: 2022-10-31
Attending: ORTHOPAEDIC SURGERY
Payer: MEDICARE

## 2022-10-31 ENCOUNTER — OFFICE VISIT (OUTPATIENT)
Dept: ORTHOPEDICS | Facility: CLINIC | Age: 67
End: 2022-10-31
Payer: MEDICARE

## 2022-10-31 DIAGNOSIS — M25.531 PAIN IN RIGHT WRIST: Primary | ICD-10-CM

## 2022-10-31 DIAGNOSIS — M92.211 OSTEOCHONDROSIS OF LUNATE OF RIGHT WRIST: ICD-10-CM

## 2022-10-31 DIAGNOSIS — M25.531 PAIN IN RIGHT WRIST: ICD-10-CM

## 2022-10-31 PROCEDURE — 99214 OFFICE O/P EST MOD 30 MIN: CPT | Mod: S$PBB,,, | Performed by: ORTHOPAEDIC SURGERY

## 2022-10-31 PROCEDURE — 73110 X-RAY EXAM OF WRIST: CPT | Mod: 26,RT,, | Performed by: RADIOLOGY

## 2022-10-31 PROCEDURE — 99999 PR PBB SHADOW E&M-EST. PATIENT-LVL III: CPT | Mod: PBBFAC,,, | Performed by: ORTHOPAEDIC SURGERY

## 2022-10-31 PROCEDURE — 99999 PR PBB SHADOW E&M-EST. PATIENT-LVL III: ICD-10-PCS | Mod: PBBFAC,,, | Performed by: ORTHOPAEDIC SURGERY

## 2022-10-31 PROCEDURE — 73110 X-RAY EXAM OF WRIST: CPT | Mod: TC,PO,RT

## 2022-10-31 PROCEDURE — 73110 XR WRIST COMPLETE 3 VIEWS RIGHT: ICD-10-PCS | Mod: 26,RT,, | Performed by: RADIOLOGY

## 2022-10-31 PROCEDURE — 99214 PR OFFICE/OUTPT VISIT, EST, LEVL IV, 30-39 MIN: ICD-10-PCS | Mod: S$PBB,,, | Performed by: ORTHOPAEDIC SURGERY

## 2022-10-31 PROCEDURE — 99213 OFFICE O/P EST LOW 20 MIN: CPT | Mod: PBBFAC,PN | Performed by: ORTHOPAEDIC SURGERY

## 2022-10-31 NOTE — PROGRESS NOTES
Mr Murray returns to clinic today.  Has a history of Dupuytren's contracture to his right small finger.  He states that his contracture has returned.  He did have a needle procedure which he felt worked well in the past.  He is also complaining about pain and stiffness to his right wrist.      Physical exam:  Examination of the right hand and wrist reveals that there is obvious Dupuytren's contracture of the right hand and small finger.  There is a thickened cord noted.  There is a 60 degree contracture of the MCP joint.  Palpation about the hand and wrist does produce tenderness directly over the carpus.  There is tenderness dorsally over the radial carpal joint.  Wrist range of motion is limited with extension of 60° and flexion of 50°.  He is neurovascularly intact     Radiology:  X-rays of the right wrist were taken in clinic today.  The films reviewed by me.  There is noted to be what appears to be avascular necrosis of the lunate.  There is some collapse noted.  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 Will send him for an MRI of the right wrist to assess for the avascular necrosis as well as to assess the shape of the lunate for further treatment options    2.  Will hold off on treatment of the Dupuytren's    3. Will follow up with me as soon as the MRI is completed

## 2022-11-07 ENCOUNTER — OFFICE VISIT (OUTPATIENT)
Dept: ORTHOPEDICS | Facility: CLINIC | Age: 67
End: 2022-11-07
Payer: MEDICARE

## 2022-11-07 VITALS — WEIGHT: 240 LBS | BODY MASS INDEX: 34.36 KG/M2 | HEIGHT: 70 IN

## 2022-11-07 DIAGNOSIS — M92.219 OSTEOCHONDROSIS OF LUNATE, UNSPECIFIED LATERALITY: Primary | ICD-10-CM

## 2022-11-07 PROCEDURE — 99999 PR PBB SHADOW E&M-EST. PATIENT-LVL III: CPT | Mod: PBBFAC,,, | Performed by: ORTHOPAEDIC SURGERY

## 2022-11-07 PROCEDURE — 99214 OFFICE O/P EST MOD 30 MIN: CPT | Mod: S$PBB,,, | Performed by: ORTHOPAEDIC SURGERY

## 2022-11-07 PROCEDURE — 99214 PR OFFICE/OUTPT VISIT, EST, LEVL IV, 30-39 MIN: ICD-10-PCS | Mod: S$PBB,,, | Performed by: ORTHOPAEDIC SURGERY

## 2022-11-07 PROCEDURE — 99213 OFFICE O/P EST LOW 20 MIN: CPT | Mod: PBBFAC,PN | Performed by: ORTHOPAEDIC SURGERY

## 2022-11-07 PROCEDURE — 99999 PR PBB SHADOW E&M-EST. PATIENT-LVL III: ICD-10-PCS | Mod: PBBFAC,,, | Performed by: ORTHOPAEDIC SURGERY

## 2022-11-07 NOTE — PROGRESS NOTES
Mr Murray returns to clinic today.  Has a history of possible avascular necrosis of the lunate.  He was sent for an MRI at his last visit.  He is here today for follow-up.  He also has contracture which is related to Dupuytren's disease of the right hand as well.      Physical exam:  Examination of the right hand and wrist reveals that he has an obvious Dupuytren's contracture in the palm of the hand.  He has no other skin changes noted.  Palpation does produce mild tenderness over the elbow.  Range of motion of the wrist is limited with extension of 40-50 degrees and flexion of 40°.  He is able to pronate and supinate.  He is neurovascularly intact over the hand and wrist     Radiology:  MRI of the right wrist has been reviewed.  There is noted to be avascular necrosis of the lunate.  There is significant cystic change within the lunate as well as some early fragmentation.  This would be consistent with stage II     X-rays of the right wrist were also reviewed.  The patient is noted to have neutral ulnar variance.    Assessment:  Right hand Dupuytren contracture, right wrist Kienbock's disease    Plan:    1. I have discussed treatment options with the patient.  I have discussed the possibility of decompression of the lunate with either STT fusion or revascularization for the lunate itself.  At this point the patient is unsure as if 2 would like to undergo that surgery.  I have discussed the possibility of monitoring it until the wound a begins the collapse and at that point performing a proximal row carpectomy.  The patient will weigh his options and will get back with me after he makes a decision about treatment    2. Will follow up on a p.r.n. basis

## 2022-11-08 ENCOUNTER — PATIENT MESSAGE (OUTPATIENT)
Dept: ORTHOPEDICS | Facility: CLINIC | Age: 67
End: 2022-11-08
Payer: MEDICARE

## 2022-11-08 PROBLEM — M92.219: Status: ACTIVE | Noted: 2022-11-08

## 2022-11-10 ENCOUNTER — PATIENT MESSAGE (OUTPATIENT)
Dept: ORTHOPEDICS | Facility: CLINIC | Age: 67
End: 2022-11-10
Payer: MEDICARE

## 2022-12-05 DIAGNOSIS — M25.531 PAIN IN RIGHT WRIST: Primary | ICD-10-CM

## 2022-12-07 ENCOUNTER — HOSPITAL ENCOUNTER (OUTPATIENT)
Dept: RADIOLOGY | Facility: HOSPITAL | Age: 67
Discharge: HOME | End: 2022-12-07
Attending: ORTHOPAEDIC SURGERY
Payer: MEDICARE

## 2022-12-07 ENCOUNTER — OFFICE VISIT (OUTPATIENT)
Dept: ORTHOPEDICS | Facility: CLINIC | Age: 67
End: 2022-12-07
Payer: MEDICARE

## 2022-12-07 VITALS — RESPIRATION RATE: 18 BRPM | WEIGHT: 240 LBS | BODY MASS INDEX: 34.36 KG/M2 | HEIGHT: 70 IN

## 2022-12-07 DIAGNOSIS — M25.519 SHOULDER PAIN, UNSPECIFIED CHRONICITY, UNSPECIFIED LATERALITY: ICD-10-CM

## 2022-12-07 DIAGNOSIS — M25.519 SHOULDER PAIN, UNSPECIFIED CHRONICITY, UNSPECIFIED LATERALITY: Primary | ICD-10-CM

## 2022-12-07 DIAGNOSIS — M75.52 BURSITIS OF LEFT SHOULDER: Primary | ICD-10-CM

## 2022-12-07 DIAGNOSIS — M75.100 ROTATOR CUFF SYNDROME, UNSPECIFIED LATERALITY: ICD-10-CM

## 2022-12-07 PROCEDURE — 99213 PR OFFICE/OUTPT VISIT, EST, LEVL III, 20-29 MIN: ICD-10-PCS | Mod: 25,S$PBB,, | Performed by: ORTHOPAEDIC SURGERY

## 2022-12-07 PROCEDURE — 99213 OFFICE O/P EST LOW 20 MIN: CPT | Mod: 25,S$PBB,, | Performed by: ORTHOPAEDIC SURGERY

## 2022-12-07 PROCEDURE — 20610 DRAIN/INJ JOINT/BURSA W/O US: CPT | Mod: PBBFAC,PN,LT | Performed by: ORTHOPAEDIC SURGERY

## 2022-12-07 PROCEDURE — 99999 PR PBB SHADOW E&M-EST. PATIENT-LVL II: ICD-10-PCS | Mod: PBBFAC,,, | Performed by: ORTHOPAEDIC SURGERY

## 2022-12-07 PROCEDURE — 73030 X-RAY EXAM OF SHOULDER: CPT | Mod: TC,PO,LT

## 2022-12-07 PROCEDURE — 73030 XR SHOULDER TRAUMA 3 VIEW LEFT: ICD-10-PCS | Mod: 26,LT,, | Performed by: RADIOLOGY

## 2022-12-07 PROCEDURE — 99212 OFFICE O/P EST SF 10 MIN: CPT | Mod: PBBFAC,PN,25 | Performed by: ORTHOPAEDIC SURGERY

## 2022-12-07 PROCEDURE — 73030 X-RAY EXAM OF SHOULDER: CPT | Mod: 26,LT,, | Performed by: RADIOLOGY

## 2022-12-07 PROCEDURE — 99999 PR PBB SHADOW E&M-EST. PATIENT-LVL II: CPT | Mod: PBBFAC,,, | Performed by: ORTHOPAEDIC SURGERY

## 2022-12-07 PROCEDURE — 20610 LARGE JOINT ASPIRATION/INJECTION: L SUBACROMIAL BURSA: ICD-10-PCS | Mod: S$PBB,LT,, | Performed by: ORTHOPAEDIC SURGERY

## 2022-12-07 RX ORDER — TRIAMCINOLONE ACETONIDE 40 MG/ML
40 INJECTION, SUSPENSION INTRA-ARTICULAR; INTRAMUSCULAR
Status: DISCONTINUED | OUTPATIENT
Start: 2022-12-07 | End: 2022-12-07 | Stop reason: HOSPADM

## 2022-12-07 RX ADMIN — TRIAMCINOLONE ACETONIDE 40 MG: 40 INJECTION, SUSPENSION INTRA-ARTICULAR; INTRAMUSCULAR at 02:12

## 2022-12-07 NOTE — PROCEDURES
Large Joint Aspiration/Injection: L subacromial bursa    Date/Time: 12/7/2022 2:15 PM  Performed by: Bertrand Means MD  Authorized by: Bertrand Means MD     Consent Done?:  Yes (Verbal)  Indications:  Pain  Site marked: the procedure site was marked    Timeout: prior to procedure the correct patient, procedure, and site was verified    Local anesthetic:  Lidocaine 1% without epinephrine and bupivacaine 0.25% without epinephrine  Anesthetic total (ml):  6      Details:  Needle Size:  20 G  Ultrasonic Guidance for needle placement?: No    Approach:  Posterior  Location:  Shoulder  Site:  L subacromial bursa  Medications:  40 mg triamcinolone acetonide 40 mg/mL  Patient tolerance:  Patient tolerated the procedure well with no immediate complications

## 2022-12-07 NOTE — PROGRESS NOTES
Past Medical History:   Diagnosis Date    Bladder cancer 08/2020    Colon polyps     DDD (degenerative disc disease), lumbar     Depression     Fatty liver     GERD (gastroesophageal reflux disease)     H/O motion sickness     Hypertension     PONV (postoperative nausea and vomiting)     RLS (restless legs syndrome)        Past Surgical History:   Procedure Laterality Date    APPENDECTOMY      CARPAL TUNNEL RELEASE      CERVICAL FUSION      CHOLECYSTECTOMY      CIRCUMCISION      COLONOSCOPY N/A 3/16/2020    Procedure: COLONOSCOPY;  Surgeon: Braulio Best MD;  Location: [facility];  Service: Endoscopy;  Laterality: N/A; 3 colon polyps removed; biopsy: tubular adenoma and hyperplastic; repeat in 5 years for surveillance    CYSTOSCOPY W/ RETROGRADES Bilateral 8/24/2020    Procedure: CYSTOSCOPY, WITH RETROGRADE PYELOGRAM -WITH URETHRAL DILATION;  Surgeon: Greg Claudio MD;  Location: [facility] OR;  Service: Urology;  Laterality: Bilateral;    CYSTOSCOPY W/ URETERAL STENT PLACEMENT Right 8/24/2020    Procedure: CYSTOSCOPY, WITH URETERAL STENT INSERTION;  Surgeon: Greg Claudio MD;  Location: [facility] OR;  Service: Urology;  Laterality: Right;    INSTILLATION OF URINARY BLADDER N/A 8/24/2020    Procedure: INSTILLATION, BLADDER - Mitomycin;  Surgeon: Greg Claudio MD;  Location: [facility] OR;  Service: Urology;  Laterality: N/A;    TRANSFORAMINAL EPIDURAL INJECTION OF STEROID Right 7/17/2019    Procedure: Injection,steroid,epidural,transforaminal approach L3-4, L4-5;  Surgeon: Chris Palencia MD;  Location: [facility] OR;  Service: Pain Management;  Laterality: Right;    TRANSFORAMINAL EPIDURAL INJECTION OF STEROID Right 8/28/2019    Procedure: Injection,steroid,epidural,transforaminal approach;  Surgeon: Chris Palencia MD;  Location: [facility] OR;  Service: Pain Management;  Laterality: Right;  L3-4, L4-L5    WISDOM TOOTH EXTRACTION         Current Outpatient Medications   Medication Sig    acetaZOLAMIDE (DIAMOX) 250 MG tablet  Take 1 tablet (250 mg total) by mouth 2 (two) times daily.    EScitalopram oxalate (LEXAPRO) 10 MG tablet TAKE 1 TABLET BY MOUTH EVERY DAY    hydroCHLOROthiazide (HYDRODIURIL) 12.5 MG Tab Take 1 tablet (12.5 mg total) by mouth once daily.    Lactobac no.41/Bifidobact no.7 (PROBIOTIC-10 ORAL) Take by mouth once daily.     melatonin 5 mg Cap Take by mouth nightly as needed. TAKE AS SCHEDULED    methocarbamoL (ROBAXIN) 500 MG Tab 500 mg daily as needed.    multivitamin (THERAGRAN) per tablet Take 1 tablet by mouth once daily.     naproxen sodium (ANAPROX) 220 MG tablet Take 220 mg by mouth 2 (two) times daily as needed.     omeprazole (PRILOSEC) 40 MG capsule Take 1 capsule (40 mg total) by mouth once daily.    pramipexole (MIRAPEX) 1 MG tablet TAKE 1 TABLET BY MOUTH EVERY EVENING.    scopolamine (TRANSDERM-SCOP) 1.3-1.5 mg (1 mg over 3 days) Place 1 patch onto the skin every 72 hours.    telmisartan (MICARDIS) 40 MG Tab Take 1 tablet (40 mg total) by mouth once daily.    triamcinolone acetonide 0.1% (KENALOG) 0.1 % cream Apply topically 2 (two) times daily. (Patient taking differently: Apply topically 2 (two) times daily as needed.)     No current facility-administered medications for this visit.     Facility-Administered Medications Ordered in Other Visits   Medication    ondansetron injection 4 mg       Review of patient's allergies indicates:   Allergen Reactions    Sulfa (sulfonamide antibiotics) Anaphylaxis       Family History   Problem Relation Age of Onset    Stroke Mother     Ulcerative colitis Mother     Cancer Neg Hx     Colon cancer Neg Hx     Crohn's disease Neg Hx     Celiac disease Neg Hx        Social History     Socioeconomic History    Marital status:    Occupational History    Occupation: [employer]   Tobacco Use    Smoking status: Former     Packs/day: 1.50     Types: Cigarettes     Quit date: 10/16/2013     Years since quittin.1    Smokeless tobacco: Never   Substance and Sexual  Activity    Alcohol use: Yes     Alcohol/week: 2.0 standard drinks     Types: 2 Cans of beer per week    Drug use: Not Currently       Chief Complaint:   Chief Complaint   Patient presents with    Left Shoulder - Pain       History of present illness:  67-year-old left-hand dominant male seen for left shoulder pain that started back in June.  Slowly getting worse.  Limited range of motion.  No injury or trauma.  Patient is unable to lay on the left side.  Patient has a history of an unrepaired left distal biceps in that arm as well.  Also has a history of cervical spine surgery previously.  Pain is 6/10.      Answers submitted by the patient for this visit:  Orthopedics Questionnaire (Submitted on 12/5/2022)  unexpected weight change: No  appetite change : No  sleep disturbance: No  IMMUNOCOMPROMISED: No  nervous/ anxious: No  dysphoric mood: No  rash: No  visual disturbance: No  eye redness: No  eye pain: No  ear pain: No  tinnitus: No  hearing loss: No  sinus pressure : No  nosebleeds: No  enviro allergies: No  food allergies: No  cough: No  shortness of breath: No  sweating: No  frequency: No  difficulty urinating: No  hematuria: No  chest pain: No  palpitations: No  nausea: No  vomiting: No  diarrhea: No  blood in stool: No  constipation: No  headaches: No  dizziness: No  numbness: No  seizures: No  joint swelling: No  myalgia: No  weakness: No  back pain: No   (Submitted on 12/5/2022)  Chief Complaint: Arm pain  Pain Chronicity: chronic  History of trauma: No  Onset: more than 1 month ago  Frequency: intermittently  Progression since onset: rapidly worsening  Injury mechanism: reaching  injury location: at home  pain- numeric: 5/10  pain location: left shoulder  pain quality: aching  Radiating Pain: No  Aggravating factors: activity, bearing weight, rotation  fever: No  inability to bear weight: Yes  itching: No  joint locking: No  limited range of motion: Yes  stiffness: Yes  tingling: No  Treatments tried:  nothing  physical therapy: not tried  Improvement on treatment: no relief        Physical Examination:    Vital Signs:    Vitals:    12/07/22 1436   Resp: 18       Body mass index is 34.44 kg/m².    This a well-developed, well nourished patient in no acute distress.  They are alert and oriented and cooperative to examination.  Pt. walks without an antalgic gait.      Examination of the left shoulder shows no rashes or erythema. There are no masses, ecchymosis, or atrophy. The patient has full range of motion in forward flexion, external rotation, and internal rotation to the mid T-spine. The patient has positive Lopez Neer and Trenton's test.  Negative speed's test.  Moderately tender to palpation over a.c. joint. Normal stability anteriorly, posteriorly, and negative sulcus sign. Passive range of motion: Forward flexion of 180°, external rotation at 90° of 90°, internal rotation of 50°, and external rotation at 0° of 50°. 2+ radial pulse. Intact axillary, radial, median and ulnar sensation. 5 out of 5 resisted forward flexion, external rotation, and negative lift off test.      X-rays:  X-rays of the left ordered and reviewed which show severe AC arthritis     Assessment::  Left rotator cuff syndrome    Plan:  I reviewed the findings with him today.  We talked about treatment options.  Patient elected for subacromial injection.  Also gave him a Thera-Band and home exercise guide.  We talked about an MRI if not improving.    This note was created using ThinkSmart voice recognition software that occasionally misinterpreted phrases or words.    Consult note is delivered via Epic messaging service.

## 2022-12-08 NOTE — PROGRESS NOTES
[facility]                                                                                                                           OCCUPATIONAL THERAPY --- ORTHOSIS  EVALUATION - FITTING - TRAINING - and DISCHARGE     Patient Name: Moustapha Murray  MRN: [id number]     Date: 12/9/2022  Physician: Baldomero Garber MD    Physician Orders: Splint needed s/p needle procedure for Dupuytren contracture     Primary Diagnosis: Dupuytren's contracture of R hand [M72.0; M25.531 (ICD-10-CM) - Pain in right wrist     Treatment Diagnosis: M72.0 (ICD-10-CM) - Dupuytren's contracture of R hand, R SF stiffness     (and resulting condition that requires a custom orthosis)     Visit: 1 of 1  Insurance Authorization Expiration Date: 12/5/2023     Start time: 10:55 pm        End Time: 11:30 pm  Total Time: 35 min     SUBJECTIVE:   HISTORY/OCCUPATIONAL PROFILE:   Patient is a 67 year old, L handed male who presents this day for orthotic fabrication/fitting/training.       Patient's chief complaint is stiffness at R small finger and reports difficulty with putting hand in pocket, etc.     Pain: 0/10 R Small  finger     Date of Onset/Injury/Surgery: earlier this same date.        OBJECTIVE:      Observations: light dressing intact to R hand/ small finger, with no strike through.      Sensation: grossly WNL     Range of Motion: slight flexion deformity persists at R small finger at PIP level and MP level     Strength (in pounds): Deferred                                        Circumferential Edema Measurements (in cm): Deferred                                        ADLs/Function:       ASSESSMENT:   Type of custom fabricated Orthosis:   Static digit ext splint 30 min      L-code: L 3913     Purpose of orthosis:   To decrease deformity/joint contracture     Initial OT Orthosis evaluation completed.  Fabricated custom R hand based digit ext splint with ring and small fingers included per MD orders for the purpose  listed above.  The patient was provided written instructions on orthosis purpose, wear schedule, care and precautions to monitor for increased pain/edema, pressure points, skin breakdown or redness/skin irritation. Patient was IND in donning/doffing of orthosis while in clinic. Orthosis to be worn full time with removal for hygiene and exercise.   Then afterwards to be worn at night for an additional 2 weeks.     Quality of Fit of orthotic fabricated:    Excellent fit achieved     Therapeutic Exercise / HEP Instruction:   Pt performed 10 reps of the following exercises. Written instructions provided. Pt to remove orthosis and perform 4-6x/day.   -Wrist E/F, RD/UD, hook, straight fist, fist, lifts, spreads    Rehab Potential: Good     Short Term Goals (1 visit)  1) Patient to be IND with orthotic use,donning/doffing, wear and care precautions and recommended HEP. MET  LTG (1 visit)  1) Patient to be IND with orthotic use,donning/doffing, wear and care precautions and recommended HEP. MET     PLAN:    Treatment to include orthotic fabrication/fitting/training, orthotic modification/adjustment as needed, HEP     Pt to be seen for 1 visit with D/C same Date.     Certification Period: 12/5/2023     Pt for D/C from OT services this date. To to cont HEP as tolerated and will follow up with referring provider for any further tx needs.     ROGER Schroeder/REBECCA        I CERTIFY THE NEED FOR THESE SERVICES FURNISHED UNDER THIS PLAN OF TREATMENT AND WHILE UNDER MY CARE     Physician's comments prn:                 Please see POC note for Eval/splint results and D/C status.

## 2022-12-09 ENCOUNTER — CLINICAL SUPPORT (OUTPATIENT)
Dept: REHABILITATION | Facility: HOSPITAL | Age: 67
End: 2022-12-09
Attending: ORTHOPAEDIC SURGERY
Payer: MEDICARE

## 2022-12-09 ENCOUNTER — OFFICE VISIT (OUTPATIENT)
Dept: ORTHOPEDICS | Facility: CLINIC | Age: 67
End: 2022-12-09
Payer: MEDICARE

## 2022-12-09 VITALS — WEIGHT: 240 LBS | HEIGHT: 70 IN | BODY MASS INDEX: 34.36 KG/M2

## 2022-12-09 DIAGNOSIS — M72.0 DUPUYTREN'S CONTRACTURE: Primary | ICD-10-CM

## 2022-12-09 DIAGNOSIS — M24.541 CONTRACTURE OF JOINT OF FINGER, RIGHT: Primary | ICD-10-CM

## 2022-12-09 DIAGNOSIS — M25.531 PAIN IN RIGHT WRIST: ICD-10-CM

## 2022-12-09 PROCEDURE — 99999 PR PBB SHADOW E&M-EST. PATIENT-LVL III: CPT | Mod: PBBFAC,,, | Performed by: ORTHOPAEDIC SURGERY

## 2022-12-09 PROCEDURE — 99213 PR OFFICE/OUTPT VISIT, EST, LEVL III, 20-29 MIN: ICD-10-PCS | Mod: S$PBB,,, | Performed by: ORTHOPAEDIC SURGERY

## 2022-12-09 PROCEDURE — 97760 ORTHOTIC MGMT&TRAING 1ST ENC: CPT

## 2022-12-09 PROCEDURE — 99999 PR PBB SHADOW E&M-EST. PATIENT-LVL III: ICD-10-PCS | Mod: PBBFAC,,, | Performed by: ORTHOPAEDIC SURGERY

## 2022-12-09 PROCEDURE — 99213 OFFICE O/P EST LOW 20 MIN: CPT | Mod: PBBFAC,PN | Performed by: ORTHOPAEDIC SURGERY

## 2022-12-09 PROCEDURE — 99213 OFFICE O/P EST LOW 20 MIN: CPT | Mod: S$PBB,,, | Performed by: ORTHOPAEDIC SURGERY

## 2022-12-09 PROCEDURE — L3913 HFO W/O JOINTS CF: HCPCS | Mod: PO

## 2022-12-09 RX ORDER — LIDOCAINE HYDROCHLORIDE 10 MG/ML
10 INJECTION, SOLUTION EPIDURAL; INFILTRATION; INTRACAUDAL; PERINEURAL
Status: COMPLETED | OUTPATIENT
Start: 2022-12-09 | End: 2022-12-09

## 2022-12-09 RX ADMIN — LIDOCAINE HYDROCHLORIDE 100 MG: 10 INJECTION, SOLUTION EPIDURAL; INFILTRATION; INTRACAUDAL; PERINEURAL at 11:12

## 2022-12-09 NOTE — PROGRESS NOTES
Mr Murray returns to clinic today.  Has a history of Dupuytren's contracture of his right hand and small finger.  He is here to undergo a needle fasciotomy.  The patient has no new complaints     Physical exam:  Examination the right hand reveals that there is no edema.  There are no major skin changes.  He does have changes consistent with Dupuytren's disease.  There is an obvious large palpable cord which extends into the small finger.  There is a 50 degree contracture of the MCP joint and a 70 degree contracture of the PIP joint.  He does have sensation intact in the median radial ulnar distributions and capillary refill is less than 2 seconds     Assessment:  Right hand/small finger Dupuytren's contracture     Plan:    1.  I would a discussion with the patient about the risks and benefits of the needle fasciotomy.  After discussion of the risks and benefits of the procedure the patient did provide verbal informed consent.  The right hand was then prepped sterilely with alcohol and ChloraPrep.  Local anesthesia was induced with 10 cc of lidocaine 1% without epinephrine.  A 25 gauge needle was then used to perforate the cord just proximal to the MCP joint as well as just proximal to the PIP joint.  With the cord perforated appropriately a manipulation was performed.  I did get an excellent release of the cord at both locations.  In the palm of the hand there was noted to be a skin tear which measured 1 x 1 cm in size.  The wound and the skin tear were then cleaned and a sterile dressing was placed.  A correction of the deformity to within 10° of full extension at the PIP and MCP joints was achieved.    2.  The patient will go to therapy to have the thermoplastic extension splint made     3. Patient will leave the sterile dressing in place for 3 days at which time he will follow up with us.  We will change his dressing and check his wound at that time.  If he is improving well I will begin daily dressing  changes.

## 2022-12-12 ENCOUNTER — OFFICE VISIT (OUTPATIENT)
Dept: ORTHOPEDICS | Facility: CLINIC | Age: 67
End: 2022-12-12
Payer: MEDICARE

## 2022-12-12 VITALS — WEIGHT: 240 LBS | BODY MASS INDEX: 34.36 KG/M2 | HEIGHT: 70 IN

## 2022-12-12 DIAGNOSIS — S61.411D SKIN TEAR OF HAND WITHOUT COMPLICATION, RIGHT, SUBSEQUENT ENCOUNTER: Primary | ICD-10-CM

## 2022-12-12 PROCEDURE — 99213 OFFICE O/P EST LOW 20 MIN: CPT | Mod: PBBFAC,PN | Performed by: PHYSICIAN ASSISTANT

## 2022-12-12 PROCEDURE — 99999 PR PBB SHADOW E&M-EST. PATIENT-LVL III: ICD-10-PCS | Mod: PBBFAC,,, | Performed by: PHYSICIAN ASSISTANT

## 2022-12-12 PROCEDURE — 99213 PR OFFICE/OUTPT VISIT, EST, LEVL III, 20-29 MIN: ICD-10-PCS | Mod: S$PBB,,, | Performed by: PHYSICIAN ASSISTANT

## 2022-12-12 PROCEDURE — 99999 PR PBB SHADOW E&M-EST. PATIENT-LVL III: CPT | Mod: PBBFAC,,, | Performed by: PHYSICIAN ASSISTANT

## 2022-12-12 PROCEDURE — 99213 OFFICE O/P EST LOW 20 MIN: CPT | Mod: S$PBB,,, | Performed by: PHYSICIAN ASSISTANT

## 2022-12-12 NOTE — PROGRESS NOTES
12/12/2022    HPI:  Moustapha Murray is a 67 y.o. male, who presents to clinic today for continued evaluation of his right hand skin tear secondary to his Dupuytren's contracture release via needle fasciotomy.  He is approximately 3 days status post procedure.  States he is kept his dressing dry, clean, and intact.  States pain overall is 2/10.  Denies any other complaints this time.    PMHX:  Past Medical History:   Diagnosis Date    Bladder cancer 08/2020    Colon polyps     DDD (degenerative disc disease), lumbar     Depression     Fatty liver     GERD (gastroesophageal reflux disease)     H/O motion sickness     Hypertension     PONV (postoperative nausea and vomiting)     RLS (restless legs syndrome)        PSHX:  Past Surgical History:   Procedure Laterality Date    APPENDECTOMY      CARPAL TUNNEL RELEASE      CERVICAL FUSION      CHOLECYSTECTOMY      CIRCUMCISION      COLONOSCOPY N/A 3/16/2020    Procedure: COLONOSCOPY;  Surgeon: Braulio Best MD;  Location: [facility];  Service: Endoscopy;  Laterality: N/A; 3 colon polyps removed; biopsy: tubular adenoma and hyperplastic; repeat in 5 years for surveillance    CYSTOSCOPY W/ RETROGRADES Bilateral 8/24/2020    Procedure: CYSTOSCOPY, WITH RETROGRADE PYELOGRAM -WITH URETHRAL DILATION;  Surgeon: Greg Claudio MD;  Location: [facility] OR;  Service: Urology;  Laterality: Bilateral;    CYSTOSCOPY W/ URETERAL STENT PLACEMENT Right 8/24/2020    Procedure: CYSTOSCOPY, WITH URETERAL STENT INSERTION;  Surgeon: Greg Claudio MD;  Location: [facility] OR;  Service: Urology;  Laterality: Right;    INSTILLATION OF URINARY BLADDER N/A 8/24/2020    Procedure: INSTILLATION, BLADDER - Mitomycin;  Surgeon: Greg Claudio MD;  Location: [facility] OR;  Service: Urology;  Laterality: N/A;    TRANSFORAMINAL EPIDURAL INJECTION OF STEROID Right 7/17/2019    Procedure: Injection,steroid,epidural,transforaminal approach L3-4, L4-5;  Surgeon: Chris Palencia MD;  [facility];   Service: Pain Management;  Laterality: Right;    TRANSFORAMINAL EPIDURAL INJECTION OF STEROID Right 2019    Procedure: Injection,steroid,epidural,transforaminal approach;  Surgeon: Chris Palencia MD;  Location: [facility] OR;  Service: Pain Management;  Laterality: Right;  L3-4, L4-L5    WISDOM TOOTH EXTRACTION         FMHX:  Family History   Problem Relation Age of Onset    Stroke Mother     Ulcerative colitis Mother     Cancer Neg Hx     Colon cancer Neg Hx     Crohn's disease Neg Hx     Celiac disease Neg Hx        SOCHX:  Social History     Tobacco Use    Smoking status: Former     Packs/day: 1.50     Types: Cigarettes     Quit date: 10/16/2013     Years since quittin.1    Smokeless tobacco: Never   Substance Use Topics    Alcohol use: Yes     Alcohol/week: 2.0 standard drinks     Types: 2 Cans of beer per week       ALLERGIES:  Sulfa (sulfonamide antibiotics)    CURRENT MEDICATIONS:  Current Outpatient Medications on File Prior to Visit   Medication Sig Dispense Refill    EScitalopram oxalate (LEXAPRO) 10 MG tablet TAKE 1 TABLET BY MOUTH EVERY DAY 90 tablet 3    hydroCHLOROthiazide (HYDRODIURIL) 12.5 MG Tab Take 1 tablet (12.5 mg total) by mouth once daily. 90 tablet 3    Lactobac no.41/Bifidobact no.7 (PROBIOTIC-10 ORAL) Take by mouth once daily.       melatonin 5 mg Cap Take by mouth nightly as needed. TAKE AS SCHEDULED      methocarbamoL (ROBAXIN) 500 MG Tab 500 mg daily as needed.      multivitamin (THERAGRAN) per tablet Take 1 tablet by mouth once daily.       naproxen sodium (ANAPROX) 220 MG tablet Take 220 mg by mouth 2 (two) times daily as needed.       omeprazole (PRILOSEC) 40 MG capsule TAKE 1 CAPSULE BY MOUTH EVERY DAY 90 capsule 0    pramipexole (MIRAPEX) 1 MG tablet TAKE 1 TABLET BY MOUTH EVERY EVENING. 90 tablet 3    scopolamine (TRANSDERM-SCOP) 1.3-1.5 mg (1 mg over 3 days) Place 1 patch onto the skin every 72 hours. 10 patch 1    telmisartan (MICARDIS) 40 MG Tab Take 1 tablet (40 mg total) by  "mouth once daily. 90 tablet 3    triamcinolone acetonide 0.1% (KENALOG) 0.1 % cream Apply topically 2 (two) times daily. (Patient taking differently: Apply topically 2 (two) times daily as needed.) 45 g 1    acetaZOLAMIDE (DIAMOX) 250 MG tablet Take 1 tablet (250 mg total) by mouth 2 (two) times daily. 60 tablet 0     Current Facility-Administered Medications on File Prior to Visit   Medication Dose Route Frequency Provider Last Rate Last Admin    ondansetron injection 4 mg  4 mg Intravenous Daily PRN Gustavo Chu MD           REVIEW OF SYSTEMS:  Review of Systems Complete; Negative, unless noted above.    GENERAL PHYSICAL EXAM:   Ht 5' 10" (1.778 m)   Wt 108.9 kg (240 lb)   BMI 34.44 kg/m²    GEN: well developed, well nourished, no acute distress   PULM: No wheezing, no respiratory distress   CV: RRR    ORTHO EXAM:   Examination of the right hand reveals a wound over the ulnar portion of the right hand.  Presence of mild sanguinous drainage from the wound.  No erythema, fluctuance, purulence, or any other signs of infection.  Able to flex extend the fingers appropriately.  Sensation is grossly intact in the radial, ulnar, median nerve distributions.  Capillary refill less than 2 seconds in all fingers.    RADIOLOGY:   None.    ASSESSMENT:   Right hand wound    PLAN:  1. I discussed with Moustapha Murray is progressing appropriately in the treatment course.  We discussed the best course of action at this time is to clean his wound and perform a dressing change.  We discussed importance of keeping the dressing dry, clean, and intact until seen again in clinic.  We discussed the importance of wearing his thermoplastic extension splint.  He verbally agreed with the treatment plan.    2.  His wound was thoroughly cleaned in clinic today with hydrogen peroxide and profusely irrigated with sterile water.  His wound was dried with sterile 4 x 4 gauze.  His wound was then dressed with sterile 4 x 4 gauze and " Coban wrap.    3. I would like to have him follow up in clinic in 2 days for repeat wound check, at which time if the wound is noted to be healing appropriately with no complications we will have to perform daily dressing changes.  He was instructed to contact the clinic for any problems or concerns in the interim.

## 2022-12-14 ENCOUNTER — OFFICE VISIT (OUTPATIENT)
Dept: ORTHOPEDICS | Facility: CLINIC | Age: 67
End: 2022-12-14
Payer: MEDICARE

## 2022-12-14 VITALS — BODY MASS INDEX: 34.37 KG/M2 | WEIGHT: 240.06 LBS | HEIGHT: 70 IN

## 2022-12-14 DIAGNOSIS — S61.411D SKIN TEAR OF HAND WITHOUT COMPLICATION, RIGHT, SUBSEQUENT ENCOUNTER: Primary | ICD-10-CM

## 2022-12-14 PROCEDURE — 99999 PR PBB SHADOW E&M-EST. PATIENT-LVL III: CPT | Mod: PBBFAC,,, | Performed by: PHYSICIAN ASSISTANT

## 2022-12-14 PROCEDURE — 99213 PR OFFICE/OUTPT VISIT, EST, LEVL III, 20-29 MIN: ICD-10-PCS | Mod: S$PBB,,, | Performed by: PHYSICIAN ASSISTANT

## 2022-12-14 PROCEDURE — 99999 PR PBB SHADOW E&M-EST. PATIENT-LVL III: ICD-10-PCS | Mod: PBBFAC,,, | Performed by: PHYSICIAN ASSISTANT

## 2022-12-14 PROCEDURE — 99213 OFFICE O/P EST LOW 20 MIN: CPT | Mod: PBBFAC,PN | Performed by: PHYSICIAN ASSISTANT

## 2022-12-14 PROCEDURE — 99213 OFFICE O/P EST LOW 20 MIN: CPT | Mod: S$PBB,,, | Performed by: PHYSICIAN ASSISTANT

## 2022-12-14 NOTE — PROGRESS NOTES
12/14/2022    HPI:  Moustapha Murray is a 67 y.o. male, who presents to clinic today for continued evaluation of his right hand skin tear secondary to his Dupuytren's contracture release via needle fasciotomy.  He is approximately 5 days status post procedure.  States he is kept his dressing dry, clean, and intact since his last visit.  States pain overall is 3/10.  Denies any other complaints at this time.    PMHX:  Past Medical History:   Diagnosis Date    Bladder cancer 08/2020    Colon polyps     DDD (degenerative disc disease), lumbar     Depression     Fatty liver     GERD (gastroesophageal reflux disease)     H/O motion sickness     Hypertension     PONV (postoperative nausea and vomiting)     RLS (restless legs syndrome)        PSHX:  Past Surgical History:   Procedure Laterality Date    APPENDECTOMY      CARPAL TUNNEL RELEASE      CERVICAL FUSION      CHOLECYSTECTOMY      CIRCUMCISION      COLONOSCOPY N/A 3/16/2020    Procedure: COLONOSCOPY;  Surgeon: Braulio Best MD;  Location: [facility];  Service: Endoscopy;  Laterality: N/A; 3 colon polyps removed; biopsy: tubular adenoma and hyperplastic; repeat in 5 years for surveillance    CYSTOSCOPY W/ RETROGRADES Bilateral 8/24/2020    Procedure: CYSTOSCOPY, WITH RETROGRADE PYELOGRAM -WITH URETHRAL DILATION;  Surgeon: Greg Claudio MD;  Location: [facility] OR;  Service: Urology;  Laterality: Bilateral;    CYSTOSCOPY W/ URETERAL STENT PLACEMENT Right 8/24/2020    Procedure: CYSTOSCOPY, WITH URETERAL STENT INSERTION;  Surgeon: Greg Claudio MD;  Location: [facility] OR;  Service: Urology;  Laterality: Right;    INSTILLATION OF URINARY BLADDER N/A 8/24/2020    Procedure: INSTILLATION, BLADDER - Mitomycin;  Surgeon: Greg Claudio MD;  Location: [facility] OR;  Service: Urology;  Laterality: N/A;    TRANSFORAMINAL EPIDURAL INJECTION OF STEROID Right 7/17/2019    Procedure: Injection,steroid,epidural,transforaminal approach L3-4, L4-5;  Surgeon: Chris Palencia,  MD;  Location: [facility] OR;  Service: Pain Management;  Laterality: Right;    TRANSFORAMINAL EPIDURAL INJECTION OF STEROID Right 2019    Procedure: Injection,steroid,epidural,transforaminal approach;  Surgeon: Chris Palencia MD;  Location: [facility] OR;  Service: Pain Management;  Laterality: Right;  L3-4, L4-L5    WISDOM TOOTH EXTRACTION         FMHX:  Family History   Problem Relation Age of Onset    Stroke Mother     Ulcerative colitis Mother     Cancer Neg Hx     Colon cancer Neg Hx     Crohn's disease Neg Hx     Celiac disease Neg Hx        SOCHX:  Social History     Tobacco Use    Smoking status: Former     Packs/day: 1.50     Types: Cigarettes     Quit date: 10/16/2013     Years since quittin.1    Smokeless tobacco: Never   Substance Use Topics    Alcohol use: Yes     Alcohol/week: 2.0 standard drinks     Types: 2 Cans of beer per week       ALLERGIES:  Sulfa (sulfonamide antibiotics)    CURRENT MEDICATIONS:  Current Outpatient Medications on File Prior to Visit   Medication Sig Dispense Refill    acetaZOLAMIDE (DIAMOX) 250 MG tablet Take 1 tablet (250 mg total) by mouth 2 (two) times daily. 60 tablet 0    EScitalopram oxalate (LEXAPRO) 10 MG tablet TAKE 1 TABLET BY MOUTH EVERY DAY 90 tablet 3    hydroCHLOROthiazide (HYDRODIURIL) 12.5 MG Tab Take 1 tablet (12.5 mg total) by mouth once daily. 90 tablet 3    Lactobac no.41/Bifidobact no.7 (PROBIOTIC-10 ORAL) Take by mouth once daily.       melatonin 5 mg Cap Take by mouth nightly as needed. TAKE AS SCHEDULED      methocarbamoL (ROBAXIN) 500 MG Tab 500 mg daily as needed.      multivitamin (THERAGRAN) per tablet Take 1 tablet by mouth once daily.       naproxen sodium (ANAPROX) 220 MG tablet Take 220 mg by mouth 2 (two) times daily as needed.       omeprazole (PRILOSEC) 40 MG capsule TAKE 1 CAPSULE BY MOUTH EVERY DAY 90 capsule 0    pramipexole (MIRAPEX) 1 MG tablet TAKE 1 TABLET BY MOUTH EVERY EVENING. 90 tablet 3    scopolamine (TRANSDERM-SCOP) 1.3-1.5 mg (1 mg  "over 3 days) Place 1 patch onto the skin every 72 hours. 10 patch 1    telmisartan (MICARDIS) 40 MG Tab Take 1 tablet (40 mg total) by mouth once daily. 90 tablet 3    triamcinolone acetonide 0.1% (KENALOG) 0.1 % cream Apply topically 2 (two) times daily. (Patient taking differently: Apply topically 2 (two) times daily as needed.) 45 g 1     Current Facility-Administered Medications on File Prior to Visit   Medication Dose Route Frequency Provider Last Rate Last Admin    ondansetron injection 4 mg  4 mg Intravenous Daily PRN Gustavo Chu MD           REVIEW OF SYSTEMS:  Review of Systems Complete; Negative, unless noted above.    GENERAL PHYSICAL EXAM:   Ht 5' 10" (1.778 m)   Wt 108.9 kg (240 lb 1.3 oz)   BMI 34.45 kg/m²    GEN: well developed, well nourished, no acute distress   PULM: No wheezing, no respiratory distress   CV: RRR    ORTHO EXAM:   Examination of the right hand reveals a wound over the ulnar portion of the right hand.  No drainage, fluctuance, purulence, erythema, or any other signs of infection.  Able to flex extend the fingers appropriately.  Sensation is grossly intact in the radial, ulnar, median nerve distributions.  Capillary refill less than 2 seconds.    RADIOLOGY:   None.    ASSESSMENT:   Right hand skin tear wound    PLAN:  1. I discussed with Moustapha Murray that he continues to progress appropriately in the treatment course.  We discussed the best course of action this time is to transition to performing daily wound cleaning and dressing changes on his own.  We discussed for daily wound washing the importance of washing the wound with clean running water and antibacterial soap.  We discussed for dressing the wound importance of dressing the wound with Telfa, 4 x 4 gauze, and Coban wrap.  He verbally agreed with the treatment plan.      2. His wound was thoroughly cleaned in clinic today with hydrogen peroxide and then profusely irrigated with sterile water.  His wound was " then dried with sterile 4 x 4 gauze.  His wound was then dressed with Telfa, 4 x 4 gauze, and Coban wrap.    3.  I would like him follow-up in clinic in 1 week for repeat wound check.  He was instructed to contact the clinic for any problems or concerns in the interim.

## 2022-12-20 ENCOUNTER — PATIENT OUTREACH (OUTPATIENT)
Dept: ADMINISTRATIVE | Facility: HOSPITAL | Age: 67
End: 2022-12-20
Payer: MEDICARE

## 2022-12-20 NOTE — PROGRESS NOTES
Uncontrolled BP REPORT  BP Readings from Last 3 Encounters:   03/15/22 138/72   22 136/70   21 130/78   2022 Care Everywhere updates requested and reviewed.  Immunizations reconciled. Media reports reviewed.  Duplicate HM overrides and  orders removed.  Overdue HM topic chart audit and/or requested.  Overdue lab testing linked to upcoming lab appointments if applies.    Health Maintenance Due   Topic Date Due    Pneumococcal Vaccines (Age 65+) (1 - PCV) Never done    Lipid Panel  2021    COVID-19 Vaccine (4 - Booster for Pfizer series) 2022    Abdominal Aortic Aneurysm Screening  Never done    Influenza Vaccine (1) 2022

## 2022-12-23 ENCOUNTER — OFFICE VISIT (OUTPATIENT)
Dept: ORTHOPEDICS | Facility: CLINIC | Age: 67
End: 2022-12-23
Payer: MEDICARE

## 2022-12-23 VITALS — BODY MASS INDEX: 34.36 KG/M2 | HEIGHT: 70 IN | WEIGHT: 240 LBS

## 2022-12-23 DIAGNOSIS — S61.411D SKIN TEAR OF HAND WITHOUT COMPLICATION, RIGHT, SUBSEQUENT ENCOUNTER: Primary | ICD-10-CM

## 2022-12-23 PROCEDURE — 99213 OFFICE O/P EST LOW 20 MIN: CPT | Mod: PBBFAC,PN | Performed by: PHYSICIAN ASSISTANT

## 2022-12-23 PROCEDURE — 99999 PR PBB SHADOW E&M-EST. PATIENT-LVL III: CPT | Mod: PBBFAC,,, | Performed by: PHYSICIAN ASSISTANT

## 2022-12-23 PROCEDURE — 99999 PR PBB SHADOW E&M-EST. PATIENT-LVL III: ICD-10-PCS | Mod: PBBFAC,,, | Performed by: PHYSICIAN ASSISTANT

## 2022-12-23 PROCEDURE — 99213 PR OFFICE/OUTPT VISIT, EST, LEVL III, 20-29 MIN: ICD-10-PCS | Mod: S$PBB,,, | Performed by: PHYSICIAN ASSISTANT

## 2022-12-23 PROCEDURE — 99213 OFFICE O/P EST LOW 20 MIN: CPT | Mod: S$PBB,,, | Performed by: PHYSICIAN ASSISTANT

## 2022-12-23 NOTE — PROGRESS NOTES
12/23/2022    HPI:  Moustapha Murray is a 67 y.o. male, who presents to clinic today for continued evaluation of his right hand skin tear secondary to his Dupuytren's contracture release via needle fasciotomy.  He is approximately 2 weeks status post procedure.  States pain on average is 0/10.  States he has perform daily dressing changes and daily wound washing with clean running water antibacterial soap.  States overall he is doing very well.  Denies any other complaints at this time.    PMHX:  Past Medical History:   Diagnosis Date    Bladder cancer 08/2020    Colon polyps     DDD (degenerative disc disease), lumbar     Depression     Fatty liver     GERD (gastroesophageal reflux disease)     H/O motion sickness     Hypertension     PONV (postoperative nausea and vomiting)     RLS (restless legs syndrome)        PSHX:  Past Surgical History:   Procedure Laterality Date    APPENDECTOMY      CARPAL TUNNEL RELEASE      CERVICAL FUSION      CHOLECYSTECTOMY      CIRCUMCISION      COLONOSCOPY N/A 3/16/2020    Procedure: COLONOSCOPY;  Surgeon: Braulio Best MD;  Location: [facility];  Service: Endoscopy;  Laterality: N/A; 3 colon polyps removed; biopsy: tubular adenoma and hyperplastic; repeat in 5 years for surveillance    CYSTOSCOPY W/ RETROGRADES Bilateral 8/24/2020    Procedure: CYSTOSCOPY, WITH RETROGRADE PYELOGRAM -WITH URETHRAL DILATION;  Surgeon: Greg Claudoi MD;  Location: [facility];  Service: Urology;  Laterality: Bilateral;    CYSTOSCOPY W/ URETERAL STENT PLACEMENT Right 8/24/2020    Procedure: CYSTOSCOPY, WITH URETERAL STENT INSERTION;  Surgeon: Greg Claudio MD;  Location: [facility] OR;  Service: Urology;  Laterality: Right;    INSTILLATION OF URINARY BLADDER N/A 8/24/2020    Procedure: INSTILLATION, BLADDER - Mitomycin;  Surgeon: Greg Claudio MD;  Location: [facility] OR;  Service: Urology;  Laterality: N/A;    TRANSFORAMINAL EPIDURAL INJECTION OF STEROID Right 7/17/2019    Procedure:  Injection,steroid,epidural,transforaminal approach L3-4, L4-5;  Surgeon: Chris Palencia MD;  Location: [facility] OR;  Service: Pain Management;  Laterality: Right;    TRANSFORAMINAL EPIDURAL INJECTION OF STEROID Right 2019    Procedure: Injection,steroid,epidural,transforaminal approach;  Surgeon: Chris Palencia MD;  Location: [facility] OR;  Service: Pain Management;  Laterality: Right;  L3-4, L4-L5    WISDOM TOOTH EXTRACTION         FMHX:  Family History   Problem Relation Age of Onset    Stroke Mother     Ulcerative colitis Mother     Cancer Neg Hx     Colon cancer Neg Hx     Crohn's disease Neg Hx     Celiac disease Neg Hx        SOCHX:  Social History     Tobacco Use    Smoking status: Former     Packs/day: 1.50     Types: Cigarettes     Quit date: 10/16/2013     Years since quittin.1    Smokeless tobacco: Never   Substance Use Topics    Alcohol use: Yes     Alcohol/week: 2.0 standard drinks     Types: 2 Cans of beer per week       ALLERGIES:  Sulfa (sulfonamide antibiotics)    CURRENT MEDICATIONS:  Current Outpatient Medications on File Prior to Visit   Medication Sig Dispense Refill    acetaZOLAMIDE (DIAMOX) 250 MG tablet Take 1 tablet (250 mg total) by mouth 2 (two) times daily. 60 tablet 0    EScitalopram oxalate (LEXAPRO) 10 MG tablet TAKE 1 TABLET BY MOUTH EVERY DAY 90 tablet 3    hydroCHLOROthiazide (HYDRODIURIL) 12.5 MG Tab Take 1 tablet (12.5 mg total) by mouth once daily. 90 tablet 3    Lactobac no.41/Bifidobact no.7 (PROBIOTIC-10 ORAL) Take by mouth once daily.       melatonin 5 mg Cap Take by mouth nightly as needed. TAKE AS SCHEDULED      methocarbamoL (ROBAXIN) 500 MG Tab 500 mg daily as needed.      multivitamin (THERAGRAN) per tablet Take 1 tablet by mouth once daily.       naproxen sodium (ANAPROX) 220 MG tablet Take 220 mg by mouth 2 (two) times daily as needed.       omeprazole (PRILOSEC) 40 MG capsule TAKE 1 CAPSULE BY MOUTH EVERY DAY 90 capsule 0    pramipexole (MIRAPEX) 1 MG tablet TAKE 1 TABLET  "BY MOUTH EVERY EVENING. 90 tablet 3    scopolamine (TRANSDERM-SCOP) 1.3-1.5 mg (1 mg over 3 days) Place 1 patch onto the skin every 72 hours. 10 patch 1    telmisartan (MICARDIS) 40 MG Tab Take 1 tablet (40 mg total) by mouth once daily. 90 tablet 3    triamcinolone acetonide 0.1% (KENALOG) 0.1 % cream Apply topically 2 (two) times daily. (Patient taking differently: Apply topically 2 (two) times daily as needed.) 45 g 1     Current Facility-Administered Medications on File Prior to Visit   Medication Dose Route Frequency Provider Last Rate Last Admin    ondansetron injection 4 mg  4 mg Intravenous Daily PRN Gustavo Chu MD           REVIEW OF SYSTEMS:  Review of Systems Complete; Negative, unless noted above.    GENERAL PHYSICAL EXAM:   Ht 5' 10" (1.778 m)   Wt 108.9 kg (240 lb)   BMI 34.44 kg/m²    GEN: well developed, well nourished, no acute distress   PULM: No wheezing, no respiratory distress   CV: RRR    ORTHO EXAM:   Examination of the right hand reveals an appropriately healing skin tear wound with appropriate granulation and progressive healing.  The skin tear is considerably smaller than his previous physical examination.  No edema, erythema, ecchymosis, drainage, fluctuance, or any signs of infection.  Able to flex extend the fingers appropriately.  Normal sensation in the radial, ulnar, median nerve distributions.  Capillary refill less than 2 seconds in all fingers.    RADIOLOGY:   None.    ASSESSMENT:   Right hand skin tear wound with routine healing    PLAN:  1. I discussed with Moustapha Murray that he is progressing appropriately in the treatment course.  We discussed the best course of action this time is continue with his daily dressing changes and daily wound washing with clean running water antibacterial soap.  He verbally agreed with the treatment plan.    2.  His wound was thoroughly cleaned in clinic today with chlorhexidine.  His wound was then dressed with Telfa, 4 x 4 gauze, " and a 2 inch Ace wrap.    3.  I would like him follow-up in clinic in 2 weeks for final evaluation.  He was instructed to contact clinic for any problems or concerns in the interim.

## 2023-01-05 ENCOUNTER — LAB VISIT (OUTPATIENT)
Dept: LAB | Facility: HOSPITAL | Age: 68
End: 2023-01-05
Attending: FAMILY MEDICINE
Payer: MEDICARE

## 2023-01-05 ENCOUNTER — OFFICE VISIT (OUTPATIENT)
Dept: FAMILY MEDICINE | Facility: CLINIC | Age: 68
End: 2023-01-05
Payer: MEDICARE

## 2023-01-05 VITALS
SYSTOLIC BLOOD PRESSURE: 134 MMHG | OXYGEN SATURATION: 95 % | HEIGHT: 70 IN | TEMPERATURE: 98 F | BODY MASS INDEX: 33.99 KG/M2 | HEART RATE: 74 BPM | WEIGHT: 237.44 LBS | DIASTOLIC BLOOD PRESSURE: 70 MMHG | RESPIRATION RATE: 18 BRPM

## 2023-01-05 DIAGNOSIS — Z13.6 ENCOUNTER FOR SCREENING FOR CARDIOVASCULAR DISORDERS: ICD-10-CM

## 2023-01-05 DIAGNOSIS — R06.09 DOE (DYSPNEA ON EXERTION): ICD-10-CM

## 2023-01-05 DIAGNOSIS — Z13.6 ENCOUNTER FOR ABDOMINAL AORTIC ANEURYSM (AAA) SCREENING: ICD-10-CM

## 2023-01-05 DIAGNOSIS — C67.9 MALIGNANT NEOPLASM OF URINARY BLADDER, UNSPECIFIED SITE: ICD-10-CM

## 2023-01-05 DIAGNOSIS — G47.33 OBSTRUCTIVE SLEEP APNEA: ICD-10-CM

## 2023-01-05 DIAGNOSIS — I10 HYPERTENSION, UNSPECIFIED TYPE: ICD-10-CM

## 2023-01-05 DIAGNOSIS — Z12.5 PROSTATE CANCER SCREENING: ICD-10-CM

## 2023-01-05 DIAGNOSIS — I10 HYPERTENSION, UNSPECIFIED TYPE: Primary | ICD-10-CM

## 2023-01-05 DIAGNOSIS — G25.81 RLS (RESTLESS LEGS SYNDROME): ICD-10-CM

## 2023-01-05 DIAGNOSIS — Z00.00 PREVENTATIVE HEALTH CARE: ICD-10-CM

## 2023-01-05 LAB
ALBUMIN SERPL BCP-MCNC: 3.8 G/DL (ref 3.5–5.2)
ALP SERPL-CCNC: 134 U/L (ref 55–135)
ALT SERPL W/O P-5'-P-CCNC: 38 U/L (ref 10–44)
ANION GAP SERPL CALC-SCNC: 7 MMOL/L (ref 8–16)
AST SERPL-CCNC: 33 U/L (ref 10–40)
BASOPHILS # BLD AUTO: 0.09 K/UL (ref 0–0.2)
BASOPHILS NFR BLD: 1.1 % (ref 0–1.9)
BILIRUB SERPL-MCNC: 0.5 MG/DL (ref 0.1–1)
BUN SERPL-MCNC: 20 MG/DL (ref 8–23)
CALCIUM SERPL-MCNC: 10 MG/DL (ref 8.7–10.5)
CHLORIDE SERPL-SCNC: 103 MMOL/L (ref 95–110)
CHOLEST SERPL-MCNC: 219 MG/DL (ref 120–199)
CHOLEST/HDLC SERPL: 3.7 {RATIO} (ref 2–5)
CO2 SERPL-SCNC: 28 MMOL/L (ref 23–29)
COMPLEXED PSA SERPL-MCNC: 0.61 NG/ML (ref 0–4)
CREAT SERPL-MCNC: 1.4 MG/DL (ref 0.5–1.4)
DIFFERENTIAL METHOD: ABNORMAL
EOSINOPHIL # BLD AUTO: 0.2 K/UL (ref 0–0.5)
EOSINOPHIL NFR BLD: 2.1 % (ref 0–8)
ERYTHROCYTE [DISTWIDTH] IN BLOOD BY AUTOMATED COUNT: 13 % (ref 11.5–14.5)
EST. GFR  (NO RACE VARIABLE): 55.8 ML/MIN/1.73 M^2
GLUCOSE SERPL-MCNC: 94 MG/DL (ref 70–110)
HCT VFR BLD AUTO: 42.9 % (ref 40–54)
HDLC SERPL-MCNC: 59 MG/DL (ref 40–75)
HDLC SERPL: 26.9 % (ref 20–50)
HGB BLD-MCNC: 14 G/DL (ref 14–18)
IMM GRANULOCYTES # BLD AUTO: 0.06 K/UL (ref 0–0.04)
IMM GRANULOCYTES NFR BLD AUTO: 0.8 % (ref 0–0.5)
LDLC SERPL CALC-MCNC: 141.4 MG/DL (ref 63–159)
LYMPHOCYTES # BLD AUTO: 1.3 K/UL (ref 1–4.8)
LYMPHOCYTES NFR BLD: 16 % (ref 18–48)
MCH RBC QN AUTO: 31.1 PG (ref 27–31)
MCHC RBC AUTO-ENTMCNC: 32.6 G/DL (ref 32–36)
MCV RBC AUTO: 95 FL (ref 82–98)
MONOCYTES # BLD AUTO: 0.7 K/UL (ref 0.3–1)
MONOCYTES NFR BLD: 8.5 % (ref 4–15)
NEUTROPHILS # BLD AUTO: 5.7 K/UL (ref 1.8–7.7)
NEUTROPHILS NFR BLD: 71.5 % (ref 38–73)
NONHDLC SERPL-MCNC: 160 MG/DL
NRBC BLD-RTO: 0 /100 WBC
PLATELET # BLD AUTO: 262 K/UL (ref 150–450)
PMV BLD AUTO: 11.1 FL (ref 9.2–12.9)
POTASSIUM SERPL-SCNC: 4.4 MMOL/L (ref 3.5–5.1)
PROT SERPL-MCNC: 7.1 G/DL (ref 6–8.4)
RBC # BLD AUTO: 4.5 M/UL (ref 4.6–6.2)
SODIUM SERPL-SCNC: 138 MMOL/L (ref 136–145)
TRIGL SERPL-MCNC: 93 MG/DL (ref 30–150)
WBC # BLD AUTO: 7.96 K/UL (ref 3.9–12.7)

## 2023-01-05 PROCEDURE — G0008 ADMIN INFLUENZA VIRUS VAC: HCPCS | Mod: PBBFAC,PO

## 2023-01-05 PROCEDURE — 36415 COLL VENOUS BLD VENIPUNCTURE: CPT | Mod: PO | Performed by: FAMILY MEDICINE

## 2023-01-05 PROCEDURE — 99999 PR PBB SHADOW E&M-EST. PATIENT-LVL IV: ICD-10-PCS | Mod: PBBFAC,,, | Performed by: FAMILY MEDICINE

## 2023-01-05 PROCEDURE — 80053 COMPREHEN METABOLIC PANEL: CPT | Performed by: FAMILY MEDICINE

## 2023-01-05 PROCEDURE — 99999 PR PBB SHADOW E&M-EST. PATIENT-LVL IV: CPT | Mod: PBBFAC,,, | Performed by: FAMILY MEDICINE

## 2023-01-05 PROCEDURE — 85025 COMPLETE CBC W/AUTO DIFF WBC: CPT | Performed by: FAMILY MEDICINE

## 2023-01-05 PROCEDURE — 80061 LIPID PANEL: CPT | Performed by: FAMILY MEDICINE

## 2023-01-05 PROCEDURE — 99214 PR OFFICE/OUTPT VISIT, EST, LEVL IV, 30-39 MIN: ICD-10-PCS | Mod: S$PBB,,, | Performed by: FAMILY MEDICINE

## 2023-01-05 PROCEDURE — 99214 OFFICE O/P EST MOD 30 MIN: CPT | Mod: S$PBB,,, | Performed by: FAMILY MEDICINE

## 2023-01-05 PROCEDURE — 99214 OFFICE O/P EST MOD 30 MIN: CPT | Mod: PBBFAC,PO | Performed by: FAMILY MEDICINE

## 2023-01-05 PROCEDURE — 90677 PCV20 VACCINE IM: CPT | Mod: PBBFAC,PO

## 2023-01-05 PROCEDURE — 84153 ASSAY OF PSA TOTAL: CPT | Performed by: FAMILY MEDICINE

## 2023-01-05 NOTE — PROGRESS NOTES
Chief Complaint   Patient presents with    Preventative Health Care     Phyiscal     HISTORY OF PRESENT ILLNESS: This is a 67-year-old white male presents today for annual physical    HTN - tolerating Micardis and HCT. Denies HA, CP or dizziness. Home /80.  GERD -  He has uses Omeprazole 40mg daily with good control.   RLS - tolerating Mirapex 1 mg daily. This is doing well  Lumbar DDD - stable  Walking or elltical daily  Depression - mood stable Lexapro 10mg; tolerating this without side effects  Bladder cancer - following with urology every 3 months    Some intermittent right anterior shoulder pain    Wife reports some apneic episodes and gasping at night  Some daytime fatigue and restless sleep    Reports some CHANDRA.  This seems to be preogressive    The 10-year ASCVD risk score (Elier DESIR, et al., 2019) is: 15.4%    Values used to calculate the score:      Age: 67 years      Sex: Male      Is Non- : No      Diabetic: No      Tobacco smoker: No      Systolic Blood Pressure: 134 mmHg      Is BP treated: Yes      HDL Cholesterol: 59 mg/dL      Total Cholesterol: 219 mg/dL      Past Medical History:   Diagnosis Date    Bladder cancer 08/2020    Colon polyps     DDD (degenerative disc disease), lumbar     Depression     Fatty liver     GERD (gastroesophageal reflux disease)     H/O motion sickness     Hypertension     PONV (postoperative nausea and vomiting)     RLS (restless legs syndrome)        Past Surgical History:   Procedure Laterality Date    APPENDECTOMY      CARPAL TUNNEL RELEASE      CERVICAL FUSION      CHOLECYSTECTOMY      CIRCUMCISION      COLONOSCOPY N/A 3/16/2020    Procedure: COLONOSCOPY;  Surgeon: Braulio Best MD;  Location: [facility];  Service: Endoscopy;  Laterality: N/A; 3 colon polyps removed; biopsy: tubular adenoma and hyperplastic; repeat in 5 years for surveillance    CYSTOSCOPY W/ RETROGRADES Bilateral 8/24/2020    Procedure: CYSTOSCOPY, WITH RETROGRADE  PYELOGRAM -WITH URETHRAL DILATION;  Surgeon: Greg Claudio MD;  Location: [facility] OR;  Service: Urology;  Laterality: Bilateral;    CYSTOSCOPY W/ URETERAL STENT PLACEMENT Right 2020    Procedure: CYSTOSCOPY, WITH URETERAL STENT INSERTION;  Surgeon: Greg Claudio MD;  Location: [facility] OR;  Service: Urology;  Laterality: Right;    INSTILLATION OF URINARY BLADDER N/A 2020    Procedure: INSTILLATION, BLADDER - Mitomycin;  Surgeon: Greg Claudio MD;  Location: [facility] OR;  Service: Urology;  Laterality: N/A;    TRANSFORAMINAL EPIDURAL INJECTION OF STEROID Right 2019    Procedure: Injection,steroid,epidural,transforaminal approach L3-4, L4-5;  Surgeon: Chris Palencia MD;  Location: [facility] OR;  Service: Pain Management;  Laterality: Right;    TRANSFORAMINAL EPIDURAL INJECTION OF STEROID Right 2019    Procedure: Injection,steroid,epidural,transforaminal approach;  Surgeon: Chris Palencia MD;  Location: [facility] OR;  Service: Pain Management;  Laterality: Right;  L3-4, L4-L5    WISDOM TOOTH EXTRACTION       FAMILY HISTORY: cancer in his grandparent.       Social History     Socioeconomic History    Marital status:    Occupational History    Occupation:    Tobacco Use    Smoking status: Former     Packs/day: 1.50     Types: Cigarettes     Quit date: 10/16/2013     Years since quittin.2    Smokeless tobacco: Never   Substance and Sexual Activity    Alcohol use: Yes     Alcohol/week: 2.0 standard drinks     Types: 2 Cans of beer per week    Drug use: Not Currently         Current Outpatient Medications on File Prior to Visit   Medication Sig Dispense Refill    EScitalopram oxalate (LEXAPRO) 10 MG tablet TAKE 1 TABLET BY MOUTH EVERY DAY 90 tablet 3    hydroCHLOROthiazide (HYDRODIURIL) 12.5 MG Tab Take 1 tablet (12.5 mg total) by mouth once daily. 90 tablet 3    Lactobac no.41/Bifidobact no.7 (PROBIOTIC-10 ORAL) Take by mouth once daily.       melatonin 5 mg Cap Take by mouth nightly  as needed. TAKE AS SCHEDULED      multivitamin (THERAGRAN) per tablet Take 1 tablet by mouth once daily.       naproxen sodium (ANAPROX) 220 MG tablet Take 220 mg by mouth 2 (two) times daily as needed.       omeprazole (PRILOSEC) 40 MG capsule TAKE 1 CAPSULE BY MOUTH EVERY DAY 90 capsule 0    pramipexole (MIRAPEX) 1 MG tablet TAKE 1 TABLET BY MOUTH EVERY EVENING. 90 tablet 3    telmisartan (MICARDIS) 40 MG Tab Take 1 tablet (40 mg total) by mouth once daily. 90 tablet 3    acetaZOLAMIDE (DIAMOX) 250 MG tablet Take 1 tablet (250 mg total) by mouth 2 (two) times daily. 60 tablet 0    methocarbamoL (ROBAXIN) 500 MG Tab 500 mg daily as needed.      scopolamine (TRANSDERM-SCOP) 1.3-1.5 mg (1 mg over 3 days) Place 1 patch onto the skin every 72 hours. (Patient not taking: Reported on 1/5/2023) 10 patch 1    triamcinolone acetonide 0.1% (KENALOG) 0.1 % cream Apply topically 2 (two) times daily. (Patient not taking: Reported on 1/5/2023) 45 g 1     Current Facility-Administered Medications on File Prior to Visit   Medication Dose Route Frequency Provider Last Rate Last Admin    ondansetron injection 4 mg  4 mg Intravenous Daily PRN Gustavo Chu MD             REVIEW OF SYSTEMS:   GENERAL: No fever, chills, fatigability or weight changes.   SKIN/BREASTS: No rashes, sores, itching or changes in color or texture of skin. No changes in moles. No pain, swelling, tenderness, lumps, bleeding or discharge from nipples.   HEAD: No headaches or recent head trauma.   EYES: Visual acuity fine. Denies blurriness, tearing, itching, photophobia, diplopia, or visual changes.   EARS: Denies ear pain, discharge, tinnitus or vertigo. Denies hearing loss.   NOSE: No loss of smell, epistaxis, postnasal drip, discharge, obstruction, or sneezing.   MOUTH & THROAT: No hoarseness, change in voice, swallowing difficulty. No excessive gum bleeding.   HEMATOLOGICAL/NODES: Denies swollen glands. No bleeding or bruising.   CHEST: No CHANDRA,  "cyanosis, wheezing, cough or sputum production.   CARDIOVASCULAR: Denies chest pain, orthopnea, or palpitations.   GI/ABDOMEN: Appetite fine. No weight loss. Denies nausea, vomiting, diarrhea, constipation, abdominal pain, hematemesis or blood in stool.   URINARY: No dysuria,hematuria, nocturia, incontinence, flank pain, urgency, or urinary difficulty.   PERIPHERAL VASCULAR: No claudication, cold intolerance or cyanosis.   MUSCULOSKELETAL: Some intermittent right wrist pain, low back pain.  NEUROLOGIC: No history of seizures, paralysis, alteration of gait or coordination. Some intermittent numbness in the left 2nd and 3rd fingers with driving that impoves with changes in position.  ENDOCRINE: Sexual maturation. Denies weight change, heat/cold intolerance, hair loss or gain, loss of libido, polyuria, polydipsia, polyphagia, pruritus, unexplained flushing, or excessive sweating.   PSYCH: No crying spells. Denies anxiety symptoms.     IMMUNIZATIONS: Up-to-date.    PHYSICAL EXAM:     /70   Pulse 74   Temp 97.6 °F (36.4 °C) (Oral)   Resp 18   Ht 5' 10" (1.778 m)   Wt 107.7 kg (237 lb 7 oz)   SpO2 95%   BMI 34.07 kg/m²   GENERAL: This is a healthy-appearing 66-year-old white male, sitting   upright, in no apparent distress. Alert and oriented x4.   SKIN: Reveals erythematous rash over right breast. No suspicious neoplasms.   NEUROLOGICALLY: Cranial nerves 2-12 are grossly intact with no sensory or motor deficits.   PSYCHOLOGICALLY: Mood and affect are normal. Insight and judgment are   excellent. Appearance is well groomed.   Pupils are equal, round and reactive to light. Extraocular movements are intact.   NECK: Supple. There is no lymphadenopathy, thyromegaly or JVD.   CHEST: Clear to auscultation bilaterally with good respiratory movement.   CARDIOVASCULAR: S1, S2. Regular rate and rhythm. No murmurs, rubs or gallops.   EXTREMITIES: Show no cyanosis, clubbing or edema with 2+ distal pulses. "   MUSCULOSKELETAL: Reveals full range of motion in all extremities.    rigth ankle: FROM; pain with resisted eversion over lateral aspect of ankle; no edema or deformity; no laxity      Results for orders placed or performed in visit on 10/13/22   POCT URINE DIPSTICK WITHOUT MICROSCOPE   Result Value Ref Range    Color, UA Yellow     pH, UA 7     WBC, UA neg     Nitrite, UA neg     Protein, POC neg     Glucose, UA neg     Ketones, UA neg     Urobilinogen, UA 0.2     Bilirubin, POC neg     Blood, UA neg     Clarity, UA Clear     Spec Grav UA 1.015      ASSESSMENT/PLAN:     Hypertension, unspecified type  -     Comprehensive Metabolic Panel; Future; Expected date: 01/05/2023  -     CBC Auto Differential; Future; Expected date: 01/05/2023  -     Lipid Panel; Future; Expected date: 01/05/2023    RLS (restless legs syndrome)    Malignant neoplasm of urinary bladder, unspecified site    Encounter for abdominal aortic aneurysm (AAA) screening  -     US Abdominal Aorta; Future; Expected date: 01/05/2023    Prostate cancer screening  -     PSA, Screening; Future; Expected date: 01/05/2023    Preventative health care  -     (In Office Administered) Pneumococcal Conjugate Vaccine (20 Valent) (IM)    Encounter for screening for cardiovascular disorders  -     CT Cardiac Scoring; Future; Expected date: 01/05/2023    CHANDRA (dyspnea on exertion)  -     Exercise Stress - EKG; Future    Obstructive sleep apnea  -     Home Sleep Study; Future  -     Ambulatory referral/consult to Sleep Disorders; Future; Expected date: 01/12/2023    Other orders  -     Influenza - Quadrivalent (Adjuvanted)        Labs today  continue micardis HCT  continue Lexapro 10mg  Continue omeprazole  Continue home BP monitoring   MIrapex to 1mg  Discussed healthy lifestyle measures including maintenance of a healthy weight, increasing fruits and vegetables in the diet.   F/u 12 months

## 2023-01-09 ENCOUNTER — CLINICAL SUPPORT (OUTPATIENT)
Dept: CARDIOLOGY | Facility: HOSPITAL | Age: 68
End: 2023-01-09
Attending: FAMILY MEDICINE
Payer: MEDICARE

## 2023-01-09 VITALS — HEIGHT: 70 IN | WEIGHT: 237 LBS | BODY MASS INDEX: 33.93 KG/M2

## 2023-01-09 DIAGNOSIS — R06.09 DOE (DYSPNEA ON EXERTION): ICD-10-CM

## 2023-01-09 PROCEDURE — 93018 CV STRESS TEST I&R ONLY: CPT | Mod: ,,, | Performed by: INTERNAL MEDICINE

## 2023-01-09 PROCEDURE — 93017 CV STRESS TEST TRACING ONLY: CPT | Mod: PO

## 2023-01-09 PROCEDURE — 93016 EXERCISE STRESS - EKG (CUPID ONLY): ICD-10-PCS | Mod: ,,, | Performed by: INTERNAL MEDICINE

## 2023-01-09 PROCEDURE — 93018 EXERCISE STRESS - EKG (CUPID ONLY): ICD-10-PCS | Mod: ,,, | Performed by: INTERNAL MEDICINE

## 2023-01-09 PROCEDURE — 93016 CV STRESS TEST SUPVJ ONLY: CPT | Mod: ,,, | Performed by: INTERNAL MEDICINE

## 2023-01-10 ENCOUNTER — OFFICE VISIT (OUTPATIENT)
Dept: ORTHOPEDICS | Facility: CLINIC | Age: 68
End: 2023-01-10
Payer: MEDICARE

## 2023-01-10 ENCOUNTER — HOSPITAL ENCOUNTER (OUTPATIENT)
Dept: RADIOLOGY | Facility: HOSPITAL | Age: 68
Discharge: HOME | End: 2023-01-10
Attending: FAMILY MEDICINE
Payer: MEDICARE

## 2023-01-10 DIAGNOSIS — Z13.6 ENCOUNTER FOR SCREENING FOR CARDIOVASCULAR DISORDERS: ICD-10-CM

## 2023-01-10 DIAGNOSIS — S61.411D SKIN TEAR OF HAND WITHOUT COMPLICATION, RIGHT, SUBSEQUENT ENCOUNTER: Primary | ICD-10-CM

## 2023-01-10 LAB
CV STRESS BASE HR: 71 BPM
DIASTOLIC BLOOD PRESSURE: 91 MMHG
OHS CV CPX 1 MINUTE RECOVERY HEART RATE: 113 BPM
OHS CV CPX 85 PERCENT MAX PREDICTED HEART RATE MALE: 132
OHS CV CPX ESTIMATED METS: 8
OHS CV CPX MAX PREDICTED HEART RATE: 155
OHS CV CPX PATIENT IS FEMALE: 0
OHS CV CPX PATIENT IS MALE: 1
OHS CV CPX PEAK DIASTOLIC BLOOD PRESSURE: 56 MMHG
OHS CV CPX PEAK HEAR RATE: 133 BPM
OHS CV CPX PEAK RATE PRESSURE PRODUCT: NORMAL
OHS CV CPX PEAK SYSTOLIC BLOOD PRESSURE: 197 MMHG
OHS CV CPX PERCENT MAX PREDICTED HEART RATE ACHIEVED: 86
OHS CV CPX RATE PRESSURE PRODUCT PRESENTING: NORMAL
STRESS ECHO POST EXERCISE DUR MIN: 4 MINUTES
STRESS ECHO POST EXERCISE DUR SEC: 19 SECONDS
SYSTOLIC BLOOD PRESSURE: 176 MMHG

## 2023-01-10 PROCEDURE — 99213 OFFICE O/P EST LOW 20 MIN: CPT | Mod: S$PBB,,, | Performed by: PHYSICIAN ASSISTANT

## 2023-01-10 PROCEDURE — 99999 PR PBB SHADOW E&M-EST. PATIENT-LVL III: ICD-10-PCS | Mod: PBBFAC,,, | Performed by: PHYSICIAN ASSISTANT

## 2023-01-10 PROCEDURE — 75571 CT HRT W/O DYE W/CA TEST: CPT | Mod: 26,,, | Performed by: RADIOLOGY

## 2023-01-10 PROCEDURE — 99213 PR OFFICE/OUTPT VISIT, EST, LEVL III, 20-29 MIN: ICD-10-PCS | Mod: S$PBB,,, | Performed by: PHYSICIAN ASSISTANT

## 2023-01-10 PROCEDURE — 99213 OFFICE O/P EST LOW 20 MIN: CPT | Mod: PBBFAC,PN | Performed by: PHYSICIAN ASSISTANT

## 2023-01-10 PROCEDURE — 75571 CT HRT W/O DYE W/CA TEST: CPT | Mod: TC,PO

## 2023-01-10 PROCEDURE — 75571 CT CALCIUM SCORING CARDIAC: ICD-10-PCS | Mod: 26,,, | Performed by: RADIOLOGY

## 2023-01-10 PROCEDURE — 99999 PR PBB SHADOW E&M-EST. PATIENT-LVL III: CPT | Mod: PBBFAC,,, | Performed by: PHYSICIAN ASSISTANT

## 2023-01-10 NOTE — PROGRESS NOTES
1/10/2023    HPI:  Moustapha Murray is a 67 y.o. male, who presents to clinic today for continued evaluation of his right hand skin tear 2nd to his Dupuytren's contracture release via needle fasciotomy.  He is approximately 5 weeks status post procedure.  States pain on average is 0/10.  States he is continued to perform the daily dressing changes and daily wound washing as instructed.  States overall he is doing very well.  Denies any other complaints at this time.    PMHX:  Past Medical History:   Diagnosis Date    Bladder cancer 08/2020    Colon polyps     DDD (degenerative disc disease), lumbar     Depression     Fatty liver     GERD (gastroesophageal reflux disease)     H/O motion sickness     Hypertension     PONV (postoperative nausea and vomiting)     RLS (restless legs syndrome)        PSHX:  Past Surgical History:   Procedure Laterality Date    APPENDECTOMY      CARPAL TUNNEL RELEASE      CERVICAL FUSION      CHOLECYSTECTOMY      CIRCUMCISION      COLONOSCOPY N/A 3/16/2020    Procedure: COLONOSCOPY;  Surgeon: Braulio Best MD;  Location: [facility];  Service: Endoscopy;  Laterality: N/A; 3 colon polyps removed; biopsy: tubular adenoma and hyperplastic; repeat in 5 years for surveillance    CYSTOSCOPY W/ RETROGRADES Bilateral 8/24/2020    Procedure: CYSTOSCOPY, WITH RETROGRADE PYELOGRAM -WITH URETHRAL DILATION;  Surgeon: Greg Claudio MD;  Location: [facility] OR;  Service: Urology;  Laterality: Bilateral;    CYSTOSCOPY W/ URETERAL STENT PLACEMENT Right 8/24/2020    Procedure: CYSTOSCOPY, WITH URETERAL STENT INSERTION;  Surgeon: Greg Claudio MD;  Location: [facility] OR;  Service: Urology;  Laterality: Right;    INSTILLATION OF URINARY BLADDER N/A 8/24/2020    Procedure: INSTILLATION, BLADDER - Mitomycin;  Surgeon: Greg Claudio MD;  Location: [facility] OR;  Service: Urology;  Laterality: N/A;    TRANSFORAMINAL EPIDURAL INJECTION OF STEROID Right 7/17/2019    Procedure:  Injection,steroid,epidural,transforaminal approach L3-4, L4-5;  Surgeon: Chris Palencia MD;  Location: [facility] OR;  Service: Pain Management;  Laterality: Right;    TRANSFORAMINAL EPIDURAL INJECTION OF STEROID Right 2019    Procedure: Injection,steroid,epidural,transforaminal approach;  Surgeon: Chris Palencia MD;  Location: [facility] OR;  Service: Pain Management;  Laterality: Right;  L3-4, L4-L5    WISDOM TOOTH EXTRACTION         FMHX:  Family History   Problem Relation Age of Onset    Stroke Mother     Ulcerative colitis Mother     Cancer Neg Hx     Colon cancer Neg Hx     Crohn's disease Neg Hx     Celiac disease Neg Hx        SOCHX:  Social History     Tobacco Use    Smoking status: Former     Packs/day: 1.50     Types: Cigarettes     Quit date: 10/16/2013     Years since quittin.2    Smokeless tobacco: Never   Substance Use Topics    Alcohol use: Yes     Alcohol/week: 2.0 standard drinks     Types: 2 Cans of beer per week       ALLERGIES:  Sulfa (sulfonamide antibiotics)    CURRENT MEDICATIONS:  Current Outpatient Medications on File Prior to Visit   Medication Sig Dispense Refill    EScitalopram oxalate (LEXAPRO) 10 MG tablet TAKE 1 TABLET BY MOUTH EVERY DAY 90 tablet 3    hydroCHLOROthiazide (HYDRODIURIL) 12.5 MG Tab Take 1 tablet (12.5 mg total) by mouth once daily. 90 tablet 3    Lactobac no.41/Bifidobact no.7 (PROBIOTIC-10 ORAL) Take by mouth once daily.       melatonin 5 mg Cap Take by mouth nightly as needed. TAKE AS SCHEDULED      methocarbamoL (ROBAXIN) 500 MG Tab 500 mg daily as needed.      multivitamin (THERAGRAN) per tablet Take 1 tablet by mouth once daily.       naproxen sodium (ANAPROX) 220 MG tablet Take 220 mg by mouth 2 (two) times daily as needed.       omeprazole (PRILOSEC) 40 MG capsule TAKE 1 CAPSULE BY MOUTH EVERY DAY 90 capsule 0    pramipexole (MIRAPEX) 1 MG tablet TAKE 1 TABLET BY MOUTH EVERY EVENING. 90 tablet 3    scopolamine (TRANSDERM-SCOP) 1.3-1.5 mg (1 mg over 3 days) Place 1 patch  onto the skin every 72 hours. 10 patch 1    telmisartan (MICARDIS) 40 MG Tab Take 1 tablet (40 mg total) by mouth once daily. 90 tablet 3    triamcinolone acetonide 0.1% (KENALOG) 0.1 % cream Apply topically 2 (two) times daily. 45 g 1    acetaZOLAMIDE (DIAMOX) 250 MG tablet Take 1 tablet (250 mg total) by mouth 2 (two) times daily. 60 tablet 0     Current Facility-Administered Medications on File Prior to Visit   Medication Dose Route Frequency Provider Last Rate Last Admin    ondansetron injection 4 mg  4 mg Intravenous Daily PRN Gustavo Chu MD           REVIEW OF SYSTEMS:  Review of Systems Complete; Negative, unless noted above.    GENERAL PHYSICAL EXAM:   There were no vitals taken for this visit.   GEN: well developed, well nourished, no acute distress   PULM: No wheezing, no respiratory distress   CV: RRR    ORTHO EXAM:   Examination of the right hand reveals a well-healed skin tear wound.  No edema, erythema, ecchymosis, or skin breakdown noted.  No fluctuance, drainage, purulence, or any other signs of infection.  Able make composite fist and fully extend all fingers, except for the right little finger which has a very mild contracture with loss of approximately 10° of extension.  5/5 /intrinsic strength.  Sensation is grossly intact in the radial, ulnar, median nerve distributions.  Capillary refill less than 2 seconds    RADIOLOGY:   None.    ASSESSMENT:   Right hand skin tear wound with routine healing    PLAN:  1. I discussed with Moustapha Murray is progressed very well in the treatment course we discussed the best course of action this time is to return to normal activity as tolerated.  He verbally agreed with the treatment plan.      2.  I would like him follow-up in clinic on a p.r.n. basis for any worsening of his symptoms or for any hand, wrist, or elbow problems/concerns.  He was instructed to contact the clinic for any problems or concerns in the interim.

## 2023-01-11 ENCOUNTER — HOSPITAL ENCOUNTER (OUTPATIENT)
Dept: RADIOLOGY | Facility: HOSPITAL | Age: 68
Discharge: HOME | End: 2023-01-11
Attending: FAMILY MEDICINE
Payer: MEDICARE

## 2023-01-11 ENCOUNTER — PATIENT MESSAGE (OUTPATIENT)
Dept: FAMILY MEDICINE | Facility: CLINIC | Age: 68
End: 2023-01-11
Payer: MEDICARE

## 2023-01-11 DIAGNOSIS — Z91.89 HIGH RISK OF CARDIAC EVENT: Primary | ICD-10-CM

## 2023-01-11 DIAGNOSIS — R93.1 ABNORMAL SCREENING CARDIAC CT: ICD-10-CM

## 2023-01-11 DIAGNOSIS — Z13.6 ENCOUNTER FOR ABDOMINAL AORTIC ANEURYSM (AAA) SCREENING: ICD-10-CM

## 2023-01-11 PROCEDURE — 76775 US EXAM ABDO BACK WALL LIM: CPT | Mod: TC,PO

## 2023-01-11 PROCEDURE — 76775 US EXAM ABDO BACK WALL LIM: CPT | Mod: 26,,, | Performed by: RADIOLOGY

## 2023-01-11 PROCEDURE — 76775 US ABDOMINAL AORTA: ICD-10-PCS | Mod: 26,,, | Performed by: RADIOLOGY

## 2023-01-25 ENCOUNTER — OFFICE VISIT (OUTPATIENT)
Dept: CARDIOLOGY | Facility: CLINIC | Age: 68
End: 2023-01-25
Payer: MEDICARE

## 2023-01-25 VITALS
HEIGHT: 70 IN | HEART RATE: 71 BPM | DIASTOLIC BLOOD PRESSURE: 73 MMHG | WEIGHT: 239.88 LBS | BODY MASS INDEX: 34.34 KG/M2 | SYSTOLIC BLOOD PRESSURE: 111 MMHG

## 2023-01-25 DIAGNOSIS — I25.118 CORONARY ARTERY DISEASE OF NATIVE ARTERY OF NATIVE HEART WITH STABLE ANGINA PECTORIS: ICD-10-CM

## 2023-01-25 DIAGNOSIS — I10 PRIMARY HYPERTENSION: ICD-10-CM

## 2023-01-25 DIAGNOSIS — R93.1 AGATSTON CAC SCORE, >400: ICD-10-CM

## 2023-01-25 DIAGNOSIS — R93.1 AGATSTON CAC SCORE, >400: Primary | ICD-10-CM

## 2023-01-25 DIAGNOSIS — R93.1 ABNORMAL SCREENING CARDIAC CT: ICD-10-CM

## 2023-01-25 DIAGNOSIS — R06.09 DOE (DYSPNEA ON EXERTION): ICD-10-CM

## 2023-01-25 DIAGNOSIS — Z91.89 HIGH RISK OF CARDIAC EVENT: Primary | ICD-10-CM

## 2023-01-25 PROCEDURE — 99999 PR PBB SHADOW E&M-EST. PATIENT-LVL III: ICD-10-PCS | Mod: PBBFAC,,, | Performed by: INTERNAL MEDICINE

## 2023-01-25 PROCEDURE — 99999 PR PBB SHADOW E&M-EST. PATIENT-LVL III: CPT | Mod: PBBFAC,,, | Performed by: INTERNAL MEDICINE

## 2023-01-25 PROCEDURE — 99205 PR OFFICE/OUTPT VISIT, NEW, LEVL V, 60-74 MIN: ICD-10-PCS | Mod: S$PBB,,, | Performed by: INTERNAL MEDICINE

## 2023-01-25 PROCEDURE — 99205 OFFICE O/P NEW HI 60 MIN: CPT | Mod: S$PBB,,, | Performed by: INTERNAL MEDICINE

## 2023-01-25 PROCEDURE — 99213 OFFICE O/P EST LOW 20 MIN: CPT | Mod: PBBFAC,PO | Performed by: INTERNAL MEDICINE

## 2023-01-25 RX ORDER — SODIUM CHLORIDE 0.9 % (FLUSH) 0.9 %
10 SYRINGE (ML) INJECTION
Status: DISCONTINUED | OUTPATIENT
Start: 2023-01-25 | End: 2023-01-27 | Stop reason: CLARIF

## 2023-01-25 RX ORDER — ROSUVASTATIN CALCIUM 40 MG/1
40 TABLET, COATED ORAL NIGHTLY
Qty: 90 TABLET | Refills: 3 | Status: ON HOLD | OUTPATIENT
Start: 2023-01-25 | End: 2023-02-01 | Stop reason: SDUPTHER

## 2023-01-25 RX ORDER — NAPROXEN SODIUM 220 MG/1
81 TABLET, FILM COATED ORAL DAILY
Qty: 90 TABLET | Refills: 3 | Status: SHIPPED | OUTPATIENT
Start: 2023-01-25

## 2023-01-25 RX ORDER — SODIUM CHLORIDE 9 MG/ML
INJECTION, SOLUTION INTRAVENOUS ONCE
Status: CANCELLED | OUTPATIENT
Start: 2023-01-25 | End: 2023-01-25

## 2023-01-25 NOTE — PATIENT INSTRUCTIONS
Start aspirin 81 mg daily  Start rosuvastatin 40 mg once daily  Will schedule the coronary angiogram at [facility]           Angiogram    Arrive for procedure at: [facility]. 1/31/22 @ 8 AM.  YOU MAY ENTER THROUGH THE MAIN ENTRANCE.  LET THEM KNOW YOU ARE THERE FOR AN OUTPATIENT PROCEDURE.  THE PROCEDURE WILL START AT 10 AM WITH DR. LISA JARRELL.    You will receive a phone call from [facility] Pre-Op Department with further instructions prior to your scheduled procedure.      FASTING: You MAY NOT have anything to eat or drink AFTER MIDNIGHT the day before your procedure.       MEDICATIONS: You may take your regular morning medications with water. If there are any medications that you should not take, you will be instructed to hold them for that morning.    CARDIOLOGY PRE-PROCEDURE MEDICATION ORDERS:  ** Please hold any medications that are checked below:    HOLD   # OF DAYS TO HOLD  HCTZ        MORNING OF PROCEDURE    WHAT TO EXPECT:    How long will the procedure take?  The procedure will take an average of 1 - 2 hours to perform.  After the procedure, you will need to lay flat for around 4 - 6 hours to minimize bleeding from the puncture site. If the wrist is accessed you will need to keep your arm still as instructed by the nurse.    When can I go home?  You may be able to be discharged home that same afternoon if there were no complications.  If you have one of the following: balloon; stent; pacemaker or defibrillator procedures, you may spend one night for observation.  Your doctor will determine your discharge based upon your progress.  The results of your procedure will be discussed with you before you are discharged.  Any further testing or procedures will be scheduled for you either before you leave or you will be instructed to call for a future appointment.      TRANSPORTATION:  PLEASE ARRANGE TO HAVE SOMEONE DRIVE YOU HOME FOLLOWING YOUR PROCEDURE, YOU WILL NOT BE ALLOWED TO  DRIVE.

## 2023-01-25 NOTE — PROGRESS NOTES
Cardiology Clinic Note      Patient: Moustapha Murray, 1955, [id number]  Primary Care Provider: Colton Johnson MD     Chief Complaint/Reason for Referral: abnormal CACS     Subjective:       Moustapha Murray is a 67 y.o. male who presents for establishing care. He is accompanied by his wife    No hematuria. No COPD. Had CACS because of CHANDRA which is still there upon moderate exertion. This has been ongoing  for 3 months. Making the bed, elliptical are things that limit him that did not limit him before. No syncope. No no presyncope. No edema. Bilateral calf claudication a block on level ground. No palps.     Focused Past History includes:  Exercise stress EKG 1/2023 by report: negative at 86% MPHR, 8 METs.   CACS 2317 by CT 1/2023  HTN  GERD  Bladder cancer treated with surgery and BCG; no systemic chemo or XRT - 2022  No AAA by US 1/2023  Former smoker of 60 pack-years - quit in 2015  No history of stroke, DM  No family history of premature CAD    Review of Systems  Constitutional: negative for fevers, night sweats, and weight loss  Eyes: negative for visual disturbance, diplopia  Respiratory: negative for cough, hemoptysis, sputum, and wheezing  Cardiovascular: see HPI  Gastrointestinal: negative for abdominal pain, bright red blood per rectum, change in bowel habits, dysphagia, melena, and reflux symptoms  Genitourinary:negative for dysuria, frequency, and hematuria  Hematologic/lymphatic: negative for bleeding, easy bruising, and lymphadenopathy  Musculoskeletal:negative for arthralgias, back pain, and myalgias  Neurological: negative for gait problems, paresthesia, speech problems, vertigo, and weakness  Behavioral/Psych: negative for excessive alcohol consumption, illegal drug usage, and sleep disturbance    ----------------------------  Bladder cancer  Colon polyps  DDD (degenerative disc disease), lumbar  Depression  Fatty liver  GERD (gastroesophageal reflux disease)  H/O motion  sickness  Hypertension  PONV (postoperative nausea and vomiting)  RLS (restless legs syndrome)  ----------------------------  Appendectomy  Carpal tunnel release  Cervical fusion  Cholecystectomy  Circumcision  Colonoscopy      Comment:  Procedure: COLONOSCOPY;  Surgeon: Braulio Best MD;  Location: [facility];  Service: Endoscopy;                 Laterality: N/A; 3 colon polyps removed; biopsy: tubular                adenoma and hyperplastic; repeat in 5 years for                surveillance  Cystoscopy w/ retrogrades      Comment:  Procedure: CYSTOSCOPY, WITH RETROGRADE PYELOGRAM -WITH                URETHRAL DILATION;  Surgeon: Greg Claudio MD;                 Location: [facility] OR;  Service: Urology;  Laterality:                Bilateral;  Cystoscopy w/ ureteral stent placement      Comment:  Procedure: CYSTOSCOPY, WITH URETERAL STENT INSERTION;                 Surgeon: Greg Claudio MD;  Location: [facility] OR;                 Service: Urology;  Laterality: Right;  Instillation of urinary bladder      Comment:  Procedure: INSTILLATION, BLADDER - Mitomycin;  Surgeon:                Greg Claudio MD;  Location: [facility] OR;  Service:                Urology;  Laterality: N/A;  Transforaminal epidural injection of steroid      Comment:  Procedure: Injection,steroid,epidural,transforaminal                approach L3-4, L4-5;  Surgeon: Chris Palencia MD;  Location:               [facility] OR;  Service: Pain Management;  Laterality: Right;  Transforaminal epidural injection of steroid      Comment:  Procedure: Injection,steroid,epidural,transforaminal                approach;  Surgeon: Chris Palencia MD;  Location: [facility] OR;                 Service: Pain Management;  Laterality: Right;  L3-4,                L4-L5  [place] tooth extraction     Family History   Problem Relation Age of Onset    Stroke Mother     Ulcerative colitis Mother     Cancer Neg Hx     Colon cancer Neg Hx     Crohn's disease Neg Hx      Celiac disease Neg Hx      Social History     Tobacco Use    Smoking status: Former     Packs/day: 1.50     Types: Cigarettes     Quit date: 10/16/2013     Years since quittin.2    Smokeless tobacco: Never   Substance Use Topics    Alcohol use: Yes     Alcohol/week: 2.0 standard drinks     Types: 2 Cans of beer per week    Drug use: Not Currently       Current Outpatient Medications   Medication Sig Dispense Refill    EScitalopram oxalate (LEXAPRO) 10 MG tablet TAKE 1 TABLET BY MOUTH EVERY DAY 90 tablet 3    hydroCHLOROthiazide (HYDRODIURIL) 12.5 MG Tab Take 1 tablet (12.5 mg total) by mouth once daily. 90 tablet 3    Lactobac no.41/Bifidobact no.7 (PROBIOTIC-10 ORAL) Take by mouth once daily.       melatonin 5 mg Cap Take by mouth nightly as needed. TAKE AS SCHEDULED      methocarbamoL (ROBAXIN) 500 MG Tab 500 mg daily as needed.      multivitamin (THERAGRAN) per tablet Take 1 tablet by mouth once daily.       naproxen sodium (ANAPROX) 220 MG tablet Take 220 mg by mouth 2 (two) times daily as needed.       omeprazole (PRILOSEC) 40 MG capsule TAKE 1 CAPSULE BY MOUTH EVERY DAY 90 capsule 0    pramipexole (MIRAPEX) 1 MG tablet TAKE 1 TABLET BY MOUTH EVERY EVENING. 90 tablet 3    scopolamine (TRANSDERM-SCOP) 1.3-1.5 mg (1 mg over 3 days) Place 1 patch onto the skin every 72 hours. 10 patch 1    telmisartan (MICARDIS) 40 MG Tab Take 1 tablet (40 mg total) by mouth once daily. 90 tablet 3    triamcinolone acetonide 0.1% (KENALOG) 0.1 % cream Apply topically 2 (two) times daily. 45 g 1    aspirin 81 MG Chew Take 1 tablet (81 mg total) by mouth once daily. 90 tablet 3    rosuvastatin (CRESTOR) 40 MG Tab Take 1 tablet (40 mg total) by mouth every evening. 90 tablet 3     Current Facility-Administered Medications   Medication Dose Route Frequency Provider Last Rate Last Admin    sodium chloride 0.9% flush 10 mL  10 mL Intravenous PRN Nitin Wheat MD         Facility-Administered Medications Ordered in Other Visits  "  Medication Dose Route Frequency Provider Last Rate Last Admin    ondansetron injection 4 mg  4 mg Intravenous Daily PRN Gustavo Chu MD            Objective:      Physical Exam  /73 (BP Location: Left arm, Patient Position: Sitting, BP Method: Large (Automatic))   Pulse 71   Ht 5' 10" (1.778 m)   Wt 108.8 kg (239 lb 13.8 oz)   BMI 34.42 kg/m²   Body surface area is 2.32 meters squared.  Body mass index is 34.42 kg/m².    General appearance: alert, appears stated age, cooperative, and no distress  Head: Normocephalic, without obvious abnormality, atraumatic  Neck: no carotid bruit, no JVD, and supple, symmetrical, trachea midline  Lungs: clear to auscultation bilaterally  Heart: regular rate and rhythm; S1, S2 normal, no murmur, click, rub or gallop  Abdomen: soft, non-tender, no distended  Extremities: extremities atraumatic, no pitting edema  Skin: warm, no cyanosis, no pathologic ecchymosis in exposed portions  Neurologic: Grossly normal. A&O x3      Lab Review   Lab Results   Component Value Date    WBC 7.96 01/05/2023    HGB 14.0 01/05/2023    HCT 42.9 01/05/2023    MCV 95 01/05/2023     01/05/2023         BMP  Lab Results   Component Value Date     01/05/2023    K 4.4 01/05/2023     01/05/2023    CO2 28 01/05/2023    BUN 20 01/05/2023    CREATININE 1.4 01/05/2023    CALCIUM 10.0 01/05/2023    ANIONGAP 7 (L) 01/05/2023    ESTGFRAFRICA >60 03/11/2022    EGFRNONAA >60 03/11/2022       Lab Results   Component Value Date    ALBUMIN 3.8 01/05/2023       Lab Results   Component Value Date    ALT 38 01/05/2023    AST 33 01/05/2023    ALKPHOS 134 01/05/2023    BILITOT 0.5 01/05/2023       Lab Results   Component Value Date    TSH 1.63 07/06/2011       Lab Results   Component Value Date    CHOL 219 (H) 01/05/2023    CHOL 190 12/11/2020    CHOL 207 (H) 10/24/2019     Lab Results   Component Value Date    HDL 59 01/05/2023    HDL 49 12/11/2020    HDL 50 10/24/2019     Lab Results "   Component Value Date    LDLCALC 141.4 01/05/2023    LDLCALC 124.6 12/11/2020    LDLCALC 140.2 10/24/2019     Lab Results   Component Value Date    TRIG 93 01/05/2023    TRIG 82 12/11/2020    TRIG 84 10/24/2019     Lab Results   Component Value Date    CHOLHDL 26.9 01/05/2023    CHOLHDL 25.8 12/11/2020    CHOLHDL 24.2 10/24/2019      Assessment & Plan:      This is a 67 y.o. pleasant  male with class 3 dyspnea on exertion for the past 3 months that was not present previously.  His coronary artery calcium score was 2317.  Review of the calcium study reveals extensive three-vessel calcification.  He underwent a treadmill stress EKG.  I reviewed the strips.  He reached 86% MPHR and was limited by dyspnea.  There were frequent PVCs at peak exercise and significant baseline artifact.  While I do not disagree with the fact that the stress was negative for ischemia at the achieved workload I am very concerned about the sensitivity in this symptomatic patient.  I advised the patient that I believe an invasive coronary angiogram or repeat stress testing with myocardial perfusion scan would be the appropriate next steps.  He elects to proceed with invasive coronary angiography and I agree.  We went over the risks and benefits and the possible outcomes including medical management, PCI or CABG.    1. Agatston CAC score, >400  aspirin 81 MG Chew    rosuvastatin (CRESTOR) 40 MG Tab      2. Coronary artery disease of native artery of native heart with stable angina pectoris  Ambulatory referral/consult to Cardiology      3. Abnormal screening cardiac CT  Ambulatory referral/consult to Cardiology      4. Primary hypertension             Coronary angiogram as above  Start aspirin 81 mg daily  Start rosuvastatin 40 mg daily  Continue telmisartan and HCTZ as is  Emphasized the importance of modifying lifestyle related risk factors including limiting alcohol intake, exercise, diet most resembling a Mediterranean diet.    I  appreciate the opportunity to participate in Moustapha Mercado Trevor 's care today.  Please follow up with me in 6 weeks.      Nitin Wheat MD, [facility]  Interventional Cardiology/Structural Heart Disease  [facility]  Office: [phone]     Parts of this note were completed using voice recognition software. Please excuse any misspellings or syntax errors and reach out to me with questions.

## 2023-01-31 PROBLEM — I25.110 ATHEROSCLEROSIS OF NATIVE CORONARY ARTERY OF NATIVE HEART WITH UNSTABLE ANGINA PECTORIS: Status: ACTIVE | Noted: 2023-01-31

## 2023-01-31 PROBLEM — Z71.89 ACP (ADVANCE CARE PLANNING): Status: ACTIVE | Noted: 2023-01-31

## 2023-02-01 ENCOUNTER — TELEPHONE (OUTPATIENT)
Dept: CARDIOLOGY | Facility: CLINIC | Age: 68
End: 2023-02-01
Payer: MEDICARE

## 2023-02-01 DIAGNOSIS — I25.118 CORONARY ARTERY DISEASE OF NATIVE ARTERY OF NATIVE HEART WITH STABLE ANGINA PECTORIS: Primary | ICD-10-CM

## 2023-02-01 DIAGNOSIS — R93.1 ABNORMAL SCREENING CARDIAC CT: ICD-10-CM

## 2023-02-01 DIAGNOSIS — Z91.89 HIGH RISK OF CARDIAC EVENT: ICD-10-CM

## 2023-02-01 DIAGNOSIS — I28.8 OTHER DISEASES OF PULMONARY VESSELS: ICD-10-CM

## 2023-02-01 DIAGNOSIS — I25.110 ATHEROSCLEROSIS OF NATIVE CORONARY ARTERY WITH UNSTABLE ANGINA PECTORIS, UNSPECIFIED WHETHER NATIVE OR TRANSPLANTED HEART: ICD-10-CM

## 2023-02-01 DIAGNOSIS — R06.09 DOE (DYSPNEA ON EXERTION): ICD-10-CM

## 2023-02-01 RX ORDER — SODIUM CHLORIDE 9 MG/ML
INJECTION, SOLUTION INTRAVENOUS ONCE
Status: CANCELLED | OUTPATIENT
Start: 2023-02-01 | End: 2023-02-01

## 2023-02-01 RX ORDER — SODIUM CHLORIDE 0.9 % (FLUSH) 0.9 %
10 SYRINGE (ML) INJECTION
Status: SHIPPED | OUTPATIENT
Start: 2023-02-01

## 2023-02-01 NOTE — TELEPHONE ENCOUNTER
----- Message from Nitin Wheat MD sent at 1/31/2023  6:34 PM CST -----  Can you schedule him for outpatient PCI with me in 2-4 weeks please

## 2023-02-02 PROBLEM — Z95.9 STATUS POST ARTERIAL STENT: Status: ACTIVE | Noted: 2023-02-02

## 2023-02-02 PROBLEM — K21.9 GASTROESOPHAGEAL REFLUX DISEASE WITHOUT ESOPHAGITIS: Status: ACTIVE | Noted: 2023-02-02

## 2023-02-02 PROBLEM — I25.10 CORONARY ARTERY DISEASE: Status: ACTIVE | Noted: 2023-02-02

## 2023-02-02 PROBLEM — Z95.5 S/P CORONARY ARTERY STENT PLACEMENT: Status: ACTIVE | Noted: 2023-02-02

## 2023-02-02 PROBLEM — Z95.5 S/P DRUG ELUTING CORONARY STENT PLACEMENT: Status: ACTIVE | Noted: 2023-02-02

## 2023-02-07 ENCOUNTER — PATIENT MESSAGE (OUTPATIENT)
Dept: FAMILY MEDICINE | Facility: CLINIC | Age: 68
End: 2023-02-07
Payer: MEDICARE

## 2023-02-07 DIAGNOSIS — M54.50 MIDLINE LOW BACK PAIN WITHOUT SCIATICA, UNSPECIFIED CHRONICITY: Primary | ICD-10-CM

## 2023-02-09 ENCOUNTER — HOSPITAL ENCOUNTER (OUTPATIENT)
Dept: RADIOLOGY | Facility: HOSPITAL | Age: 68
Discharge: HOME | End: 2023-02-09
Attending: FAMILY MEDICINE
Payer: MEDICARE

## 2023-02-09 DIAGNOSIS — M54.50 MIDLINE LOW BACK PAIN WITHOUT SCIATICA, UNSPECIFIED CHRONICITY: ICD-10-CM

## 2023-02-09 PROCEDURE — 72114 X-RAY EXAM L-S SPINE BENDING: CPT | Mod: 26,,, | Performed by: RADIOLOGY

## 2023-02-09 PROCEDURE — 72114 X-RAY EXAM L-S SPINE BENDING: CPT | Mod: TC,FY,PO

## 2023-02-09 PROCEDURE — 72114 XR LUMBAR SPINE 5 VIEW WITH FLEX AND EXT: ICD-10-PCS | Mod: 26,,, | Performed by: RADIOLOGY

## 2023-02-16 ENCOUNTER — PROCEDURE VISIT (OUTPATIENT)
Dept: UROLOGY | Facility: CLINIC | Age: 68
End: 2023-02-16
Payer: MEDICARE

## 2023-02-16 VITALS — WEIGHT: 238.13 LBS | BODY MASS INDEX: 34.09 KG/M2 | HEIGHT: 70 IN

## 2023-02-16 DIAGNOSIS — C67.2 MALIGNANT NEOPLASM OF LATERAL WALL OF URINARY BLADDER: ICD-10-CM

## 2023-02-16 PROCEDURE — 52000 CYSTOURETHROSCOPY: CPT | Mod: PBBFAC,PO | Performed by: UROLOGY

## 2023-02-16 PROCEDURE — 52000 CYSTOSCOPY: ICD-10-PCS | Mod: S$PBB,,, | Performed by: UROLOGY

## 2023-02-16 NOTE — PROCEDURES
Cystoscopy    Date/Time: 2/16/2023 9:00 AM  Performed by: ROSA Lr MD  Authorized by: ROSA Lr MD     Consent Done?:  Yes (Written)  Timeout: prior to procedure the correct patient, procedure, and site was verified    Prep: patient was prepped and draped in usual sterile fashion    Anesthesia:  Lidocaine jelly  Indications: history bladder cancer    Position:  Supine  Anesthesia:  Lidocaine jelly  Patient sedated?: No    Preparation: Patient was prepped and draped in usual sterile fashion    Scope type:  Flexible cystoscope   patient tolerated the procedure well with no immediate complications    Blood Loss:  None     67-year-old with a history of T1 urothelial cancer.  He underwent TURBT with instillation of mitomycin-C in 08/2020. He has a recurrence and underwent repeat TURBT 03/2022.  Pepeat path again shows low-grade noninvasive urothelial carcinoma.  This was his 1st recurrence.  He now has completed an induction course of BCG.  He is here for cystoscopy        The flexible cystoscope was placed into the urethra and carefully advanced into the bladder.  A careful cystoscopic exam was then performed.  The entire bladder mucosa was systematically visualized.  Findings include moderate bladder wall trabeculation.  There were no lesions, masses foreign bodies or stones.   TUR scar was evident on the right lateral wall.  The right ureteral orifice was deviated laterally.  Both ureteral orifices were visualized and both had clear efflux of urine.  On retroflexion there was a small intravesical gland.  The cystoscope was then removed and I examined the entire length of the urethra.  There was moderate bilobar enlargement of the prostate.  In the anterior urethra there was multiple areas of narrowing which were not thought to be clinically significant.  He tolerated the procedure well.  There were no complications.     Impression:  No evidence of recurrence     Plan:  Follow-up 6 months for  cystoscopy

## 2023-03-03 ENCOUNTER — OFFICE VISIT (OUTPATIENT)
Dept: OTOLARYNGOLOGY | Facility: CLINIC | Age: 68
End: 2023-03-03
Payer: MEDICARE

## 2023-03-03 VITALS — HEIGHT: 70 IN | WEIGHT: 240.5 LBS | BODY MASS INDEX: 34.43 KG/M2

## 2023-03-03 DIAGNOSIS — J34.3 NASAL TURBINATE HYPERTROPHY: ICD-10-CM

## 2023-03-03 DIAGNOSIS — J34.2 DNS (DEVIATED NASAL SEPTUM): ICD-10-CM

## 2023-03-03 DIAGNOSIS — G47.33 OSA ON CPAP: Primary | ICD-10-CM

## 2023-03-03 PROCEDURE — 99203 PR OFFICE/OUTPT VISIT, NEW, LEVL III, 30-44 MIN: ICD-10-PCS | Mod: S$PBB,,, | Performed by: STUDENT IN AN ORGANIZED HEALTH CARE EDUCATION/TRAINING PROGRAM

## 2023-03-03 PROCEDURE — 99999 PR PBB SHADOW E&M-EST. PATIENT-LVL III: ICD-10-PCS | Mod: PBBFAC,,, | Performed by: STUDENT IN AN ORGANIZED HEALTH CARE EDUCATION/TRAINING PROGRAM

## 2023-03-03 PROCEDURE — 99213 OFFICE O/P EST LOW 20 MIN: CPT | Mod: PBBFAC,PO | Performed by: STUDENT IN AN ORGANIZED HEALTH CARE EDUCATION/TRAINING PROGRAM

## 2023-03-03 PROCEDURE — 99999 PR PBB SHADOW E&M-EST. PATIENT-LVL III: CPT | Mod: PBBFAC,,, | Performed by: STUDENT IN AN ORGANIZED HEALTH CARE EDUCATION/TRAINING PROGRAM

## 2023-03-03 PROCEDURE — 99203 OFFICE O/P NEW LOW 30 MIN: CPT | Mod: S$PBB,,, | Performed by: STUDENT IN AN ORGANIZED HEALTH CARE EDUCATION/TRAINING PROGRAM

## 2023-03-03 RX ORDER — FLUTICASONE PROPIONATE 50 MCG
1 SPRAY, SUSPENSION (ML) NASAL 2 TIMES DAILY
Qty: 16 G | Refills: 11 | Status: SHIPPED | OUTPATIENT
Start: 2023-03-03

## 2023-03-03 NOTE — PROGRESS NOTES
Otolaryngology Clinic Note    Subjective:       Patient ID: Moustapha Murray is a 67 y.o. male.    Chief Complaint: inspire consult      History of Present Illness: Moustapha Murray is a 67 y.o. male presenting with sleep apnea, has been  using CPAP for 2 weeks. Has not made it past 2 hours. Using nasal mask and seems like too much airflow. Thinks it is getting there but is too much. Has water on machine. Not doing well so far. Seems like mouth is closed, tried chin strap. Did not try different masks. No issues breathing out of nose during the day. No ENT surgeries. No nasal sprays.      ESS 11 today.     Stents in Feb 2023, on plavix and asa since stents.           Past Surgical History:   Procedure Laterality Date    ANGIOGRAM, CORONARY, WITH LEFT HEART CATHETERIZATION N/A 1/31/2023    Procedure: Left Heart Cath;  Surgeon: Nitin Wheat MD;  Location: [facility] CATH;  Service: Cardiology;  Laterality: N/A;    APPENDECTOMY      CARPAL TUNNEL RELEASE      CERVICAL FUSION      CHOLECYSTECTOMY      CIRCUMCISION      COLONOSCOPY N/A 3/16/2020    Procedure: COLONOSCOPY;  Surgeon: Braulio Best MD;  Location: [facility] ENDO;  Service: Endoscopy;  Laterality: N/A; 3 colon polyps removed; biopsy: tubular adenoma and hyperplastic; repeat in 5 years for surveillance    CORONARY ANGIOGRAPHY  2/13/2023    Procedure: ANGIOGRAM, CORONARY ARTERY;  Surgeon: Nitin Wheat MD;  Location: [facility] CATH;  Service: Cardiology;;    CORONARY ANGIOPLASTY WITH STENT PLACEMENT N/A 1/31/2023    Procedure: ANGIOPLASTY, CORONARY ARTERY, WITH STENT INSERTION;  Surgeon: Nitin Wheat MD;  Location: [facility] CATH;  Service: Cardiology;  Laterality: N/A;    CYSTOSCOPY W/ RETROGRADES Bilateral 8/24/2020    Procedure: CYSTOSCOPY, WITH RETROGRADE PYELOGRAM -WITH URETHRAL DILATION;  Surgeon: Greg Claudio MD;  Location: [facility] OR;  Service: Urology;  Laterality: Bilateral;    CYSTOSCOPY W/ URETERAL STENT PLACEMENT Right 8/24/2020    Procedure: CYSTOSCOPY,  WITH URETERAL STENT INSERTION;  Surgeon: Greg Claudio MD;  Location: [facility] OR;  Service: Urology;  Laterality: Right;    INSTILLATION OF URINARY BLADDER N/A 8/24/2020    Procedure: INSTILLATION, BLADDER - Mitomycin;  Surgeon: Greg Claudio MD;  Location: [facility] OR;  Service: Urology;  Laterality: N/A;    IVUS, CORONARY  1/31/2023    Procedure: IVUS, Coronary;  Surgeon: Nitin Wheat MD;  Location: [facility] CATH;  Service: Cardiology;;    IVUS, CORONARY  2/13/2023    Procedure: IVUS, Coronary;  Surgeon: Nitin Wheat MD;  Location: [facility] CATH;  Service: Cardiology;;    PERCUTANEOUS CORONARY INTERVENTION, ARTERY  2/13/2023    Procedure: Percutaneous coronary intervention;  Surgeon: Nitin Wheat MD;  Location: [facility] CATH;  Service: Cardiology;;    TRANSFORAMINAL EPIDURAL INJECTION OF STEROID Right 7/17/2019    Procedure: Injection,steroid,epidural,transforaminal approach L3-4, L4-5;  Surgeon: Chris Palencia MD;  Location: [facility] OR;  Service: Pain Management;  Laterality: Right;    TRANSFORAMINAL EPIDURAL INJECTION OF STEROID Right 8/28/2019    Procedure: Injection,steroid,epidural,transforaminal approach;  Surgeon: Chris Palencia MD;  Location: [facility] OR;  Service: Pain Management;  Laterality: Right;  L3-4, L4-L5    WISDOM TOOTH EXTRACTION       Past Medical History:   Diagnosis Date    Anticoagulant long-term use     Bladder cancer 08/2020    Colon polyps     Coronary artery disease 01/31/2023    CORONARY STENTS    DDD (degenerative disc disease), lumbar     Depression     Fatty liver     GERD (gastroesophageal reflux disease)     H/O motion sickness     Hypertension     PONV (postoperative nausea and vomiting)     RLS (restless legs syndrome)      Social Determinants of Health     Tobacco Use: Medium Risk    Smoking Tobacco Use: Former    Smokeless Tobacco Use: Never    Passive Exposure: Not on file   Alcohol Use: Not At Risk    Frequency of Alcohol Consumption: 2-4 times a month    Average Number of Drinks: 3 or 4    Frequency  of Binge Drinking: Never   Financial Resource Strain: Low Risk     Difficulty of Paying Living Expenses: Not hard at all   Food Insecurity: No Food Insecurity    Worried About Running Out of Food in the Last Year: Never true    Ran Out of Food in the Last Year: Never true   Transportation Needs: No Transportation Needs    Lack of Transportation (Medical): No    Lack of Transportation (Non-Medical): No   Physical Activity: Inactive    Days of Exercise per Week: 0 days    Minutes of Exercise per Session: 0 min   Stress: No Stress Concern Present    Feeling of Stress : Not at all   Social Connections: Unknown    Frequency of Communication with Friends and Family: More than three times a week    Frequency of Social Gatherings with Friends and Family: Twice a week    Attends Taoist Services: Not on file    Active Member of Clubs or Organizations: No    Attends Club or Organization Meetings: Never    Marital Status:    Housing Stability: Low Risk     Unable to Pay for Housing in the Last Year: No    Number of Places Lived in the Last Year: 1    Unstable Housing in the Last Year: No   Depression: Low Risk     Last PHQ Score: 0     Review of patient's allergies indicates:   Allergen Reactions    Sulfa (sulfonamide antibiotics) Anaphylaxis     Current Outpatient Medications   Medication Instructions    aspirin 81 mg, Oral, Daily    clopidogreL (PLAVIX) 75 mg, Oral, Daily    EScitalopram oxalate (LEXAPRO) 10 mg, Oral, Daily    hydroCHLOROthiazide (HYDRODIURIL) 12.5 mg, Oral, Daily    Lactobac no.41/Bifidobact no.7 (PROBIOTIC-10 ORAL) 1 capsule, Oral, Daily    melatonin 5 mg, Oral, Nightly PRN, TAKE AS SCHEDULED     methocarbamoL (ROBAXIN) 500 mg, Daily PRN    multivitamin (THERAGRAN) per tablet 1 tablet, Oral, Daily    omeprazole (PRILOSEC) 40 mg, Oral, Nightly    pramipexole (MIRAPEX) 1 mg, Oral, Nightly    rosuvastatin (CRESTOR) 40 mg, Oral, Daily    scopolamine (TRANSDERM-SCOP) 1.3-1.5 mg (1 mg over 3 days) 1  patch, Transdermal, Every 72 hours PRN, As needed    telmisartan (MICARDIS) 40 mg, Oral, Daily    triamcinolone acetonide 0.1% (KENALOG) 0.1 % cream Topical (Top), 2 times daily PRN         ENT ROS negative except as stated above.     Patient answers are not available for this visit.            Objective:      There were no vitals filed for this visit.    General: NAD, well appearing  Eyes: Normal conjunctiva and lids  Face: symmetric, nerve intact  Nose: The nose is without any evidence of any deformity, some tip ptosis. The nasal mucosa is moist. The septum deviates right mid portion. There is no evidence of septal hematoma. The turbinates are mod hypertrophied   Ears: The ears are with normal-appearing pinna. Examination of the canals is normal appearing bilaterally. There is no drainage or erythema noted. The tympanic membranes are normal appearing with pearly color, normal-appearing landmarks and normal light reflex. Hearing is grossly intact.  Mouth: No obvious abnormalities to the lips. The teeth are unremarkable. The gingivae are without any obvious evidence of infection or lesion. The oral mucosa is moist and pink. There are no obvious masses to the hard or soft palate.   Oropharynx: The uvula is midline.  The tongue is midline. The posterior pharynx is without erythema or exudate. The tonsils are normal appearing 0+.  Salivary glands: The salivary glands are symmetric and not enlarged, no masses  Neck: No lymphadenopathy, trachea midline, thryoid not enlarged.  Psych: Normal mood and affect.   Neuro: Grossly intact  Speech: fluent       Assessment and Plan:       1. TASIA on CPAP    2. DNS (deviated nasal septum)    3. Nasal turbinate hypertrophy          Meets BMI for his insurance 34.51, meets AHI at 60.3, however he has only been using CPAP for 2 weeks and is also newly on plavix as of Feb.   Recommend trying different mask, full face for now, keep water on machine. Needs 3 month trial. Cannot do anything  for at least next 5.5 months with new plavix and would need cards to clear to be off for surgery.     Would do flonase trial for nose- 1-2 x a day. May help with nasal airway. Can also consider septo/turb trim in future for cpap tolerance. Could do with DISE. Discussed need for DISE to assess airway collapse prior to candidacy as well.     RTC: 4.5 months    Plan of care was discussed in detail with the patient, who agreed with the plan as above. All questions were answered in detail.     Rebeca Humphries MD  Otolaryngology

## 2023-03-03 NOTE — PATIENT INSTRUCTIONS
Meets BMI for his insurance 34.51, meets AHI at 60.3, however he has only been using CPAP for 2 weeks and is also newly on plavix as of Feb.     Recommend trying different mask, full face for now, keep water on machine. Needs 3 month trial. Cannot do anything for at least next 5.5 months with new plavix and would need cards to clear to be off for surgery.      Would do flonase trial for nose- 1-2 x a day. May help with nasal airway. Can also consider septo/turb trim in future for cpap tolerance. Could do with DISE (Drug induced sleep endoscopy). Discussed need for DISE to assess airway collapse prior to candidacy as well.     Follow up in 4.5 months

## 2023-03-14 ENCOUNTER — OFFICE VISIT (OUTPATIENT)
Dept: CARDIOLOGY | Facility: CLINIC | Age: 68
End: 2023-03-14
Payer: MEDICARE

## 2023-03-14 VITALS
DIASTOLIC BLOOD PRESSURE: 71 MMHG | BODY MASS INDEX: 34.69 KG/M2 | WEIGHT: 242.31 LBS | SYSTOLIC BLOOD PRESSURE: 138 MMHG | HEIGHT: 70 IN | HEART RATE: 78 BPM

## 2023-03-14 DIAGNOSIS — I25.10 CORONARY ARTERY DISEASE INVOLVING NATIVE CORONARY ARTERY OF NATIVE HEART WITHOUT ANGINA PECTORIS: ICD-10-CM

## 2023-03-14 DIAGNOSIS — I73.9 CLAUDICATION: ICD-10-CM

## 2023-03-14 DIAGNOSIS — I83.93 ASYMPTOMATIC VARICOSE VEINS OF BOTH LOWER EXTREMITIES: ICD-10-CM

## 2023-03-14 DIAGNOSIS — I10 PRIMARY HYPERTENSION: ICD-10-CM

## 2023-03-14 DIAGNOSIS — Z95.5 S/P DRUG ELUTING CORONARY STENT PLACEMENT: Primary | ICD-10-CM

## 2023-03-14 DIAGNOSIS — I83.893 VARICOSE VEINS OF BOTH LEGS WITH EDEMA: ICD-10-CM

## 2023-03-14 PROCEDURE — 99214 OFFICE O/P EST MOD 30 MIN: CPT | Mod: S$PBB,,, | Performed by: INTERNAL MEDICINE

## 2023-03-14 PROCEDURE — 99999 PR PBB SHADOW E&M-EST. PATIENT-LVL III: ICD-10-PCS | Mod: PBBFAC,,, | Performed by: INTERNAL MEDICINE

## 2023-03-14 PROCEDURE — 99213 OFFICE O/P EST LOW 20 MIN: CPT | Mod: PBBFAC,PO | Performed by: INTERNAL MEDICINE

## 2023-03-14 PROCEDURE — 99999 PR PBB SHADOW E&M-EST. PATIENT-LVL III: CPT | Mod: PBBFAC,,, | Performed by: INTERNAL MEDICINE

## 2023-03-14 PROCEDURE — 99214 PR OFFICE/OUTPT VISIT, EST, LEVL IV, 30-39 MIN: ICD-10-PCS | Mod: S$PBB,,, | Performed by: INTERNAL MEDICINE

## 2023-03-14 RX ORDER — PANTOPRAZOLE SODIUM 40 MG/1
40 TABLET, DELAYED RELEASE ORAL DAILY
Qty: 90 TABLET | Refills: 3 | Status: SHIPPED | OUTPATIENT
Start: 2023-03-14 | End: 2024-04-02

## 2023-03-14 NOTE — PATIENT INSTRUCTIONS
Change omeprazole to pantoprazole  Continue everything else as is  Ultrasound of leg arteries and veins - will refer to vein specialist as needed  Cardiac rehab  Return in 6 months

## 2023-03-14 NOTE — PROGRESS NOTES
Cardiology Clinic Note      Patient: Moustapha Murray, 1955, [id number]  Primary Care Provider: Colton Johnson MD     Chief Complaint/Reason for Referral: abnormal CACS     Subjective:       Moustapha Murray is a 67 y.o. male who presents for establishing care. He is accompanied by his wife    No pathologic bleeding. Compliant. Starting CR tomorrow. Maybe better. No CP, syncope, edema.     Focused Past History includes:  Exercise stress EKG 1/2023 by report: negative at 86% MPHR, 8 METs.   CACS 2317 by CT 1/2023  CAD:   Coronary angiogram and PCI 1/31/23 (RK): Severe segmental stenosis extending from proximal to mid LAD.  Successful IVUS guided PCI with overlapping 4.0 x 38 and 2.5 x 38 Synergy XD FERNANDO from ostial to mid LAD  Staged PCI 2/13/23: 80% stenosis in a large right posterior AV branch feeding a large PLB s/p single FERNANDO. Ostial RCA insignificant by IVUS  HTN  GERD  Bladder cancer treated with surgery and BCG; no systemic chemo or XRT - 2022  No AAA by US 1/2023  Former smoker of 60 pack-years - quit in 2015  No history of stroke, DM  No family history of premature CAD    Review of Systems  Constitutional: negative for fevers, night sweats, and weight loss  Eyes: negative for visual disturbance, diplopia  Respiratory: negative for cough, hemoptysis, sputum, and wheezing  Cardiovascular: see HPI  Gastrointestinal: negative for abdominal pain, bright red blood per rectum, change in bowel habits, dysphagia, melena, and reflux symptoms  Genitourinary:negative for dysuria, frequency, and hematuria  Hematologic/lymphatic: negative for bleeding, easy bruising, and lymphadenopathy  Musculoskeletal:negative for arthralgias, back pain, and myalgias  Neurological: negative for gait problems, paresthesia, speech problems, vertigo, and weakness  Behavioral/Psych: negative for excessive alcohol consumption, illegal drug usage, and sleep disturbance    ----------------------------  Anticoagulant long-term  use  Bladder cancer  Colon polyps  Coronary artery disease      Comment:  CORONARY STENTS  DDD (degenerative disc disease), lumbar  Depression  Fatty liver  GERD (gastroesophageal reflux disease)  H/O motion sickness  Hypertension  PONV (postoperative nausea and vomiting)  RLS (restless legs syndrome)  ----------------------------  Angiogram, coronary, with left heart catheterization      Comment:  Procedure: Left Heart Cath;  Surgeon: Nitin Wheat MD;                 Location: [facility] CATH;  Service: Cardiology;  Laterality:                N/A;  Appendectomy  Carpal tunnel release  Cervical fusion  Cholecystectomy  Circumcision  Colonoscopy      Comment:  Procedure: COLONOSCOPY;  Surgeon: Braulio Best MD;  Location: [facility];  Service: Endoscopy;                 Laterality: N/A; 3 colon polyps removed; biopsy: tubular                adenoma and hyperplastic; repeat in 5 years for                surveillance  Coronary angiography      Comment:  Procedure: ANGIOGRAM, CORONARY ARTERY;  Surgeon: Nitin Wheat MD;  Location: [facility] CATH;  Service: Cardiology;;  Coronary angioplasty with stent placement      Comment:  Procedure: ANGIOPLASTY, CORONARY ARTERY, WITH STENT                INSERTION;  Surgeon: Nitin Wheat MD;  Location: [facility] CATH;               Service: Cardiology;  Laterality: N/A;  Cystoscopy w/ retrogrades      Comment:  Procedure: CYSTOSCOPY, WITH RETROGRADE PYELOGRAM -WITH                URETHRAL DILATION;  Surgeon: Greg Claudio MD;                 Location: [facility] OR;  Service: Urology;  Laterality:                Bilateral;  Cystoscopy w/ ureteral stent placement      Comment:  Procedure: CYSTOSCOPY, WITH URETERAL STENT INSERTION;                 Surgeon: Greg Claudio MD;  Location: [facility] OR;                 Service: Urology;  Laterality: Right;  Instillation of urinary bladder      Comment:  Procedure: INSTILLATION, BLADDER - Mitomycin;  Surgeon:                 Greg Claudio MD;  Location: [facility] OR;  Service:                Urology;  Laterality: N/A;  Ivus, coronary      Comment:  Procedure: IVUS, Coronary;  Surgeon: Nitin Wheat MD;                 Location: [facility] CATH;  Service: Cardiology;;  Ivus, coronary      Comment:  Procedure: IVUS, Coronary;  Surgeon: Nitin Wheat MD;                 Location: [facility] CATH;  Service: Cardiology;;  Percutaneous coronary intervention, artery      Comment:  Procedure: Percutaneous coronary intervention;  Surgeon:               Nitin Wheat MD;  Location: [facility] CATH;  Service:                Cardiology;;  Transforaminal epidural injection of steroid      Comment:  Procedure: Injection,steroid,epidural,transforaminal                approach L3-4, L4-5;  Surgeon: Chris Palencia MD;  Location:               [facility] OR;  Service: Pain Management;  Laterality: Right;  Transforaminal epidural injection of steroid      Comment:  Procedure: Injection,steroid,epidural,transforaminal                approach;  Surgeon: Chris Palencia MD;  Location: [facility] OR;                 Service: Pain Management;  Laterality: Right;  L3-4,                L4-L5  [place] tooth extraction     Family History   Problem Relation Age of Onset    Stroke Mother     Ulcerative colitis Mother     Cancer Neg Hx     Colon cancer Neg Hx     Crohn's disease Neg Hx     Celiac disease Neg Hx      Social History     Tobacco Use    Smoking status: Former     Packs/day: 1.50     Types: Cigarettes     Quit date: 10/16/2013     Years since quittin.4    Smokeless tobacco: Never   Substance Use Topics    Alcohol use: Yes     Alcohol/week: 2.0 standard drinks     Types: 2 Cans of beer per week    Drug use: Not Currently       Current Outpatient Medications   Medication Sig Dispense Refill    aspirin 81 MG Chew Take 1 tablet (81 mg total) by mouth once daily. 90 tablet 3    clopidogreL (PLAVIX) 75 mg tablet Take 1 tablet (75 mg total) by mouth once daily. 90 tablet 3    EScitalopram oxalate  "(LEXAPRO) 10 MG tablet Take 1 tablet (10 mg total) by mouth once daily.      fluticasone propionate (FLONASE) 50 mcg/actuation nasal spray 1 spray (50 mcg total) by Each Nostril route 2 (two) times a day. 16 g 11    hydroCHLOROthiazide (HYDRODIURIL) 12.5 MG Tab Take 1 tablet (12.5 mg total) by mouth once daily. 90 tablet 3    Lactobac no.41/Bifidobact no.7 (PROBIOTIC-10 ORAL) Take 1 capsule by mouth once daily.      melatonin 5 mg Cap Take 5 mg by mouth nightly as needed. TAKE AS SCHEDULED      methocarbamoL (ROBAXIN) 500 MG Tab 500 mg daily as needed.      multivitamin (THERAGRAN) per tablet Take 1 tablet by mouth once daily.       pramipexole (MIRAPEX) 1 MG tablet Take 1 tablet (1 mg total) by mouth nightly.      rosuvastatin (CRESTOR) 40 MG Tab Take 1 tablet (40 mg total) by mouth once daily.      scopolamine (TRANSDERM-SCOP) 1.3-1.5 mg (1 mg over 3 days) Place 1 patch onto the skin every 72 hours as needed. As needed      telmisartan (MICARDIS) 40 MG Tab Take 1 tablet (40 mg total) by mouth once daily. 90 tablet 3    triamcinolone acetonide 0.1% (KENALOG) 0.1 % cream Apply topically 2 (two) times daily as needed.      pantoprazole (PROTONIX) 40 MG tablet Take 1 tablet (40 mg total) by mouth once daily. 90 tablet 3     Current Facility-Administered Medications   Medication Dose Route Frequency Provider Last Rate Last Admin    sodium chloride 0.9% flush 10 mL  10 mL Intravenous PRN Nitin Wheat MD         Facility-Administered Medications Ordered in Other Visits   Medication Dose Route Frequency Provider Last Rate Last Admin    ondansetron injection 4 mg  4 mg Intravenous Daily PRN Gustavo Chu MD            Objective:      Physical Exam  /71 (BP Location: Left arm, Patient Position: Sitting)   Pulse 78   Ht 5' 10" (1.778 m)   Wt 109.9 kg (242 lb 4.6 oz)   BMI 34.76 kg/m²   Body surface area is 2.33 meters squared.  Body mass index is 34.76 kg/m².    General appearance: alert, appears stated age, " cooperative, and no distress  Head: Normocephalic, without obvious abnormality, atraumatic  Neck: no carotid bruit, no JVD, and supple, symmetrical, trachea midline  Lungs: clear to auscultation bilaterally  Heart: regular rate and rhythm; S1, S2 normal, no murmur, click, rub or gallop  Abdomen: soft, non-tender, no distended  Extremities: extremities atraumatic, no pitting edema. Large varicose veins in LLE>RLE with marked telangiectasias  Skin: warm, no cyanosis, no pathologic ecchymosis in exposed portions  Neurologic: Grossly normal. A&O x3      Lab Review   Lab Results   Component Value Date    WBC 9.49 02/13/2023    HGB 12.9 (L) 02/13/2023    HCT 37.8 (L) 02/13/2023    MCV 91 02/13/2023     02/13/2023         BMP  Lab Results   Component Value Date     (L) 02/13/2023    K 4.1 02/13/2023     02/13/2023    CO2 27 02/13/2023    BUN 17 02/13/2023    CREATININE 1.02 02/13/2023    CALCIUM 9.0 02/13/2023    ANIONGAP 5 (L) 02/13/2023    ESTGFRAFRICA >60 03/11/2022    EGFRNONAA >60 03/11/2022       Lab Results   Component Value Date    ALBUMIN 4.3 02/13/2023       Lab Results   Component Value Date    ALT 35 02/13/2023    AST 40 02/13/2023    ALKPHOS 121 02/13/2023    BILITOT 0.8 02/13/2023       Lab Results   Component Value Date    TSH 1.63 07/06/2011       Lab Results   Component Value Date    CHOL 219 (H) 01/05/2023    CHOL 190 12/11/2020    CHOL 207 (H) 10/24/2019     Lab Results   Component Value Date    HDL 59 01/05/2023    HDL 49 12/11/2020    HDL 50 10/24/2019     Lab Results   Component Value Date    LDLCALC 141.4 01/05/2023    LDLCALC 124.6 12/11/2020    LDLCALC 140.2 10/24/2019     Lab Results   Component Value Date    TRIG 93 01/05/2023    TRIG 82 12/11/2020    TRIG 84 10/24/2019     Lab Results   Component Value Date    CHOLHDL 26.9 01/05/2023    CHOLHDL 25.8 12/11/2020    CHOLHDL 24.2 10/24/2019      Assessment & Plan:      This is a 67 y.o. pleasant  male with class 3 dyspnea  on exertion for the past 3 months that was not present previously.  Coronary angiogram revealed two vessel disease which was revascularized percutaneously. Starting CR tomorrow. He has asymptomatic varicose veins.     1. S/P drug eluting coronary stent placement  Lipid Panel      2. Coronary artery disease involving native coronary artery of native heart without angina pectoris  Lipid Panel      3. Asymptomatic varicose veins of both lower extremities  CV US Lower Extremity Veins Bilateral Insufficiency      4. Claudication  Ankle Brachial Indices (PAUL)      5. Varicose veins of both legs with edema  CV US Lower Extremity Veins Bilateral Insufficiency      6. Primary hypertension               Continue telmisartan 40 and HCTZ 12.5 as is  Continue DAPT for at least 6 months, preferably longer  Continue rosuva 40 mg QD - may need to add ezetimibe to get LDL-C<50  ABIs and bilateral venous insufficiency US - may need referral for varicose vein management  Emphasized the importance of modifying lifestyle related risk factors including limiting alcohol intake, exercise, diet most resembling a Mediterranean diet.    I appreciate the opportunity to participate in Moustapha Murray 's care today.  Please follow up with me in 6 months.      Nitin Wheat MD, [facility]  Interventional Cardiology/Structural Heart Disease  [facility]  Office: [phone]     Parts of this note were completed using voice recognition software. Please excuse any misspellings or syntax errors and reach out to me with questions.

## 2023-03-29 ENCOUNTER — CLINICAL SUPPORT (OUTPATIENT)
Dept: CARDIOLOGY | Facility: HOSPITAL | Age: 68
End: 2023-03-29
Attending: INTERNAL MEDICINE
Payer: MEDICARE

## 2023-03-29 DIAGNOSIS — I83.893 VARICOSE VEINS OF BOTH LEGS WITH EDEMA: ICD-10-CM

## 2023-03-29 DIAGNOSIS — I73.9 CLAUDICATION: ICD-10-CM

## 2023-03-29 DIAGNOSIS — I83.93 ASYMPTOMATIC VARICOSE VEINS OF BOTH LOWER EXTREMITIES: ICD-10-CM

## 2023-03-29 LAB
LEFT ABI: 1.12
LEFT ARM BP: 116 MMHG
LEFT DORSALIS PEDIS: 131 MMHG
LEFT GREAT SAPHENOUS DISTAL THIGH DIA: 0.5 CM
LEFT GREAT SAPHENOUS DISTAL THIGH REFLUX: 5549 MS
LEFT GREAT SAPHENOUS JUNCTION DIA: 0.8 CM
LEFT GREAT SAPHENOUS KNEE DIA: 0.45 CM
LEFT GREAT SAPHENOUS KNEE REFLUX: 5401 MS
LEFT GREAT SAPHENOUS MIDDLE THIGH DIA: 0.8 CM
LEFT GREAT SAPHENOUS MIDDLE THIGH REFLUX: 2301 MS
LEFT GREAT SAPHENOUS PROXIMAL CALF DIA: 0.4 CM
LEFT POSTERIOR TIBIAL: 123 MMHG
LEFT SMALL SAPHENOUS KNEE DIA: 0.54 CM
LEFT SMALL SAPHENOUS SPJ DIA: 0.2 CM
RIGHT ABI: 0.95
RIGHT ARM BP: 117 MMHG
RIGHT DORSALIS PEDIS: 101 MMHG
RIGHT GREAT SAPHENOUS DISTAL THIGH DIA: 0.3 CM
RIGHT GREAT SAPHENOUS JUNCTION DIA: 0.63 CM
RIGHT GREAT SAPHENOUS KNEE DIA: 0.4 CM
RIGHT GREAT SAPHENOUS MIDDLE THIGH DIA: 0.6 CM
RIGHT GREAT SAPHENOUS PROXIMAL CALF DIA: 0.3 CM
RIGHT GREAT SAPHENOUS PROXIMAL CALF REFLUX: 2660 MS
RIGHT POSTERIOR TIBIAL: 111 MMHG
RIGHT SMALL SAPHENOUS KNEE DIA: 0.6 CM
RIGHT SMALL SAPHENOUS KNEE REFLUX: 5184 MS
RIGHT SMALL SAPHENOUS SPJ DIA: 0.3 CM

## 2023-03-29 PROCEDURE — 93922 UPR/L XTREMITY ART 2 LEVELS: CPT | Mod: PO

## 2023-03-29 PROCEDURE — 93970 EXTREMITY STUDY: CPT | Mod: TC,PO

## 2023-03-29 PROCEDURE — 93922 UPR/L XTREMITY ART 2 LEVELS: CPT | Mod: 26,,, | Performed by: INTERNAL MEDICINE

## 2023-03-29 PROCEDURE — 93922 ANKLE BRACHIAL INDICES (ABI): ICD-10-PCS | Mod: 26,,, | Performed by: INTERNAL MEDICINE

## 2023-03-29 PROCEDURE — 93970 EXTREMITY STUDY: CPT | Mod: 26,,, | Performed by: INTERNAL MEDICINE

## 2023-03-29 PROCEDURE — 93970 CV US LOWER VENOUS INSUFFICIENCY BILATERAL (CUPID ONLY): ICD-10-PCS | Mod: 26,,, | Performed by: INTERNAL MEDICINE

## 2023-04-03 ENCOUNTER — PATIENT MESSAGE (OUTPATIENT)
Dept: FAMILY MEDICINE | Facility: CLINIC | Age: 68
End: 2023-04-03
Payer: MEDICARE

## 2023-04-03 DIAGNOSIS — G25.81 RLS (RESTLESS LEGS SYNDROME): ICD-10-CM

## 2023-04-04 RX ORDER — PRAMIPEXOLE DIHYDROCHLORIDE 1 MG/1
1 TABLET ORAL NIGHTLY
Qty: 90 TABLET | Refills: 3 | Status: SHIPPED | OUTPATIENT
Start: 2023-04-04 | End: 2023-12-07

## 2023-04-11 DIAGNOSIS — I83.93 VARICOSE VEINS OF BOTH LOWER EXTREMITIES, UNSPECIFIED WHETHER COMPLICATED: Primary | ICD-10-CM

## 2023-04-22 DIAGNOSIS — I10 HYPERTENSION, UNSPECIFIED TYPE: ICD-10-CM

## 2023-04-22 NOTE — TELEPHONE ENCOUNTER
No new care gaps identified.  [facility] Embedded Care Gaps. Reference number: [id number]. 4/22/2023   8:29:20 AM CDT

## 2023-04-23 RX ORDER — TELMISARTAN 40 MG/1
TABLET ORAL
Qty: 90 TABLET | Refills: 2 | Status: SHIPPED | OUTPATIENT
Start: 2023-04-23

## 2023-04-23 NOTE — TELEPHONE ENCOUNTER
Refill Decision Note   Moustapha Murray  is requesting a refill authorization.  Brief Assessment and Rationale for Refill:  Approve     Medication Therapy Plan:  Not true ED visit    Medication Reconciliation Completed: No   Comments:     No Care Gaps recommended.     Note composed:12:59 PM 04/23/2023

## 2023-04-27 DIAGNOSIS — M75.100 ROTATOR CUFF SYNDROME, UNSPECIFIED LATERALITY: Primary | ICD-10-CM

## 2023-05-01 ENCOUNTER — PATIENT MESSAGE (OUTPATIENT)
Dept: CARDIOLOGY | Facility: CLINIC | Age: 68
End: 2023-05-01
Payer: MEDICARE

## 2023-05-02 DIAGNOSIS — R93.1 AGATSTON CAC SCORE, >400: ICD-10-CM

## 2023-05-02 RX ORDER — ROSUVASTATIN CALCIUM 40 MG/1
40 TABLET, COATED ORAL DAILY
Qty: 90 TABLET | Refills: 3 | Status: SHIPPED | OUTPATIENT
Start: 2023-05-02

## 2023-05-03 ENCOUNTER — OFFICE VISIT (OUTPATIENT)
Dept: ORTHOPEDICS | Facility: CLINIC | Age: 68
End: 2023-05-03
Payer: MEDICARE

## 2023-05-03 ENCOUNTER — HOSPITAL ENCOUNTER (OUTPATIENT)
Dept: RADIOLOGY | Facility: HOSPITAL | Age: 68
Discharge: HOME | End: 2023-05-03
Attending: ORTHOPAEDIC SURGERY
Payer: MEDICARE

## 2023-05-03 VITALS — WEIGHT: 242 LBS | RESPIRATION RATE: 18 BRPM | HEIGHT: 70 IN | BODY MASS INDEX: 34.65 KG/M2

## 2023-05-03 DIAGNOSIS — M75.100 ROTATOR CUFF SYNDROME, UNSPECIFIED LATERALITY: ICD-10-CM

## 2023-05-03 DIAGNOSIS — M75.100 ROTATOR CUFF SYNDROME, UNSPECIFIED LATERALITY: Primary | ICD-10-CM

## 2023-05-03 PROCEDURE — 99214 PR OFFICE/OUTPT VISIT, EST, LEVL IV, 30-39 MIN: ICD-10-PCS | Mod: 25,S$PBB,, | Performed by: ORTHOPAEDIC SURGERY

## 2023-05-03 PROCEDURE — 99214 OFFICE O/P EST MOD 30 MIN: CPT | Mod: 25,S$PBB,, | Performed by: ORTHOPAEDIC SURGERY

## 2023-05-03 PROCEDURE — 99213 OFFICE O/P EST LOW 20 MIN: CPT | Mod: PBBFAC,PN,25 | Performed by: ORTHOPAEDIC SURGERY

## 2023-05-03 PROCEDURE — 73030 X-RAY EXAM OF SHOULDER: CPT | Mod: 26,RT,, | Performed by: RADIOLOGY

## 2023-05-03 PROCEDURE — 20610 DRAIN/INJ JOINT/BURSA W/O US: CPT | Mod: PBBFAC,PN,RT | Performed by: ORTHOPAEDIC SURGERY

## 2023-05-03 PROCEDURE — 99999 PR PBB SHADOW E&M-EST. PATIENT-LVL III: CPT | Mod: PBBFAC,,, | Performed by: ORTHOPAEDIC SURGERY

## 2023-05-03 PROCEDURE — 99999 PR PBB SHADOW E&M-EST. PATIENT-LVL III: ICD-10-PCS | Mod: PBBFAC,,, | Performed by: ORTHOPAEDIC SURGERY

## 2023-05-03 PROCEDURE — 73030 XR SHOULDER TRAUMA 3 VIEW RIGHT: ICD-10-PCS | Mod: 26,RT,, | Performed by: RADIOLOGY

## 2023-05-03 PROCEDURE — 20610 LARGE JOINT ASPIRATION/INJECTION: R SUBACROMIAL BURSA: ICD-10-PCS | Mod: S$PBB,RT,, | Performed by: ORTHOPAEDIC SURGERY

## 2023-05-03 PROCEDURE — 73030 X-RAY EXAM OF SHOULDER: CPT | Mod: TC,PO,RT

## 2023-05-03 RX ORDER — TRIAMCINOLONE ACETONIDE 40 MG/ML
40 INJECTION, SUSPENSION INTRA-ARTICULAR; INTRAMUSCULAR
Status: DISCONTINUED | OUTPATIENT
Start: 2023-05-03 | End: 2023-05-03 | Stop reason: HOSPADM

## 2023-05-03 RX ADMIN — TRIAMCINOLONE ACETONIDE 40 MG: 40 INJECTION, SUSPENSION INTRA-ARTICULAR; INTRAMUSCULAR at 09:05

## 2023-05-03 NOTE — PROGRESS NOTES
Past Medical History:   Diagnosis Date    Anticoagulant long-term use     Bladder cancer 08/2020    Colon polyps     Coronary artery disease 01/31/2023    CORONARY STENTS    DDD (degenerative disc disease), lumbar     Depression     Fatty liver     GERD (gastroesophageal reflux disease)     H/O motion sickness     Hypertension     PONV (postoperative nausea and vomiting)     RLS (restless legs syndrome)        Past Surgical History:   Procedure Laterality Date    ANGIOGRAM, CORONARY, WITH LEFT HEART CATHETERIZATION N/A 1/31/2023    Procedure: Left Heart Cath;  Surgeon: Nitin Wheat MD;  Location: [facility] CATH;  Service: Cardiology;  Laterality: N/A;    APPENDECTOMY      CARPAL TUNNEL RELEASE      CERVICAL FUSION      CHOLECYSTECTOMY      CIRCUMCISION      COLONOSCOPY N/A 3/16/2020    Procedure: COLONOSCOPY;  Surgeon: Braulio Best MD;  Location: [facility];  Service: Endoscopy;  Laterality: N/A; 3 colon polyps removed; biopsy: tubular adenoma and hyperplastic; repeat in 5 years for surveillance    CORONARY ANGIOGRAPHY  2/13/2023    Procedure: ANGIOGRAM, CORONARY ARTERY;  Surgeon: Nitin Wheat MD;  Location: [facility] CATH;  Service: Cardiology;;    CORONARY ANGIOPLASTY WITH STENT PLACEMENT N/A 1/31/2023    Procedure: ANGIOPLASTY, CORONARY ARTERY, WITH STENT INSERTION;  Surgeon: Nitin Wheat MD;  Location: [facility] CATH;  Service: Cardiology;  Laterality: N/A;    CYSTOSCOPY W/ RETROGRADES Bilateral 8/24/2020    Procedure: CYSTOSCOPY, WITH RETROGRADE PYELOGRAM -WITH URETHRAL DILATION;  Surgeon: Greg Claudio MD;  Location: [facility] OR;  Service: Urology;  Laterality: Bilateral;    CYSTOSCOPY W/ URETERAL STENT PLACEMENT Right 8/24/2020    Procedure: CYSTOSCOPY, WITH URETERAL STENT INSERTION;  Surgeon: Greg Claudio MD;  Location: [facility] OR;  Service: Urology;  Laterality: Right;    INSTILLATION OF URINARY BLADDER N/A 8/24/2020    Procedure: INSTILLATION, BLADDER - Mitomycin;  Surgeon: Greg Claudio MD;  Location: [facility]  OR;  Service: Urology;  Laterality: N/A;    IVUS, CORONARY  1/31/2023    Procedure: IVUS, Coronary;  Surgeon: Nitin Wheat MD;  [facility];  Service: Cardiology;;    IVUS, CORONARY  2/13/2023    Procedure: IVUS, Coronary;  Surgeon: Nitin Wheat MD;  Location: [facility] CATH;  Service: Cardiology;;    PERCUTANEOUS CORONARY INTERVENTION, ARTERY  2/13/2023    Procedure: Percutaneous coronary intervention;  Surgeon: Nitin Wheat MD;  Location: [facility] CATH;  Service: Cardiology;;    TRANSFORAMINAL EPIDURAL INJECTION OF STEROID Right 7/17/2019    Procedure: Injection,steroid,epidural,transforaminal approach L3-4, L4-5;  Surgeon: Chris Palencia MD;  Location: [facility] OR;  Service: Pain Management;  Laterality: Right;    TRANSFORAMINAL EPIDURAL INJECTION OF STEROID Right 8/28/2019    Procedure: Injection,steroid,epidural,transforaminal approach;  Surgeon: Chris Palencia MD;  Location: [facility] OR;  Service: Pain Management;  Laterality: Right;  L3-4, L4-L5    WISDOM TOOTH EXTRACTION         Current Outpatient Medications   Medication Sig    aspirin 81 MG Chew Take 1 tablet (81 mg total) by mouth once daily.    clopidogreL (PLAVIX) 75 mg tablet Take 1 tablet (75 mg total) by mouth once daily.    EScitalopram oxalate (LEXAPRO) 10 MG tablet Take 1 tablet (10 mg total) by mouth once daily.    fluticasone propionate (FLONASE) 50 mcg/actuation nasal spray 1 spray (50 mcg total) by Each Nostril route 2 (two) times a day.    hydroCHLOROthiazide (HYDRODIURIL) 12.5 MG Tab Take 1 tablet (12.5 mg total) by mouth once daily.    Lactobac no.41/Bifidobact no.7 (PROBIOTIC-10 ORAL) Take 1 capsule by mouth once daily.    melatonin 5 mg Cap Take 5 mg by mouth nightly as needed. TAKE AS SCHEDULED    methocarbamoL (ROBAXIN) 500 MG Tab 500 mg daily as needed.    multivitamin (THERAGRAN) per tablet Take 1 tablet by mouth once daily.     pantoprazole (PROTONIX) 40 MG tablet Take 1 tablet (40 mg total) by mouth once daily.    pramipexole (MIRAPEX) 1 MG tablet Take  1 tablet (1 mg total) by mouth nightly.    rosuvastatin (CRESTOR) 40 MG Tab Take 1 tablet (40 mg total) by mouth once daily.    scopolamine (TRANSDERM-SCOP) 1.3-1.5 mg (1 mg over 3 days) Place 1 patch onto the skin every 72 hours as needed. As needed    telmisartan (MICARDIS) 40 MG Tab TAKE 1 TABLET BY MOUTH EVERY DAY    triamcinolone acetonide 0.1% (KENALOG) 0.1 % cream Apply topically 2 (two) times daily as needed.     Current Facility-Administered Medications   Medication    sodium chloride 0.9% flush 10 mL     Facility-Administered Medications Ordered in Other Visits   Medication    ondansetron injection 4 mg       Review of patient's allergies indicates:   Allergen Reactions    Sulfa (sulfonamide antibiotics) Anaphylaxis       Family History   Problem Relation Age of Onset    Stroke Mother     Ulcerative colitis Mother     Cancer Neg Hx     Colon cancer Neg Hx     Crohn's disease Neg Hx     Celiac disease Neg Hx        Social History     Socioeconomic History    Marital status:    Occupational History    Occupation: [employer]   Tobacco Use    Smoking status: Former     Packs/day: 1.50     Types: Cigarettes     Quit date: 10/16/2013     Years since quittin.5    Smokeless tobacco: Never   Substance and Sexual Activity    Alcohol use: Yes     Alcohol/week: 2.0 standard drinks     Types: 2 Cans of beer per week    Drug use: Not Currently     Social Determinants of Health     Financial Resource Strain: Low Risk     Difficulty of Paying Living Expenses: Not hard at all   Food Insecurity: No Food Insecurity    Worried About Running Out of Food in the Last Year: Never true    Ran Out of Food in the Last Year: Never true   Transportation Needs: No Transportation Needs    Lack of Transportation (Medical): No    Lack of Transportation (Non-Medical): No   Physical Activity: Inactive    Days of Exercise per Week: 0 days    Minutes of Exercise per Session: 0 min   Stress: No Stress Concern Present     Feeling of Stress : Not at all   Social Connections: Unknown    Frequency of Communication with Friends and Family: More than three times a week    Frequency of Social Gatherings with Friends and Family: Twice a week    Active Member of Clubs or Organizations: No    Attends Club or Organization Meetings: Never    Marital Status:    Housing Stability: Low Risk     Unable to Pay for Housing in the Last Year: No    Number of Places Lived in the Last Year: 1    Unstable Housing in the Last Year: No       Chief Complaint:   Chief Complaint   Patient presents with    Shoulder Pain     eval right shoulder        History of present illness:  67-year-old left-hand dominant male seen for right shoulder pain.  Pain is been hurting him since about January.  No injury or trauma.  Patient gets sharp intermittent pain with activities although he can not really localize what activities hurting which was do not.  Pain lasts about 30 seconds and then has about 3-4 minutes of residual soreness.  Patient had more of a rotator cuff issue on the other side which was treated with an injection which did well.  Pain in the right shoulder is a 3/10.      Answers submitted by the patient for this visit:  Orthopedics Questionnaire (Submitted on 12/5/2022)  unexpected weight change: No  appetite change : No  sleep disturbance: No  IMMUNOCOMPROMISED: No  nervous/ anxious: No  dysphoric mood: No  rash: No  visual disturbance: No  eye redness: No  eye pain: No  ear pain: No  tinnitus: No  hearing loss: No  sinus pressure : No  nosebleeds: No  enviro allergies: No  food allergies: No  cough: No  shortness of breath: No  sweating: No  frequency: No  difficulty urinating: No  hematuria: No  chest pain: No  palpitations: No  nausea: No  vomiting: No  diarrhea: No  blood in stool: No  constipation: No  headaches: No  dizziness: No  numbness: No  seizures: No  joint swelling: No  myalgia: No  weakness: No  back pain: No   (Submitted on  12/5/2022)  Chief Complaint: Arm pain  Pain Chronicity: chronic  History of trauma: No  Onset: more than 1 month ago  Frequency: intermittently  Progression since onset: rapidly worsening  Injury mechanism: reaching  injury location: at home  pain- numeric: 5/10  pain location: left shoulder  pain quality: aching  Radiating Pain: No  Aggravating factors: activity, bearing weight, rotation  fever: No  inability to bear weight: Yes  itching: No  joint locking: No  limited range of motion: Yes  stiffness: Yes  tingling: No  Treatments tried: nothing  physical therapy: not tried  Improvement on treatment: no relief        Physical Examination:    Vital Signs:    Vitals:    05/03/23 0852   Resp: 18       Body mass index is 34.72 kg/m².    This a well-developed, well nourished patient in no acute distress.  They are alert and oriented and cooperative to examination.  Pt. walks without an antalgic gait.      Examination of the right shoulder shows no rashes or erythema. There are no masses, ecchymosis, or atrophy. The patient has full range of motion in forward flexion, external rotation, and internal rotation to the mid T-spine. The patient has minimal positive Lopez Neer and grossly positive Fenwick's test.  Negative speed's test.  Moderately tender to palpation over a.c. joint. Normal stability anteriorly, posteriorly, and negative sulcus sign. Passive range of motion: Forward flexion of 180°, external rotation at 90° of 90°, internal rotation of 50°, and external rotation at 0° of 50°. 2+ radial pulse. Intact axillary, radial, median and ulnar sensation. 5 out of 5 resisted forward flexion, external rotation, and negative lift off test.      X-rays:  X-rays of the right ordered and reviewed which show some sclerosis around the tuberosity     Assessment::  Right posterior rotator cuff tendinitis versus symptomatic SLAP lesion    Plan:  I reviewed the findings with him today.  We talked about treatment options.   Patient elected for subacromial injection.  Continue with the exercises.  We talked about possibly getting an MRI if not improving.    This note was created using [employer] voice recognition software that occasionally misinterpreted phrases or words.    Consult note is delivered via Epic messaging service.

## 2023-05-03 NOTE — PROCEDURES
Large Joint Aspiration/Injection: R subacromial bursa    Date/Time: 5/3/2023 9:15 AM  Performed by: Bertrand Means MD  Authorized by: Bertrand Means MD     Consent Done?:  Yes (Verbal)  Indications:  Pain  Site marked: the procedure site was marked    Timeout: prior to procedure the correct patient, procedure, and site was verified    Local anesthetic: Ropivicaine.  Anesthetic total (ml):  3      Details:  Needle Size:  20 G  Ultrasonic Guidance for needle placement?: No    Approach:  Posterior  Location:  Shoulder  Site:  R subacromial bursa  Medications:  40 mg triamcinolone acetonide 40 mg/mL  Patient tolerance:  Patient tolerated the procedure well with no immediate complications

## 2023-06-08 ENCOUNTER — TELEPHONE (OUTPATIENT)
Dept: CARDIOLOGY | Facility: CLINIC | Age: 68
End: 2023-06-08
Payer: MEDICARE

## 2023-06-08 NOTE — TELEPHONE ENCOUNTER
N/A AT THIS TIME, LEFT VM FOR PT TO CALL BACK AND R/S HIS APPT WITH DR. JARRELL 6/14/23 B/C DR. JARRELL WILL BE OUT FOR CONT. ED

## 2023-06-09 ENCOUNTER — TELEPHONE (OUTPATIENT)
Dept: CARDIOLOGY | Facility: CLINIC | Age: 68
End: 2023-06-09
Payer: MEDICARE

## 2023-06-09 NOTE — TELEPHONE ENCOUNTER
Spoke to pt in regards to rescheduling his appt with  6/14. Pt stated he would be able to come in at 9:20am after his cardiac rehab appt. Infomred pt we will get him moved to then and to please give us a call that morning if he is running late as  will have to leave on time

## 2023-06-14 ENCOUNTER — LAB VISIT (OUTPATIENT)
Dept: LAB | Facility: HOSPITAL | Age: 68
End: 2023-06-14
Attending: INTERNAL MEDICINE
Payer: MEDICARE

## 2023-06-14 ENCOUNTER — OFFICE VISIT (OUTPATIENT)
Dept: CARDIOLOGY | Facility: CLINIC | Age: 68
End: 2023-06-14
Payer: MEDICARE

## 2023-06-14 ENCOUNTER — TELEPHONE (OUTPATIENT)
Dept: CARDIOLOGY | Facility: CLINIC | Age: 68
End: 2023-06-14

## 2023-06-14 VITALS
DIASTOLIC BLOOD PRESSURE: 70 MMHG | HEIGHT: 70 IN | BODY MASS INDEX: 34.69 KG/M2 | WEIGHT: 242.31 LBS | SYSTOLIC BLOOD PRESSURE: 129 MMHG | HEART RATE: 76 BPM

## 2023-06-14 DIAGNOSIS — E78.00 HYPERCHOLESTEROLEMIA: ICD-10-CM

## 2023-06-14 DIAGNOSIS — Z95.5 S/P DRUG ELUTING CORONARY STENT PLACEMENT: ICD-10-CM

## 2023-06-14 DIAGNOSIS — I83.93 ASYMPTOMATIC VARICOSE VEINS OF BOTH LOWER EXTREMITIES: ICD-10-CM

## 2023-06-14 DIAGNOSIS — I10 PRIMARY HYPERTENSION: ICD-10-CM

## 2023-06-14 DIAGNOSIS — I25.10 CORONARY ARTERY DISEASE INVOLVING NATIVE CORONARY ARTERY OF NATIVE HEART WITHOUT ANGINA PECTORIS: Primary | ICD-10-CM

## 2023-06-14 DIAGNOSIS — I25.10 CORONARY ARTERY DISEASE INVOLVING NATIVE CORONARY ARTERY OF NATIVE HEART WITHOUT ANGINA PECTORIS: ICD-10-CM

## 2023-06-14 LAB
CHOLEST SERPL-MCNC: 119 MG/DL (ref 120–199)
CHOLEST/HDLC SERPL: 2.2 {RATIO} (ref 2–5)
HDLC SERPL-MCNC: 53 MG/DL (ref 40–75)
HDLC SERPL: 44.5 % (ref 20–50)
LDLC SERPL CALC-MCNC: 56.2 MG/DL (ref 63–159)
NONHDLC SERPL-MCNC: 66 MG/DL
TRIGL SERPL-MCNC: 49 MG/DL (ref 30–150)

## 2023-06-14 PROCEDURE — 99213 OFFICE O/P EST LOW 20 MIN: CPT | Mod: PBBFAC,PO | Performed by: INTERNAL MEDICINE

## 2023-06-14 PROCEDURE — 99999 PR PBB SHADOW E&M-EST. PATIENT-LVL III: ICD-10-PCS | Mod: PBBFAC,,, | Performed by: INTERNAL MEDICINE

## 2023-06-14 PROCEDURE — 36415 COLL VENOUS BLD VENIPUNCTURE: CPT | Mod: PO | Performed by: INTERNAL MEDICINE

## 2023-06-14 PROCEDURE — 99214 PR OFFICE/OUTPT VISIT, EST, LEVL IV, 30-39 MIN: ICD-10-PCS | Mod: S$PBB,,, | Performed by: INTERNAL MEDICINE

## 2023-06-14 PROCEDURE — 99214 OFFICE O/P EST MOD 30 MIN: CPT | Mod: S$PBB,,, | Performed by: INTERNAL MEDICINE

## 2023-06-14 PROCEDURE — 80061 LIPID PANEL: CPT | Performed by: INTERNAL MEDICINE

## 2023-06-14 PROCEDURE — 99999 PR PBB SHADOW E&M-EST. PATIENT-LVL III: CPT | Mod: PBBFAC,,, | Performed by: INTERNAL MEDICINE

## 2023-06-14 NOTE — PROGRESS NOTES
Cardiology Clinic Note      Patient: Moustapha Murray, 1955, [id number]  Primary Care Provider: Colton Johnson MD     Chief Complaint/Reason for Referral: abnormal CACS     Subjective:       Moustapha Murray is a 68 y.o. male who presents for follow up. He is not accompanied by his wife      Finishing CR Friday, no CP, little CHANDRA, no syncope, no claudication, no palpitations, no edema, no orthopnea. Compliant with meds. Bruising but no pathologic bleeding GI/    Focused Past History includes:  Exercise stress EKG 1/2023 by report: negative at 86% MPHR, 8 METs.   CACS 2317 by CT 1/2023  CAD:   Coronary angiogram and PCI 1/31/23 ([facility]): Severe segmental stenosis extending from proximal to mid LAD.  Successful IVUS guided PCI with overlapping 4.0 x 38 and 2.5 x 38 Synergy XD FERNANDO from ostial to mid LAD  Staged PCI 2/13/23: 80% stenosis in a large right posterior AV branch feeding a large PLB s/p single FERNANDO. Ostial RCA insignificant by IVUS  HTN  GERD  Bladder cancer treated with surgery and BCG; no systemic chemo or XRT - 2022  No AAA by US 1/2023  BLE varicose veins  Former smoker of 60 pack-years - quit in 2015  No history of stroke, DM  No family history of premature CAD    Review of Systems  Constitutional: negative for fevers, night sweats, and weight loss  Eyes: negative for visual disturbance, diplopia  Respiratory: negative for cough, hemoptysis, sputum, and wheezing  Cardiovascular: see HPI  Gastrointestinal: negative for abdominal pain, bright red blood per rectum, change in bowel habits, dysphagia, melena, and reflux symptoms  Genitourinary:negative for dysuria, frequency, and hematuria  Hematologic/lymphatic: negative for bleeding, easy bruising, and lymphadenopathy  Musculoskeletal:negative for arthralgias, back pain, and myalgias  Neurological: negative for gait problems, paresthesia, speech problems, vertigo, and weakness  Behavioral/Psych: negative for excessive alcohol consumption,  illegal drug usage, and sleep disturbance    ----------------------------  Anticoagulant long-term use  Bladder cancer  Colon polyps  Coronary artery disease      Comment:  CORONARY STENTS  DDD (degenerative disc disease), lumbar  Depression  Fatty liver  GERD (gastroesophageal reflux disease)  H/O motion sickness  Hypertension  PONV (postoperative nausea and vomiting)  RLS (restless legs syndrome)  ----------------------------  Angiogram, coronary, with left heart catheterization      Comment:  Procedure: Left Heart Cath;  Surgeon: Nitin Wheat MD;                 Location: [facility] CATH;  Service: Cardiology;  Laterality:                N/A;  Appendectomy  Carpal tunnel release  Cervical fusion  Cholecystectomy  Circumcision  Colonoscopy      Comment:  Procedure: COLONOSCOPY;  Surgeon: Braulio Best MD;  Location: [facility];  Service: Endoscopy;                 Laterality: N/A; 3 colon polyps removed; biopsy: tubular                adenoma and hyperplastic; repeat in 5 years for                surveillance  Coronary angiography      Comment:  Procedure: ANGIOGRAM, CORONARY ARTERY;  Surgeon: Nitin Wheat MD;  Location: [facility] CATH;  Service: Cardiology;;  Coronary angioplasty with stent placement      Comment:  Procedure: ANGIOPLASTY, CORONARY ARTERY, WITH STENT                INSERTION;  Surgeon: Nitin Wheat MD;  Location: [facility] CATH;               Service: Cardiology;  Laterality: N/A;  Cystoscopy w/ retrogrades      Comment:  Procedure: CYSTOSCOPY, WITH RETROGRADE PYELOGRAM -WITH                URETHRAL DILATION;  Surgeon: Greg Claudio MD;                 Location: [facility] OR;  Service: Urology;  Laterality:                Bilateral;  Cystoscopy w/ ureteral stent placement      Comment:  Procedure: CYSTOSCOPY, WITH URETERAL STENT INSERTION;                 Surgeon: Greg Claudio MD;  Location: [facility] OR;                 Service: Urology;  Laterality: Right;  Instillation of  urinary bladder      Comment:  Procedure: INSTILLATION, BLADDER - Mitomycin;  Surgeon:                Greg Claudio MD;  Location: [facility] OR;  Service:                Urology;  Laterality: N/A;  Ivus, coronary      Comment:  Procedure: IVUS, Coronary;  Surgeon: Nitin Wheat MD;                 Location: [facility] CATH;  Service: Cardiology;;  Ivus, coronary      Comment:  Procedure: IVUS, Coronary;  Surgeon: Nitin Wheat MD;                 Location: [facility] CATH;  Service: Cardiology;;  Percutaneous coronary intervention, artery      Comment:  Procedure: Percutaneous coronary intervention;  Surgeon:               Nitin Wheat MD;  Location: [facility] CATH;  Service:                Cardiology;;  Transforaminal epidural injection of steroid      Comment:  Procedure: Injection,steroid,epidural,transforaminal                approach L3-4, L4-5;  Surgeon: Chris Palencia MD;  Location:               [facility] OR;  Service: Pain Management;  Laterality: Right;  Transforaminal epidural injection of steroid      Comment:  Procedure: Injection,steroid,epidural,transforaminal                approach;  Surgeon: Chris Paelncia MD;  Location: [facility] OR;                 Service: Pain Management;  Laterality: Right;  L3-4,                L4-L5  [place] tooth extraction     Family History   Problem Relation Age of Onset    Stroke Mother     Ulcerative colitis Mother     Cancer Neg Hx     Colon cancer Neg Hx     Crohn's disease Neg Hx     Celiac disease Neg Hx      Social History     Tobacco Use    Smoking status: Former     Packs/day: 1.50     Types: Cigarettes     Quit date: 10/16/2013     Years since quittin.6    Smokeless tobacco: Never   Substance Use Topics    Alcohol use: Yes     Alcohol/week: 2.0 standard drinks     Types: 2 Cans of beer per week    Drug use: Not Currently       Current Outpatient Medications   Medication Sig Dispense Refill    aspirin 81 MG Chew Take 1 tablet (81 mg total) by mouth once daily. 90 tablet 3    clopidogreL (PLAVIX) 75  "mg tablet Take 1 tablet (75 mg total) by mouth once daily. 90 tablet 3    EScitalopram oxalate (LEXAPRO) 10 MG tablet Take 1 tablet (10 mg total) by mouth once daily.      fluticasone propionate (FLONASE) 50 mcg/actuation nasal spray 1 spray (50 mcg total) by Each Nostril route 2 (two) times a day. 16 g 11    hydroCHLOROthiazide (HYDRODIURIL) 12.5 MG Tab Take 1 tablet (12.5 mg total) by mouth once daily. 90 tablet 3    Lactobac no.41/Bifidobact no.7 (PROBIOTIC-10 ORAL) Take 1 capsule by mouth once daily.      melatonin 5 mg Cap Take 5 mg by mouth nightly as needed. TAKE AS SCHEDULED      methocarbamoL (ROBAXIN) 500 MG Tab 500 mg daily as needed.      multivitamin (THERAGRAN) per tablet Take 1 tablet by mouth once daily.       pantoprazole (PROTONIX) 40 MG tablet Take 1 tablet (40 mg total) by mouth once daily. 90 tablet 3    pramipexole (MIRAPEX) 1 MG tablet Take 1 tablet (1 mg total) by mouth nightly. 90 tablet 3    rosuvastatin (CRESTOR) 40 MG Tab Take 1 tablet (40 mg total) by mouth once daily. 90 tablet 3    scopolamine (TRANSDERM-SCOP) 1.3-1.5 mg (1 mg over 3 days) Place 1 patch onto the skin every 72 hours as needed. As needed      telmisartan (MICARDIS) 40 MG Tab TAKE 1 TABLET BY MOUTH EVERY DAY 90 tablet 2    triamcinolone acetonide 0.1% (KENALOG) 0.1 % cream Apply topically 2 (two) times daily as needed.       Current Facility-Administered Medications   Medication Dose Route Frequency Provider Last Rate Last Admin    sodium chloride 0.9% flush 10 mL  10 mL Intravenous PRN Nitin Wheat MD         Facility-Administered Medications Ordered in Other Visits   Medication Dose Route Frequency Provider Last Rate Last Admin    ondansetron injection 4 mg  4 mg Intravenous Daily PRN Gustavo Chu MD            Objective:      Physical Exam  /70 (BP Location: Left arm, Patient Position: Sitting, BP Method: Medium (Automatic))   Pulse 76   Ht 5' 10" (1.778 m)   Wt 109.9 kg (242 lb 4.6 oz)   BMI 34.76 kg/m² "   Body surface area is 2.33 meters squared.  Body mass index is 34.76 kg/m².    General appearance: alert, appears stated age, cooperative, and no distress  Head: Normocephalic, without obvious abnormality, atraumatic  Neck: no carotid bruit, no JVD, and supple, symmetrical, trachea midline  Lungs: clear to auscultation bilaterally  Heart: regular rate and rhythm; S1, S2 normal, no murmur, click, rub or gallop  Abdomen: soft, non-tender, no distended  Extremities: extremities atraumatic, no pitting edema. Large varicose veins in LLE>RLE with marked telangiectasias  Skin: warm, no cyanosis, no pathologic ecchymosis in exposed portions  Neurologic: Grossly normal. A&O x3      Lab Review   Lab Results   Component Value Date    WBC 9.49 02/13/2023    HGB 12.9 (L) 02/13/2023    HCT 37.8 (L) 02/13/2023    MCV 91 02/13/2023     02/13/2023         BMP  Lab Results   Component Value Date     (L) 02/13/2023    K 4.1 02/13/2023     02/13/2023    CO2 27 02/13/2023    BUN 17 02/13/2023    CREATININE 1.02 02/13/2023    CALCIUM 9.0 02/13/2023    ANIONGAP 5 (L) 02/13/2023    ESTGFRAFRICA >60 03/11/2022    EGFRNONAA >60 03/11/2022       Lab Results   Component Value Date    ALBUMIN 4.3 02/13/2023       Lab Results   Component Value Date    ALT 35 02/13/2023    AST 40 02/13/2023    ALKPHOS 121 02/13/2023    BILITOT 0.8 02/13/2023       Lab Results   Component Value Date    TSH 1.63 07/06/2011       Lab Results   Component Value Date    CHOL 219 (H) 01/05/2023    CHOL 190 12/11/2020    CHOL 207 (H) 10/24/2019     Lab Results   Component Value Date    HDL 59 01/05/2023    HDL 49 12/11/2020    HDL 50 10/24/2019     Lab Results   Component Value Date    LDLCALC 141.4 01/05/2023    LDLCALC 124.6 12/11/2020    LDLCALC 140.2 10/24/2019     Lab Results   Component Value Date    TRIG 93 01/05/2023    TRIG 82 12/11/2020    TRIG 84 10/24/2019     Lab Results   Component Value Date    CHOLHDL 26.9 01/05/2023    CHOLHDL 25.8  12/11/2020    CHOLHDL 24.2 10/24/2019      Assessment & Plan:      This is a 68 y.o. pleasant  male with CHANDRA that has improved significantly after PCI and CR.  Coronary angiogram revealed two vessel disease which was revascularized percutaneously. He has varicose veins.     1. Coronary artery disease involving native coronary artery of native heart without angina pectoris  Lipid Panel      2. Asymptomatic varicose veins of both lower extremities        3. S/P drug eluting coronary stent placement        4. Primary hypertension        5. Hypercholesterolemia                 Continue telmisartan 40 and HCTZ 12.5 as is  Continue DAPT for at least 6 months, preferably longer  Continue rosuva 40 mg QD - may need to add ezetimibe to get LDL-C<50 - FLP today  Referral to [facility] for varicose veins  Emphasized the importance of modifying lifestyle related risk factors including limiting alcohol intake, exercise, diet most resembling a Mediterranean diet.    I appreciate the opportunity to participate in Moustapha Murray 's care today.  Please follow up with me in 6 months.      Nitin Wheat MD, [facility]  Interventional Cardiology/Structural Heart Disease  [facility]  Office: [phone]     Parts of this note were completed using voice recognition software. Please excuse any misspellings or syntax errors and reach out to me with questions.

## 2023-06-14 NOTE — PATIENT INSTRUCTIONS
Lipid panel today  No changes to medications  Will replace referral to [facility]  Return in 6 months

## 2023-06-21 ENCOUNTER — PATIENT MESSAGE (OUTPATIENT)
Dept: FAMILY MEDICINE | Facility: CLINIC | Age: 68
End: 2023-06-21
Payer: MEDICARE

## 2023-06-21 DIAGNOSIS — F32.A DEPRESSION, UNSPECIFIED DEPRESSION TYPE: ICD-10-CM

## 2023-06-21 RX ORDER — ESCITALOPRAM OXALATE 10 MG/1
10 TABLET ORAL DAILY
Qty: 30 TABLET | Refills: 11 | Status: SHIPPED | OUTPATIENT
Start: 2023-06-21

## 2023-06-22 NOTE — TELEPHONE ENCOUNTER
No care due was identified.  [facility] Embedded Care Due Messages. Reference number: [id number].   6/21/2023 8:33:02 PM CDT

## 2023-07-03 DIAGNOSIS — I10 HYPERTENSION, UNSPECIFIED TYPE: ICD-10-CM

## 2023-07-03 RX ORDER — HYDROCHLOROTHIAZIDE 12.5 MG/1
TABLET ORAL
Qty: 90 TABLET | Refills: 0 | Status: SHIPPED | OUTPATIENT
Start: 2023-07-03 | End: 2023-10-01

## 2023-07-03 NOTE — TELEPHONE ENCOUNTER
No care due was identified.  [facility] Embedded Care Due Messages. Reference number: [id number].   7/03/2023 12:38:58 AM CDT

## 2023-07-03 NOTE — TELEPHONE ENCOUNTER
Refill request routed to [facility] Review Pool for Pharmacist Review.

## 2023-07-03 NOTE — TELEPHONE ENCOUNTER
Provider Staff:  Action required. This patient has received hospital care.     Please schedule patient for a follow up appointment within next 90 days.     Thanks!  [facility]     Appointments      Date Provider   Last Visit   1/5/2023 Colton Johnson MD   Next Visit   Visit date not found Colton Johnson MD        Refill Decision Note   Moustapha Murray  is requesting a refill authorization.  Brief Assessment and Rationale for Refill:  Approve     Medication Therapy Plan:  Hospital visit documentation reviewed and no changes in therapy      Pharmacist review requested: Yes   Extended chart review required: Yes   Comments:     Note composed:5:19 PM 07/03/2023

## 2023-08-17 ENCOUNTER — PROCEDURE VISIT (OUTPATIENT)
Dept: UROLOGY | Facility: CLINIC | Age: 68
End: 2023-08-17
Payer: MEDICARE

## 2023-08-17 VITALS — WEIGHT: 238.13 LBS | BODY MASS INDEX: 34.16 KG/M2

## 2023-08-17 DIAGNOSIS — C67.2 MALIGNANT NEOPLASM OF LATERAL WALL OF URINARY BLADDER: ICD-10-CM

## 2023-08-17 PROCEDURE — 52000 CYSTOURETHROSCOPY: CPT | Mod: PBBFAC,PO | Performed by: UROLOGY

## 2023-08-17 PROCEDURE — 52000 CYSTOSCOPY: ICD-10-PCS | Mod: S$PBB,,, | Performed by: UROLOGY

## 2023-08-17 NOTE — PROCEDURES
Cystoscopy    Date/Time: 8/17/2023 9:00 AM    Performed by: ROSA Lr MD  Authorized by: ROSA Lr MD    Consent Done?:  Yes (Written)  Timeout: prior to procedure the correct patient, procedure, and site was verified    Prep: patient was prepped and draped in usual sterile fashion    Anesthesia:  Lidocaine jelly  Indications: history bladder cancer    Position:  Supine  Anesthesia:  Lidocaine jelly  Patient sedated?: No    Preparation: Patient was prepped and draped in usual sterile fashion    Scope type:  Flexible cystoscope   patient tolerated the procedure well with no immediate complications    Blood Loss:  None     68-year-old with a history of T1 urothelial cancer.  He underwent TURBT with instillation of mitomycin-C in 08/2020. He has a recurrence and underwent repeat TURBT 03/2022.  Pepeat path again shows low-grade noninvasive urothelial carcinoma.  He then completed an induction course of BCG.  He is here for cystoscopy        The flexible cystoscope was placed into the urethra and carefully advanced into the bladder.  A careful cystoscopic exam was then performed.  The entire bladder mucosa was systematically visualized.  Findings include moderate bladder wall trabeculation.  There were no lesions, masses foreign bodies or stones.   TUR scar was evident on the right lateral wall.  The right ureteral orifice was deviated laterally.  Both ureteral orifices were visualized and both had clear efflux of urine.  On retroflexion there was a small intravesical gland.  The cystoscope was then removed and I examined the entire length of the urethra.  There was moderate bilobar enlargement of the prostate.  In the anterior urethra there was multiple areas of narrowing which were not thought to be clinically significant.  He tolerated the procedure well.  There were no complications.     Impression:  No evidence of recurrence     Plan:  Follow-up 9 months for cystoscopy

## 2023-10-01 DIAGNOSIS — I10 HYPERTENSION, UNSPECIFIED TYPE: ICD-10-CM

## 2023-10-01 RX ORDER — HYDROCHLOROTHIAZIDE 12.5 MG/1
TABLET ORAL
Qty: 90 TABLET | Refills: 1 | Status: SHIPPED | OUTPATIENT
Start: 2023-10-01

## 2023-10-01 NOTE — TELEPHONE ENCOUNTER
No care due was identified.  [facility] Embedded Care Due Messages. Reference number: [id number].   10/01/2023 7:42:42 AM CDT

## 2023-10-02 NOTE — TELEPHONE ENCOUNTER
Refill Decision Note   Moustapha Trevor  is requesting a refill authorization.  Brief Assessment and Rationale for Refill:  Approve     Medication Therapy Plan:       Medication Reconciliation Completed: No   Comments:     No Care Gaps recommended.     Note composed:7:44 PM 10/01/2023

## 2023-10-17 ENCOUNTER — HOSPITAL ENCOUNTER (OUTPATIENT)
Dept: RADIOLOGY | Facility: HOSPITAL | Age: 68
Discharge: HOME | End: 2023-10-17
Attending: INTERNAL MEDICINE
Payer: MEDICARE

## 2023-10-17 DIAGNOSIS — R06.02 SHORTNESS OF BREATH: ICD-10-CM

## 2023-10-17 PROCEDURE — 71046 X-RAY EXAM CHEST 2 VIEWS: CPT | Mod: 26,,, | Performed by: RADIOLOGY

## 2023-10-17 PROCEDURE — 71046 X-RAY EXAM CHEST 2 VIEWS: CPT | Mod: TC,FY,PO

## 2023-10-17 PROCEDURE — 71046 XR CHEST PA AND LATERAL: ICD-10-PCS | Mod: 26,,, | Performed by: RADIOLOGY

## 2023-11-29 ENCOUNTER — LAB VISIT (OUTPATIENT)
Dept: LAB | Facility: HOSPITAL | Age: 68
End: 2023-11-29
Attending: INTERNAL MEDICINE
Payer: MEDICARE

## 2023-11-29 DIAGNOSIS — J84.10 PULMONARY FIBROSIS: ICD-10-CM

## 2023-11-29 DIAGNOSIS — J84.9 INTERSTITIAL PULMONARY DISEASE, UNSPECIFIED: ICD-10-CM

## 2023-11-29 LAB
CCP AB SER IA-ACNC: 0.8 U/ML
CK SERPL-CCNC: 190 U/L (ref 20–200)
CRP SERPL-MCNC: 2.3 MG/L (ref 0–8.2)

## 2023-11-29 PROCEDURE — 86235 NUCLEAR ANTIGEN ANTIBODY: CPT | Mod: 59 | Performed by: INTERNAL MEDICINE

## 2023-11-29 PROCEDURE — 82550 ASSAY OF CK (CPK): CPT | Performed by: INTERNAL MEDICINE

## 2023-11-29 PROCEDURE — 86039 ANTINUCLEAR ANTIBODIES (ANA): CPT | Performed by: INTERNAL MEDICINE

## 2023-11-29 PROCEDURE — 86038 ANTINUCLEAR ANTIBODIES: CPT | Performed by: INTERNAL MEDICINE

## 2023-11-29 PROCEDURE — 86225 DNA ANTIBODY NATIVE: CPT | Performed by: INTERNAL MEDICINE

## 2023-11-29 PROCEDURE — 86200 CCP ANTIBODY: CPT | Performed by: INTERNAL MEDICINE

## 2023-11-29 PROCEDURE — 86235 NUCLEAR ANTIGEN ANTIBODY: CPT | Performed by: INTERNAL MEDICINE

## 2023-11-29 PROCEDURE — 82085 ASSAY OF ALDOLASE: CPT | Performed by: INTERNAL MEDICINE

## 2023-11-29 PROCEDURE — 86431 RHEUMATOID FACTOR QUANT: CPT | Performed by: INTERNAL MEDICINE

## 2023-11-29 PROCEDURE — 86036 ANCA SCREEN EACH ANTIBODY: CPT | Performed by: INTERNAL MEDICINE

## 2023-11-29 PROCEDURE — 86140 C-REACTIVE PROTEIN: CPT | Performed by: INTERNAL MEDICINE

## 2023-11-29 PROCEDURE — 36415 COLL VENOUS BLD VENIPUNCTURE: CPT | Mod: PO | Performed by: INTERNAL MEDICINE

## 2023-11-30 LAB
ANA PATTERN 1: NORMAL
ANA PATTERN 2: NORMAL
ANA SER QL IF: POSITIVE
ANA TITER 2: NORMAL
ANA TITR SER IF: NORMAL {TITER}
ANTI-SSA ANTIBODY: 0.1 RATIO (ref 0–0.99)
ANTI-SSA INTERPRETATION: NEGATIVE
ANTI-SSB ANTIBODY: 0.11 RATIO (ref 0–0.99)
ANTI-SSB INTERPRETATION: NEGATIVE
RHEUMATOID FACT SERPL-ACNC: <13 IU/ML (ref 0–15)

## 2023-12-01 LAB
ALDOLASE SERPL-CCNC: 4.3 U/L (ref 1.2–7.6)
ENA SCL70 IGG SER IA-ACNC: <0.2 U

## 2023-12-04 LAB
ANCA AB TITR SER IF: NORMAL TITER
P-ANCA TITR SER IF: NORMAL TITER

## 2023-12-07 ENCOUNTER — HOSPITAL ENCOUNTER (OUTPATIENT)
Dept: RADIOLOGY | Facility: HOSPITAL | Age: 68
Discharge: HOME | End: 2023-12-07
Attending: FAMILY MEDICINE
Payer: MEDICARE

## 2023-12-07 ENCOUNTER — OFFICE VISIT (OUTPATIENT)
Dept: FAMILY MEDICINE | Facility: CLINIC | Age: 68
End: 2023-12-07
Payer: MEDICARE

## 2023-12-07 VITALS
BODY MASS INDEX: 33.25 KG/M2 | HEIGHT: 70 IN | SYSTOLIC BLOOD PRESSURE: 118 MMHG | HEART RATE: 39 BPM | WEIGHT: 232.25 LBS | OXYGEN SATURATION: 97 % | DIASTOLIC BLOOD PRESSURE: 68 MMHG | RESPIRATION RATE: 18 BRPM

## 2023-12-07 DIAGNOSIS — M79.632 LEFT FOREARM PAIN: ICD-10-CM

## 2023-12-07 DIAGNOSIS — K21.9 GASTROESOPHAGEAL REFLUX DISEASE WITHOUT ESOPHAGITIS: ICD-10-CM

## 2023-12-07 DIAGNOSIS — C67.2 MALIGNANT NEOPLASM OF LATERAL WALL OF URINARY BLADDER: ICD-10-CM

## 2023-12-07 DIAGNOSIS — G25.81 RLS (RESTLESS LEGS SYNDROME): ICD-10-CM

## 2023-12-07 DIAGNOSIS — F32.A DEPRESSION, UNSPECIFIED DEPRESSION TYPE: ICD-10-CM

## 2023-12-07 DIAGNOSIS — Z12.5 PROSTATE CANCER SCREENING: ICD-10-CM

## 2023-12-07 DIAGNOSIS — I10 HYPERTENSION, UNSPECIFIED TYPE: Primary | ICD-10-CM

## 2023-12-07 DIAGNOSIS — L30.9 DERMATITIS: ICD-10-CM

## 2023-12-07 LAB
ANTI SM ANTIBODY: 0.17 RATIO (ref 0–0.99)
ANTI SM/RNP ANTIBODY: 0.09 RATIO (ref 0–0.99)
ANTI-SM INTERPRETATION: NEGATIVE
ANTI-SM/RNP INTERPRETATION: NEGATIVE
ANTI-SSA ANTIBODY: 0.1 RATIO (ref 0–0.99)
ANTI-SSA INTERPRETATION: NEGATIVE
ANTI-SSB ANTIBODY: 0.11 RATIO (ref 0–0.99)
ANTI-SSB INTERPRETATION: NEGATIVE
DSDNA AB SER-ACNC: NORMAL [IU]/ML

## 2023-12-07 PROCEDURE — 99213 OFFICE O/P EST LOW 20 MIN: CPT | Mod: PBBFAC,PO | Performed by: FAMILY MEDICINE

## 2023-12-07 PROCEDURE — 73090 X-RAY EXAM OF FOREARM: CPT | Mod: TC,FY,PO,LT

## 2023-12-07 PROCEDURE — 73090 X-RAY EXAM OF FOREARM: CPT | Mod: 26,LT,, | Performed by: RADIOLOGY

## 2023-12-07 PROCEDURE — 99214 OFFICE O/P EST MOD 30 MIN: CPT | Mod: S$PBB,,, | Performed by: FAMILY MEDICINE

## 2023-12-07 PROCEDURE — 73090 XR FOREARM LEFT: ICD-10-PCS | Mod: 26,LT,, | Performed by: RADIOLOGY

## 2023-12-07 PROCEDURE — 99214 PR OFFICE/OUTPT VISIT, EST, LEVL IV, 30-39 MIN: ICD-10-PCS | Mod: S$PBB,,, | Performed by: FAMILY MEDICINE

## 2023-12-07 PROCEDURE — 99999 PR PBB SHADOW E&M-EST. PATIENT-LVL III: CPT | Mod: PBBFAC,,, | Performed by: FAMILY MEDICINE

## 2023-12-07 PROCEDURE — 99999 PR PBB SHADOW E&M-EST. PATIENT-LVL III: ICD-10-PCS | Mod: PBBFAC,,, | Performed by: FAMILY MEDICINE

## 2023-12-07 RX ORDER — TRIAMCINOLONE ACETONIDE 1 MG/G
CREAM TOPICAL 2 TIMES DAILY PRN
Qty: 45 G | Refills: 1 | Status: SHIPPED | OUTPATIENT
Start: 2023-12-07 | End: 2024-02-20

## 2023-12-07 RX ORDER — GABAPENTIN 300 MG/1
300 CAPSULE ORAL 3 TIMES DAILY
Qty: 60 CAPSULE | Refills: 11 | Status: SHIPPED | OUTPATIENT
Start: 2023-12-07 | End: 2024-01-03

## 2023-12-07 NOTE — PROGRESS NOTES
Chief Complaint   Patient presents with    Preventative Health Care     Physical       HISTORY OF PRESENT ILLNESS: This is a 68-year-old white male presents today for annual physical    He reports a fall at the end of October. He fell forward onto his right arm.  He developed some bruising over the forearm.  Since then has had persistent pain in the forearm with motion, especially when holding weighted objects with the wrist.  ROM is normal    HTN - tolerating Micardis and HCT. Denies HA, CP or dizziness. Home /80.  GERD -  He has uses Omeprazole 40mg daily with good control.   RLS - tolerating Mirapex 1 mg daily. Symptoms occurring with just short times of immobility.  Lumbar DDD - stable  Walking or elltical daily  Depression - mood stable Lexapro 10mg; tolerating this without side effects  Bladder cancer - following with urology every 3 months    Some intermittent right anterior shoulder pain    Wife reports some apneic episodes and gasping at night  Some daytime fatigue and restless sleep    Reports some CHANDRA.  This seems to be preogressive    The ASCVD Risk score (Elier DK, et al., 2019) failed to calculate for the following reasons:    The valid total cholesterol range is 130 to 320 mg/dL      Past Medical History:   Diagnosis Date    Anticoagulant long-term use     Bladder cancer 08/2020    Colon polyps     Coronary artery disease 01/31/2023    CORONARY STENTS    DDD (degenerative disc disease), lumbar     Depression     Fatty liver     GERD (gastroesophageal reflux disease)     H/O motion sickness     Hypertension     PONV (postoperative nausea and vomiting)     RLS (restless legs syndrome)        Past Surgical History:   Procedure Laterality Date    ANGIOGRAM, CORONARY, WITH LEFT HEART CATHETERIZATION N/A 1/31/2023    Procedure: Left Heart Cath;  Surgeon: Nitin Wheat MD;  Location: [facility];  Service: Cardiology;  Laterality: N/A;    APPENDECTOMY      CARPAL TUNNEL RELEASE      CERVICAL FUSION       CHOLECYSTECTOMY      CIRCUMCISION      COLONOSCOPY N/A 3/16/2020    Procedure: COLONOSCOPY;  Surgeon: Braulio Best MD;  Location: [facility] ENDO;  Service: Endoscopy;  Laterality: N/A; 3 colon polyps removed; biopsy: tubular adenoma and hyperplastic; repeat in 5 years for surveillance    CORONARY ANGIOGRAPHY  2/13/2023    Procedure: ANGIOGRAM, CORONARY ARTERY;  Surgeon: Nitin Wheat MD;  Location: [facility] CATH;  Service: Cardiology;;    CORONARY ANGIOPLASTY WITH STENT PLACEMENT N/A 1/31/2023    Procedure: ANGIOPLASTY, CORONARY ARTERY, WITH STENT INSERTION;  Surgeon: Nitin Wheat MD;  Location: [facility] CATH;  Service: Cardiology;  Laterality: N/A;    CYSTOSCOPY W/ RETROGRADES Bilateral 8/24/2020    Procedure: CYSTOSCOPY, WITH RETROGRADE PYELOGRAM -WITH URETHRAL DILATION;  Surgeon: Greg Claudio MD;  Location: [facility] OR;  Service: Urology;  Laterality: Bilateral;    CYSTOSCOPY W/ URETERAL STENT PLACEMENT Right 8/24/2020    Procedure: CYSTOSCOPY, WITH URETERAL STENT INSERTION;  Surgeon: Greg Claudio MD;  Location: [facility] OR;  Service: Urology;  Laterality: Right;    INSTILLATION OF URINARY BLADDER N/A 8/24/2020    Procedure: INSTILLATION, BLADDER - Mitomycin;  Surgeon: Greg Claudio MD;  Location: [facility] OR;  Service: Urology;  Laterality: N/A;    IVUS, CORONARY  1/31/2023    Procedure: IVUS, Coronary;  Surgeon: Nitin Wheat MD;  Location: [facility] CATH;  Service: Cardiology;;    IVUS, CORONARY  2/13/2023    Procedure: IVUS, Coronary;  Surgeon: Nitin Wheat MD;  Location: [facility] CATH;  Service: Cardiology;;    PERCUTANEOUS CORONARY INTERVENTION, ARTERY  2/13/2023    Procedure: Percutaneous coronary intervention;  Surgeon: Nitin Wheat MD;  Location: [facility] CATH;  Service: Cardiology;;    TRANSFORAMINAL EPIDURAL INJECTION OF STEROID Right 7/17/2019    Procedure: Injection,steroid,epidural,transforaminal approach L3-4, L4-5;  Surgeon: Chris Palencia MD;  Location: [facility] OR;  Service: Pain Management;  Laterality: Right;     TRANSFORAMINAL EPIDURAL INJECTION OF STEROID Right 8/28/2019    Procedure: Injection,steroid,epidural,transforaminal approach;  Surgeon: Chris Palencia MD;  Location: [facility];  Service: Pain Management;  Laterality: Right;  L3-4, L4-L5    WISDOM TOOTH EXTRACTION       FAMILY HISTORY: cancer in his grandparent.       Social History     Socioeconomic History    Marital status:    Occupational History    Occupation:    Tobacco Use    Smoking status: Former     Current packs/day: 0.00     Types: Cigarettes     Quit date: 10/16/2013     Years since quitting: 10.1    Smokeless tobacco: Never   Substance and Sexual Activity    Alcohol use: Yes     Alcohol/week: 2.0 standard drinks of alcohol     Types: 2 Cans of beer per week    Drug use: Not Currently     Social Determinants of Health     Financial Resource Strain: Low Risk  (12/4/2023)    Overall Financial Resource Strain (CARDIA)     Difficulty of Paying Living Expenses: Not hard at all   Food Insecurity: No Food Insecurity (12/4/2023)    Hunger Vital Sign     Worried About Running Out of Food in the Last Year: Never true     Ran Out of Food in the Last Year: Never true   Transportation Needs: No Transportation Needs (12/4/2023)    PRAPARE - Transportation     Lack of Transportation (Medical): No     Lack of Transportation (Non-Medical): No   Physical Activity: Inactive (12/4/2023)    Exercise Vital Sign     Days of Exercise per Week: 0 days     Minutes of Exercise per Session: 0 min   Stress: Stress Concern Present (12/4/2023)    [employer] - Occupational Stress Questionnaire     Feeling of Stress : To some extent   Social Connections: Unknown (12/4/2023)    Social Connection and Isolation Panel [NHANES]     Frequency of Communication with Friends and Family: Three times a week     Frequency of Social Gatherings with Friends and Family: Once a week     Active Member of Clubs or Organizations: No     Attends Club or  Organization Meetings: Never     Marital Status:    Housing Stability: Low Risk  (12/4/2023)    Housing Stability Vital Sign     Unable to Pay for Housing in the Last Year: No     Number of Places Lived in the Last Year: 1     Unstable Housing in the Last Year: No         Current Outpatient Medications on File Prior to Visit   Medication Sig Dispense Refill    albuterol (PROVENTIL/VENTOLIN HFA) 90 mcg/actuation inhaler Inhale 2 puffs into the lungs every 6 (six) hours as needed for Wheezing. Rescue 18 g 6    aspirin 81 MG Chew Take 1 tablet (81 mg total) by mouth once daily. 90 tablet 3    clopidogreL (PLAVIX) 75 mg tablet Take 1 tablet (75 mg total) by mouth once daily. 90 tablet 3    EScitalopram oxalate (LEXAPRO) 10 MG tablet Take 1 tablet (10 mg total) by mouth once daily. 30 tablet 11    fluticasone propionate (FLONASE) 50 mcg/actuation nasal spray 1 spray (50 mcg total) by Each Nostril route 2 (two) times a day. 16 g 11    fluticasone-umeclidin-vilanter (TRELEGY ELLIPTA) 200-62.5-25 mcg inhaler Inhale 1 puff into the lungs once daily. 60 each 6    hydroCHLOROthiazide (HYDRODIURIL) 12.5 MG Tab TAKE 1 TABLET BY MOUTH EVERY DAY 90 tablet 1    Lactobac no.41/Bifidobact no.7 (PROBIOTIC-10 ORAL) Take 1 capsule by mouth once daily.      melatonin 5 mg Cap Take 5 mg by mouth nightly as needed. TAKE AS SCHEDULED      multivitamin (THERAGRAN) per tablet Take 1 tablet by mouth once daily.       pantoprazole (PROTONIX) 40 MG tablet Take 1 tablet (40 mg total) by mouth once daily. 90 tablet 3    rosuvastatin (CRESTOR) 40 MG Tab Take 1 tablet (40 mg total) by mouth once daily. 90 tablet 3    telmisartan (MICARDIS) 40 MG Tab TAKE 1 TABLET BY MOUTH EVERY DAY 90 tablet 2    [DISCONTINUED] pramipexole (MIRAPEX) 1 MG tablet Take 1 tablet (1 mg total) by mouth nightly. 90 tablet 3    methocarbamoL (ROBAXIN) 500 MG Tab 500 mg daily as needed.      scopolamine (TRANSDERM-SCOP) 1.3-1.5 mg (1 mg over 3 days) Place 1 patch onto  the skin every 72 hours as needed. As needed      [DISCONTINUED] triamcinolone acetonide 0.1% (KENALOG) 0.1 % cream Apply topically 2 (two) times daily as needed.       Current Facility-Administered Medications on File Prior to Visit   Medication Dose Route Frequency Provider Last Rate Last Admin    ondansetron injection 4 mg  4 mg Intravenous Daily PRN Gustavo Chu MD        sodium chloride 0.9% flush 10 mL  10 mL Intravenous PRN Nitin Wheat MD             REVIEW OF SYSTEMS:   GENERAL: No fever, chills, fatigability or weight changes.   SKIN/BREASTS: No rashes, sores, itching or changes in color or texture of skin. No changes in moles. No pain, swelling, tenderness, lumps, bleeding or discharge from nipples.   HEAD: No headaches or recent head trauma.   EYES: Visual acuity fine. Denies blurriness, tearing, itching, photophobia, diplopia, or visual changes.   EARS: Denies ear pain, discharge, tinnitus or vertigo. Denies hearing loss.   NOSE: No loss of smell, epistaxis, postnasal drip, discharge, obstruction, or sneezing.   MOUTH & THROAT: No hoarseness, change in voice, swallowing difficulty. No excessive gum bleeding.   HEMATOLOGICAL/NODES: Denies swollen glands. No bleeding or bruising.   CHEST: No CHANDRA, cyanosis, wheezing, cough or sputum production.   CARDIOVASCULAR: Denies chest pain, orthopnea, or palpitations.   GI/ABDOMEN: Appetite fine. No weight loss. Denies nausea, vomiting, diarrhea, constipation, abdominal pain, hematemesis or blood in stool.   URINARY: No dysuria,hematuria, nocturia, incontinence, flank pain, urgency, or urinary difficulty.   PERIPHERAL VASCULAR: No claudication, cold intolerance or cyanosis.   MUSCULOSKELETAL: Some intermittent right wrist pain, low back pain.  NEUROLOGIC: No history of seizures, paralysis, alteration of gait or coordination. Some intermittent numbness in the left 2nd and 3rd fingers with driving that impoves with changes in position.  ENDOCRINE: Sexual  "maturation. Denies weight change, heat/cold intolerance, hair loss or gain, loss of libido, polyuria, polydipsia, polyphagia, pruritus, unexplained flushing, or excessive sweating.   PSYCH: No crying spells. Denies anxiety symptoms.     IMMUNIZATIONS: Up-to-date.    PHYSICAL EXAM:     /68   Pulse (!) 39   Resp 18   Ht 5' 10" (1.778 m)   Wt 105.3 kg (232 lb 4.1 oz)   SpO2 97%   BMI 33.33 kg/m²   GENERAL: This is a healthy-appearing 68-year-old white male, sitting   upright, in no apparent distress. Alert and oriented x4.   SKIN: Reveals erythematous rash over right breast. No suspicious neoplasms.   NEUROLOGICALLY: Cranial nerves 2-12 are grossly intact with no sensory or motor deficits.   PSYCHOLOGICALLY: Mood and affect are normal. Insight and judgment are   excellent. Appearance is well groomed.   Pupils are equal, round and reactive to light. Extraocular movements are intact.   NECK: Supple. There is no lymphadenopathy, thyromegaly or JVD.   CHEST: Clear to auscultation bilaterally with good respiratory movement.   CARDIOVASCULAR: S1, S2. Regular rate and rhythm. No murmurs, rubs or gallops.   EXTREMITIES: Show no cyanosis, clubbing or edema with 2+ distal pulses.   MUSCULOSKELETAL: Reveals full range of motion in all extremities.    Right forearm: FAROM; pain with wrist pronation and supination    Results for orders placed or performed in visit on 11/29/23   [place]   Result Value Ref Range    [place] Screen Positive (A) Negative <1:80   Rheumatoid factor   Result Value Ref Range    Rheumatoid Factor <13.0 0.0 - 15.0 IU/mL   Cyclic citrul peptide antibody, IgG   Result Value Ref Range    CCP Antibodies 0.8 <5.0 U/mL   Anti-neutrophilic cytoplasmic antibody   Result Value Ref Range    Cytoplasmic Neutrophilic Ab <1:20 <1:20 Titer    Perinuclear (P-ANCA) <1:20 <1:20 Titer   C-reactive protein   Result Value Ref Range    CRP 2.3 0.0 - 8.2 mg/L   Sjogrens syndrome-A extractable nuclear antibody   Result Value " Ref Range    Anti-SSA Antibody 0.10 0.00 - 0.99 Ratio    Anti-SSA Interpretation Negative Negative   Sjogrens syndrome-B extractable nuclear antibody   Result Value Ref Range    Anti-SSB Antibody 0.11 0.00 - 0.99 Ratio    Anti-SSB Interpretation Negative Negative   PM-Scl Antibody by Immunodiffusion   Result Value Ref Range    SCL-70 Antibody <0.2 <1.0 (Negative) U   Aldolase   Result Value Ref Range    Aldolase 4.3 1.2 - 7.6 U/L   CK   Result Value Ref Range     20 - 200 U/L   [place] Profile   Result Value Ref Range    Anti Sm Antibody 0.17 0.00 - 0.99 Ratio    Anti-Sm Interpretation Negative Negative    Anti-SSA Antibody 0.10 0.00 - 0.99 Ratio    Anti-SSA Interpretation Negative Negative    Anti-SSB Antibody 0.11 0.00 - 0.99 Ratio    Anti-SSB Interpretation Negative Negative    ds DNA Ab Negative 1:10 Negative 1:10   [place] Pattern 1   Result Value Ref Range    [place] PATTERN 1 Homogeneous    [place] Titer 2   Result Value Ref Range    [place] Titer 2 1:80    [place] Titer 1   Result Value Ref Range    [place] Titer 1 1:80    [place] Pattern 2   Result Value Ref Range    [place] Pattern 2 Speckled      ASSESSMENT/PLAN:     Hypertension, unspecified type    Depression, unspecified depression type    RLS (restless legs syndrome)  -     gabapentin (NEURONTIN) 300 MG capsule; Take 1 capsule (300 mg total) by mouth 3 (three) times daily.  Dispense: 60 capsule; Refill: 11    Gastroesophageal reflux disease without esophagitis    Malignant neoplasm of lateral wall of urinary bladder    Prostate cancer screening  -     PSA, Screening; Future; Expected date: 01/06/2024    Left forearm pain  -     X-Ray Forearm Left; Future; Expected date: 12/07/2023    Dermatitis  -     triamcinolone acetonide 0.1% (KENALOG) 0.1 % cream; Apply topically 2 (two) times daily as needed.  Dispense: 45 g; Refill: 1      Suspect left forearm ligament injury; will get xray; consider ortho consult if symptoms improve  Labs today  continue micardis HCT  continue Lexapro  10mg  Continue omeprazole  Continue home BP monitoring   Stop Mirapex and begin gabapentin  Discussed healthy lifestyle measures including maintenance of a healthy weight, increasing fruits and vegetables in the diet.   F/u 12 months      Answers submitted by the patient for this visit:  Review of Systems Questionnaire (Submitted on 12/4/2023)  activity change: Yes  unexpected weight change: No  neck pain: No  hearing loss: No  rhinorrhea: No  trouble swallowing: No  eye discharge: No  visual disturbance: No  chest tightness: No  wheezing: Yes  chest pain: No  palpitations: No  blood in stool: No  constipation: No  vomiting: No  diarrhea: No  polydipsia: No  polyuria: No  difficulty urinating: No  urgency: No  hematuria: No  joint swelling: No  arthralgias: Yes  headaches: No  weakness: No  confusion: No  dysphoric mood: No

## 2023-12-08 ENCOUNTER — PATIENT MESSAGE (OUTPATIENT)
Dept: FAMILY MEDICINE | Facility: CLINIC | Age: 68
End: 2023-12-08
Payer: MEDICARE

## 2023-12-08 ENCOUNTER — TELEPHONE (OUTPATIENT)
Dept: FAMILY MEDICINE | Facility: CLINIC | Age: 68
End: 2023-12-08
Payer: MEDICARE

## 2023-12-08 ENCOUNTER — TELEPHONE (OUTPATIENT)
Dept: ORTHOPEDICS | Facility: CLINIC | Age: 68
End: 2023-12-08
Payer: MEDICARE

## 2023-12-08 DIAGNOSIS — S52.135A CLOSED NONDISPLACED FRACTURE OF NECK OF LEFT RADIUS, INITIAL ENCOUNTER: Primary | ICD-10-CM

## 2023-12-08 NOTE — TELEPHONE ENCOUNTER
Patient informed he can call back on Monday when Dr. MCKENZIE Staff is in the office and they can help him get an appointment

## 2023-12-08 NOTE — TELEPHONE ENCOUNTER
----- Message from Artie Ashley MA sent at 12/8/2023  2:35 PM CST -----  Contact: patient  Patient is EP with Dr. Means and insisting on being seen for elbow pain.    Call back number is [phone]

## 2023-12-08 NOTE — TELEPHONE ENCOUNTER
Forearm xray confirmed a non-displaced fracture of the radius.  It is already showing signs of healing, but this is the explanation for his persistent forearm pain since his fall. I have ordered a left forearm removal splint that I would like him to get today from ortho here. Please coordinate him getting that. He would need to wear it for 2 weeks and only take it off when showering to promote continued healing of the bone.

## 2023-12-08 NOTE — TELEPHONE ENCOUNTER
I spoke with Mr. Dee and gave him the results.  Camilla Murry from ortho is to call and set up a time to get the splint.

## 2023-12-19 ENCOUNTER — TELEPHONE (OUTPATIENT)
Dept: FAMILY MEDICINE | Facility: CLINIC | Age: 68
End: 2023-12-19
Payer: MEDICARE

## 2023-12-19 NOTE — TELEPHONE ENCOUNTER
----- Message from Inga Beltran, Patient Care Assistant sent at 12/19/2023  9:51 AM CST -----  Contact: self  Type: Needs Medical Advice  Who Called:   self   Symptoms (please be specific):   compression fracture   How long has patient had these symptoms:   last night   Pharmacy name and phone #:    [id number] - [place], [place] - [address]  [address] [address]  Phone: [phone] Fax: [phone]  Best Call Back Number: [phone]  Additional Information: Pt was seen in urgent care and was referred to the ER. He is calling to get orders for a xray and to be seen today.  Thanks

## 2023-12-19 NOTE — TELEPHONE ENCOUNTER
If the vertebral compression fracture occurred last night, he should go to the ER for definitive care. I think he's there now.

## 2023-12-19 NOTE — TELEPHONE ENCOUNTER
----- Message from Patience Chou sent at 12/19/2023  4:37 PM CST -----  Type:  Needs Medical Advice    Who Called: pt    Symptoms (please be specific): compression fracture     How long has patient had these symptoms:  1 day    Pharmacy name and phone #:    [id number] - [place] [address]  [address] [facility] [facility] [address]  [facility] [address]  Phone: [phone] Fax: [phone]        Would the patient rather a call back or a response via [facility]? Call back    Best Call Back Number: [phone]      Additional Information: pt was calling to get an appt and some orders for xray      Please call Back to advise. Thanks!

## 2023-12-20 ENCOUNTER — OFFICE VISIT (OUTPATIENT)
Dept: ORTHOPEDICS | Facility: CLINIC | Age: 68
End: 2023-12-20
Payer: MEDICARE

## 2023-12-20 ENCOUNTER — HOSPITAL ENCOUNTER (OUTPATIENT)
Dept: RADIOLOGY | Facility: HOSPITAL | Age: 68
Discharge: HOME | End: 2023-12-20
Attending: ORTHOPAEDIC SURGERY
Payer: MEDICARE

## 2023-12-20 VITALS — WEIGHT: 230 LBS | HEIGHT: 70 IN | BODY MASS INDEX: 32.93 KG/M2 | RESPIRATION RATE: 18 BRPM

## 2023-12-20 DIAGNOSIS — S52.135A CLOSED NONDISPLACED FRACTURE OF NECK OF LEFT RADIUS, INITIAL ENCOUNTER: ICD-10-CM

## 2023-12-20 DIAGNOSIS — S52.135A CLOSED NONDISPLACED FRACTURE OF NECK OF LEFT RADIUS, INITIAL ENCOUNTER: Primary | ICD-10-CM

## 2023-12-20 PROCEDURE — 99213 OFFICE O/P EST LOW 20 MIN: CPT | Mod: PBBFAC,PO | Performed by: ORTHOPAEDIC SURGERY

## 2023-12-20 PROCEDURE — 99999 PR PBB SHADOW E&M-EST. PATIENT-LVL III: ICD-10-PCS | Mod: PBBFAC,,, | Performed by: ORTHOPAEDIC SURGERY

## 2023-12-20 PROCEDURE — 73080 XR ELBOW COMPLETE 3 VIEW LEFT: ICD-10-PCS | Mod: 26,LT,, | Performed by: RADIOLOGY

## 2023-12-20 PROCEDURE — 99999 PR PBB SHADOW E&M-EST. PATIENT-LVL III: CPT | Mod: PBBFAC,,, | Performed by: ORTHOPAEDIC SURGERY

## 2023-12-20 PROCEDURE — 99213 PR OFFICE/OUTPT VISIT, EST, LEVL III, 20-29 MIN: ICD-10-PCS | Mod: S$PBB,,, | Performed by: ORTHOPAEDIC SURGERY

## 2023-12-20 PROCEDURE — 73080 X-RAY EXAM OF ELBOW: CPT | Mod: 26,LT,, | Performed by: RADIOLOGY

## 2023-12-20 PROCEDURE — 99213 OFFICE O/P EST LOW 20 MIN: CPT | Mod: S$PBB,,, | Performed by: ORTHOPAEDIC SURGERY

## 2023-12-20 PROCEDURE — 73080 X-RAY EXAM OF ELBOW: CPT | Mod: TC,PO,LT

## 2023-12-20 NOTE — PROGRESS NOTES
Past Medical History:   Diagnosis Date    Anticoagulant long-term use     Bladder cancer 08/2020    Colon polyps     Coronary artery disease 01/31/2023    CORONARY STENTS    DDD (degenerative disc disease), lumbar     Depression     Fatty liver     GERD (gastroesophageal reflux disease)     H/O motion sickness     Hypertension     PONV (postoperative nausea and vomiting)     RLS (restless legs syndrome)        Past Surgical History:   Procedure Laterality Date    ANGIOGRAM, CORONARY, WITH LEFT HEART CATHETERIZATION N/A 1/31/2023    Procedure: Left Heart Cath;  Surgeon: Nitin Wheat MD;  Location: [facility] CATH;  Service: Cardiology;  Laterality: N/A;    APPENDECTOMY      CARPAL TUNNEL RELEASE      CERVICAL FUSION      CHOLECYSTECTOMY      CIRCUMCISION      COLONOSCOPY N/A 3/16/2020    Procedure: COLONOSCOPY;  Surgeon: Braulio Best MD;  Location: [facility];  Service: Endoscopy;  Laterality: N/A; 3 colon polyps removed; biopsy: tubular adenoma and hyperplastic; repeat in 5 years for surveillance    CORONARY ANGIOGRAPHY  2/13/2023    Procedure: ANGIOGRAM, CORONARY ARTERY;  Surgeon: Nitin Wheat MD;  Location: [facility] CATH;  Service: Cardiology;;    CORONARY ANGIOPLASTY WITH STENT PLACEMENT N/A 1/31/2023    Procedure: ANGIOPLASTY, CORONARY ARTERY, WITH STENT INSERTION;  Surgeon: Nitin Wheat MD;  Location: [facility] CATH;  Service: Cardiology;  Laterality: N/A;    CYSTOSCOPY W/ RETROGRADES Bilateral 8/24/2020    Procedure: CYSTOSCOPY, WITH RETROGRADE PYELOGRAM -WITH URETHRAL DILATION;  Surgeon: Greg Claudio MD;  Location: [facility] OR;  Service: Urology;  Laterality: Bilateral;    CYSTOSCOPY W/ URETERAL STENT PLACEMENT Right 8/24/2020    Procedure: CYSTOSCOPY, WITH URETERAL STENT INSERTION;  Surgeon: Greg Claudio MD;  Location: [facility] OR;  Service: Urology;  Laterality: Right;    INSTILLATION OF URINARY BLADDER N/A 8/24/2020    Procedure: INSTILLATION, BLADDER - Mitomycin;  Surgeon: Greg Claudio MD;  Location: [facility]  OR;  Service: Urology;  Laterality: N/A;    IVUS, CORONARY  1/31/2023    Procedure: IVUS, Coronary;  Surgeon: Nitin Wheat MD;  Location: [facility] CATH;  Service: Cardiology;;    IVUS, CORONARY  2/13/2023    Procedure: IVUS, Coronary;  Surgeon: Nitin Wheat MD;  Location: [facility] CATH;  Service: Cardiology;;    PERCUTANEOUS CORONARY INTERVENTION, ARTERY  2/13/2023    Procedure: Percutaneous coronary intervention;  Surgeon: Nitin Wheat MD;  Location: [facility] CATH;  Service: Cardiology;;    TRANSFORAMINAL EPIDURAL INJECTION OF STEROID Right 7/17/2019    Procedure: Injection,steroid,epidural,transforaminal approach L3-4, L4-5;  Surgeon: Chris Palencia MD;  Location: [facility] OR;  Service: Pain Management;  Laterality: Right;    TRANSFORAMINAL EPIDURAL INJECTION OF STEROID Right 8/28/2019    Procedure: Injection,steroid,epidural,transforaminal approach;  Surgeon: Chris Palencia MD;  Location: [facility] OR;  Service: Pain Management;  Laterality: Right;  L3-4, L4-L5    WISDOM TOOTH EXTRACTION         Current Outpatient Medications   Medication Sig    albuterol (PROVENTIL/VENTOLIN HFA) 90 mcg/actuation inhaler Inhale 2 puffs into the lungs every 6 (six) hours as needed for Wheezing. Rescue    aspirin 81 MG Chew Take 1 tablet (81 mg total) by mouth once daily.    budesonide (PULMICORT) 0.5 mg/2 mL nebulizer solution Take 2 mLs (0.5 mg total) by nebulization 2 (two) times daily. Controller    clopidogreL (PLAVIX) 75 mg tablet Take 1 tablet (75 mg total) by mouth once daily.    EScitalopram oxalate (LEXAPRO) 10 MG tablet Take 1 tablet (10 mg total) by mouth once daily.    fluticasone propionate (FLONASE) 50 mcg/actuation nasal spray 1 spray (50 mcg total) by Each Nostril route 2 (two) times a day.    formoterol (PERFOROMIST) 20 mcg/2 mL Nebu Take 2 mLs (20 mcg total) by nebulization 2 (two) times a day. Controller    gabapentin (NEURONTIN) 300 MG capsule Take 1 capsule (300 mg total) by mouth 3 (three) times daily.    hydroCHLOROthiazide (HYDRODIURIL)  12.5 MG Tab TAKE 1 TABLET BY MOUTH EVERY DAY    HYDROcodone-acetaminophen (NORCO) 5-325 mg per tablet Take 1 tablet by mouth every 6 (six) hours as needed for Pain.    ibuprofen (ADVIL,MOTRIN) 600 MG tablet Take 1 tablet (600 mg total) by mouth every 6 (six) hours as needed for Pain.    Lactobac no.41/Bifidobact no.7 (PROBIOTIC-10 ORAL) Take 1 capsule by mouth once daily.    LIDOcaine (LIDODERM) 5 % Place 2 patches onto the skin once daily. Remove & Discard patch within 12 hours or as directed by MD for 15 days    melatonin 5 mg Cap Take 5 mg by mouth nightly as needed. TAKE AS SCHEDULED    methocarbamoL (ROBAXIN) 500 MG Tab 500 mg daily as needed.    multivitamin (THERAGRAN) per tablet Take 1 tablet by mouth once daily.     pantoprazole (PROTONIX) 40 MG tablet Take 1 tablet (40 mg total) by mouth once daily.    rosuvastatin (CRESTOR) 40 MG Tab Take 1 tablet (40 mg total) by mouth once daily.    scopolamine (TRANSDERM-SCOP) 1.3-1.5 mg (1 mg over 3 days) Place 1 patch onto the skin every 72 hours as needed. As needed    telmisartan (MICARDIS) 40 MG Tab TAKE 1 TABLET BY MOUTH EVERY DAY    triamcinolone acetonide 0.1% (KENALOG) 0.1 % cream Apply topically 2 (two) times daily as needed.     Current Facility-Administered Medications   Medication    sodium chloride 0.9% flush 10 mL     Facility-Administered Medications Ordered in Other Visits   Medication    ondansetron injection 4 mg       Review of patient's allergies indicates:   Allergen Reactions    Sulfa (sulfonamide antibiotics) Anaphylaxis       Family History   Problem Relation Age of Onset    Stroke Mother     Ulcerative colitis Mother     Cancer Neg Hx     Colon cancer Neg Hx     Crohn's disease Neg Hx     Celiac disease Neg Hx        Social History     Socioeconomic History    Marital status:    Occupational History    Occupation: [email or web address]   Tobacco Use    Smoking status: Former     Current packs/day: 0.00     Types: Cigarettes     Quit date:  10/16/2013     Years since quitting: 10.1    Smokeless tobacco: Never   Substance and Sexual Activity    Alcohol use: Yes     Alcohol/week: 2.0 standard drinks of alcohol     Types: 2 Cans of beer per week    Drug use: Not Currently     Social Determinants of Health     Financial Resource Strain: Low Risk  (12/4/2023)    Overall Financial Resource Strain (CARDIA)     Difficulty of Paying Living Expenses: Not hard at all   Food Insecurity: No Food Insecurity (12/4/2023)    Hunger Vital Sign     Worried About Running Out of Food in the Last Year: Never true     Ran Out of Food in the Last Year: Never true   Transportation Needs: No Transportation Needs (12/4/2023)    PRAPARE - Transportation     Lack of Transportation (Medical): No     Lack of Transportation (Non-Medical): No   Physical Activity: Inactive (12/4/2023)    Exercise Vital Sign     Days of Exercise per Week: 0 days     Minutes of Exercise per Session: 0 min   Stress: Stress Concern Present (12/4/2023)    [employer] [place] [employer] - Occupational Stress Questionnaire     Feeling of Stress : To some extent   Social Connections: Unknown (12/4/2023)    Social Connection and Isolation Panel [NHANES]     Frequency of Communication with Friends and Family: Three times a week     Frequency of Social Gatherings with Friends and Family: Once a week     Active Member of Clubs or Organizations: No     Attends Club or Organization Meetings: Never     Marital Status:    Housing Stability: Low Risk  (12/4/2023)    Housing Stability Vital Sign     Unable to Pay for Housing in the Last Year: No     Number of Places Lived in the Last Year: 1     Unstable Housing in the Last Year: No       Chief Complaint: No chief complaint on file.      History of present illness:  68-year-old male seen for left elbow injury.  Patient had a fall on October 29th.  Presented to his primary care doctor couple weeks ago where a radial neck fracture was discovered.  Pain is  a 4/10.  Patient has pain with certain movements.      Review of Systems:    Constitution: Negative for chills, fever, and sweats.  Negative for unexplained weight loss.    HENT:  Negative for headaches and blurry vision.    Cardiovascular:Negative for chest pain or irregular heart beat. Negative for hypertension.    Respiratory:  Negative for cough and shortness of breath.    Gastrointestinal: Negative for abdominal pain, heartburn, melena, nausea, and vomitting.    Genitourinary:  Negative bladder incontinence and dysuria.    Musculoskeletal:  See HPI    Neurological: Negative for numbness.    Psychiatric/Behavioral: Negative for depression.  The patient is not nervous/anxious.      Endocrine: Negative for polyuria    Hematologic/Lymphatic: Negative for bleeding problem.  Does not bruise/bleed easily.    Skin: Negative for poor would healing and rash      Physical Examination:    Vital Signs:  There were no vitals filed for this visit.    There is no height or weight on file to calculate BMI.    This a well-developed, well nourished patient in no acute distress.  They are alert and oriented and cooperative to examination.  Pt. walks without an antalgic gait.      Examination of the left elbow shows no signs of rashes or erythema. The patient has no masses, ecchymosis, or effusion. The patient has near full range of motion. Patient has full pronation and supination. Patient is nontender along the medial epicondyle and nontender over the lateral epicondyle. Nontender over the olecranon process.  Nontender along the course of the UCL.  Mildly tender over the radial head.  2+ radial pulse. Intact light touch sensation.     X-rays:  X-rays of the left elbow are ordered and review which show a radial neck fracture and overall good alignment     Assessment::  Left radial neck fracture    Plan:  Reviewed the findings with him today.  Recommended stopping the brace.  I want him working on range of motion and not  immobilizing it.  Patient may use the arm and elbow for light to mild activity but nothing heavy.  I will see him back in 6 weeks with another x-ray of the left elbow.    All previous pertinent notes including ER visits, physical therapy visits, other orthopedic visits as well as other care for the same musculoskeletal problem were reviewed.  All pertinent lab values and previous imaging was reviewed pertinent to the current visit.    This note was created using [employer] voice recognition software that occasionally misinterpreted phrases or words.    Consult note is delivered via Epic messaging service.

## 2024-01-03 ENCOUNTER — OFFICE VISIT (OUTPATIENT)
Dept: FAMILY MEDICINE | Facility: CLINIC | Age: 69
End: 2024-01-03
Payer: MEDICARE

## 2024-01-03 VITALS
WEIGHT: 227.31 LBS | RESPIRATION RATE: 18 BRPM | OXYGEN SATURATION: 96 % | HEART RATE: 76 BPM | HEIGHT: 70 IN | SYSTOLIC BLOOD PRESSURE: 128 MMHG | DIASTOLIC BLOOD PRESSURE: 74 MMHG | BODY MASS INDEX: 32.54 KG/M2

## 2024-01-03 DIAGNOSIS — G25.81 RLS (RESTLESS LEGS SYNDROME): Primary | ICD-10-CM

## 2024-01-03 PROCEDURE — 99213 OFFICE O/P EST LOW 20 MIN: CPT | Mod: PBBFAC,PO | Performed by: FAMILY MEDICINE

## 2024-01-03 PROCEDURE — 99214 OFFICE O/P EST MOD 30 MIN: CPT | Mod: S$PBB,,, | Performed by: FAMILY MEDICINE

## 2024-01-03 PROCEDURE — 99999 PR PBB SHADOW E&M-EST. PATIENT-LVL III: CPT | Mod: PBBFAC,,, | Performed by: FAMILY MEDICINE

## 2024-01-03 RX ORDER — PRAMIPEXOLE DIHYDROCHLORIDE 1.5 MG/1
1.5 TABLET ORAL 2 TIMES DAILY
Qty: 60 TABLET | Refills: 5 | Status: SHIPPED | OUTPATIENT
Start: 2024-01-03 | End: 2025-01-02

## 2024-01-03 NOTE — PROGRESS NOTES
Chief Complaint   Patient presents with    Restless legs     HISTORY OF PRESENT ILLNESS: This is a 68-year-old white male presents today for f/u on low back pain after recent fall on 12/19.  He was seen at ER and CT scans showed no signs of fracture.  Back stable.    Following with ortho for left radial neck fracture.  No longer wearing a splint.  This is improved.    RLS was more active during recent trip to [place].  Gabapentin caused some excessive sleepiness, so he switched back to Mirapex 2mg QHS.  Despite the Mirapex, he is still having some persistent symptoms.  His symptoms are also still occurring during the day and this has been progressive over the last year.    HTN - tolerating Micardis and HCT. Denies HA, CP or dizziness. Home /80.  GERD -  He has uses Omeprazole 40mg daily with good control.   RLS - tolerating Mirapex 2 mg daily. Symptoms occurring with just short times of immobility.  Lumbar DDD - stable  Walking or elltical daily  Depression - mood stable Lexapro 10mg; tolerating this without side effects  Bladder cancer - following with urology every 3 months      The ASCVD Risk score (Elier DK, et al., 2019) failed to calculate for the following reasons:    The valid total cholesterol range is 130 to 320 mg/dL      Past Medical History:   Diagnosis Date    Anticoagulant long-term use     Bladder cancer 08/2020    Colon polyps     Coronary artery disease 01/31/2023    CORONARY STENTS    DDD (degenerative disc disease), lumbar     Depression     Fatty liver     GERD (gastroesophageal reflux disease)     H/O motion sickness     Hypertension     PONV (postoperative nausea and vomiting)     RLS (restless legs syndrome)        Past Surgical History:   Procedure Laterality Date    ANGIOGRAM, CORONARY, WITH LEFT HEART CATHETERIZATION N/A 1/31/2023    Procedure: Left Heart Cath;  Surgeon: Nitin Wheat MD;  Location: [facility] CATH;  Service: Cardiology;  Laterality: N/A;    APPENDECTOMY      CARPAL TUNNEL  RELEASE      CERVICAL FUSION      CHOLECYSTECTOMY      CIRCUMCISION      COLONOSCOPY N/A 3/16/2020    Procedure: COLONOSCOPY;  Surgeon: Braulio Best MD;  Location: [facility] ENDO;  Service: Endoscopy;  Laterality: N/A; 3 colon polyps removed; biopsy: tubular adenoma and hyperplastic; repeat in 5 years for surveillance    CORONARY ANGIOGRAPHY  2/13/2023    Procedure: ANGIOGRAM, CORONARY ARTERY;  Surgeon: Nitin Wheat MD;  Location: [facility] CATH;  Service: Cardiology;;    CORONARY ANGIOPLASTY WITH STENT PLACEMENT N/A 1/31/2023    Procedure: ANGIOPLASTY, CORONARY ARTERY, WITH STENT INSERTION;  Surgeon: Nitin Wheat MD;  Location: [facility] CATH;  Service: Cardiology;  Laterality: N/A;    CYSTOSCOPY W/ RETROGRADES Bilateral 8/24/2020    Procedure: CYSTOSCOPY, WITH RETROGRADE PYELOGRAM -WITH URETHRAL DILATION;  Surgeon: Greg Claudio MD;  Location: [facility] OR;  Service: Urology;  Laterality: Bilateral;    CYSTOSCOPY W/ URETERAL STENT PLACEMENT Right 8/24/2020    Procedure: CYSTOSCOPY, WITH URETERAL STENT INSERTION;  Surgeon: Greg Claudio MD;  Location: [facility] OR;  Service: Urology;  Laterality: Right;    INSTILLATION OF URINARY BLADDER N/A 8/24/2020    Procedure: INSTILLATION, BLADDER - Mitomycin;  Surgeon: Greg Claudio MD;  Location: [facility] OR;  Service: Urology;  Laterality: N/A;    IVUS, CORONARY  1/31/2023    Procedure: IVUS, Coronary;  Surgeon: Nitin Wheat MD;  [facility];  Service: Cardiology;;    IVUS, CORONARY  2/13/2023    Procedure: IVUS, Coronary;  Surgeon: Nitin Wheat MD;  Location: [facility] CATH;  Service: Cardiology;;    PERCUTANEOUS CORONARY INTERVENTION, ARTERY  2/13/2023    Procedure: Percutaneous coronary intervention;  Surgeon: Nitin Wheat MD;  Location: [facility] CATH;  Service: Cardiology;;    TRANSFORAMINAL EPIDURAL INJECTION OF STEROID Right 7/17/2019    Procedure: Injection,steroid,epidural,transforaminal approach L3-4, L4-5;  Surgeon: Chris Palencia MD;  Location: [facility] OR;  Service: Pain Management;   Laterality: Right;    TRANSFORAMINAL EPIDURAL INJECTION OF STEROID Right 8/28/2019    Procedure: Injection,steroid,epidural,transforaminal approach;  Surgeon: Chris Palencia MD;  Location: [facility] OR;  Service: Pain Management;  Laterality: Right;  L3-4, L4-L5    WISDOM TOOTH EXTRACTION       FAMILY HISTORY: cancer in his grandparent.       Social History     Socioeconomic History    Marital status:    Occupational History    Occupation:    Tobacco Use    Smoking status: Former     Current packs/day: 0.00     Types: Cigarettes     Quit date: 10/16/2013     Years since quitting: 10.2    Smokeless tobacco: Never   Substance and Sexual Activity    Alcohol use: Yes     Alcohol/week: 2.0 standard drinks of alcohol     Types: 2 Cans of beer per week    Drug use: Not Currently     Social Determinants of Health     Financial Resource Strain: Low Risk  (12/4/2023)    Overall Financial Resource Strain (CARDIA)     Difficulty of Paying Living Expenses: Not hard at all   Food Insecurity: No Food Insecurity (12/4/2023)    Hunger Vital Sign     Worried About Running Out of Food in the Last Year: Never true     Ran Out of Food in the Last Year: Never true   Transportation Needs: No Transportation Needs (12/4/2023)    PRAPARE - Transportation     Lack of Transportation (Medical): No     Lack of Transportation (Non-Medical): No   Physical Activity: Inactive (12/4/2023)    Exercise Vital Sign     Days of Exercise per Week: 0 days     Minutes of Exercise per Session: 0 min   Stress: Stress Concern Present (12/4/2023)    [employer] - Occupational Stress Questionnaire     Feeling of Stress : To some extent   Social Connections: Unknown (12/4/2023)    Social Connection and Isolation Panel [NHANES]     Frequency of Communication with Friends and Family: Three times a week     Frequency of Social Gatherings with Friends and Family: Once a week     Active Member of Clubs or Organizations: No      Attends Club or Organization Meetings: Never     Marital Status:    Housing Stability: Low Risk  (12/4/2023)    Housing Stability Vital Sign     Unable to Pay for Housing in the Last Year: No     Number of Places Lived in the Last Year: 1     Unstable Housing in the Last Year: No         Current Outpatient Medications on File Prior to Visit   Medication Sig Dispense Refill    albuterol (PROVENTIL/VENTOLIN HFA) 90 mcg/actuation inhaler Inhale 2 puffs into the lungs every 6 (six) hours as needed for Wheezing. Rescue 18 g 6    aspirin 81 MG Chew Take 1 tablet (81 mg total) by mouth once daily. 90 tablet 3    budesonide (PULMICORT) 0.5 mg/2 mL nebulizer solution Take 2 mLs (0.5 mg total) by nebulization 2 (two) times daily. Controller 120 mL 3    clopidogreL (PLAVIX) 75 mg tablet Take 1 tablet (75 mg total) by mouth once daily. 90 tablet 3    EScitalopram oxalate (LEXAPRO) 10 MG tablet Take 1 tablet (10 mg total) by mouth once daily. 30 tablet 11    fluticasone propionate (FLONASE) 50 mcg/actuation nasal spray 1 spray (50 mcg total) by Each Nostril route 2 (two) times a day. 16 g 11    hydroCHLOROthiazide (HYDRODIURIL) 12.5 MG Tab TAKE 1 TABLET BY MOUTH EVERY DAY 90 tablet 1    Lactobac no.41/Bifidobact no.7 (PROBIOTIC-10 ORAL) Take 1 capsule by mouth once daily.      LIDOcaine (LIDODERM) 5 % Place 2 patches onto the skin once daily. Remove & Discard patch within 12 hours or as directed by MD for 15 days 30 patch 0    melatonin 5 mg Cap Take 5 mg by mouth nightly as needed. TAKE AS SCHEDULED      methocarbamoL (ROBAXIN) 500 MG Tab 500 mg daily as needed.      multivitamin (THERAGRAN) per tablet Take 1 tablet by mouth once daily.       pantoprazole (PROTONIX) 40 MG tablet Take 1 tablet (40 mg total) by mouth once daily. 90 tablet 3    rosuvastatin (CRESTOR) 40 MG Tab Take 1 tablet (40 mg total) by mouth once daily. 90 tablet 3    telmisartan (MICARDIS) 40 MG Tab TAKE 1 TABLET BY MOUTH EVERY DAY 90 tablet 2     triamcinolone acetonide 0.1% (KENALOG) 0.1 % cream Apply topically 2 (two) times daily as needed. 45 g 1    formoterol (PERFOROMIST) 20 mcg/2 mL Nebu Take 2 mLs (20 mcg total) by nebulization 2 (two) times a day. Controller (Patient not taking: Reported on 1/3/2024) 90 mL 3    ibuprofen (ADVIL,MOTRIN) 600 MG tablet Take 1 tablet (600 mg total) by mouth every 6 (six) hours as needed for Pain. (Patient not taking: Reported on 1/3/2024) 20 tablet 0    scopolamine (TRANSDERM-SCOP) 1.3-1.5 mg (1 mg over 3 days) Place 1 patch onto the skin every 72 hours as needed. As needed      [DISCONTINUED] gabapentin (NEURONTIN) 300 MG capsule Take 1 capsule (300 mg total) by mouth 3 (three) times daily. (Patient not taking: Reported on 1/2/2024) 60 capsule 11     Current Facility-Administered Medications on File Prior to Visit   Medication Dose Route Frequency Provider Last Rate Last Admin    ondansetron injection 4 mg  4 mg Intravenous Daily PRN Gustavo Chu MD        sodium chloride 0.9% flush 10 mL  10 mL Intravenous PRN Nitin Wheat MD             REVIEW OF SYSTEMS:   GENERAL: No fever, chills, fatigability or weight changes.   SKIN/BREASTS: No rashes, sores, itching or changes in color or texture of skin. No changes in moles. No pain, swelling, tenderness, lumps, bleeding or discharge from nipples.   HEAD: No headaches or recent head trauma.   EYES: Visual acuity fine. Denies blurriness, tearing, itching, photophobia, diplopia, or visual changes.   EARS: Denies ear pain, discharge, tinnitus or vertigo. Denies hearing loss.   NOSE: No loss of smell, epistaxis, postnasal drip, discharge, obstruction, or sneezing.   MOUTH & THROAT: No hoarseness, change in voice, swallowing difficulty. No excessive gum bleeding.   HEMATOLOGICAL/NODES: Denies swollen glands. No bleeding or bruising.   CHEST: No CHANDRA, cyanosis, wheezing, cough or sputum production.   CARDIOVASCULAR: Denies chest pain, orthopnea, or palpitations.   GI/ABDOMEN:  "Appetite fine. No weight loss. Denies nausea, vomiting, diarrhea, constipation, abdominal pain, hematemesis or blood in stool.   URINARY: No dysuria,hematuria, nocturia, incontinence, flank pain, urgency, or urinary difficulty.   PERIPHERAL VASCULAR: No claudication, cold intolerance or cyanosis.   MUSCULOSKELETAL: Some intermittent right wrist pain, low back pain.  NEUROLOGIC: No history of seizures, paralysis, alteration of gait or coordination. Some intermittent numbness in the left 2nd and 3rd fingers with driving that impoves with changes in position.  ENDOCRINE: Sexual maturation. Denies weight change, heat/cold intolerance, hair loss or gain, loss of libido, polyuria, polydipsia, polyphagia, pruritus, unexplained flushing, or excessive sweating.   PSYCH: No crying spells. Denies anxiety symptoms.     IMMUNIZATIONS: Up-to-date.    PHYSICAL EXAM:     /74   Pulse 76   Resp 18   Ht 5' 10" (1.778 m)   Wt 103.1 kg (227 lb 4.7 oz)   SpO2 96%   BMI 32.61 kg/m²   GENERAL: This is a healthy-appearing 68-year-old white male, sitting   upright, in no apparent distress. Alert and oriented x4.   SKIN: Reveals erythematous rash over right breast. No suspicious neoplasms.   NEUROLOGICALLY: Cranial nerves 2-12 are grossly intact with no sensory or motor deficits.   PSYCHOLOGICALLY: Mood and affect are normal. Insight and judgment are   excellent. Appearance is well groomed.   Pupils are equal, round and reactive to light. Extraocular movements are intact.   NECK: Supple. There is no lymphadenopathy, thyromegaly or JVD.   CHEST: Clear to auscultation bilaterally with good respiratory movement.   CARDIOVASCULAR: S1, S2. Regular rate and rhythm. No murmurs, rubs or gallops.   EXTREMITIES: Show no cyanosis, clubbing or edema with 2+ distal pulses.   MUSCULOSKELETAL: Reveals full range of motion in all extremities.    Right forearm: FAROM; pain with wrist pronation and supination    Results for orders placed or " performed in visit on 11/29/23   [place]   Result Value Ref Range    [place] Screen Positive (A) Negative <1:80   Rheumatoid factor   Result Value Ref Range    Rheumatoid Factor <13.0 0.0 - 15.0 IU/mL   Cyclic citrul peptide antibody, IgG   Result Value Ref Range    CCP Antibodies 0.8 <5.0 U/mL   Anti-neutrophilic cytoplasmic antibody   Result Value Ref Range    Cytoplasmic Neutrophilic Ab <1:20 <1:20 Titer    Perinuclear (P-ANCA) <1:20 <1:20 Titer   C-reactive protein   Result Value Ref Range    CRP 2.3 0.0 - 8.2 mg/L   Sjogrens syndrome-A extractable nuclear antibody   Result Value Ref Range    Anti-SSA Antibody 0.10 0.00 - 0.99 Ratio    Anti-SSA Interpretation Negative Negative   Sjogrens syndrome-B extractable nuclear antibody   Result Value Ref Range    Anti-SSB Antibody 0.11 0.00 - 0.99 Ratio    Anti-SSB Interpretation Negative Negative   PM-Scl Antibody by Immunodiffusion   Result Value Ref Range    SCL-70 Antibody <0.2 <1.0 (Negative) U   Aldolase   Result Value Ref Range    Aldolase 4.3 1.2 - 7.6 U/L   CK   Result Value Ref Range     20 - 200 U/L   [place] Profile   Result Value Ref Range    Anti Sm Antibody 0.17 0.00 - 0.99 Ratio    Anti-Sm Interpretation Negative Negative    Anti-SSA Antibody 0.10 0.00 - 0.99 Ratio    Anti-SSA Interpretation Negative Negative    Anti-SSB Antibody 0.11 0.00 - 0.99 Ratio    Anti-SSB Interpretation Negative Negative    ds DNA Ab Negative 1:10 Negative 1:10    Anti Sm/RNP Antibody 0.09 0.00 - 0.99 Ratio    Anti-Sm/RNP Interpretation Negative Negative   [place] Pattern 1   Result Value Ref Range    [place] PATTERN 1 Homogeneous    [place] Titer 2   Result Value Ref Range    [place] Titer 2 1:80    [place] Titer 1   Result Value Ref Range    [place] Titer 1 1:80    [place] Pattern 2   Result Value Ref Range    [place] Pattern 2 Speckled      ASSESSMENT/PLAN:     RLS (restless legs syndrome)  -     Ambulatory referral/consult to Neurology; Future; Expected date: 01/10/2024  -     pramipexole (MIRAPEX) 1.5 MG  tablet; Take 1 tablet (1.5 mg total) by mouth 2 (two) times a day.  Dispense: 60 tablet; Refill: 5      Increase to Mirapex 1.5mg BID  Neurology consultation  continue micardis HCT  continue Lexapro 10mg  Continue omeprazole  Continue home BP monitoring   Discussed healthy lifestyle measures including maintenance of a healthy weight, increasing fruits and vegetables in the diet.   F/u PRN

## 2024-01-04 ENCOUNTER — TELEPHONE (OUTPATIENT)
Dept: NEUROLOGY | Facility: CLINIC | Age: 69
End: 2024-01-04
Payer: MEDICARE

## 2024-01-04 NOTE — TELEPHONE ENCOUNTER
Spoke with the patient. Offered next available appointment 2/23 at 2 pm. Pt accepted next available, verbalized understanding, and repeated back date/time/location of scheduled appointment.

## 2024-01-04 NOTE — TELEPHONE ENCOUNTER
Spoke with patient and informed we do not have any providers in [place] that treat RLS. Patient fine with going to main campus. Advised will send message to get him scheduled.

## 2024-01-04 NOTE — TELEPHONE ENCOUNTER
----- Message from Marilee Mancilla sent at 2024  9:29 AM CST -----  Regarding: no availability (referral in chart)  Contact: pt @[phone]  Pt called in to schedule an appt; no available appts in Epic. Pt is asking for a call back soon to schedule. Thanks.         Reason appt not schedule: no availability          Patient's DX:G25.81 (ICD-10-CM) - RLS (restless legs syndrome)         Patient requesting call back or Janachaubrey [phone]

## 2024-01-08 ENCOUNTER — LAB VISIT (OUTPATIENT)
Dept: LAB | Facility: HOSPITAL | Age: 69
End: 2024-01-08
Attending: FAMILY MEDICINE
Payer: MEDICARE

## 2024-01-08 DIAGNOSIS — J84.10 PULMONARY FIBROSIS: ICD-10-CM

## 2024-01-08 DIAGNOSIS — Z12.5 PROSTATE CANCER SCREENING: ICD-10-CM

## 2024-01-08 PROCEDURE — 36415 COLL VENOUS BLD VENIPUNCTURE: CPT | Mod: PO | Performed by: FAMILY MEDICINE

## 2024-01-08 PROCEDURE — 84153 ASSAY OF PSA TOTAL: CPT | Performed by: FAMILY MEDICINE

## 2024-01-09 LAB — COMPLEXED PSA SERPL-MCNC: 0.49 NG/ML (ref 0–4)

## 2024-01-24 LAB

## 2024-01-29 ENCOUNTER — TELEPHONE (OUTPATIENT)
Dept: HEMATOLOGY/ONCOLOGY | Facility: CLINIC | Age: 69
End: 2024-01-29
Payer: MEDICARE

## 2024-01-29 DIAGNOSIS — S52.135A CLOSED NONDISPLACED FRACTURE OF NECK OF LEFT RADIUS, INITIAL ENCOUNTER: Primary | ICD-10-CM

## 2024-01-29 NOTE — NURSING
Spoke with pt, schedule appt with Dr. West 2/8. Pt verbalized agreement to time/date/location. Bladder ca

## 2024-01-30 ENCOUNTER — TELEPHONE (OUTPATIENT)
Dept: GASTROENTEROLOGY | Facility: CLINIC | Age: 69
End: 2024-01-30
Payer: MEDICARE

## 2024-01-30 NOTE — TELEPHONE ENCOUNTER
Spoke with pt. Scheduled c-scope. Prep instructions sent to pt's mychart. Pt verbalized understanding to all

## 2024-01-30 NOTE — TELEPHONE ENCOUNTER
----- Message from Braulio Best MD sent at 1/30/2024  2:52 PM CST -----  Nees C-scope for hx polyps/ILD (VANDANA Arellano)

## 2024-01-31 ENCOUNTER — HOSPITAL ENCOUNTER (OUTPATIENT)
Dept: RADIOLOGY | Facility: HOSPITAL | Age: 69
Discharge: HOME | End: 2024-01-31
Attending: ORTHOPAEDIC SURGERY
Payer: MEDICARE

## 2024-01-31 ENCOUNTER — OFFICE VISIT (OUTPATIENT)
Dept: ORTHOPEDICS | Facility: CLINIC | Age: 69
End: 2024-01-31
Payer: MEDICARE

## 2024-01-31 VITALS — WEIGHT: 227 LBS | BODY MASS INDEX: 32.5 KG/M2 | RESPIRATION RATE: 18 BRPM | HEIGHT: 70 IN

## 2024-01-31 DIAGNOSIS — S52.135D CLOSED NONDISPLACED FRACTURE OF NECK OF LEFT RADIUS WITH ROUTINE HEALING, SUBSEQUENT ENCOUNTER: Primary | ICD-10-CM

## 2024-01-31 DIAGNOSIS — S52.135A CLOSED NONDISPLACED FRACTURE OF NECK OF LEFT RADIUS, INITIAL ENCOUNTER: ICD-10-CM

## 2024-01-31 PROCEDURE — 73080 X-RAY EXAM OF ELBOW: CPT | Mod: TC,PO,LT

## 2024-01-31 PROCEDURE — 99999 PR PBB SHADOW E&M-EST. PATIENT-LVL III: CPT | Mod: PBBFAC,,, | Performed by: ORTHOPAEDIC SURGERY

## 2024-01-31 PROCEDURE — 73080 X-RAY EXAM OF ELBOW: CPT | Mod: 26,LT,, | Performed by: RADIOLOGY

## 2024-01-31 PROCEDURE — 99213 OFFICE O/P EST LOW 20 MIN: CPT | Mod: PBBFAC,PO | Performed by: ORTHOPAEDIC SURGERY

## 2024-01-31 PROCEDURE — 99214 OFFICE O/P EST MOD 30 MIN: CPT | Mod: S$PBB,,, | Performed by: ORTHOPAEDIC SURGERY

## 2024-01-31 NOTE — PROGRESS NOTES
Past Medical History:   Diagnosis Date    Anticoagulant long-term use     Bladder cancer 08/2020    Colon polyps     Coronary artery disease 01/31/2023    CORONARY STENTS    DDD (degenerative disc disease), lumbar     Depression     Fatty liver     GERD (gastroesophageal reflux disease)     H/O motion sickness     Hypertension     PONV (postoperative nausea and vomiting)     RLS (restless legs syndrome)        Past Surgical History:   Procedure Laterality Date    ANGIOGRAM, CORONARY, WITH LEFT HEART CATHETERIZATION N/A 1/31/2023    Procedure: Left Heart Cath;  Surgeon: Nitin Wheat MD;  Location: [facility] CATH;  Service: Cardiology;  Laterality: N/A;    APPENDECTOMY      CARPAL TUNNEL RELEASE      CERVICAL FUSION      CHOLECYSTECTOMY      CIRCUMCISION      COLONOSCOPY N/A 3/16/2020    Procedure: COLONOSCOPY;  Surgeon: Braulio Best MD;  Location: [facility];  Service: Endoscopy;  Laterality: N/A; 3 colon polyps removed; biopsy: tubular adenoma and hyperplastic; repeat in 5 years for surveillance    CORONARY ANGIOGRAPHY  2/13/2023    Procedure: ANGIOGRAM, CORONARY ARTERY;  Surgeon: Nitin Wheat MD;  Location: [facility] CATH;  Service: Cardiology;;    CORONARY ANGIOPLASTY WITH STENT PLACEMENT N/A 1/31/2023    Procedure: ANGIOPLASTY, CORONARY ARTERY, WITH STENT INSERTION;  Surgeon: Nitin Wheat MD;  Location: [facility] CATH;  Service: Cardiology;  Laterality: N/A;    CYSTOSCOPY W/ RETROGRADES Bilateral 8/24/2020    Procedure: CYSTOSCOPY, WITH RETROGRADE PYELOGRAM -WITH URETHRAL DILATION;  Surgeon: Greg Claudio MD;  Location: [facility] OR;  Service: Urology;  Laterality: Bilateral;    CYSTOSCOPY W/ URETERAL STENT PLACEMENT Right 8/24/2020    Procedure: CYSTOSCOPY, WITH URETERAL STENT INSERTION;  Surgeon: Greg Claudio MD;  Location: [facility] OR;  Service: Urology;  Laterality: Right;    INSTILLATION OF URINARY BLADDER N/A 8/24/2020    Procedure: INSTILLATION, BLADDER - Mitomycin;  Surgeon: Greg Claudio MD;  Location: [facility]  OR;  Service: Urology;  Laterality: N/A;    IVUS, CORONARY  1/31/2023    Procedure: IVUS, Coronary;  Surgeon: Nitin Wheat MD;  [facility];  Service: Cardiology;;    IVUS, CORONARY  2/13/2023    Procedure: IVUS, Coronary;  Surgeon: Nitin Wheat MD;  Location: [facility] CATH;  Service: Cardiology;;    PERCUTANEOUS CORONARY INTERVENTION, ARTERY  2/13/2023    Procedure: Percutaneous coronary intervention;  Surgeon: Nitin Wheat MD;  Location: [facility] CATH;  Service: Cardiology;;    TRANSFORAMINAL EPIDURAL INJECTION OF STEROID Right 7/17/2019    Procedure: Injection,steroid,epidural,transforaminal approach L3-4, L4-5;  Surgeon: Chris Palencia MD;  Location: [facility] OR;  Service: Pain Management;  Laterality: Right;    TRANSFORAMINAL EPIDURAL INJECTION OF STEROID Right 8/28/2019    Procedure: Injection,steroid,epidural,transforaminal approach;  Surgeon: Chris Palencia MD;  Location: [facility] OR;  Service: Pain Management;  Laterality: Right;  L3-4, L4-L5    WISDOM TOOTH EXTRACTION         Current Outpatient Medications   Medication Sig    albuterol (PROVENTIL/VENTOLIN HFA) 90 mcg/actuation inhaler Inhale 2 puffs into the lungs every 6 (six) hours as needed for Wheezing. Rescue    aspirin 81 MG Chew Take 1 tablet (81 mg total) by mouth once daily.    budesonide (PULMICORT) 0.5 mg/2 mL nebulizer solution Take 2 mLs (0.5 mg total) by nebulization 2 (two) times daily. Controller    clopidogreL (PLAVIX) 75 mg tablet Take 1 tablet (75 mg total) by mouth once daily.    EScitalopram oxalate (LEXAPRO) 10 MG tablet Take 1 tablet (10 mg total) by mouth once daily.    fluticasone propionate (FLONASE) 50 mcg/actuation nasal spray 1 spray (50 mcg total) by Each Nostril route 2 (two) times a day.    formoterol (PERFOROMIST) 20 mcg/2 mL Nebu Take 2 mLs (20 mcg total) by nebulization 2 (two) times a day. Controller    hydroCHLOROthiazide (HYDRODIURIL) 12.5 MG Tab TAKE 1 TABLET BY MOUTH EVERY DAY    ibuprofen (ADVIL,MOTRIN) 600 MG tablet Take 1 tablet (600  mg total) by mouth every 6 (six) hours as needed for Pain.    Lactobac no.41/Bifidobact no.7 (PROBIOTIC-10 ORAL) Take 1 capsule by mouth once daily.    melatonin 5 mg Cap Take 5 mg by mouth nightly as needed. TAKE AS SCHEDULED    methocarbamoL (ROBAXIN) 500 MG Tab 500 mg daily as needed.    multivitamin (THERAGRAN) per tablet Take 1 tablet by mouth once daily.     pantoprazole (PROTONIX) 40 MG tablet Take 1 tablet (40 mg total) by mouth once daily.    pramipexole (MIRAPEX) 1.5 MG tablet Take 1 tablet (1.5 mg total) by mouth 2 (two) times a day.    rosuvastatin (CRESTOR) 40 MG Tab Take 1 tablet (40 mg total) by mouth once daily.    scopolamine (TRANSDERM-SCOP) 1.3-1.5 mg (1 mg over 3 days) Place 1 patch onto the skin every 72 hours as needed. As needed    telmisartan (MICARDIS) 40 MG Tab TAKE 1 TABLET BY MOUTH EVERY DAY    triamcinolone acetonide 0.1% (KENALOG) 0.1 % cream Apply topically 2 (two) times daily as needed.     Current Facility-Administered Medications   Medication    sodium chloride 0.9% flush 10 mL     Facility-Administered Medications Ordered in Other Visits   Medication    ondansetron injection 4 mg       Review of patient's allergies indicates:   Allergen Reactions    Sulfa (sulfonamide antibiotics) Anaphylaxis       Family History   Problem Relation Age of Onset    Stroke Mother     Ulcerative colitis Mother     Cancer Neg Hx     Colon cancer Neg Hx     Crohn's disease Neg Hx     Celiac disease Neg Hx        Social History     Socioeconomic History    Marital status:    Occupational History    Occupation: [employer]   Tobacco Use    Smoking status: Former     Current packs/day: 0.00     Types: Cigarettes     Quit date: 10/16/2013     Years since quitting: 10.2    Smokeless tobacco: Never   Substance and Sexual Activity    Alcohol use: Yes     Alcohol/week: 2.0 standard drinks of alcohol     Types: 2 Cans of beer per week    Drug use: Not Currently     Social Determinants of Health      Financial Resource Strain: Low Risk  (12/4/2023)    Overall Financial Resource Strain (CARDIA)     Difficulty of Paying Living Expenses: Not hard at all   Food Insecurity: No Food Insecurity (12/4/2023)    Hunger Vital Sign     Worried About Running Out of Food in the Last Year: Never true     Ran Out of Food in the Last Year: Never true   Transportation Needs: No Transportation Needs (12/4/2023)    PRAPARE - Transportation     Lack of Transportation (Medical): No     Lack of Transportation (Non-Medical): No   Physical Activity: Inactive (12/4/2023)    Exercise Vital Sign     Days of Exercise per Week: 0 days     Minutes of Exercise per Session: 0 min   Stress: Stress Concern Present (12/4/2023)    [employer] - Occupational Stress Questionnaire     Feeling of Stress : To some extent   Social Connections: Unknown (12/4/2023)    Social Connection and Isolation Panel [NHANES]     Frequency of Communication with Friends and Family: Three times a week     Frequency of Social Gatherings with Friends and Family: Once a week     Active Member of Clubs or Organizations: No     Attends Club or Organization Meetings: Never     Marital Status:    Housing Stability: Low Risk  (12/4/2023)    Housing Stability Vital Sign     Unable to Pay for Housing in the Last Year: No     Number of Places Lived in the Last Year: 1     Unstable Housing in the Last Year: No       Chief Complaint:   Chief Complaint   Patient presents with    Follow-up     Follow up left elbow radial head fx. Doi 10/29/23       History of present illness:  68-year-old male seen for left elbow injury.  Patient had a fall on October 29th.  Presented to his primary care doctor couple weeks ago where a radial neck fracture was discovered.  Pain is a 1/10.  Feeling much better.      Review of Systems:  Musculoskeletal:  See HPI        Physical Examination:    Vital Signs:    Vitals:    01/31/24 1345   Resp: 18       Body mass  index is 32.57 kg/m².    This a well-developed, well nourished patient in no acute distress.  They are alert and oriented and cooperative to examination.  Pt. walks without an antalgic gait.      Examination of the left elbow shows no signs of rashes or erythema. The patient has no masses, ecchymosis, or effusion. The patient has full range of motion. Patient has full pronation and supination.   Mildly tender over the radial head.  2+ radial pulse. Intact light touch sensation.     X-rays:  X-rays of the left elbow are ordered and review which show a radial neck fracture and overall good alignment.  No significant increase in healing noted     Assessment::  Left radial neck fracture    Plan:  Reviewed the findings with him today.  Patient may continue To use the arm For light to moderate activity.  Recommended augmentation of his calcium and vitamin-D.  Follow up in 2 months with another x-ray of the left elbow.    All previous pertinent notes including ER visits, physical therapy visits, other orthopedic visits as well as other care for the same musculoskeletal problem were reviewed.  All pertinent lab values and previous imaging was reviewed pertinent to the current visit.    This note was created using [employer] voice recognition software that occasionally misinterpreted phrases or words.    Consult note is delivered via Epic messaging service.

## 2024-02-08 ENCOUNTER — LAB VISIT (OUTPATIENT)
Dept: LAB | Facility: HOSPITAL | Age: 69
End: 2024-02-08
Attending: INTERNAL MEDICINE
Payer: MEDICARE

## 2024-02-08 ENCOUNTER — OFFICE VISIT (OUTPATIENT)
Dept: HEMATOLOGY/ONCOLOGY | Facility: CLINIC | Age: 69
End: 2024-02-08
Payer: MEDICARE

## 2024-02-08 VITALS
SYSTOLIC BLOOD PRESSURE: 118 MMHG | RESPIRATION RATE: 18 BRPM | HEIGHT: 70 IN | TEMPERATURE: 98 F | HEART RATE: 40 BPM | DIASTOLIC BLOOD PRESSURE: 62 MMHG | BODY MASS INDEX: 31.5 KG/M2 | WEIGHT: 220 LBS

## 2024-02-08 DIAGNOSIS — C67.9 MALIGNANT NEOPLASM OF URINARY BLADDER, UNSPECIFIED SITE: ICD-10-CM

## 2024-02-08 DIAGNOSIS — J84.10 PULMONARY FIBROSIS: ICD-10-CM

## 2024-02-08 DIAGNOSIS — R76.8 POSITIVE ANA (ANTINUCLEAR ANTIBODY): ICD-10-CM

## 2024-02-08 DIAGNOSIS — R89.9 ABNORMAL LABORATORY TEST RESULT: Primary | ICD-10-CM

## 2024-02-08 DIAGNOSIS — D64.9 NORMOCYTIC ANEMIA: ICD-10-CM

## 2024-02-08 LAB
ALBUMIN SERPL BCP-MCNC: 3.6 G/DL (ref 3.5–5.2)
ALP SERPL-CCNC: 195 U/L (ref 55–135)
ALT SERPL W/O P-5'-P-CCNC: 29 U/L (ref 10–44)
ANION GAP SERPL CALC-SCNC: 9 MMOL/L (ref 8–16)
AST SERPL-CCNC: 38 U/L (ref 10–40)
BASOPHILS # BLD AUTO: 0.08 K/UL (ref 0–0.2)
BASOPHILS NFR BLD: 1.2 % (ref 0–1.9)
BILIRUB SERPL-MCNC: 1.4 MG/DL (ref 0.1–1)
BUN SERPL-MCNC: 12 MG/DL (ref 8–23)
CALCIUM SERPL-MCNC: 9 MG/DL (ref 8.7–10.5)
CHLORIDE SERPL-SCNC: 96 MMOL/L (ref 95–110)
CO2 SERPL-SCNC: 23 MMOL/L (ref 23–29)
CREAT SERPL-MCNC: 1.2 MG/DL (ref 0.5–1.4)
DIFFERENTIAL METHOD BLD: ABNORMAL
EOSINOPHIL # BLD AUTO: 0.2 K/UL (ref 0–0.5)
EOSINOPHIL NFR BLD: 2.6 % (ref 0–8)
ERYTHROCYTE [DISTWIDTH] IN BLOOD BY AUTOMATED COUNT: 16.3 % (ref 11.5–14.5)
EST. GFR  (NO RACE VARIABLE): >60 ML/MIN/1.73 M^2
GLUCOSE SERPL-MCNC: 97 MG/DL (ref 70–110)
HCT VFR BLD AUTO: 25.5 % (ref 40–54)
HGB BLD-MCNC: 8.3 G/DL (ref 14–18)
IMM GRANULOCYTES # BLD AUTO: 0.03 K/UL (ref 0–0.04)
IMM GRANULOCYTES NFR BLD AUTO: 0.4 % (ref 0–0.5)
LYMPHOCYTES # BLD AUTO: 0.7 K/UL (ref 1–4.8)
LYMPHOCYTES NFR BLD: 10.7 % (ref 18–48)
MCH RBC QN AUTO: 24.6 PG (ref 27–31)
MCHC RBC AUTO-ENTMCNC: 32.5 G/DL (ref 32–36)
MCV RBC AUTO: 76 FL (ref 82–98)
MONOCYTES # BLD AUTO: 0.6 K/UL (ref 0.3–1)
MONOCYTES NFR BLD: 8.1 % (ref 4–15)
NEUTROPHILS # BLD AUTO: 5.3 K/UL (ref 1.8–7.7)
NEUTROPHILS NFR BLD: 77 % (ref 38–73)
NRBC BLD-RTO: 0 /100 WBC
PLATELET # BLD AUTO: 312 K/UL (ref 150–450)
PMV BLD AUTO: 8.9 FL (ref 9.2–12.9)
POTASSIUM SERPL-SCNC: 4.2 MMOL/L (ref 3.5–5.1)
PROT SERPL-MCNC: 6.6 G/DL (ref 6–8.4)
RBC # BLD AUTO: 3.37 M/UL (ref 4.6–6.2)
SODIUM SERPL-SCNC: 128 MMOL/L (ref 136–145)
WBC # BLD AUTO: 6.91 K/UL (ref 3.9–12.7)

## 2024-02-08 PROCEDURE — 99215 OFFICE O/P EST HI 40 MIN: CPT | Mod: PBBFAC,PN | Performed by: INTERNAL MEDICINE

## 2024-02-08 PROCEDURE — 80053 COMPREHEN METABOLIC PANEL: CPT | Mod: PN | Performed by: INTERNAL MEDICINE

## 2024-02-08 PROCEDURE — 99205 OFFICE O/P NEW HI 60 MIN: CPT | Mod: S$PBB,,, | Performed by: INTERNAL MEDICINE

## 2024-02-08 PROCEDURE — 36415 COLL VENOUS BLD VENIPUNCTURE: CPT | Mod: PN | Performed by: INTERNAL MEDICINE

## 2024-02-08 PROCEDURE — 85025 COMPLETE CBC W/AUTO DIFF WBC: CPT | Mod: PN | Performed by: INTERNAL MEDICINE

## 2024-02-08 PROCEDURE — 99999 PR PBB SHADOW E&M-EST. PATIENT-LVL V: CPT | Mod: PBBFAC,,, | Performed by: INTERNAL MEDICINE

## 2024-02-08 NOTE — PROGRESS NOTES
PATIENT: Moustapha Murray  MRN: [id number]  DATE: 2/8/2024      Diagnosis:   1. Abnormal laboratory test result    2. Pulmonary fibrosis    3. Malignant neoplasm of urinary bladder, unspecified site    4. Normocytic anemia    5. Positive AUGUSTA (antinuclear antibody)        Chief Complaint: Bladder Cancer      Oncologic History:      Oncologic History     Oncologic Treatment     Pathology           Subjective:    Initial History: Mr. Murray is a 68 y.o. male with CAD, Depression, fatty liver, GERD, HTN, RLS, pulmonary fibrosis, pulmonary fibrosis, h/o bladder cancer who presents for positive TIF1 gamma antibody.  Patient with a history of noninvasive bladder cancer under the care of Dr. Lr.  Patient last underwent cystoscopy on 08/17/2023 with no lesions seen.  Patient with history of T1 disease status post bladder installation of mitomycin-C and BCG.  The patient is currently being followed by Dr. Arellano for pulmonary fibrosis and underwent laboratory testing on 1/08/24 showing positive TIF1 gamma antibody.  The patient is currently scheduled for colonoscopy 3/04/2024 and cystoscopy 3/21/2024.  PSA on 1/08/24 was normal at 0.49ng/mL.      Currently the patient endorses chronic shortness of breath with activity, chronic cough, chronic swelling of the legs worse in the evening.  The patient endorses chronic lower back pain which was worsened after a fall in December.  Patient also endorses restless leg syndrome and the need to move his legs continuously throughout the day.  The patient denies CP, abdominal pain, nausea, vomiting, constipation, diarrhea.  The patient denies fever, chills, night sweats, weight loss, new lumps or bumps, easy bruising or bleeding.    Past Medical History:   Past Medical History:   Diagnosis Date    Anticoagulant long-term use     Bladder cancer 08/2020    Colon polyps     Coronary artery disease 01/31/2023    CORONARY STENTS    DDD (degenerative disc disease), lumbar     Depression      Fatty liver     GERD (gastroesophageal reflux disease)     H/O motion sickness     Hypertension     PONV (postoperative nausea and vomiting)     RLS (restless legs syndrome)        Past Surgical HIstory:   Past Surgical History:   Procedure Laterality Date    ANGIOGRAM, CORONARY, WITH LEFT HEART CATHETERIZATION N/A 1/31/2023    Procedure: Left Heart Cath;  Surgeon: Nitin Wheat MD;  Location: [facility] CATH;  Service: Cardiology;  Laterality: N/A;    APPENDECTOMY      CARPAL TUNNEL RELEASE      CERVICAL FUSION      CHOLECYSTECTOMY      CIRCUMCISION      COLONOSCOPY N/A 3/16/2020    Procedure: COLONOSCOPY;  Surgeon: Braulio Best MD;  Location: [facility] ENDO;  Service: Endoscopy;  Laterality: N/A; 3 colon polyps removed; biopsy: tubular adenoma and hyperplastic; repeat in 5 years for surveillance    CORONARY ANGIOGRAPHY  2/13/2023    Procedure: ANGIOGRAM, CORONARY ARTERY;  Surgeon: Nitin Wheat MD;  Location: [facility] CATH;  Service: Cardiology;;    CORONARY ANGIOPLASTY WITH STENT PLACEMENT N/A 1/31/2023    Procedure: ANGIOPLASTY, CORONARY ARTERY, WITH STENT INSERTION;  Surgeon: Nitin Wheat MD;  Location: [facility] CATH;  Service: Cardiology;  Laterality: N/A;    CYSTOSCOPY W/ RETROGRADES Bilateral 8/24/2020    Procedure: CYSTOSCOPY, WITH RETROGRADE PYELOGRAM -WITH URETHRAL DILATION;  Surgeon: Greg Claudio MD;  Location: [facility] OR;  Service: Urology;  Laterality: Bilateral;    CYSTOSCOPY W/ URETERAL STENT PLACEMENT Right 8/24/2020    Procedure: CYSTOSCOPY, WITH URETERAL STENT INSERTION;  Surgeon: Greg Claudio MD;  Location: [facility] OR;  Service: Urology;  Laterality: Right;    INSTILLATION OF URINARY BLADDER N/A 8/24/2020    Procedure: INSTILLATION, BLADDER - Mitomycin;  Surgeon: Greg Claudio MD;  Location: [facility] OR;  Service: Urology;  Laterality: N/A;    IVUS, CORONARY  1/31/2023    Procedure: IVUS, Coronary;  Surgeon: Nitin Wheat MD;  Location: [facility] CATH;  Service: Cardiology;;    IVUS, CORONARY  2/13/2023     Procedure: IVUS, Coronary;  Surgeon: Nitin Wheat MD;  Location: [facility] CATH;  Service: Cardiology;;    PERCUTANEOUS CORONARY INTERVENTION, ARTERY  2/13/2023    Procedure: Percutaneous coronary intervention;  Surgeon: Nitin Wheat MD;  Location: [facility] CATH;  Service: Cardiology;;    TRANSFORAMINAL EPIDURAL INJECTION OF STEROID Right 7/17/2019    Procedure: Injection,steroid,epidural,transforaminal approach L3-4, L4-5;  Surgeon: Chris Palencia MD;  Location: [facility] OR;  Service: Pain Management;  Laterality: Right;    TRANSFORAMINAL EPIDURAL INJECTION OF STEROID Right 8/28/2019    Procedure: Injection,steroid,epidural,transforaminal approach;  Surgeon: Chris Palencia MD;  Location: [facility] OR;  Service: Pain Management;  Laterality: Right;  L3-4, L4-L5    WISDOM TOOTH EXTRACTION         Family History:   Family History   Problem Relation Age of Onset    Stroke Mother     Ulcerative colitis Mother     Kidney cancer Mother     Cancer Neg Hx     Colon cancer Neg Hx     Crohn's disease Neg Hx     Celiac disease Neg Hx        Social History:  reports that he quit smoking about 10 years ago. His smoking use included cigarettes. He has never used smokeless tobacco. He reports current alcohol use of about 2.0 standard drinks of alcohol per week. He reports that he does not currently use drugs.    Allergies:  Review of patient's allergies indicates:   Allergen Reactions    Sulfa (sulfonamide antibiotics) Anaphylaxis       Medications:  Current Outpatient Medications   Medication Sig Dispense Refill    albuterol (PROVENTIL/VENTOLIN HFA) 90 mcg/actuation inhaler Inhale 2 puffs into the lungs every 6 (six) hours as needed for Wheezing. Rescue 18 g 6    aspirin 81 MG Chew Take 1 tablet (81 mg total) by mouth once daily. 90 tablet 3    budesonide (PULMICORT) 0.5 mg/2 mL nebulizer solution Take 2 mLs (0.5 mg total) by nebulization 2 (two) times daily. Controller 120 mL 3    clopidogreL (PLAVIX) 75 mg tablet Take 1 tablet (75 mg total) by mouth once  daily. 90 tablet 3    EScitalopram oxalate (LEXAPRO) 10 MG tablet Take 1 tablet (10 mg total) by mouth once daily. 30 tablet 11    fluticasone propionate (FLONASE) 50 mcg/actuation nasal spray 1 spray (50 mcg total) by Each Nostril route 2 (two) times a day. 16 g 11    formoterol (PERFOROMIST) 20 mcg/2 mL Nebu Take 2 mLs (20 mcg total) by nebulization 2 (two) times a day. Controller 90 mL 3    hydroCHLOROthiazide (HYDRODIURIL) 12.5 MG Tab TAKE 1 TABLET BY MOUTH EVERY DAY 90 tablet 1    ibuprofen (ADVIL,MOTRIN) 600 MG tablet Take 1 tablet (600 mg total) by mouth every 6 (six) hours as needed for Pain. 20 tablet 0    Lactobac no.41/Bifidobact no.7 (PROBIOTIC-10 ORAL) Take 1 capsule by mouth once daily.      melatonin 5 mg Cap Take 5 mg by mouth nightly as needed. TAKE AS SCHEDULED      methocarbamoL (ROBAXIN) 500 MG Tab 500 mg daily as needed.      multivitamin (THERAGRAN) per tablet Take 1 tablet by mouth once daily.       pantoprazole (PROTONIX) 40 MG tablet Take 1 tablet (40 mg total) by mouth once daily. 90 tablet 3    pramipexole (MIRAPEX) 1.5 MG tablet Take 1 tablet (1.5 mg total) by mouth 2 (two) times a day. 60 tablet 5    rosuvastatin (CRESTOR) 40 MG Tab Take 1 tablet (40 mg total) by mouth once daily. 90 tablet 3    scopolamine (TRANSDERM-SCOP) 1.3-1.5 mg (1 mg over 3 days) Place 1 patch onto the skin every 72 hours as needed. As needed      telmisartan (MICARDIS) 40 MG Tab TAKE 1 TABLET BY MOUTH EVERY DAY 90 tablet 2    triamcinolone acetonide 0.1% (KENALOG) 0.1 % cream Apply topically 2 (two) times daily as needed. 45 g 1     Current Facility-Administered Medications   Medication Dose Route Frequency Provider Last Rate Last Admin    sodium chloride 0.9% flush 10 mL  10 mL Intravenous PRN Nitin Wheat MD         Facility-Administered Medications Ordered in Other Visits   Medication Dose Route Frequency Provider Last Rate Last Admin    ondansetron injection 4 mg  4 mg Intravenous Daily PRN Gustavo Chu  "MD ARVIN           Review of Systems   Constitutional:  Negative for appetite change, chills, fever and unexpected weight change.   HENT:  Negative for mouth sores, sore throat and trouble swallowing.    Eyes:  Negative for photophobia and visual disturbance.   Respiratory:  Positive for cough (chronic) and shortness of breath (with activity). Negative for chest tightness.    Cardiovascular:  Positive for leg swelling (on occasion). Negative for chest pain and palpitations.   Gastrointestinal:  Negative for abdominal pain, constipation, diarrhea, nausea and vomiting.   Endocrine: Negative for cold intolerance and heat intolerance.   Genitourinary:  Negative for difficulty urinating, dysuria, frequency and hematuria.   Musculoskeletal:  Positive for back pain (lower - chronic worse since december). Negative for arthralgias and myalgias.        RLS all day   Skin:  Negative for color change and rash.   Neurological:  Negative for dizziness, light-headedness, numbness and headaches.   Hematological:  Negative for adenopathy. Does not bruise/bleed easily.   Psychiatric/Behavioral:  The patient is nervous/anxious.        ECOG Performance Status: 1   Objective:      Vitals:   Vitals:    02/08/24 0758   BP: 118/62   BP Location: Right arm   Patient Position: Sitting   BP Method: Large (Automatic)   Pulse: (!) 40   Resp: 18   Temp: 97.7 °F (36.5 °C)   Weight: 99.8 kg (220 lb 0.3 oz)   Height: 5' 10" (1.778 m)     Physical Exam  Constitutional:       General: He is not in acute distress.     Appearance: He is well-developed. He is not diaphoretic.   HENT:      Head: Normocephalic and atraumatic.   Eyes:      General: No scleral icterus.        Right eye: No discharge.         Left eye: No discharge.   Cardiovascular:      Rate and Rhythm: Normal rate and regular rhythm.      Heart sounds: Normal heart sounds. No murmur heard.     No friction rub. No gallop.   Pulmonary:      Effort: Pulmonary effort is normal. No respiratory " distress.      Breath sounds: Normal breath sounds. No wheezing or rales.   Chest:      Chest wall: No tenderness.   Abdominal:      General: Bowel sounds are normal. There is no distension.      Palpations: Abdomen is soft. There is no mass.      Tenderness: There is no abdominal tenderness. There is no rebound.   Skin:     General: Skin is warm and dry.      Findings: No erythema or rash.   Neurological:      Mental Status: He is alert and oriented to person, place, and time.   Psychiatric:         Behavior: Behavior normal.         Laboratory Data:  Lab Visit on 02/08/2024   Component Date Value Ref Range Status    WBC 02/08/2024 6.91  3.90 - 12.70 K/uL Final    RBC 02/08/2024 3.37 (L)  4.60 - 6.20 M/uL Final    Hemoglobin 02/08/2024 8.3 (L)  14.0 - 18.0 g/dL Final    Hematocrit 02/08/2024 25.5 (L)  40.0 - 54.0 % Final    MCV 02/08/2024 76 (L)  82 - 98 fL Final    MCH 02/08/2024 24.6 (L)  27.0 - 31.0 pg Final    MCHC 02/08/2024 32.5  32.0 - 36.0 g/dL Final    RDW 02/08/2024 16.3 (H)  11.5 - 14.5 % Final    Platelets 02/08/2024 312  150 - 450 K/uL Final    MPV 02/08/2024 8.9 (L)  9.2 - 12.9 fL Final    Immature Granulocytes 02/08/2024 0.4  0.0 - 0.5 % Final    Gran # (ANC) 02/08/2024 5.3  1.8 - 7.7 K/uL Final    Immature Grans (Abs) 02/08/2024 0.03  0.00 - 0.04 K/uL Final    Comment: Mild elevation in immature granulocytes is non specific and   can be seen in a variety of conditions including stress response,   acute inflammation, trauma and pregnancy. Correlation with other   laboratory and clinical findings is essential.      Lymph # 02/08/2024 0.7 (L)  1.0 - 4.8 K/uL Final    Mono # 02/08/2024 0.6  0.3 - 1.0 K/uL Final    Eos # 02/08/2024 0.2  0.0 - 0.5 K/uL Final    Baso # 02/08/2024 0.08  0.00 - 0.20 K/uL Final    nRBC 02/08/2024 0  0 /100 WBC Final    Gran % 02/08/2024 77.0 (H)  38.0 - 73.0 % Final    Lymph % 02/08/2024 10.7 (L)  18.0 - 48.0 % Final    Mono % 02/08/2024 8.1  4.0 - 15.0 % Final    Eosinophil  % 02/08/2024 2.6  0.0 - 8.0 % Final    Basophil % 02/08/2024 1.2  0.0 - 1.9 % Final    Differential Method 02/08/2024 Automated   Final         Imaging:     CT Chest 11/20/23    Heart size enlarged with coronary artery calcifications.  The course and caliber of the thoracic aorta is normal.  A triple vessel aortic arch is identified.  The main pulmonary artery is borderline dilated.  No evidence for hilar or mediastinal adenopathy.  Shotty lymph nodes are identified.     Coarse interstitial markings lungs with emphysematous changes lungs.  Bibasilar scarring is noted without evidence for fibrosis or honeycombing.  No evidence for consolidative area nor evidence for mass like lesion.  No definitive nodularity.  No pleural thickening or pleural effusion.  The trachea and airways are patent.     The visualized hollow and solid viscera of the upper abdomen demonstrates hepatic steatosis.     No suspicious osseous lesions.  Spondylotic changes are identified.  Postsurgical changes the anterior cervical spine identified.  The soft tissues are grossly normal.       Assessment:       1. Abnormal laboratory test result    2. Pulmonary fibrosis    3. Malignant neoplasm of urinary bladder, unspecified site    4. Normocytic anemia    5. Positive AUGUSTA (antinuclear antibody)           Plan:     Positive TIF1 Gamma Antibody - can be associated with risk for pulmonary fibrosis and for occult malignancy  -Pt with h/o non invasive bladder cancer with most recent cystoscopy 8/17/23 with MEG.  Pt to undergo repeat cystoscopy 3/21/2024  -Colonoscopy to be performed 3/04/24  -PSA on 1/08/24 was normal at 0.49ng/mL  -Will obtain CT neck, chest, abdomen and pelvis  -If aforementioned work up negative, would consider EGD    Pulmonary Fibrosis - managed by Dr Arellano  -Unclear etiology but possibly related to Positive TIF1 Gamma Antibody as above    Bladder Cancer -  history of noninvasive bladder cancer under the care of Dr. Lr with  prior T1 disease  -Pt status post bladder installation of mitomycin-C and BCG  -Last cystoscopy on 08/17/2023 with no lesions seen  -Pt to undergo repeat cystoscopy 3/21/2024    Normocytic Anemia - will repeat CBC  -Will work up further if continued anemia confirmed    Positive AUGUSTA - unclear if responsible for disease  -Consider rheumatology referral if work up for malignancy negative    Route Chart for Scheduling    Med Onc Chart Routing      Follow up with physician Other. Pt needs a CBC and CMP today.  He needs a CT neck, C/A/P with return appt with me once completed.   Follow up with [facility]    Infusion scheduling note    Injection scheduling note    Labs    Imaging    Pharmacy appointment    Other referrals                       Fredi West MD  [facility]  Hematology and Oncology  [facility]   [address]   [place] [address]   O: [phone]  F: [phone]

## 2024-02-20 DIAGNOSIS — L30.9 DERMATITIS: ICD-10-CM

## 2024-02-20 RX ORDER — TRIAMCINOLONE ACETONIDE 1 MG/G
CREAM TOPICAL
Qty: 45 G | Refills: 5 | Status: SHIPPED | OUTPATIENT
Start: 2024-02-20

## 2024-02-20 NOTE — TELEPHONE ENCOUNTER
No care due was identified.  Health [facility] Embedded Care Due Messages. Reference number: [id number].   2/20/2024 7:56:34 AM CST

## 2024-02-20 NOTE — TELEPHONE ENCOUNTER
Refill Routing Note   Medication(s) are not appropriate for processing by [facility] for the following reason(s):        New or recently adjusted medication: less than 90 days under the signature of responsible provider    [facility] action(s):  Defer               Appointments  past 12m or future 3m with PCP    Date Provider   Last Visit   1/3/2024 Colton Johnson MD   Next Visit   Visit date not found Colton Johnson MD   ED visits in past 90 days: 1        Note composed:9:48 AM 02/20/2024

## 2024-02-26 ENCOUNTER — HOSPITAL ENCOUNTER (OUTPATIENT)
Dept: RADIOLOGY | Facility: HOSPITAL | Age: 69
Discharge: HOME | End: 2024-02-26
Attending: INTERNAL MEDICINE
Payer: MEDICARE

## 2024-02-26 DIAGNOSIS — J84.10 PULMONARY FIBROSIS: ICD-10-CM

## 2024-02-26 PROCEDURE — 71046 X-RAY EXAM CHEST 2 VIEWS: CPT | Mod: 26,,, | Performed by: RADIOLOGY

## 2024-02-26 PROCEDURE — 71046 X-RAY EXAM CHEST 2 VIEWS: CPT | Mod: TC,FY,PO

## 2024-02-27 ENCOUNTER — HOSPITAL ENCOUNTER (OUTPATIENT)
Dept: RADIOLOGY | Facility: HOSPITAL | Age: 69
Discharge: HOME | End: 2024-02-27
Attending: INTERNAL MEDICINE
Payer: MEDICARE

## 2024-02-27 DIAGNOSIS — C67.9 MALIGNANT NEOPLASM OF URINARY BLADDER, UNSPECIFIED SITE: ICD-10-CM

## 2024-02-27 DIAGNOSIS — Z00.00 ENCOUNTER FOR MEDICARE ANNUAL WELLNESS EXAM: ICD-10-CM

## 2024-02-27 DIAGNOSIS — J84.10 PULMONARY FIBROSIS: ICD-10-CM

## 2024-02-27 PROCEDURE — 25500020 PHARM REV CODE 255: Mod: PO | Performed by: INTERNAL MEDICINE

## 2024-02-27 PROCEDURE — A9698 NON-RAD CONTRAST MATERIALNOC: HCPCS | Mod: PO | Performed by: INTERNAL MEDICINE

## 2024-02-27 PROCEDURE — 70491 CT SOFT TISSUE NECK W/DYE: CPT | Mod: TC,PO

## 2024-02-27 PROCEDURE — 74177 CT ABD & PELVIS W/CONTRAST: CPT | Mod: 26,,, | Performed by: RADIOLOGY

## 2024-02-27 PROCEDURE — 74177 CT ABD & PELVIS W/CONTRAST: CPT | Mod: TC,PO

## 2024-02-27 PROCEDURE — 70491 CT SOFT TISSUE NECK W/DYE: CPT | Mod: 26,,, | Performed by: RADIOLOGY

## 2024-02-27 PROCEDURE — 71260 CT THORAX DX C+: CPT | Mod: 26,,, | Performed by: RADIOLOGY

## 2024-02-27 RX ADMIN — IOHEXOL 100 ML: 350 INJECTION, SOLUTION INTRAVENOUS at 08:02

## 2024-02-27 RX ADMIN — IOHEXOL 1000 ML: 12 SOLUTION ORAL at 08:02

## 2024-02-28 NOTE — PROGRESS NOTES
PATIENT: Moustapha Murray  MRN: [id number]  DATE: 3/2/2024      Diagnosis:   1. Abnormal laboratory test result    2. Pulmonary fibrosis    3. Other iron deficiency anemia    4. Malignant neoplasm of urinary bladder, unspecified site    5. Colonic thickening    6. Lymphadenopathy    7. Anorexia          Chief Complaint: Abnormal laboratory test result (3 week follow up)      Oncologic History:      Oncologic History     Oncologic Treatment     Pathology           Subjective:    Interval History: Mr. Murray is a 68 y.o. male with CAD, Depression, fatty liver, GERD, HTN, RLS, pulmonary fibrosis, pulmonary fibrosis, h/o bladder cancer who presents for positive TIF1 gamma antibody.   The patient underwent a CT of the chest, abdomen, and pelvis on 02/27/2024 showing a lower right paratracheal enlarged lymph node measuring 10 mm; enlarged subcarinal lymph node measuring 14 mm; mildly enlarged lymph node adjacent to the distal esophagus; interstitial thickening bilaterally in the subpleural pattern suggesting fibrosis; diffuse fatty infiltration of the liver; anterior wedge compression fracture of L1 and T12; wall thickening in the region of the ascending colon and cecum.  CT of the neck showed no acute findings.    Currently the patient endorses severe fatigue.  The patient denies CP, cough, SOB, abdominal pain, nausea, vomiting, constipation, diarrhea.  The patient denies fever, chills, night sweats, weight loss, new lumps or bumps, easy bruising or bleeding. The patient denies any melena, BRBPR, hemoptysis, hematemesis, hematuria.      Prior History:  Patient with a history of noninvasive bladder cancer under the care of Dr. Lr.  Patient last underwent cystoscopy on 08/17/2023 with no lesions seen.  Patient with history of T1 disease status post bladder installation of mitomycin-C and BCG.  The patient is currently being followed by Dr. Arellano for pulmonary fibrosis and underwent laboratory testing on 1/08/24  showing positive TIF1 gamma antibody.  The patient is currently scheduled for colonoscopy 3/04/2024 and cystoscopy 3/21/2024.  PSA on 1/08/24 was normal at 0.49ng/mL.    Past Medical History:   Past Medical History:   Diagnosis Date    Anticoagulant long-term use     Bladder cancer 08/2020    Colon polyps     Coronary artery disease 01/31/2023    CORONARY STENTS    DDD (degenerative disc disease), lumbar     Depression     Fatty liver     GERD (gastroesophageal reflux disease)     H/O motion sickness     Hypertension     PONV (postoperative nausea and vomiting)     Pulmonary fibrosis     RLS (restless legs syndrome)     Sleep apnea, unspecified     uses cpap       Past Surgical HIstory:   Past Surgical History:   Procedure Laterality Date    ANGIOGRAM, CORONARY, WITH LEFT HEART CATHETERIZATION N/A 1/31/2023    Procedure: Left Heart Cath;  Surgeon: Nitin Wheat MD;  Location: [facility] CATH;  Service: Cardiology;  Laterality: N/A;    APPENDECTOMY      CARPAL TUNNEL RELEASE      CERVICAL FUSION      CHOLECYSTECTOMY      CIRCUMCISION      COLONOSCOPY N/A 3/16/2020    Procedure: COLONOSCOPY;  Surgeon: Braulio Best MD;  Location: [facility];  Service: Endoscopy;  Laterality: N/A; 3 colon polyps removed; biopsy: tubular adenoma and hyperplastic; repeat in 5 years for surveillance    CORONARY ANGIOGRAPHY  2/13/2023    Procedure: ANGIOGRAM, CORONARY ARTERY;  Surgeon: Nitin Wheat MD;  Location: [facility] CATH;  Service: Cardiology;;    CORONARY ANGIOPLASTY WITH STENT PLACEMENT N/A 1/31/2023    Procedure: ANGIOPLASTY, CORONARY ARTERY, WITH STENT INSERTION;  Surgeon: Nitin Wheat MD;  Location: [facility] CATH;  Service: Cardiology;  Laterality: N/A;    CYSTOSCOPY W/ RETROGRADES Bilateral 8/24/2020    Procedure: CYSTOSCOPY, WITH RETROGRADE PYELOGRAM -WITH URETHRAL DILATION;  Surgeon: Greg Claudio MD;  Location: [facility] OR;  Service: Urology;  Laterality: Bilateral;    CYSTOSCOPY W/ URETERAL STENT PLACEMENT Right 8/24/2020    Procedure:  CYSTOSCOPY, WITH URETERAL STENT INSERTION;  Surgeon: Greg Claudio MD;  Location: [facility] OR;  Service: Urology;  Laterality: Right;    INSTILLATION OF URINARY BLADDER N/A 8/24/2020    Procedure: INSTILLATION, BLADDER - Mitomycin;  Surgeon: Greg Claudio MD;  Location: [facility] OR;  Service: Urology;  Laterality: N/A;    IVUS, CORONARY  1/31/2023    Procedure: IVUS, Coronary;  Surgeon: Nitin Wheat MD;  Location: [facility] CATH;  Service: Cardiology;;    IVUS, CORONARY  2/13/2023    Procedure: IVUS, Coronary;  Surgeon: Nitin Wheat MD;  Location: [facility] CATH;  Service: Cardiology;;    PERCUTANEOUS CORONARY INTERVENTION, ARTERY  2/13/2023    Procedure: Percutaneous coronary intervention;  Surgeon: Nitin Wheat MD;  Location: [facility] CATH;  Service: Cardiology;;    TRANSFORAMINAL EPIDURAL INJECTION OF STEROID Right 7/17/2019    Procedure: Injection,steroid,epidural,transforaminal approach L3-4, L4-5;  Surgeon: Chris Palencia MD;  Location: [facility] OR;  Service: Pain Management;  Laterality: Right;    TRANSFORAMINAL EPIDURAL INJECTION OF STEROID Right 8/28/2019    Procedure: Injection,steroid,epidural,transforaminal approach;  Surgeon: Chris Palencia MD;  Location: [facility] OR;  Service: Pain Management;  Laterality: Right;  L3-4, L4-L5    WISDOM TOOTH EXTRACTION         Family History:   Family History   Problem Relation Age of Onset    Stroke Mother     Ulcerative colitis Mother     Kidney cancer Mother     Cancer Neg Hx     Colon cancer Neg Hx     Crohn's disease Neg Hx     Celiac disease Neg Hx        Social History:  reports that he quit smoking about 10 years ago. His smoking use included cigarettes. He has never used smokeless tobacco. He reports current alcohol use of about 2.0 standard drinks of alcohol per week. He reports that he does not currently use drugs.    Allergies:  Review of patient's allergies indicates:   Allergen Reactions    Sulfa (sulfonamide antibiotics) Anaphylaxis       Medications:  Current Outpatient Medications    Medication Sig Dispense Refill    albuterol (PROVENTIL/VENTOLIN HFA) 90 mcg/actuation inhaler Inhale 2 puffs into the lungs every 6 (six) hours as needed for Wheezing. Rescue 18 g 6    aspirin 81 MG Chew Take 1 tablet (81 mg total) by mouth once daily. 90 tablet 3    budesonide (PULMICORT) 0.5 mg/2 mL nebulizer solution Take 2 mLs (0.5 mg total) by nebulization 2 (two) times daily. Controller 120 mL 3    clopidogreL (PLAVIX) 75 mg tablet TAKE 1 TABLET BY MOUTH EVERY DAY 90 tablet 3    EScitalopram oxalate (LEXAPRO) 10 MG tablet Take 1 tablet (10 mg total) by mouth once daily. 30 tablet 11    fluticasone propionate (FLONASE) 50 mcg/actuation nasal spray 1 spray (50 mcg total) by Each Nostril route 2 (two) times a day. 16 g 11    formoterol (PERFOROMIST) 20 mcg/2 mL Nebu Take 2 mLs (20 mcg total) by nebulization 2 (two) times a day. Controller (Patient not taking: Reported on 3/1/2024) 90 mL 3    hydroCHLOROthiazide (HYDRODIURIL) 12.5 MG Tab TAKE 1 TABLET BY MOUTH EVERY DAY 90 tablet 1    ibuprofen (ADVIL,MOTRIN) 600 MG tablet Take 1 tablet (600 mg total) by mouth every 6 (six) hours as needed for Pain. 20 tablet 0    Lactobac no.41/Bifidobact no.7 (PROBIOTIC-10 ORAL) Take 1 capsule by mouth once daily.      melatonin 5 mg Cap Take 5 mg by mouth nightly as needed. TAKE AS SCHEDULED      methocarbamoL (ROBAXIN) 500 MG Tab 500 mg daily as needed.      multivitamin (THERAGRAN) per tablet Take 1 tablet by mouth once daily.       pantoprazole (PROTONIX) 40 MG tablet Take 1 tablet (40 mg total) by mouth once daily. 90 tablet 3    pramipexole (MIRAPEX) 1.5 MG tablet Take 1 tablet (1.5 mg total) by mouth 2 (two) times a day. 60 tablet 5    rosuvastatin (CRESTOR) 40 MG Tab Take 1 tablet (40 mg total) by mouth once daily. 90 tablet 3    scopolamine (TRANSDERM-SCOP) 1.3-1.5 mg (1 mg over 3 days) Place 1 patch onto the skin every 72 hours as needed. As needed      telmisartan (MICARDIS) 40 MG Tab TAKE 1 TABLET BY MOUTH EVERY  "DAY 90 tablet 2    triamcinolone acetonide 0.1% (KENALOG) 0.1 % cream APPLY TOPICALLY 2 TIMES DAILY AS NEEDED. 45 g 5    droNABinol (MARINOL) 2.5 MG capsule Take 1 capsule (2.5 mg total) by mouth 2 (two) times daily before meals. 60 capsule 3     Current Facility-Administered Medications   Medication Dose Route Frequency Provider Last Rate Last Admin    sodium chloride 0.9% flush 10 mL  10 mL Intravenous PRN Nitin Wheat MD         Facility-Administered Medications Ordered in Other Visits   Medication Dose Route Frequency Provider Last Rate Last Admin    ondansetron injection 4 mg  4 mg Intravenous Daily PRN Gustavo hCu MD           Review of Systems   Constitutional:  Positive for fatigue. Negative for chills, diaphoresis, fever and unexpected weight change.   Respiratory:  Negative for cough and shortness of breath.    Cardiovascular:  Negative for chest pain and palpitations.   Gastrointestinal:  Negative for abdominal pain, constipation, diarrhea, nausea and vomiting.   Skin:  Negative for color change and rash.   Neurological:  Negative for headaches.   Hematological:  Negative for adenopathy. Does not bruise/bleed easily.       ECOG Performance Status: 1   Objective:      Vitals:   Vitals:    03/01/24 1014   BP: (!) 90/50   BP Location: Left arm   Patient Position: Sitting   BP Method: Large (Manual)   Pulse: (!) 58   Resp: 18   Temp: 97.7 °F (36.5 °C)   TempSrc: Temporal   SpO2: 98%   Weight: 92.9 kg (204 lb 12.9 oz)   Height: 5' 10" (1.778 m)       Physical Exam  Constitutional:       General: He is not in acute distress.     Appearance: He is well-developed. He is not diaphoretic.   HENT:      Head: Normocephalic and atraumatic.   Cardiovascular:      Rate and Rhythm: Normal rate and regular rhythm.      Heart sounds: Normal heart sounds. No murmur heard.     No friction rub. No gallop.   Pulmonary:      Effort: Pulmonary effort is normal. No respiratory distress.      Breath sounds: Normal breath " sounds. No wheezing or rales.   Chest:      Chest wall: No tenderness.   Abdominal:      General: Bowel sounds are normal. There is no distension.      Palpations: Abdomen is soft. There is no mass.      Tenderness: There is no abdominal tenderness. There is no rebound.   Musculoskeletal:      Cervical back: Normal range of motion.   Lymphadenopathy:      Cervical: No cervical adenopathy.      Upper Body:      Right upper body: No supraclavicular or axillary adenopathy.      Left upper body: No supraclavicular or axillary adenopathy.   Skin:     General: Skin is warm and dry.      Findings: No erythema or rash.   Neurological:      Mental Status: He is alert and oriented to person, place, and time.   Psychiatric:         Behavior: Behavior normal.         Laboratory Data:  Lab Visit on 03/01/2024   Component Date Value Ref Range Status    WBC 03/01/2024 8.64  3.90 - 12.70 K/uL Final    RBC 03/01/2024 4.12 (L)  4.60 - 6.20 M/uL Final    Hemoglobin 03/01/2024 9.8 (L)  14.0 - 18.0 g/dL Final    Hematocrit 03/01/2024 30.7 (L)  40.0 - 54.0 % Final    MCV 03/01/2024 75 (L)  82 - 98 fL Final    MCH 03/01/2024 23.8 (L)  27.0 - 31.0 pg Final    MCHC 03/01/2024 31.9 (L)  32.0 - 36.0 g/dL Final    RDW 03/01/2024 16.9 (H)  11.5 - 14.5 % Final    Platelets 03/01/2024 427  150 - 450 K/uL Final    MPV 03/01/2024 9.4  9.2 - 12.9 fL Final    Immature Granulocytes 03/01/2024 0.2  0.0 - 0.5 % Final    Gran # (ANC) 03/01/2024 6.4  1.8 - 7.7 K/uL Final    Immature Grans (Abs) 03/01/2024 0.02  0.00 - 0.04 K/uL Final    Comment: Mild elevation in immature granulocytes is non specific and   can be seen in a variety of conditions including stress response,   acute inflammation, trauma and pregnancy. Correlation with other   laboratory and clinical findings is essential.      Lymph # 03/01/2024 1.0  1.0 - 4.8 K/uL Final    Mono # 03/01/2024 0.8  0.3 - 1.0 K/uL Final    Eos # 03/01/2024 0.3  0.0 - 0.5 K/uL Final    Baso # 03/01/2024 0.10   0.00 - 0.20 K/uL Final    nRBC 03/01/2024 0  0 /100 WBC Final    Gran % 03/01/2024 74.5 (H)  38.0 - 73.0 % Final    Lymph % 03/01/2024 11.6 (L)  18.0 - 48.0 % Final    Mono % 03/01/2024 8.7  4.0 - 15.0 % Final    Eosinophil % 03/01/2024 3.8  0.0 - 8.0 % Final    Basophil % 03/01/2024 1.2  0.0 - 1.9 % Final    Differential Method 03/01/2024 Automated   Final    Sodium 03/01/2024 135 (L)  136 - 145 mmol/L Final    Potassium 03/01/2024 3.9  3.5 - 5.1 mmol/L Final    Chloride 03/01/2024 100  95 - 110 mmol/L Final    CO2 03/01/2024 24  23 - 29 mmol/L Final    Glucose 03/01/2024 95  70 - 110 mg/dL Final    BUN 03/01/2024 16  8 - 23 mg/dL Final    Creatinine 03/01/2024 1.2  0.5 - 1.4 mg/dL Final    Calcium 03/01/2024 9.7  8.7 - 10.5 mg/dL Final    Total Protein 03/01/2024 7.0  6.0 - 8.4 g/dL Final    Albumin 03/01/2024 3.6  3.5 - 5.2 g/dL Final    Total Bilirubin 03/01/2024 0.8  0.1 - 1.0 mg/dL Final    Comment: For infants and newborns, interpretation of results should be based  on gestational age, weight and in agreement with clinical  observations.    Premature Infant recommended reference ranges:  Up to 24 hours.............<8.0 mg/dL  Up to 48 hours............<12.0 mg/dL  3-5 days..................<15.0 mg/dL  6-29 days.................<15.0 mg/dL      Alkaline Phosphatase 03/01/2024 161 (H)  55 - 135 U/L Final    AST 03/01/2024 38  10 - 40 U/L Final    ALT 03/01/2024 32  10 - 44 U/L Final    eGFR 03/01/2024 >60.0  >60 mL/min/1.73 m^2 Final    Anion Gap 03/01/2024 11  8 - 16 mmol/L Final    Ferritin 03/01/2024 127  20.0 - 300.0 ng/mL Final    Iron 03/01/2024 30 (L)  45 - 160 ug/dL Final    Transferrin 03/01/2024 344  200 - 375 mg/dL Final    TIBC 03/01/2024 509 (H)  250 - 450 ug/dL Final    Saturated Iron 03/01/2024 6 (L)  20 - 50 % Final    Group & Rh 03/01/2024 O POS   Final    Indirect Naomi GEL 03/01/2024 NEG   Final    Specimen Outdate 03/01/2024 03/04/2024 23:59   Final         Imaging:     CT C/A/P  2/27/24  Chest: There is evidence of prior lower cervical spine surgery. Vascular calcifications including coronary artery calcifications are present. Upper limits of normal in size pre-vascular lymph nodes are evident measuring as large as 8 mm in smallest dimensions. This is stable. A lower right paratracheal borderline enlarged lymph node is seen measuring 10 mm in smallest dimensions. An enlarged subcarinal lymph node is seen measuring 14 mm in smallest dimensions. This is also stable relative the prior exam. There is mild prominence to the right and left pulmonary arteries. The main pulmonary artery measures 2.6 cm. Extensive coronary artery calcifications are present. There is no evidence of a pleural or pericardial effusion. There is suggestion of a small hiatal hernia. Adjacent to the distal esophagus there is a mildly enlarged lymph node measuring 12 10 mm in small stone mentions. This is unchanged relative the prior exam. Prior right posterior rib fractures are noted.     Lung windows are degraded by motion. Interstitial thickening is seen bilaterally in a subpleural pattern suggesting fibrosis. There is a partially calcified subpleural nodule on series 9, image number 266 measuring 8 x 4 mm. This is unchanged. No definite new nodules are seen although sensitivity is decreased due to motion.     Abdomen and pelvis: Images are compared to prior exam performed July 9, 2020. There is diffuse fatty liver infiltration. The adrenal glands are unremarkable. There is evidence of prior cholecystectomy. There is a 4.2 cm right inter pole renal cyst. Aortoiliac atherosclerosis is present. There is no evidence of adenopathy or free fluid. There is an anterior wedge compression fracture of L1 and T12 and there is a grade 1 anterolisthesis of L5 upon S1 with evidence of a bilateral L5 spondylolysis. Pelvic images are degraded by motion. There is no gross evidence of bowel obstruction. There is no evidence of adenopathy.  In the region of the ascending colon and cecum there is suggestion of some wall thickening. The appendix is not confidently seen. A colitis cannot be excluded. The changes described in the transverse colon which are more diffuse on the 2020 examination abscess resolved. Infectious or ischemic colitis could produce this appearance.     CT Neck 2/27/24  Skull base and brain: On this limited evaluation of the lower brain there is no acute abnormality. The basilar cisterns are open. The vessels at the base of the brain enhance normally. The 4th ventricle is normal in size and position.     Soft tissue: Fat and soft tissue planes throughout the neck are preserved. There is no dominant mass or pathologic lymphadenopathy.     Parotid/Submandibular glands: The submandibular and parotid glands appear normal and symmetric bilaterally without focal mass, ductal dilatation or calcification.     Thyroid gland: The thyroid gland is normal in size with homogeneous enhancement.     Larynx, pharynx: The posterior nasopharyngeal soft tissues are normal. The tonsillar pillars appear normal. Again, the parapharyngeal fat spaces are preserved without pathologic lymphadenopathy or induration. The epiglottis is normal. The vallecular and piriform sinuses appear normal. The vocal cords are grossly normal.     Lymph nodes: There are shotty lymph nodes in the neck bilaterally. These are present along the internal jugular chain as well as in the posterior fossa, submandibular and submental spaces. There is no pathologically enlarged, enhancing or necrotic lymph node.     Vasculature: There is moderate calcified plaque at the carotid bulb bilaterally.     Airway, lungs: The airway is patent. Lung apices are somewhat suboptimally evaluated due to respiratory motion but there is no focal mass.     Sinus/mastoid: The paranasal sinuses and mastoid air cells are clear. There is a suspected small 5 mm osteoma in the anterior ethmoid air cells on the  right. The nasal septum is deviated to the right.     Bones: There are degenerative and postsurgical changes in the cervical spine. There are changes of previous ACDF of C6 and C7. Fusion is solid. Hardware is intact. There is marked disc space narrowing at the C5-6 level. There is no fracture. There is no destructive lytic or sclerotic bone lesion. There is multilevel facet joint arthropathy.       Assessment:       1. Abnormal laboratory test result    2. Pulmonary fibrosis    3. Other iron deficiency anemia    4. Malignant neoplasm of urinary bladder, unspecified site    5. Colonic thickening    6. Lymphadenopathy    7. Anorexia             Plan:     Positive TIF1 Gamma Antibody - can be associated with risk for pulmonary fibrosis and for occult malignancy  -Pt with h/o non invasive bladder cancer with most recent cystoscopy 8/17/23 with MEG.  Pt to undergo repeat cystoscopy 3/21/2024  -PSA on 1/08/24 was normal at 0.49ng/mL  -CT C/A/P on 2/27/24 showed thickening of the colon and mediastinal, paraesophageal LAD  -Colonoscopy to be performed 3/04/24    Pulmonary Fibrosis - managed by Dr Arellano  -Unclear etiology but possibly related to Positive TIF1 Gamma Antibody as above    Colonic Thickening - seen on ct scan  -Colonoscopy to be done 3/04/24    Lymphadenopathy - CT chest showed right paratracheal enlarged lymph node measuring 10 mm; enlarged subcarinal lymph node measuring 14 mm; mildly enlarged lymph node adjacent to the distal esophagus  -Pt may need an EUS pending colonoscopy and cystoscopy    Iron Deficiency Anemia - pt with marked microcytosis and elevated TIBC  -Ferritin possibly normal due to inflammation  -Will have the patient undergo iron infusions  -PT to have colonoscopy on 3/04/24    Bladder Cancer -  history of noninvasive bladder cancer under the care of Dr. Lr with prior T1 disease  -Pt status post bladder installation of mitomycin-C and BCG  -Last cystoscopy on 08/17/2023 with no lesions  seen  -Pt to undergo repeat cystoscopy 3/21/2024    Positive AUGUSTA - unclear if responsible for disease  -Consider rheumatology referral if work up for malignancy negative    Anorexia - possibly due to malignancy  -Marinol prescribed    Route Chart for Scheduling    Med Onc Chart Routing      Follow up with physician 4 weeks. The patient needs labs today.  he needs an apt with me on 3/28/24.   Follow up with MAXI    Infusion scheduling note    Injection scheduling note    Labs    Imaging    Pharmacy appointment    Other referrals                     Fredi West MD  [facility]  Hematology and Oncology  [facility] [address] [place] [address]   O: [phone]  F: [phone]

## 2024-03-01 ENCOUNTER — OFFICE VISIT (OUTPATIENT)
Dept: HEMATOLOGY/ONCOLOGY | Facility: CLINIC | Age: 69
End: 2024-03-01
Payer: MEDICARE

## 2024-03-01 ENCOUNTER — LAB VISIT (OUTPATIENT)
Dept: LAB | Facility: HOSPITAL | Age: 69
End: 2024-03-01
Attending: INTERNAL MEDICINE
Payer: MEDICARE

## 2024-03-01 ENCOUNTER — TELEPHONE (OUTPATIENT)
Dept: INFUSION THERAPY | Facility: HOSPITAL | Age: 69
End: 2024-03-01
Payer: MEDICARE

## 2024-03-01 VITALS
OXYGEN SATURATION: 98 % | BODY MASS INDEX: 29.32 KG/M2 | HEART RATE: 58 BPM | TEMPERATURE: 98 F | SYSTOLIC BLOOD PRESSURE: 90 MMHG | HEIGHT: 70 IN | RESPIRATION RATE: 18 BRPM | DIASTOLIC BLOOD PRESSURE: 50 MMHG | WEIGHT: 204.81 LBS

## 2024-03-01 DIAGNOSIS — D50.8 OTHER IRON DEFICIENCY ANEMIA: ICD-10-CM

## 2024-03-01 DIAGNOSIS — J84.10 PULMONARY FIBROSIS: ICD-10-CM

## 2024-03-01 DIAGNOSIS — D64.9 NORMOCYTIC ANEMIA: ICD-10-CM

## 2024-03-01 DIAGNOSIS — C67.9 MALIGNANT NEOPLASM OF URINARY BLADDER, UNSPECIFIED SITE: ICD-10-CM

## 2024-03-01 DIAGNOSIS — K63.9 COLONIC THICKENING: ICD-10-CM

## 2024-03-01 DIAGNOSIS — R89.9 ABNORMAL LABORATORY TEST RESULT: Primary | ICD-10-CM

## 2024-03-01 DIAGNOSIS — R63.0 ANOREXIA: ICD-10-CM

## 2024-03-01 DIAGNOSIS — R59.1 LYMPHADENOPATHY: ICD-10-CM

## 2024-03-01 LAB
ABO + RH BLD: NORMAL
ALBUMIN SERPL BCP-MCNC: 3.6 G/DL (ref 3.5–5.2)
ALP SERPL-CCNC: 161 U/L (ref 55–135)
ALT SERPL W/O P-5'-P-CCNC: 32 U/L (ref 10–44)
ANION GAP SERPL CALC-SCNC: 11 MMOL/L (ref 8–16)
AST SERPL-CCNC: 38 U/L (ref 10–40)
BASOPHILS # BLD AUTO: 0.1 K/UL (ref 0–0.2)
BASOPHILS NFR BLD: 1.2 % (ref 0–1.9)
BILIRUB SERPL-MCNC: 0.8 MG/DL (ref 0.1–1)
BLD GP AB SCN CELLS X3 SERPL QL: NORMAL
BUN SERPL-MCNC: 16 MG/DL (ref 8–23)
CALCIUM SERPL-MCNC: 9.7 MG/DL (ref 8.7–10.5)
CHLORIDE SERPL-SCNC: 100 MMOL/L (ref 95–110)
CO2 SERPL-SCNC: 24 MMOL/L (ref 23–29)
CREAT SERPL-MCNC: 1.2 MG/DL (ref 0.5–1.4)
DIFFERENTIAL METHOD BLD: ABNORMAL
EOSINOPHIL # BLD AUTO: 0.3 K/UL (ref 0–0.5)
EOSINOPHIL NFR BLD: 3.8 % (ref 0–8)
ERYTHROCYTE [DISTWIDTH] IN BLOOD BY AUTOMATED COUNT: 16.9 % (ref 11.5–14.5)
EST. GFR  (NO RACE VARIABLE): >60 ML/MIN/1.73 M^2
FERRITIN SERPL-MCNC: 127 NG/ML (ref 20–300)
GLUCOSE SERPL-MCNC: 95 MG/DL (ref 70–110)
HCT VFR BLD AUTO: 30.7 % (ref 40–54)
HGB BLD-MCNC: 9.8 G/DL (ref 14–18)
IMM GRANULOCYTES # BLD AUTO: 0.02 K/UL (ref 0–0.04)
IMM GRANULOCYTES NFR BLD AUTO: 0.2 % (ref 0–0.5)
IRON SERPL-MCNC: 30 UG/DL (ref 45–160)
LYMPHOCYTES # BLD AUTO: 1 K/UL (ref 1–4.8)
LYMPHOCYTES NFR BLD: 11.6 % (ref 18–48)
MCH RBC QN AUTO: 23.8 PG (ref 27–31)
MCHC RBC AUTO-ENTMCNC: 31.9 G/DL (ref 32–36)
MCV RBC AUTO: 75 FL (ref 82–98)
MONOCYTES # BLD AUTO: 0.8 K/UL (ref 0.3–1)
MONOCYTES NFR BLD: 8.7 % (ref 4–15)
NEUTROPHILS # BLD AUTO: 6.4 K/UL (ref 1.8–7.7)
NEUTROPHILS NFR BLD: 74.5 % (ref 38–73)
NRBC BLD-RTO: 0 /100 WBC
PLATELET # BLD AUTO: 427 K/UL (ref 150–450)
PMV BLD AUTO: 9.4 FL (ref 9.2–12.9)
POTASSIUM SERPL-SCNC: 3.9 MMOL/L (ref 3.5–5.1)
PROT SERPL-MCNC: 7 G/DL (ref 6–8.4)
RBC # BLD AUTO: 4.12 M/UL (ref 4.6–6.2)
SATURATED IRON: 6 % (ref 20–50)
SODIUM SERPL-SCNC: 135 MMOL/L (ref 136–145)
SPECIMEN OUTDATE: NORMAL
TOTAL IRON BINDING CAPACITY: 509 UG/DL (ref 250–450)
TRANSFERRIN SERPL-MCNC: 344 MG/DL (ref 200–375)
WBC # BLD AUTO: 8.64 K/UL (ref 3.9–12.7)

## 2024-03-01 PROCEDURE — 99215 OFFICE O/P EST HI 40 MIN: CPT | Mod: S$PBB,,, | Performed by: INTERNAL MEDICINE

## 2024-03-01 PROCEDURE — 80053 COMPREHEN METABOLIC PANEL: CPT | Mod: PN | Performed by: INTERNAL MEDICINE

## 2024-03-01 PROCEDURE — 99215 OFFICE O/P EST HI 40 MIN: CPT | Mod: PBBFAC,25,PN | Performed by: INTERNAL MEDICINE

## 2024-03-01 PROCEDURE — 83540 ASSAY OF IRON: CPT | Performed by: INTERNAL MEDICINE

## 2024-03-01 PROCEDURE — 36415 COLL VENOUS BLD VENIPUNCTURE: CPT | Mod: PN | Performed by: INTERNAL MEDICINE

## 2024-03-01 PROCEDURE — 86901 BLOOD TYPING SEROLOGIC RH(D): CPT | Mod: PN | Performed by: INTERNAL MEDICINE

## 2024-03-01 PROCEDURE — 99999 PR PBB SHADOW E&M-EST. PATIENT-LVL V: CPT | Mod: PBBFAC,,, | Performed by: INTERNAL MEDICINE

## 2024-03-01 PROCEDURE — 82728 ASSAY OF FERRITIN: CPT | Performed by: INTERNAL MEDICINE

## 2024-03-01 PROCEDURE — 86901 BLOOD TYPING SEROLOGIC RH(D): CPT | Performed by: INTERNAL MEDICINE

## 2024-03-01 PROCEDURE — 85025 COMPLETE CBC W/AUTO DIFF WBC: CPT | Mod: PN | Performed by: INTERNAL MEDICINE

## 2024-03-01 RX ORDER — DRONABINOL 2.5 MG/1
2.5 CAPSULE ORAL
Qty: 60 CAPSULE | Refills: 3 | Status: SHIPPED | OUTPATIENT
Start: 2024-03-01

## 2024-03-01 NOTE — TELEPHONE ENCOUNTER
RD called pt with referral. Pt and RD agreed on Friday, March 8th at 2:00pm.    Cinthia Pandya, GRUPON, LDN

## 2024-03-06 ENCOUNTER — PATIENT MESSAGE (OUTPATIENT)
Dept: CARDIOLOGY | Facility: CLINIC | Age: 69
End: 2024-03-06
Payer: MEDICARE

## 2024-03-06 DIAGNOSIS — R06.02 SOB (SHORTNESS OF BREATH): Primary | ICD-10-CM

## 2024-03-07 ENCOUNTER — HOSPITAL ENCOUNTER (OUTPATIENT)
Dept: CARDIOLOGY | Facility: HOSPITAL | Age: 69
Discharge: HOME | End: 2024-03-07
Attending: INTERNAL MEDICINE
Payer: MEDICARE

## 2024-03-07 DIAGNOSIS — R06.02 SOB (SHORTNESS OF BREATH): ICD-10-CM

## 2024-03-07 PROCEDURE — 93242 EXT ECG>48HR<7D RECORDING: CPT | Mod: PO

## 2024-03-07 PROCEDURE — 93010 ELECTROCARDIOGRAM REPORT: CPT | Mod: ,,, | Performed by: INTERNAL MEDICINE

## 2024-03-07 PROCEDURE — 93005 ELECTROCARDIOGRAM TRACING: CPT | Mod: 59,PO

## 2024-03-07 PROCEDURE — 93244 EXT ECG>48HR<7D REV&INTERPJ: CPT | Mod: ,,, | Performed by: INTERNAL MEDICINE

## 2024-03-08 ENCOUNTER — TELEPHONE (OUTPATIENT)
Dept: INFUSION THERAPY | Facility: HOSPITAL | Age: 69
End: 2024-03-08
Payer: MEDICARE

## 2024-03-08 ENCOUNTER — TELEPHONE (OUTPATIENT)
Dept: HEMATOLOGY/ONCOLOGY | Facility: CLINIC | Age: 69
End: 2024-03-08
Payer: MEDICARE

## 2024-03-08 ENCOUNTER — PATIENT MESSAGE (OUTPATIENT)
Dept: HEMATOLOGY/ONCOLOGY | Facility: CLINIC | Age: 69
End: 2024-03-08
Payer: MEDICARE

## 2024-03-08 DIAGNOSIS — R89.9 ABNORMAL LABORATORY TEST RESULT: Primary | ICD-10-CM

## 2024-03-08 DIAGNOSIS — D50.8 OTHER IRON DEFICIENCY ANEMIA: ICD-10-CM

## 2024-03-08 DIAGNOSIS — R63.4 WEIGHT LOSS: ICD-10-CM

## 2024-03-08 PROBLEM — D50.9 IRON DEFICIENCY ANEMIA: Status: ACTIVE | Noted: 2024-03-08

## 2024-03-08 LAB
OHS QRS DURATION: 84 MS
OHS QTC CALCULATION: 477 MS

## 2024-03-08 NOTE — TELEPHONE ENCOUNTER
----- Message from Fátima Augustine sent at 3/8/2024  1:28 PM CST -----  Type: Need Medical Advice   Who Called: patient   Best callback number: [phone]   Additional Information: patient called to ask if the dispute about the medication has been resolved  Please call to further assist, Thanks.

## 2024-03-08 NOTE — TELEPHONE ENCOUNTER
RD called pt per missed appt. Pt needs to reschedule. RD provided pt with contact information and will call back with a new date.    Cinthia Pandya, GRUPON, LDN

## 2024-03-12 ENCOUNTER — PATIENT MESSAGE (OUTPATIENT)
Dept: INFUSION THERAPY | Facility: HOSPITAL | Age: 69
End: 2024-03-12
Payer: MEDICARE

## 2024-03-15 ENCOUNTER — TELEPHONE (OUTPATIENT)
Dept: INFUSION THERAPY | Facility: HOSPITAL | Age: 69
End: 2024-03-15
Payer: MEDICARE

## 2024-03-15 NOTE — TELEPHONE ENCOUNTER
RD called pt to reschedule nutrition appt. Pt agreed on and new time and date.     Cinthia Pandya, GRUPON, LDN

## 2024-03-21 ENCOUNTER — PROCEDURE VISIT (OUTPATIENT)
Dept: UROLOGY | Facility: CLINIC | Age: 69
End: 2024-03-21
Payer: MEDICARE

## 2024-03-21 VITALS — BODY MASS INDEX: 29.2 KG/M2 | WEIGHT: 203.94 LBS | HEIGHT: 70 IN

## 2024-03-21 DIAGNOSIS — C67.2 MALIGNANT NEOPLASM OF LATERAL WALL OF URINARY BLADDER: Primary | ICD-10-CM

## 2024-03-21 LAB
BILIRUBIN, UA POC OHS: NEGATIVE
BLOOD, UA POC OHS: NEGATIVE
CLARITY, UA POC OHS: CLEAR
COLOR, UA POC OHS: YELLOW
GLUCOSE, UA POC OHS: NEGATIVE
KETONES, UA POC OHS: NEGATIVE
LEUKOCYTES, UA POC OHS: NEGATIVE
NITRITE, UA POC OHS: NEGATIVE
PH, UA POC OHS: 6
PROTEIN, UA POC OHS: ABNORMAL
SPECIFIC GRAVITY, UA POC OHS: 1.02
UROBILINOGEN, UA POC OHS: 0.2

## 2024-03-21 PROCEDURE — 81003 URINALYSIS AUTO W/O SCOPE: CPT | Mod: PBBFAC,PO | Performed by: UROLOGY

## 2024-03-21 PROCEDURE — 99999PBSHW POCT URINALYSIS(INSTRUMENT): Mod: PBBFAC,,,

## 2024-03-21 PROCEDURE — 52000 CYSTOURETHROSCOPY: CPT | Mod: PBBFAC,PO | Performed by: UROLOGY

## 2024-03-21 NOTE — PROCEDURES
Cystoscopy    Date/Time: 3/21/2024 10:00 AM    Performed by: ROSA Lr MD  Authorized by: ROSA Lr MD    Consent Done?:  Yes (Written)  Timeout: prior to procedure the correct patient, procedure, and site was verified    Prep: patient was prepped and draped in usual sterile fashion    Anesthesia:  Lidocaine jelly  Indications: history bladder cancer    Position:  Supine  Anesthesia:  Lidocaine jelly  Patient sedated?: No    Preparation: Patient was prepped and draped in usual sterile fashion    Scope type:  Flexible cystoscope   patient tolerated the procedure well with no immediate complications    Blood Loss:  None     68-year-old with a history of T1 urothelial cancer.  He underwent TURBT with instillation of mitomycin-C in 08/2020. He has a recurrence and underwent repeat TURBT 03/2022.  Pepeat path again shows low-grade noninvasive urothelial carcinoma.  He then completed an induction course of BCG.  He is here for cystoscopy        The flexible cystoscope was placed into the urethra and carefully advanced into the bladder.  A careful cystoscopic exam was then performed.  The entire bladder mucosa was systematically visualized.  Findings include moderate bladder wall trabeculation.  There were no lesions, masses foreign bodies or stones.   TUR scar was evident on the right lateral wall.  The right ureteral orifice was deviated laterally.  Both ureteral orifices were visualized and both had clear efflux of urine.  On retroflexion there was a small intravesical gland.  The cystoscope was then removed and I examined the entire length of the urethra.  There was moderate bilobar enlargement of the prostate.  In the anterior urethra there was multiple areas of narrowing which were not thought to be clinically significant.  He tolerated the procedure well.  There were no complications.     Impression:  No evidence of recurrence     Plan:  Follow-up 9 months for cystoscopy

## 2024-03-22 ENCOUNTER — TELEPHONE (OUTPATIENT)
Dept: HEMATOLOGY/ONCOLOGY | Facility: CLINIC | Age: 69
End: 2024-03-22
Payer: MEDICARE

## 2024-03-22 ENCOUNTER — PATIENT MESSAGE (OUTPATIENT)
Dept: HEMATOLOGY/ONCOLOGY | Facility: CLINIC | Age: 69
End: 2024-03-22
Payer: MEDICARE

## 2024-03-22 ENCOUNTER — HOSPITAL ENCOUNTER (OUTPATIENT)
Dept: RADIOLOGY | Facility: HOSPITAL | Age: 69
Discharge: HOME | End: 2024-03-22
Attending: INTERNAL MEDICINE
Payer: MEDICARE

## 2024-03-22 DIAGNOSIS — R63.4 WEIGHT LOSS: ICD-10-CM

## 2024-03-22 DIAGNOSIS — R59.1 LYMPHADENOPATHY: Primary | ICD-10-CM

## 2024-03-22 DIAGNOSIS — R89.9 ABNORMAL LABORATORY TEST RESULT: ICD-10-CM

## 2024-03-22 LAB — GLUCOSE SERPL-MCNC: 101 MG/DL (ref 70–110)

## 2024-03-22 PROCEDURE — A9552 F18 FDG: HCPCS | Mod: PN | Performed by: INTERNAL MEDICINE

## 2024-03-22 PROCEDURE — 78815 PET IMAGE W/CT SKULL-THIGH: CPT | Mod: 26,PI,, | Performed by: RADIOLOGY

## 2024-03-22 PROCEDURE — 78815 PET IMAGE W/CT SKULL-THIGH: CPT | Mod: TC,PI,PN

## 2024-03-22 RX ORDER — FLUDEOXYGLUCOSE F18 500 MCI/ML
12 INJECTION INTRAVENOUS
Status: COMPLETED | OUTPATIENT
Start: 2024-03-22 | End: 2024-03-22

## 2024-03-22 RX ADMIN — FLUDEOXYGLUCOSE F-18 11.9 MILLICURIE: 500 INJECTION INTRAVENOUS at 09:03

## 2024-03-22 NOTE — PROGRESS NOTES
PET Imaging Questionnaire    Are you a Diabetic? Recent Blood Sugar level? No    Are you anemic? Bone Marrow Stimulation Meds? Yes    Have you had a CT Scan, if so when & where was your last one? Yes -     Have you had a PET Scan, if so when & where was your last one? No    Chemotherapy or currently on Chemotherapy? No    Radiation therapy? No    Surgical History:   Past Surgical History:   Procedure Laterality Date    ANGIOGRAM, CORONARY, WITH LEFT HEART CATHETERIZATION N/A 1/31/2023    Procedure: Left Heart Cath;  Surgeon: Nitin Wheat MD;  Location: [facility] CATH;  Service: Cardiology;  Laterality: N/A;    APPENDECTOMY      CARPAL TUNNEL RELEASE      CERVICAL FUSION      CHOLECYSTECTOMY      CIRCUMCISION      COLONOSCOPY N/A 3/16/2020    Procedure: COLONOSCOPY;  Surgeon: Braulio Best MD;  Location: [facility];  Service: Endoscopy;  Laterality: N/A; 3 colon polyps removed; biopsy: tubular adenoma and hyperplastic; repeat in 5 years for surveillance    COLONOSCOPY N/A 3/4/2024    Procedure: COLONOSCOPY;  Surgeon: Braulio Best MD;  Location: [facility];  Service: Gastroenterology;  Laterality: N/A;    CORONARY ANGIOGRAPHY  2/13/2023    Procedure: ANGIOGRAM, CORONARY ARTERY;  Surgeon: Nitin Wheat MD;  Location: [facility] CATH;  Service: Cardiology;;    CORONARY ANGIOPLASTY WITH STENT PLACEMENT N/A 1/31/2023    Procedure: ANGIOPLASTY, CORONARY ARTERY, WITH STENT INSERTION;  Surgeon: Nitin Wheat MD;  Location: [facility] CATH;  Service: Cardiology;  Laterality: N/A;    CYSTOSCOPY W/ RETROGRADES Bilateral 8/24/2020    Procedure: CYSTOSCOPY, WITH RETROGRADE PYELOGRAM -WITH URETHRAL DILATION;  Surgeon: rGeg Claudio MD;  Location: [facility];  Service: Urology;  Laterality: Bilateral;    CYSTOSCOPY W/ URETERAL STENT PLACEMENT Right 8/24/2020    Procedure: CYSTOSCOPY, WITH URETERAL STENT INSERTION;  Surgeon: Greg Claudio MD;  Location: [facility] OR;  Service: Urology;  Laterality: Right;    INSTILLATION OF URINARY BLADDER  N/A 8/24/2020    Procedure: INSTILLATION, BLADDER - Mitomycin;  Surgeon: Greg Claudio MD;  Location: [facility] OR;  Service: Urology;  Laterality: N/A;    IVUS, CORONARY  1/31/2023    Procedure: IVUS, Coronary;  Surgeon: Nitin Wheat MD;  Location: [facility] CATH;  Service: Cardiology;;    IVUS, CORONARY  2/13/2023    Procedure: IVUS, Coronary;  Surgeon: Nitin Wheat MD;  Location: [facility] CATH;  Service: Cardiology;;    PERCUTANEOUS CORONARY INTERVENTION, ARTERY  2/13/2023    Procedure: Percutaneous coronary intervention;  Surgeon: Nitin Wheat MD;  Location: [facility] CATH;  Service: Cardiology;;    TRANSFORAMINAL EPIDURAL INJECTION OF STEROID Right 7/17/2019    Procedure: Injection,steroid,epidural,transforaminal approach L3-4, L4-5;  Surgeon: Chris Palencia MD;  Location: [facility] OR;  Service: Pain Management;  Laterality: Right;    TRANSFORAMINAL EPIDURAL INJECTION OF STEROID Right 8/28/2019    Procedure: Injection,steroid,epidural,transforaminal approach;  Surgeon: Chris Palencia MD;  Location: [facility] OR;  Service: Pain Management;  Laterality: Right;  L3-4, L4-L5    WISDOM TOOTH EXTRACTION          Have you been fasting for at least 6 hours? Yes    Is there any chance you may be pregnant or breastfeeding? No    Assay: 12.79 MCi@:9:09   Injection Site:RT AC    Residual: .903 mCi@: 9:11   Technologist: Edmar Perdomo Injected:11.9 mCi

## 2024-03-22 NOTE — TELEPHONE ENCOUNTER
----- Message from Edith Nuñez sent at 3/22/2024  4:59 PM CDT -----  Type: Needs Medical Advice  Who Called:  Patient   Symptoms (please be specific):    How long has patient had these symptoms:    Pharmacy name and phone #:    Best Call Back Number: [phone]  Additional Information: Patient is requesting a call back regarding his PET results.

## 2024-03-23 NOTE — TELEPHONE ENCOUNTER
I called the patient and we discussed his recent PET-CT on 03/22/2024.  We discussed the mediastinal and paraesophageal lymphadenopathy as well as the uptake in the prostate.  I instructed him that the uptake in the prostate could be due to his recent cystoscopy and that his recent normal PSA was reassuring.  I instructed him that the best approach would likely be an EUS as this could survey his esophagus stomach as well as biopsy the paraesophageal lymph node.  I instructed him I would reach out-to Dr. Ochoa.  The patient expressed understanding.  All questions were answered to his satisfaction.    Fredi West MD  [facility]  Hematology and Oncology  [facility]   [address] [place] [address]   O: [phone]  F: [phone]

## 2024-03-25 ENCOUNTER — DOCUMENTATION ONLY (OUTPATIENT)
Dept: HEMATOLOGY/ONCOLOGY | Facility: CLINIC | Age: 69
End: 2024-03-25
Payer: MEDICARE

## 2024-03-25 NOTE — NURSING
Chart review. Sent face sheet, referral, last note to An at Dr. Ochoa's  office for scheduling.  An emailed 3/25 at 8:57 am stating she is working on scheduling the pt. Will continue to follow.

## 2024-03-28 ENCOUNTER — DOCUMENTATION ONLY (OUTPATIENT)
Dept: HEMATOLOGY/ONCOLOGY | Facility: CLINIC | Age: 69
End: 2024-03-28
Payer: MEDICARE

## 2024-03-28 LAB
OHS CV HOLTER SINUS AVERAGE HR: 86
OHS CV HOLTER SINUS MAX HR: 133
OHS CV HOLTER SINUS MIN HR: 63

## 2024-03-28 NOTE — NURSING
Chart review. Followed up on appt with Dr. Ochoa, pt is scheduled for EGD with Dr. Ochoa 5/8. Will continue to follow.

## 2024-03-28 NOTE — PROGRESS NOTES
The perceived low HR was related to very frequent PVCs. Please schedule stress test, TTE (ordered). Start Toprol 25 mg QD AFTER the stress test (also ordered)./

## 2024-04-02 ENCOUNTER — NUTRITION (OUTPATIENT)
Dept: INFUSION THERAPY | Facility: HOSPITAL | Age: 69
End: 2024-04-02
Attending: INTERNAL MEDICINE
Payer: MEDICARE

## 2024-04-02 VITALS
SYSTOLIC BLOOD PRESSURE: 128 MMHG | BODY MASS INDEX: 29.7 KG/M2 | OXYGEN SATURATION: 98 % | HEART RATE: 72 BPM | DIASTOLIC BLOOD PRESSURE: 68 MMHG | RESPIRATION RATE: 16 BRPM | WEIGHT: 207.44 LBS | HEIGHT: 70 IN | TEMPERATURE: 98 F

## 2024-04-02 DIAGNOSIS — R63.0 ANOREXIA: ICD-10-CM

## 2024-04-02 DIAGNOSIS — D50.9 IRON DEFICIENCY ANEMIA, UNSPECIFIED IRON DEFICIENCY ANEMIA TYPE: Primary | ICD-10-CM

## 2024-04-02 DIAGNOSIS — R63.0 ANOREXIA: Primary | ICD-10-CM

## 2024-04-02 PROCEDURE — 25000003 PHARM REV CODE 250: Mod: PN | Performed by: STUDENT IN AN ORGANIZED HEALTH CARE EDUCATION/TRAINING PROGRAM

## 2024-04-02 PROCEDURE — 96365 THER/PROPH/DIAG IV INF INIT: CPT

## 2024-04-02 PROCEDURE — 63600175 PHARM REV CODE 636 W HCPCS: Mod: JZ,JG,PN | Performed by: STUDENT IN AN ORGANIZED HEALTH CARE EDUCATION/TRAINING PROGRAM

## 2024-04-02 RX ORDER — DIPHENHYDRAMINE HYDROCHLORIDE 50 MG/ML
50 INJECTION INTRAMUSCULAR; INTRAVENOUS ONCE AS NEEDED
Status: CANCELLED | OUTPATIENT
Start: 2024-04-02

## 2024-04-02 RX ORDER — EPINEPHRINE 0.3 MG/.3ML
0.3 INJECTION SUBCUTANEOUS ONCE AS NEEDED
Status: CANCELLED | OUTPATIENT
Start: 2024-04-02

## 2024-04-02 RX ORDER — DIPHENHYDRAMINE HYDROCHLORIDE 50 MG/ML
50 INJECTION INTRAMUSCULAR; INTRAVENOUS ONCE AS NEEDED
Status: DISCONTINUED | OUTPATIENT
Start: 2024-04-02 | End: 2024-04-02 | Stop reason: HOSPADM

## 2024-04-02 RX ORDER — DIPHENHYDRAMINE HYDROCHLORIDE 50 MG/ML
50 INJECTION INTRAMUSCULAR; INTRAVENOUS ONCE AS NEEDED
Status: CANCELLED | OUTPATIENT
Start: 2024-04-03

## 2024-04-02 RX ORDER — SODIUM CHLORIDE 0.9 % (FLUSH) 0.9 %
10 SYRINGE (ML) INJECTION
Status: CANCELLED | OUTPATIENT
Start: 2024-04-03

## 2024-04-02 RX ORDER — SODIUM CHLORIDE 0.9 % (FLUSH) 0.9 %
10 SYRINGE (ML) INJECTION
Status: CANCELLED | OUTPATIENT
Start: 2024-04-02

## 2024-04-02 RX ORDER — CYPROHEPTADINE HYDROCHLORIDE 4 MG/1
4 TABLET ORAL 3 TIMES DAILY PRN
Qty: 90 TABLET | Refills: 0 | Status: SHIPPED | OUTPATIENT
Start: 2024-04-02 | End: 2024-04-29

## 2024-04-02 RX ORDER — PANTOPRAZOLE SODIUM 40 MG/1
40 TABLET, DELAYED RELEASE ORAL
Qty: 90 TABLET | Refills: 3 | Status: SHIPPED | OUTPATIENT
Start: 2024-04-02

## 2024-04-02 RX ORDER — EPINEPHRINE 0.3 MG/.3ML
0.3 INJECTION SUBCUTANEOUS ONCE AS NEEDED
Status: CANCELLED | OUTPATIENT
Start: 2024-04-03

## 2024-04-02 RX ORDER — EPINEPHRINE 0.3 MG/.3ML
0.3 INJECTION SUBCUTANEOUS ONCE AS NEEDED
Status: DISCONTINUED | OUTPATIENT
Start: 2024-04-02 | End: 2024-04-02 | Stop reason: HOSPADM

## 2024-04-02 RX ORDER — HEPARIN 100 UNIT/ML
500 SYRINGE INTRAVENOUS
Status: CANCELLED | OUTPATIENT
Start: 2024-04-02

## 2024-04-02 RX ORDER — HEPARIN 100 UNIT/ML
500 SYRINGE INTRAVENOUS
Status: CANCELLED | OUTPATIENT
Start: 2024-04-03

## 2024-04-02 RX ADMIN — FERRIC CARBOXYMALTOSE INJECTION 750 MG: 50 INJECTION, SOLUTION INTRAVENOUS at 01:04

## 2024-04-02 RX ADMIN — SODIUM CHLORIDE: 9 INJECTION, SOLUTION INTRAVENOUS at 01:04

## 2024-04-02 NOTE — NURSING
Patient tolerated infusion, vital signs stable, no apparent distress at this time. Patient ambulatory upon discharge.

## 2024-04-03 ENCOUNTER — OFFICE VISIT (OUTPATIENT)
Dept: NEUROLOGY | Facility: CLINIC | Age: 69
End: 2024-04-03
Payer: MEDICARE

## 2024-04-03 VITALS — SYSTOLIC BLOOD PRESSURE: 142 MMHG | DIASTOLIC BLOOD PRESSURE: 67 MMHG | HEART RATE: 70 BPM

## 2024-04-03 DIAGNOSIS — G25.81 RLS (RESTLESS LEGS SYNDROME): ICD-10-CM

## 2024-04-03 DIAGNOSIS — G25.81 RESTLESS LEGS SYNDROME (RLS): Primary | ICD-10-CM

## 2024-04-03 PROCEDURE — 99212 OFFICE O/P EST SF 10 MIN: CPT | Mod: PBBFAC

## 2024-04-03 PROCEDURE — 99999 PR PBB SHADOW E&M-EST. PATIENT-LVL II: CPT | Mod: PBBFAC,GC,,

## 2024-04-03 PROCEDURE — 99205 OFFICE O/P NEW HI 60 MIN: CPT | Mod: S$PBB,GC,, | Performed by: STUDENT IN AN ORGANIZED HEALTH CARE EDUCATION/TRAINING PROGRAM

## 2024-04-03 RX ORDER — PREGABALIN 50 MG/1
50 CAPSULE ORAL NIGHTLY
Qty: 30 CAPSULE | Refills: 2 | Status: SHIPPED | OUTPATIENT
Start: 2024-04-03 | End: 2024-05-17

## 2024-04-03 RX ORDER — PRAMIPEXOLE DIHYDROCHLORIDE 1 MG/1
1 TABLET ORAL 2 TIMES DAILY
Start: 2024-04-03 | End: 2024-04-24

## 2024-04-03 NOTE — PATIENT INSTRUCTIONS
Decrease Mirapex to 1mg twice a day  Continue with your iron infusions  Check labs  Start taking lyrica (pregabalin) only at night time  Start taking magnesium glycinate and ensure that it is at least 400mg - you can start at 200mg and increase to 400mg to avoid any diarrhea. Brands are nature made or NOW

## 2024-04-03 NOTE — PROGRESS NOTES
"[facility] - NEUROLOGY [address] [facility], [place]    Date: 4/3/24  Patient Name: Moustapha Murray   MRN: [id number]   PCP: Colton Johnson  Referring Provider: Other    Assessment:   Moustapha Murray is a 69 y.o. male presenting for evaluation of RLS. Diagnosis seems consistent w/ RLS. No signs concerning for Parkinsonism on exam. Tried and failed many of the first line medications over the years. Possibly experiencing augmentation from the high doses of the mirapex. Likely that iron deficiency is a significant contributor at this point. For that he is getting iron infusions and workup for his anemia w/ upcoming EGD.   Could also have a paraneoplastic antibody given cancer history.  Has been taking magnesium but supplement seems to be at lower doses.    Plan:     Problem List Items Addressed This Visit          Neuro    Restless legs syndrome (RLS) - Primary    Relevant Medications    pregabalin (LYRICA) 50 MG capsule    Other Relevant Orders    Paraneoplastic Autoantibody Evaulation, Serum    PROTEIN ELECTROPHORESIS, SERUM    COPPER, SERUM    CERULOPLASMIN    RLS (restless legs syndrome)    Relevant Medications    pramipexole (MIRAPEX) 1 MG tablet     Decrease Mirapex to 1mg twice a day  Continue with your iron infusions  Check labs as above  Start taking lyrica (pregabalin) only at night time - starting at 50mg and can increasae  Start taking magnesium glycinate and ensure that it is at least 400mg  RTC in 6 weeks    Kade Prather MD      Subjective:        HPI:   Mr. Moustapha Murray is a 69 y.o. male presenting for evaluation of RLS    Feels like legs have "add" and always need to move  No tingling, not much pain, sometimes sensitive to touch  If up walking around gets better  Present all the time but worse at night  Hasn't sleep all night in many years- not sure if from legs or overactive mind  Temporary relief from moving/taking aleeve     Dx w/ RLS " over 25 years ago- used to take requip (ropinerole) - wasn't working well- changed to mirapex in 2014 - was controlled on 0.5g mirapex from 2014 to 2021 when his dose was increased to 1mg   12/2023 was having worsening symptoms and was changed from mirapex to gabapentin - gabapentin made him too sleepy and messed up sleep schedule so he switched back to mirapex - was increased to 1.5mg BID and referred to neurology    Had fall while on gabapentin    Takes magnesium BID     Can't tell much difference    Wears compression stockings- helps w/ long car rides    Has BELLE- sees hematology- started on iron infusions yesterday    Lost 30lbs last 3 months- unknown why- ordered appetite stimulants    +TIF1 gamma - unclear from what    PAST MEDICAL HISTORY:    Pulmonary fibrosis/ILD  TASIA  Past Medical History:   Diagnosis Date    Anticoagulant long-term use     Bladder cancer 08/2020    Colon polyps     Coronary artery disease 01/31/2023    CORONARY STENTS    DDD (degenerative disc disease), lumbar     Depression     Fatty liver     GERD (gastroesophageal reflux disease)     H/O motion sickness     Hypertension     PONV (postoperative nausea and vomiting)     Pulmonary fibrosis     RLS (restless legs syndrome)     Sleep apnea, unspecified     uses cpap       PAST SURGICAL HISTORY:  Past Surgical History:   Procedure Laterality Date    ANGIOGRAM, CORONARY, WITH LEFT HEART CATHETERIZATION N/A 1/31/2023    Procedure: Left Heart Cath;  Surgeon: Nitin Wheat MD;  Location: [facility] CATH;  Service: Cardiology;  Laterality: N/A;    APPENDECTOMY      CARPAL TUNNEL RELEASE      CERVICAL FUSION      CHOLECYSTECTOMY      CIRCUMCISION      COLONOSCOPY N/A 3/16/2020    Procedure: COLONOSCOPY;  Surgeon: Braulio Best MD;  Location: [facility];  Service: Endoscopy;  Laterality: N/A; 3 colon polyps removed; biopsy: tubular adenoma and hyperplastic; repeat in 5 years for surveillance    COLONOSCOPY N/A 3/4/2024    Procedure: COLONOSCOPY;   Surgeon: Braulio Best MD;  Location: [facility] ENDO;  Service: Gastroenterology;  Laterality: N/A;    CORONARY ANGIOGRAPHY  2/13/2023    Procedure: ANGIOGRAM, CORONARY ARTERY;  Surgeon: Nitin Wheat MD;  Location: [facility] CATH;  Service: Cardiology;;    CORONARY ANGIOPLASTY WITH STENT PLACEMENT N/A 1/31/2023    Procedure: ANGIOPLASTY, CORONARY ARTERY, WITH STENT INSERTION;  Surgeon: Nitin Wheat MD;  Location: [facility] CATH;  Service: Cardiology;  Laterality: N/A;    CYSTOSCOPY W/ RETROGRADES Bilateral 8/24/2020    Procedure: CYSTOSCOPY, WITH RETROGRADE PYELOGRAM -WITH URETHRAL DILATION;  Surgeon: Greg Claudio MD;  Location: [facility] OR;  Service: Urology;  Laterality: Bilateral;    CYSTOSCOPY W/ URETERAL STENT PLACEMENT Right 8/24/2020    Procedure: CYSTOSCOPY, WITH URETERAL STENT INSERTION;  Surgeon: Greg Claudio MD;  Location: [facility] OR;  Service: Urology;  Laterality: Right;    INSTILLATION OF URINARY BLADDER N/A 8/24/2020    Procedure: INSTILLATION, BLADDER - Mitomycin;  Surgeon: Greg Claudio MD;  Location: [facility] OR;  Service: Urology;  Laterality: N/A;    IVUS, CORONARY  1/31/2023    Procedure: IVUS, Coronary;  Surgeon: Nitin Wheat MD;  Location: [facility] CATH;  Service: Cardiology;;    IVUS, CORONARY  2/13/2023    Procedure: IVUS, Coronary;  Surgeon: Nitin Wheat MD;  Location: [facility] CATH;  Service: Cardiology;;    PERCUTANEOUS CORONARY INTERVENTION, ARTERY  2/13/2023    Procedure: Percutaneous coronary intervention;  Surgeon: Nitin Wheat MD;  Location: [facility] CATH;  Service: Cardiology;;    TRANSFORAMINAL EPIDURAL INJECTION OF STEROID Right 7/17/2019    Procedure: Injection,steroid,epidural,transforaminal approach L3-4, L4-5;  Surgeon: Chris Palencia MD;  Location: [facility] OR;  Service: Pain Management;  Laterality: Right;    TRANSFORAMINAL EPIDURAL INJECTION OF STEROID Right 8/28/2019    Procedure: Injection,steroid,epidural,transforaminal approach;  Surgeon: Chris Palencia MD;  Location: [facility] OR;  Service: Pain  Management;  Laterality: Right;  L3-4, L4-L5    WISDOM TOOTH EXTRACTION         CURRENT MEDS:  Current Outpatient Medications   Medication Sig Dispense Refill    albuterol (PROVENTIL/VENTOLIN HFA) 90 mcg/actuation inhaler Inhale 2 puffs into the lungs every 6 (six) hours as needed for Wheezing. Rescue 18 g 6    aspirin 81 MG Chew Take 1 tablet (81 mg total) by mouth once daily. 90 tablet 3    budesonide (PULMICORT) 0.5 mg/2 mL nebulizer solution Take 2 mLs (0.5 mg total) by nebulization 2 (two) times daily. Controller 120 mL 3    clopidogreL (PLAVIX) 75 mg tablet TAKE 1 TABLET BY MOUTH EVERY DAY 90 tablet 3    cyproheptadine (PERIACTIN) 4 mg tablet Take 1 tablet (4 mg total) by mouth 3 (three) times daily as needed. 90 tablet 0    droNABinol (MARINOL) 2.5 MG capsule Take 1 capsule (2.5 mg total) by mouth 2 (two) times daily before meals. 60 capsule 3    EScitalopram oxalate (LEXAPRO) 10 MG tablet Take 1 tablet (10 mg total) by mouth once daily. 30 tablet 11    fluticasone propionate (FLONASE) 50 mcg/actuation nasal spray 1 spray (50 mcg total) by Each Nostril route 2 (two) times a day. 16 g 11    formoterol (PERFOROMIST) 20 mcg/2 mL Nebu Take 2 mLs (20 mcg total) by nebulization 2 (two) times a day. Controller 90 mL 3    hydroCHLOROthiazide (HYDRODIURIL) 12.5 MG Tab TAKE 1 TABLET BY MOUTH EVERY DAY 90 tablet 1    ibuprofen (ADVIL,MOTRIN) 600 MG tablet Take 1 tablet (600 mg total) by mouth every 6 (six) hours as needed for Pain. 20 tablet 0    Lactobac no.41/Bifidobact no.7 (PROBIOTIC-10 ORAL) Take 1 capsule by mouth once daily.      melatonin 5 mg Cap Take 5 mg by mouth nightly as needed. TAKE AS SCHEDULED      methocarbamoL (ROBAXIN) 500 MG Tab 500 mg daily as needed.      metoprolol succinate (TOPROL-XL) 25 MG 24 hr tablet Take 1 tablet (25 mg total) by mouth once daily. 30 tablet 11    multivitamin (THERAGRAN) per tablet Take 1 tablet by mouth once daily.       pantoprazole (PROTONIX) 40 MG tablet TAKE 1 TABLET  BY MOUTH EVERY DAY 90 tablet 3    rosuvastatin (CRESTOR) 40 MG Tab Take 1 tablet (40 mg total) by mouth once daily. 90 tablet 3    scopolamine (TRANSDERM-SCOP) 1.3-1.5 mg (1 mg over 3 days) Place 1 patch onto the skin every 72 hours as needed. As needed      telmisartan (MICARDIS) 40 MG Tab TAKE 1 TABLET BY MOUTH EVERY DAY 90 tablet 2    triamcinolone acetonide 0.1% (KENALOG) 0.1 % cream APPLY TOPICALLY 2 TIMES DAILY AS NEEDED. 45 g 5    pramipexole (MIRAPEX) 1 MG tablet Take 1 tablet (1 mg total) by mouth 2 (two) times a day.      pregabalin (LYRICA) 50 MG capsule Take 1 capsule (50 mg total) by mouth every evening. 30 capsule 2     Current Facility-Administered Medications   Medication Dose Route Frequency Provider Last Rate Last Admin    sodium chloride 0.9% flush 10 mL  10 mL Intravenous PRN Nitin Wheat MD         Facility-Administered Medications Ordered in Other Visits   Medication Dose Route Frequency Provider Last Rate Last Admin    lactated ringers infusion   Intravenous Continuous Braulio Best MD 75 mL/hr at 03/04/24 1104 New Bag at 03/04/24 1104    ondansetron injection 4 mg  4 mg Intravenous Daily PRN Gustavo Chu MD        sodium chloride 0.9% flush 10 mL  10 mL Intravenous PRN Braulio Best MD           ALLERGIES:  Review of patient's allergies indicates:   Allergen Reactions    Sulfa (sulfonamide antibiotics) Anaphylaxis       FAMILY HISTORY:  Family History   Problem Relation Age of Onset    Stroke Mother     Ulcerative colitis Mother     Kidney cancer Mother     Cancer Neg Hx     Colon cancer Neg Hx     Crohn's disease Neg Hx     Celiac disease Neg Hx        SOCIAL HISTORY:  Social History     Tobacco Use    Smoking status: Former     Current packs/day: 0.00     Types: Cigarettes     Quit date: 10/16/2013     Years since quitting: 10.4    Smokeless tobacco: Never   Substance Use Topics    Alcohol use: Yes     Alcohol/week: 2.0 standard drinks of alcohol     Types: 2 Cans of  beer per week    Drug use: Not Currently       Review of Systems:  12 system review of systems is negative except for the symptoms mentioned in HPI.      Objective:     Vitals:    04/03/24 1417   BP: (!) 142/67   Pulse: 70     General: NAD, well-appearing  Eyes: no discharge or erythema   ENT: mucous membranes moist  Neck: Supple, full range of motion  Lungs: Normal work of breathing, not in respiratory distress  Skin: No rash or lesions  Psychiatry: Mood and affect are appropriate   Abdomen: flat, non-distended  Extremeties: No cyanosis or edema.    Neurological Exam:  MENTAL STATUS: Alert and oriented to person, place, and time. Attention and concentration within normal limits. Speech without dysarthria, able to name and repeat without difficulty. Recent and remote memory within normal limits.   CRANIAL NERVES: Visual fields intact. PERRL. EOMI. Facial sensation intact. Facial expression symmetrical. Hearing grossly intact. Full shoulder shrug bilaterally.   SENSORY: Sensation is intact to light touch throughout. Negative Romberg.  MOTOR: Normal bulk and tone. 5/5 deltoid, biceps, triceps, interosseous, hand  bilaterally. 5/5 hip flexion/extension, knee extension/flexion, and dorsi-/plantarflexion bilaterally.    REFLEXES: Symmetric and 2+ throughout.  CEREBELLAR/COORDINATION/GAIT: Gait steady with normal arm swing and stride length. Finger to nose intact.    Labs/Imaging:  Lab Results   Component Value Date    IRON 30 (L) 03/01/2024    TRANSFERRIN 344 03/01/2024    TIBC 509 (H) 03/01/2024    FESATURATED 6 (L) 03/01/2024      Lab Results   Component Value Date    FERRITIN 127 03/01/2024

## 2024-04-03 NOTE — PROGRESS NOTES
Late entry, RD visit on 04.02.24  ONCOLOGY NUTRITION  INITIAL VISIT        Moustapha Murray is a 69 y.o. male.    DATE: 04/03/2024     Oncology Diagnosis: Malignant Neoplasm of urinary bladder    REFERRAL FROM:   [] Integrative Oncology   [x] Med/Heme Oncology  [] Radiation Oncology  [] Surgical Oncology     [] Routine Nutrition follow up    TREATMENT PLAN:   [] Full treatment plan pending  [] Chemotherapy  [] Immunotherapy  [] Radiation  [] Concurrent  [] Surgery  [x] Treatment complete/post-treatment    PAST MEDICAL HISTORY:  Past Medical History:   Diagnosis Date    Anticoagulant long-term use     Bladder cancer 08/2020    Colon polyps     Coronary artery disease 01/31/2023    CORONARY STENTS    DDD (degenerative disc disease), lumbar     Depression     Fatty liver     GERD (gastroesophageal reflux disease)     H/O motion sickness     Hypertension     PONV (postoperative nausea and vomiting)     Pulmonary fibrosis     RLS (restless legs syndrome)     Sleep apnea, unspecified     uses cpap     Past Surgical History:   Procedure Laterality Date    ANGIOGRAM, CORONARY, WITH LEFT HEART CATHETERIZATION N/A 1/31/2023    Procedure: Left Heart Cath;  Surgeon: Nitin Wheat MD;  Location: [facility] CATH;  Service: Cardiology;  Laterality: N/A;    APPENDECTOMY      CARPAL TUNNEL RELEASE      CERVICAL FUSION      CHOLECYSTECTOMY      CIRCUMCISION      COLONOSCOPY N/A 3/16/2020    Procedure: COLONOSCOPY;  Surgeon: Braulio Best MD;  Location: [facility];  Service: Endoscopy;  Laterality: N/A; 3 colon polyps removed; biopsy: tubular adenoma and hyperplastic; repeat in 5 years for surveillance    COLONOSCOPY N/A 3/4/2024    Procedure: COLONOSCOPY;  Surgeon: Braulio Best MD;  Location: [facility] ENDO;  Service: Gastroenterology;  Laterality: N/A;    CORONARY ANGIOGRAPHY  2/13/2023    Procedure: ANGIOGRAM, CORONARY ARTERY;  Surgeon: Nitin Wheat MD;  Location: [facility] CATH;  Service: Cardiology;;    CORONARY ANGIOPLASTY WITH  STENT PLACEMENT N/A 1/31/2023    Procedure: ANGIOPLASTY, CORONARY ARTERY, WITH STENT INSERTION;  Surgeon: Nitin Wheat MD;  Location: [facility] CATH;  Service: Cardiology;  Laterality: N/A;    CYSTOSCOPY W/ RETROGRADES Bilateral 8/24/2020    Procedure: CYSTOSCOPY, WITH RETROGRADE PYELOGRAM -WITH URETHRAL DILATION;  Surgeon: Greg Claudio MD;  Location: [facility] OR;  Service: Urology;  Laterality: Bilateral;    CYSTOSCOPY W/ URETERAL STENT PLACEMENT Right 8/24/2020    Procedure: CYSTOSCOPY, WITH URETERAL STENT INSERTION;  Surgeon: Greg Claudio MD;  Location: [facility] OR;  Service: Urology;  Laterality: Right;    INSTILLATION OF URINARY BLADDER N/A 8/24/2020    Procedure: INSTILLATION, BLADDER - Mitomycin;  Surgeon: Greg Claudio MD;  Location: [facility] OR;  Service: Urology;  Laterality: N/A;    IVUS, CORONARY  1/31/2023    Procedure: IVUS, Coronary;  Surgeon: Nitin Wheat MD;  Location: [facility] CATH;  Service: Cardiology;;    IVUS, CORONARY  2/13/2023    Procedure: IVUS, Coronary;  Surgeon: Nitin Wheat MD;  Location: [facility] CATH;  Service: Cardiology;;    PERCUTANEOUS CORONARY INTERVENTION, ARTERY  2/13/2023    Procedure: Percutaneous coronary intervention;  Surgeon: Nitin Wheat MD;  Location: [facility] CATH;  Service: Cardiology;;    TRANSFORAMINAL EPIDURAL INJECTION OF STEROID Right 7/17/2019    Procedure: Injection,steroid,epidural,transforaminal approach L3-4, L4-5;  Surgeon: Chris Palencia MD;  Location: [facility] OR;  Service: Pain Management;  Laterality: Right;    TRANSFORAMINAL EPIDURAL INJECTION OF STEROID Right 8/28/2019    Procedure: Injection,steroid,epidural,transforaminal approach;  Surgeon: Chris Palencia MD;  Location: [facility] OR;  Service: Pain Management;  Laterality: Right;  L3-4, L4-L5    WISDOM TOOTH EXTRACTION       Family History   Problem Relation Age of Onset    Stroke Mother     Ulcerative colitis Mother     Kidney cancer Mother     Cancer Neg Hx     Colon cancer Neg Hx     Crohn's disease Neg Hx     Celiac disease  Neg Hx      Social History     Tobacco Use    Smoking status: Former     Current packs/day: 0.00     Types: Cigarettes     Quit date: 10/16/2013     Years since quitting: 10.4    Smokeless tobacco: Never   Substance and Sexual Activity    Alcohol use: Yes     Alcohol/week: 2.0 standard drinks of alcohol     Types: 2 Cans of beer per week    Drug use: Not Currently    Sexual activity: Not on file     Review of patient's allergies indicates:   Allergen Reactions    Sulfa (sulfonamide antibiotics) Anaphylaxis       Review of patient's allergies indicates:   Allergen Reactions    Sulfa (sulfonamide antibiotics) Anaphylaxis       ANTHROPOMETRICS:  Wt Readings from Last 10 Encounters:   04/02/24 94.1 kg (207 lb 7.3 oz)   03/21/24 92.5 kg (203 lb 14.8 oz)   03/08/24 92.5 kg (204 lb)   03/04/24 90.5 kg (199 lb 8.3 oz)   03/01/24 92.9 kg (204 lb 12.9 oz)   02/08/24 99.8 kg (220 lb 0.3 oz)   01/31/24 103 kg (227 lb)   01/29/24 103 kg (227 lb)   01/03/24 103.1 kg (227 lb 4.7 oz)   01/02/24 102.1 kg (225 lb)     Weight Changes: has decreased 18 pounds over last 3 months  - 8% loss    PHYSICAL EXAM:    Muscle Wasting Observed:  [] No Deficit  [x] Mild Deficit  [] Moderate  [] Severe    INTAKE:  [x] PO Intake [] TF Intake  Current Diet: regular diet  Dietary Patterns:  Patient denies following any restrictive, fad or altered diets - reports normal intake w/o any challenges at this time.  [x] Oral nutritional supplements: Fairlife    FOOD INSECURITY:   [x]Not discussed at this time  Patient is worried whether their food would run out before they got money to buy more.[] Often  [] Sometimes []Never  The food that they bought just didn't last and we didn't have money to get more.[] Often  [] Sometimes []Never    SYMPTOMS/COMPLAINTS:   [] No nutritional concerns at current  [] Diarrhea                    [] Constipation           [] Nausea                 [] Vomiting                [] Indigestion                [] Reflux               [x] Poor Appetite            [] Anorexia                 [] Early Satiety         [] Gas                       [] Bloating                     [] Dry Mouth    [] Mucositis                   [] Mouth Sores           [] Poor Dentition      [] Difficulty chewing  [] Difficulty Swallowing   [] Pain with swallowing [x] Change in taste      [] Change in smell   [] Pain (general)       [] Fatigue                      [] Sleep issues   [] Weight loss   [] other, please specify-     Initial Nutrition Assessment Risk: Moderate risk    MEDICATIONS:    Current Outpatient Medications:     albuterol (PROVENTIL/VENTOLIN HFA) 90 mcg/actuation inhaler, Inhale 2 puffs into the lungs every 6 (six) hours as needed for Wheezing. Rescue, Disp: 18 g, Rfl: 6    aspirin 81 MG Chew, Take 1 tablet (81 mg total) by mouth once daily., Disp: 90 tablet, Rfl: 3    budesonide (PULMICORT) 0.5 mg/2 mL nebulizer solution, Take 2 mLs (0.5 mg total) by nebulization 2 (two) times daily. Controller, Disp: 120 mL, Rfl: 3    clopidogreL (PLAVIX) 75 mg tablet, TAKE 1 TABLET BY MOUTH EVERY DAY, Disp: 90 tablet, Rfl: 3    cyproheptadine (PERIACTIN) 4 mg tablet, Take 1 tablet (4 mg total) by mouth 3 (three) times daily as needed., Disp: 90 tablet, Rfl: 0    droNABinol (MARINOL) 2.5 MG capsule, Take 1 capsule (2.5 mg total) by mouth 2 (two) times daily before meals. (Patient not taking: Reported on 4/2/2024), Disp: 60 capsule, Rfl: 3    EScitalopram oxalate (LEXAPRO) 10 MG tablet, Take 1 tablet (10 mg total) by mouth once daily., Disp: 30 tablet, Rfl: 11    fluticasone propionate (FLONASE) 50 mcg/actuation nasal spray, 1 spray (50 mcg total) by Each Nostril route 2 (two) times a day. (Patient not taking: Reported on 4/2/2024), Disp: 16 g, Rfl: 11    formoterol (PERFOROMIST) 20 mcg/2 mL Nebu, Take 2 mLs (20 mcg total) by nebulization 2 (two) times a day. Controller, Disp: 90 mL, Rfl: 3    hydroCHLOROthiazide (HYDRODIURIL) 12.5 MG Tab, TAKE 1 TABLET BY MOUTH  EVERY DAY (Patient not taking: Reported on 4/2/2024), Disp: 90 tablet, Rfl: 1    ibuprofen (ADVIL,MOTRIN) 600 MG tablet, Take 1 tablet (600 mg total) by mouth every 6 (six) hours as needed for Pain., Disp: 20 tablet, Rfl: 0    Lactobac no.41/Bifidobact no.7 (PROBIOTIC-10 ORAL), Take 1 capsule by mouth once daily., Disp: , Rfl:     melatonin 5 mg Cap, Take 5 mg by mouth nightly as needed. TAKE AS SCHEDULED, Disp: , Rfl:     methocarbamoL (ROBAXIN) 500 MG Tab, 500 mg daily as needed., Disp: , Rfl:     metoprolol succinate (TOPROL-XL) 25 MG 24 hr tablet, Take 1 tablet (25 mg total) by mouth once daily. (Patient not taking: Reported on 4/2/2024), Disp: 30 tablet, Rfl: 11    multivitamin (THERAGRAN) per tablet, Take 1 tablet by mouth once daily. , Disp: , Rfl:     pantoprazole (PROTONIX) 40 MG tablet, TAKE 1 TABLET BY MOUTH EVERY DAY, Disp: 90 tablet, Rfl: 3    pramipexole (MIRAPEX) 1.5 MG tablet, Take 1 tablet (1.5 mg total) by mouth 2 (two) times a day., Disp: 60 tablet, Rfl: 5    rosuvastatin (CRESTOR) 40 MG Tab, Take 1 tablet (40 mg total) by mouth once daily., Disp: 90 tablet, Rfl: 3    scopolamine (TRANSDERM-SCOP) 1.3-1.5 mg (1 mg over 3 days), Place 1 patch onto the skin every 72 hours as needed. As needed, Disp: , Rfl:     telmisartan (MICARDIS) 40 MG Tab, TAKE 1 TABLET BY MOUTH EVERY DAY (Patient not taking: Reported on 4/2/2024), Disp: 90 tablet, Rfl: 2    triamcinolone acetonide 0.1% (KENALOG) 0.1 % cream, APPLY TOPICALLY 2 TIMES DAILY AS NEEDED., Disp: 45 g, Rfl: 5    Current Facility-Administered Medications:     sodium chloride 0.9% flush 10 mL, 10 mL, Intravenous, PRN, Nitin Wheat MD    Facility-Administered Medications Ordered in Other Visits:     lactated ringers infusion, , Intravenous, Continuous, Guarisco, Braulio A., MD, Last Rate: 75 mL/hr at 03/04/24 1104, New Bag at 03/04/24 1104    ondansetron injection 4 mg, 4 mg, Intravenous, Daily PRN, Gustavo Chu MD    sodium chloride 0.9% flush 10  mL, 10 mL, Intravenous, PRN, Braulio Best MD     LABS:  WBC   Date Value Ref Range Status   03/01/2024 8.64 3.90 - 12.70 K/uL Final     RBC   Date Value Ref Range Status   03/01/2024 4.12 (L) 4.60 - 6.20 M/uL Final     Hemoglobin   Date Value Ref Range Status   03/01/2024 9.8 (L) 14.0 - 18.0 g/dL Final     Hematocrit   Date Value Ref Range Status   03/01/2024 30.7 (L) 40.0 - 54.0 % Final     Platelets   Date Value Ref Range Status   03/01/2024 427 150 - 450 K/uL Final     Glucose   Date Value Ref Range Status   03/01/2024 95 70 - 110 mg/dL Final     Sodium   Date Value Ref Range Status   03/01/2024 135 (L) 136 - 145 mmol/L Final     Potassium   Date Value Ref Range Status   03/01/2024 3.9 3.5 - 5.1 mmol/L Final     Calcium   Date Value Ref Range Status   03/01/2024 9.7 8.7 - 10.5 mg/dL Final     Alkaline Phosphatase   Date Value Ref Range Status   03/01/2024 161 (H) 55 - 135 U/L Final     BUN   Date Value Ref Range Status   03/01/2024 16 8 - 23 mg/dL Final     Creatinine   Date Value Ref Range Status   03/01/2024 1.2 0.5 - 1.4 mg/dL Final     Total Bilirubin   Date Value Ref Range Status   03/01/2024 0.8 0.1 - 1.0 mg/dL Final     Comment:     For infants and newborns, interpretation of results should be based  on gestational age, weight and in agreement with clinical  observations.    Premature Infant recommended reference ranges:  Up to 24 hours.............<8.0 mg/dL  Up to 48 hours............<12.0 mg/dL  3-5 days..................<15.0 mg/dL  6-29 days.................<15.0 mg/dL       AST   Date Value Ref Range Status   03/01/2024 38 10 - 40 U/L Final     ALT   Date Value Ref Range Status   03/01/2024 32 10 - 44 U/L Final     Albumin   Date Value Ref Range Status   03/01/2024 3.6 3.5 - 5.2 g/dL Final     Total Protein   Date Value Ref Range Status   03/01/2024 7.0 6.0 - 8.4 g/dL Final     eGFR if    Date Value Ref Range Status   03/11/2022 >60 >60 mL/min/1.73 m^2 Final     eGFR if non     Date Value Ref Range Status   03/11/2022 >60 >60 mL/min/1.73 m^2 Final     Comment:     Calculation used to obtain the estimated glomerular filtration  rate (eGFR) is the CKD-EPI equation.          ESTIMATED ENERGY NEEDS:  Calories : 2,350kcals (25kcals/kg)  Protein : 113g (1.2g/kg)  Fluid: 2350 mL (1mL/kcal) or per MD    NUTRITION DIAGNOSIS (PES):   Problem: Increased energy/protein needs   Etiology (related to): Increased demands   Signs/Symptoms (as evidenced by): 8% weight loss in 3 months and h/o bladder cancer    EDUCATION PROVIDED:   [] No Education Needed at this time  [] New Patient Education (Food safety, common nutritional side effects regarding specific medication and treatment plan, importance of nutrition as it relates to cancer, weight maintenance, side effect/symptom management, adequate calories, protein and hydration importance)  [] Diarrhea                                              [] Constipation                          [] Nausea/Vomiting  [] Mucositis                [] Dry Mouth    [x] Dealing with changes in Taste/Smell  [] Dealing with Poor Appetite   [] Soft/moist Diet      [x] Weight Loss/Gain     [] Weight Maintenance                           [] Indigestion/GERD                 [] Gas/Bloating          [] Foods High/ Low in specific nutrients [x] Increasing Calories/Protein   [] Milkshake/Smoothies Recipes   [] Nutrition Supplements                        [] Increasing Fluid Intakes         [] Foods that fight cancer    [] Evidence bases resources                 [] Fermented Foods/Probiotics  [] Mediterranean/Plant Based Diet     [] Other, specify                                   [x] Handouts provided: Changes in Taste and Smell, High Calorie Food and Snack Ideas      [] Samples provided     RD NOTE:  RD met with pt during iron infusion with nutrition referral from Dr. West. Pt with an 8# weight loss in 3 months - moderate loss. Dr. West dx pt with anorexia. Pt  was prescribed marinol but his insurance denied. Pt states his appetite is sporadic. Pt not eating as much as he used to and pt experiencing change of taste. RD went over tips to aid with change of taste.   Dr. West out of the office this week. RD spoke to Dr. Yu nurse, Maria G Mitchell regarding pt's appetite stimulant. Message was sent to Dr. Yu about changing prescription from marinol to periactin. Dr. Yu is going to review pt's chart and see if it is appropriate for pt to take.     RD Goals:   [x] Weight stable                  [] Weight gain                      [] Weight Loss                               [] Continue adequate Kcal/protein   [x] Increase Kcal/protein      [] Adjust Tube-feeding Rx   [] Tolerate Tube Feedings             [] Increase tube feedings to goal     [] Tolerate Supplements     [x] Symptom Improvement   [] Understand nutrition Education  [] Offer supportive visits   [] other, please specify      RECOMMENDATIONS:  Suck on tart fruit such as lemon wedges prior to eating to aid with change of taste  Eat 5-6 smaller meals more frequently  Choose from high calorie food list when eating small meals/snacks  Make high calorie smoothie with Fairlife protein drink    Follow up: LEONARD Pandya RDN, LDN  04/03/2024  8:52 AM

## 2024-04-04 ENCOUNTER — HOSPITAL ENCOUNTER (OUTPATIENT)
Dept: RADIOLOGY | Facility: HOSPITAL | Age: 69
Discharge: HOME | End: 2024-04-04
Attending: INTERNAL MEDICINE
Payer: MEDICARE

## 2024-04-04 ENCOUNTER — HOSPITAL ENCOUNTER (OUTPATIENT)
Dept: CARDIOLOGY | Facility: HOSPITAL | Age: 69
Discharge: HOME | End: 2024-04-04
Attending: INTERNAL MEDICINE
Payer: MEDICARE

## 2024-04-04 VITALS — WEIGHT: 207 LBS | BODY MASS INDEX: 29.63 KG/M2 | HEIGHT: 70 IN

## 2024-04-04 VITALS — BODY MASS INDEX: 29.06 KG/M2 | WEIGHT: 203 LBS | HEIGHT: 70 IN

## 2024-04-04 DIAGNOSIS — I25.118 ATHEROSCLEROTIC HEART DISEASE OF NATIVE CORONARY ARTERY WITH OTHER FORMS OF ANGINA PECTORIS: ICD-10-CM

## 2024-04-04 DIAGNOSIS — I49.3 FREQUENT PVCS: ICD-10-CM

## 2024-04-04 PROCEDURE — 93306 TTE W/DOPPLER COMPLETE: CPT | Mod: 26,,, | Performed by: INTERNAL MEDICINE

## 2024-04-04 PROCEDURE — A9502 TC99M TETROFOSMIN: HCPCS | Mod: PO | Performed by: INTERNAL MEDICINE

## 2024-04-04 PROCEDURE — 78452 HT MUSCLE IMAGE SPECT MULT: CPT | Mod: 26,,, | Performed by: INTERNAL MEDICINE

## 2024-04-04 PROCEDURE — 78452 HT MUSCLE IMAGE SPECT MULT: CPT | Mod: PO

## 2024-04-04 PROCEDURE — 93306 TTE W/DOPPLER COMPLETE: CPT | Mod: PO

## 2024-04-04 PROCEDURE — 93016 CV STRESS TEST SUPVJ ONLY: CPT | Mod: S$PBB,,, | Performed by: INTERNAL MEDICINE

## 2024-04-04 PROCEDURE — 93018 CV STRESS TEST I&R ONLY: CPT | Mod: S$PBB,,, | Performed by: INTERNAL MEDICINE

## 2024-04-04 PROCEDURE — 93017 CV STRESS TEST TRACING ONLY: CPT | Mod: PO

## 2024-04-04 PROCEDURE — 93017 CV STRESS TEST TRACING ONLY: CPT | Mod: PBBFAC,PO

## 2024-04-04 RX ADMIN — TETROFOSMIN 10.4 MILLICURIE: 1.38 INJECTION, POWDER, LYOPHILIZED, FOR SOLUTION INTRAVENOUS at 01:04

## 2024-04-04 RX ADMIN — TETROFOSMIN 33 MILLICURIE: 1.38 INJECTION, POWDER, LYOPHILIZED, FOR SOLUTION INTRAVENOUS at 01:04

## 2024-04-05 ENCOUNTER — TELEPHONE (OUTPATIENT)
Dept: NEUROLOGY | Facility: CLINIC | Age: 69
End: 2024-04-05
Payer: MEDICARE

## 2024-04-05 ENCOUNTER — TELEPHONE (OUTPATIENT)
Dept: CARDIOLOGY | Facility: CLINIC | Age: 69
End: 2024-04-05
Payer: MEDICARE

## 2024-04-05 LAB
ASCENDING AORTA: 2.44 CM
AV INDEX (PROSTH): 0.73
AV MEAN GRADIENT: 7 MMHG
AV PEAK GRADIENT: 13 MMHG
AV VALVE AREA BY VELOCITY RATIO: 2.62 CM²
AV VALVE AREA: 3.11 CM²
AV VELOCITY RATIO: 0.61
BSA FOR ECHO PROCEDURE: 2.15 M2
CV ECHO LV RWT: 0.43 CM
CV STRESS BASE HR: 77 BPM
DIASTOLIC BLOOD PRESSURE: 87 MMHG
DOP CALC AO PEAK VEL: 1.79 M/S
DOP CALC AO VTI: 40.3 CM
DOP CALC LVOT AREA: 4.3 CM2
DOP CALC LVOT DIAMETER: 2.33 CM
DOP CALC LVOT PEAK VEL: 1.1 M/S
DOP CALC LVOT STROKE VOLUME: 125.29 CM3
DOP CALCLVOT PEAK VEL VTI: 29.4 CM
E WAVE DECELERATION TIME: 162.09 MSEC
E/A RATIO: 1.3
E/E' RATIO: 11.7 M/S
ECHO LV POSTERIOR WALL: 1.02 CM (ref 0.6–1.1)
FRACTIONAL SHORTENING: 34 % (ref 28–44)
INTERVENTRICULAR SEPTUM: 1.14 CM (ref 0.6–1.1)
IVRT: 122.74 MSEC
LEFT ATRIUM SIZE: 5.04 CM
LEFT ATRIUM VOLUME INDEX MOD: 43.6 ML/M2
LEFT ATRIUM VOLUME MOD: 92.35 CM3
LEFT INTERNAL DIMENSION IN SYSTOLE: 3.1 CM (ref 2.1–4)
LEFT VENTRICLE DIASTOLIC VOLUME INDEX: 48.42 ML/M2
LEFT VENTRICLE DIASTOLIC VOLUME: 102.64 ML
LEFT VENTRICLE MASS INDEX: 87 G/M2
LEFT VENTRICLE SYSTOLIC VOLUME INDEX: 17.8 ML/M2
LEFT VENTRICLE SYSTOLIC VOLUME: 37.83 ML
LEFT VENTRICULAR INTERNAL DIMENSION IN DIASTOLE: 4.71 CM (ref 3.5–6)
LEFT VENTRICULAR MASS: 183.44 G
LV LATERAL E/E' RATIO: 11.7 M/S
LV SEPTAL E/E' RATIO: 11.7 M/S
LVOT MG: 2.87 MMHG
LVOT MV: 0.8 CM/S
MV PEAK A VEL: 0.9 M/S
MV PEAK E VEL: 1.17 M/S
NUC STRESS EJECTION FRACTION: 62 %
OHS CV CPX 1 MINUTE RECOVERY HEART RATE: 114 BPM
OHS CV CPX 85 PERCENT MAX PREDICTED HEART RATE MALE: 130
OHS CV CPX ESTIMATED METS: 7
OHS CV CPX MAX PREDICTED HEART RATE: 153
OHS CV CPX PATIENT IS FEMALE: 0
OHS CV CPX PATIENT IS MALE: 1
OHS CV CPX PEAK DIASTOLIC BLOOD PRESSURE: 75 MMHG
OHS CV CPX PEAK HEAR RATE: 134 BPM
OHS CV CPX PEAK RATE PRESSURE PRODUCT: ABNORMAL
OHS CV CPX PEAK SYSTOLIC BLOOD PRESSURE: 165 MMHG
OHS CV CPX PERCENT MAX PREDICTED HEART RATE ACHIEVED: 88
OHS CV CPX RATE PRESSURE PRODUCT PRESENTING: ABNORMAL
PISA MRMAX VEL: 5.22 M/S
PISA TR MAX VEL: 2.87 M/S
PULM VEIN S/D RATIO: 0.52
PV PEAK D VEL: 0.73 M/S
PV PEAK S VEL: 0.38 M/S
RA PRESSURE ESTIMATED: 3 MMHG
RA VOL SYS: 68.76 ML
RIGHT ATRIAL AREA: 20.5 CM2
RIGHT VENTRICULAR END-DIASTOLIC DIMENSION: 4.13 CM
RIGHT VENTRICULAR LENGTH IN DIASTOLE (APICAL 4-CHAMBER VIEW): 8.46 CM
RV MID DIAMA: 2.51 CM
RV TB RVSP: 6 MMHG
RV TISSUE DOPPLER FREE WALL SYSTOLIC VELOCITY 1 (APICAL 4 CHAMBER VIEW): 15.19 CM/S
SINUS: 2.9 CM
STJ: 2.47 CM
STRESS ECHO POST EXERCISE DUR MIN: 4 MINUTES
STRESS ECHO POST EXERCISE DUR SEC: 35 SECONDS
SYSTOLIC BLOOD PRESSURE: 147 MMHG
TDI LATERAL: 0.1 M/S
TDI SEPTAL: 0.1 M/S
TDI: 0.1 M/S
TR MAX PG: 33 MMHG
TRICUSPID ANNULAR PLANE SYSTOLIC EXCURSION: 2.57 CM
TV REST PULMONARY ARTERY PRESSURE: 36 MMHG
Z-SCORE OF LEFT VENTRICULAR DIMENSION IN END DIASTOLE: -3.52
Z-SCORE OF LEFT VENTRICULAR DIMENSION IN END SYSTOLE: -2.19

## 2024-04-05 NOTE — TELEPHONE ENCOUNTER
----- Message from Pia Haskins MA sent at 4/5/2024  8:41 AM CDT -----  Regarding: FW: duplicate appt  Jasen Wattsrell,    Are you fine with cancelling this appt? Just wanted to give you a heads up if you need the slot.    ThanksPia   ----- Message -----  From: Kade Prather MD  Sent: 4/3/2024   5:29 PM CDT  To: Kathya Wilkes RN; Pia Haskins MA  Subject: duplicate appt                                   Hi,     I saw this patient in clinic today for RLS. It seems someone also scheduled him to see Dr. Perez for the same problem but not for a couple of weeks (4/15/24). Since he's already seeing me can we get the one with Dr. Perez cancelled?    ThanksRohan

## 2024-04-05 NOTE — TELEPHONE ENCOUNTER
I called the patient and informed him of testing results; frequent PVCs 31%; normal EF on TTE; large anterior ischemia on MPI.    I recommended coronary angiography which we will try to schedule this coming Wednesday April 10. He will continue his medications as is in the mean time.    Of note he is being worked up for malignancy and had a recent PET scan with adenopathy. He also has been diagnosed with ILD. He denies chest discomfort.     Risks, benefits, and alternatives explained and they agree to proceed.     Nitin Wheat MD

## 2024-04-08 ENCOUNTER — PATIENT MESSAGE (OUTPATIENT)
Dept: HEMATOLOGY/ONCOLOGY | Facility: CLINIC | Age: 69
End: 2024-04-08
Payer: MEDICARE

## 2024-04-08 ENCOUNTER — PATIENT MESSAGE (OUTPATIENT)
Dept: CARDIOLOGY | Facility: CLINIC | Age: 69
End: 2024-04-08
Payer: MEDICARE

## 2024-04-08 DIAGNOSIS — I25.10 CORONARY ARTERY DISEASE, UNSPECIFIED VESSEL OR LESION TYPE, UNSPECIFIED WHETHER ANGINA PRESENT, UNSPECIFIED WHETHER NATIVE OR TRANSPLANTED HEART: ICD-10-CM

## 2024-04-08 DIAGNOSIS — R94.39 POSITIVE CARDIAC STRESS TEST: Primary | ICD-10-CM

## 2024-04-08 RX ORDER — SODIUM CHLORIDE 0.9 % (FLUSH) 0.9 %
10 SYRINGE (ML) INJECTION
Status: DISCONTINUED | OUTPATIENT
Start: 2024-04-08 | End: 2024-04-09 | Stop reason: CLARIF

## 2024-04-08 RX ORDER — SODIUM CHLORIDE 9 MG/ML
INJECTION, SOLUTION INTRAVENOUS ONCE
Status: CANCELLED | OUTPATIENT
Start: 2024-04-08 | End: 2024-04-08

## 2024-04-08 NOTE — PROGRESS NOTES
Angiogram    Arrive for procedure at: [facility] on 4/10/24 at 11 am. Your procedure is scheduled for 1 pm.    You will receive a phone call from [facility] Pre-Op Department with further instructions and exact arrival time prior to your scheduled procedure.    Notify the nurse if you are ALLERGIC TO IODINE.    FASTING:  If your procedure is scheduled in the afternoon, you may have a LIGHT BREAKFAST 6-8 hours prior to your procedure.  For example: Two slices of toast; black coffee or black tea.    MEDICATIONS: You may take your regular morning medications with water. If there are any medications that you should not take, you will be instructed to hold them for that morning.    CARDIOLOGY PRE-PROCEDURE MEDICATION ORDERS:    CONTINUE the Following Medications   Please continue all your medications including your aspirin and Plavix          WHAT TO EXPECT:    How long will the procedure take?  The procedure will take an average of 1 - 2 hours to perform.  After the procedure, you will need to lay flat for around 4 - 6 hours to minimize bleeding from the puncture site. If the wrist is accessed you will need to keep your arm still as instructed by the nurse.    When can I go home?  You may be able to be discharged home that same afternoon if there were no complications.  If you have one of the following: balloon; stent; pacemaker or defibrillator procedures, you may spend one night for observation.  Your doctor will determine your discharge based upon your progress.  The results of your procedure will be discussed with you before you are discharged.  Any further testing or procedures will be scheduled for you either before you leave or you will be instructed to call for a future appointment.      TRANSPORTATION:  PLEASE ARRANGE TO HAVE SOMEONE DRIVE YOU HOME FOLLOWING YOUR PROCEDURE, YOU WILL NOT BE ALLOWED TO DRIVE.

## 2024-04-09 ENCOUNTER — INFUSION (OUTPATIENT)
Dept: INFUSION THERAPY | Facility: HOSPITAL | Age: 69
End: 2024-04-09
Attending: INTERNAL MEDICINE
Payer: MEDICARE

## 2024-04-09 VITALS
HEART RATE: 69 BPM | TEMPERATURE: 98 F | WEIGHT: 204.38 LBS | OXYGEN SATURATION: 97 % | SYSTOLIC BLOOD PRESSURE: 144 MMHG | DIASTOLIC BLOOD PRESSURE: 72 MMHG | RESPIRATION RATE: 18 BRPM | HEIGHT: 70 IN | BODY MASS INDEX: 29.26 KG/M2

## 2024-04-09 DIAGNOSIS — D50.9 IRON DEFICIENCY ANEMIA, UNSPECIFIED IRON DEFICIENCY ANEMIA TYPE: Primary | ICD-10-CM

## 2024-04-09 PROCEDURE — 96365 THER/PROPH/DIAG IV INF INIT: CPT | Mod: PN

## 2024-04-09 PROCEDURE — 25000003 PHARM REV CODE 250: Mod: PN | Performed by: STUDENT IN AN ORGANIZED HEALTH CARE EDUCATION/TRAINING PROGRAM

## 2024-04-09 PROCEDURE — 63600175 PHARM REV CODE 636 W HCPCS: Mod: JZ,JG,PN | Performed by: STUDENT IN AN ORGANIZED HEALTH CARE EDUCATION/TRAINING PROGRAM

## 2024-04-09 RX ORDER — SODIUM CHLORIDE 0.9 % (FLUSH) 0.9 %
10 SYRINGE (ML) INJECTION
Status: DISCONTINUED | OUTPATIENT
Start: 2024-04-09 | End: 2024-04-09 | Stop reason: HOSPADM

## 2024-04-09 RX ORDER — EPINEPHRINE 0.3 MG/.3ML
0.3 INJECTION SUBCUTANEOUS ONCE AS NEEDED
Status: DISCONTINUED | OUTPATIENT
Start: 2024-04-09 | End: 2024-04-09 | Stop reason: HOSPADM

## 2024-04-09 RX ORDER — DIPHENHYDRAMINE HYDROCHLORIDE 50 MG/ML
50 INJECTION INTRAMUSCULAR; INTRAVENOUS ONCE AS NEEDED
Status: DISCONTINUED | OUTPATIENT
Start: 2024-04-09 | End: 2024-04-09 | Stop reason: HOSPADM

## 2024-04-09 RX ADMIN — FERRIC CARBOXYMALTOSE INJECTION 750 MG: 50 INJECTION, SOLUTION INTRAVENOUS at 01:04

## 2024-04-09 RX ADMIN — SODIUM CHLORIDE: 9 INJECTION, SOLUTION INTRAVENOUS at 01:04

## 2024-04-09 NOTE — PLAN OF CARE
Problem: Adult Inpatient Plan of Care  Goal: Patient-Specific Goal (Individualized)  Outcome: Ongoing, Progressing  Flowsheets (Taken 4/9/2024 1529)  Anxieties, Fears or Concerns: None  Individualized Care Needs: Recpaola chaidez     Problem: Fatigue  Goal: Improved Activity Tolerance  Intervention: Promote Improved Energy  Flowsheets (Taken 4/9/2024 1529)  Fatigue Management:   activity schedule adjusted   paced activity encouraged   fatigue-related activity identified  Sleep/Rest Enhancement:   relaxation techniques promoted   regular sleep/rest pattern promoted  Activity Management:   Ambulated -L4   Ambulated in balderas - L4     Problem: Adult Inpatient Plan of Care  Goal: Plan of Care Review  Outcome: Ongoing, Progressing  Flowsheets (Taken 4/9/2024 1529)  Plan of Care Reviewed With: patient  Tolerated treatment with no known distress.  Ambulated from infusion center with steady gait.

## 2024-04-12 DIAGNOSIS — R93.1 AGATSTON CAC SCORE, >400: ICD-10-CM

## 2024-04-12 RX ORDER — ROSUVASTATIN CALCIUM 40 MG/1
40 TABLET, COATED ORAL
Qty: 90 TABLET | Refills: 3 | Status: SHIPPED | OUTPATIENT
Start: 2024-04-12

## 2024-04-15 ENCOUNTER — PATIENT MESSAGE (OUTPATIENT)
Dept: NEUROLOGY | Facility: CLINIC | Age: 69
End: 2024-04-15
Payer: MEDICARE

## 2024-04-15 ENCOUNTER — TELEPHONE (OUTPATIENT)
Dept: CARDIOLOGY | Facility: CLINIC | Age: 69
End: 2024-04-15
Payer: MEDICARE

## 2024-04-15 NOTE — TELEPHONE ENCOUNTER
----- Message from Patty Washburn sent at 4/15/2024  3:27 PM CDT -----  Regarding: Patient Returning Call  Contact: An with Dr. Ochoa at [phone] ext [phone] (please dial ext as message starts)  Type:  Patient Returning Call    Who Called:  An with Dr. Ochoa at [phone] ext [phone] (please dial ext as message starts)  Who Left Message for Patient:  Agnes Blanc LPN  Does the patient know what this is regarding?:  yes    Additional Information:  Please call and advise. Thank you

## 2024-04-15 NOTE — TELEPHONE ENCOUNTER
----- Message from Janett Mccall sent at 4/15/2024 11:36 AM CDT -----  Who Called: Dr. Ochoa's office    What is the request in detail: Requesting call back to discuss cardiac clearance for upcoming 05/08 procedure. Please advise    Can the clinic reply by [facility]? No    Best Call Back Number: [phone] est [phone]      Additional Information:

## 2024-04-17 DIAGNOSIS — I49.3 FREQUENT PVCS: Primary | ICD-10-CM

## 2024-04-17 DIAGNOSIS — R94.39 POSITIVE CARDIAC STRESS TEST: ICD-10-CM

## 2024-04-17 DIAGNOSIS — Z95.5 S/P DRUG ELUTING CORONARY STENT PLACEMENT: ICD-10-CM

## 2024-04-22 ENCOUNTER — TELEPHONE (OUTPATIENT)
Dept: CARDIOLOGY | Facility: CLINIC | Age: 69
End: 2024-04-22
Payer: MEDICARE

## 2024-04-22 NOTE — TELEPHONE ENCOUNTER
Gave correct fax number. Confirmed pt has had angiogram. Will send clearance to Dr Wheat when sent.

## 2024-04-22 NOTE — TELEPHONE ENCOUNTER
----- Message from Jagjit Carlos sent at 4/22/2024 10:05 AM CDT -----  Type: Needs Medical Advice    Who Called:      Best Call Back Number: [phone], ext [phone]    Additional Information: An with dr. Ochoa's office is calling to check on status of clearance. Please call back to advise, Thanks!

## 2024-04-23 ENCOUNTER — TELEPHONE (OUTPATIENT)
Dept: CARDIOLOGY | Facility: CLINIC | Age: 69
End: 2024-04-23
Payer: MEDICARE

## 2024-04-23 NOTE — TELEPHONE ENCOUNTER
Pt having EGD/EUS on 5/8/24. Pt needing CV risk and medication instructions. Pt taking ASA 81mg and plavix 75mg.     Fax: [phone]

## 2024-04-24 DIAGNOSIS — G25.81 RLS (RESTLESS LEGS SYNDROME): ICD-10-CM

## 2024-04-24 RX ORDER — PRAMIPEXOLE DIHYDROCHLORIDE 1 MG/1
1 TABLET ORAL NIGHTLY
Qty: 90 TABLET | Refills: 3 | Status: SHIPPED | OUTPATIENT
Start: 2024-04-24 | End: 2024-05-17 | Stop reason: ALTCHOICE

## 2024-04-24 NOTE — TELEPHONE ENCOUNTER
Refill Routing Note   Medication(s) are not appropriate for processing by [facility] for the following reason(s):        Outside of protocol    [facility] action(s):  Route               Appointments  past 12m or future 3m with PCP    Date Provider   Last Visit   1/3/2024 Colton Johnson MD   Next Visit   Visit date not found Colton Johnson MD   ED visits in past 90 days: 0        Note composed:9:40 AM 04/24/2024

## 2024-04-24 NOTE — TELEPHONE ENCOUNTER
No care due was identified.  [facility] Embedded Care Due Messages. Reference number: [id number].   4/24/2024 12:33:04 AM CDT

## 2024-04-28 NOTE — TELEPHONE ENCOUNTER
May proceed to EGD/EUS with low predicted cardiac risk. He may hold Plavix for as long as needed by his procedure physician and should be resumed after. Aspirin should NOT be held.  No care due was identified.  [facility] Embedded Care Due Messages. Reference number: [id number].   7/25/2023 9:50:56 AM CDT

## 2024-04-29 ENCOUNTER — OUTPATIENT CASE MANAGEMENT (OUTPATIENT)
Dept: ADMINISTRATIVE | Facility: OTHER | Age: 69
End: 2024-04-29
Payer: MEDICARE

## 2024-04-29 DIAGNOSIS — R63.0 ANOREXIA: ICD-10-CM

## 2024-04-29 RX ORDER — CYPROHEPTADINE HYDROCHLORIDE 4 MG/1
4 TABLET ORAL
Qty: 270 TABLET | Refills: 1 | Status: SHIPPED | OUTPATIENT
Start: 2024-04-29

## 2024-05-03 ENCOUNTER — HOSPITAL ENCOUNTER (OUTPATIENT)
Dept: CARDIOLOGY | Facility: HOSPITAL | Age: 69
Discharge: HOME | End: 2024-05-03
Attending: INTERNAL MEDICINE
Payer: MEDICARE

## 2024-05-03 DIAGNOSIS — Z95.5 S/P DRUG ELUTING CORONARY STENT PLACEMENT: ICD-10-CM

## 2024-05-03 DIAGNOSIS — I49.3 FREQUENT PVCS: ICD-10-CM

## 2024-05-03 DIAGNOSIS — R94.39 POSITIVE CARDIAC STRESS TEST: ICD-10-CM

## 2024-05-03 PROCEDURE — 93226 XTRNL ECG REC<48 HR SCAN A/R: CPT | Mod: PO

## 2024-05-03 PROCEDURE — 93227 XTRNL ECG REC<48 HR R&I: CPT | Mod: ,,, | Performed by: INTERNAL MEDICINE

## 2024-05-07 ENCOUNTER — OUTPATIENT CASE MANAGEMENT (OUTPATIENT)
Dept: ADMINISTRATIVE | Facility: OTHER | Age: 69
End: 2024-05-07
Payer: MEDICARE

## 2024-05-07 NOTE — PROGRESS NOTES
Outpatient Care Management  Patient Does Not Consent    Patient: Moustapha Murray  MRN:  [id number]  Date of Service:  5/7/2024  Completed by:  Erica Schuster RN    Chief Complaint   Patient presents with    Case Closure       Patient Summary           Consent Received:  Decline  Decline Reason:  Needs met     Encouraged patient to reach out to me or PCP in the future with any needs that may arise, voiced understanding.  GELA Schuster RN

## 2024-05-08 ENCOUNTER — DOCUMENTATION ONLY (OUTPATIENT)
Dept: HEMATOLOGY/ONCOLOGY | Facility: CLINIC | Age: 69
End: 2024-05-08
Payer: MEDICARE

## 2024-05-08 LAB
OHS CV EVENT MONITOR DAY: 0
OHS CV HOLTER LENGTH DECIMAL HOURS: 48
OHS CV HOLTER LENGTH HOURS: 48
OHS CV HOLTER LENGTH MINUTES: 0
OHS CV HOLTER SINUS AVERAGE HR: 77
OHS CV HOLTER SINUS MAX HR: 109
OHS CV HOLTER SINUS MIN HR: 41

## 2024-05-08 NOTE — PROGRESS NOTES
Chart review, pt had egd with Dr. Ochoa today, follow-up appointment already scheduled with Dr. West.  Dr. Ochoa will inform if he needs to follow up with patient.

## 2024-05-09 DIAGNOSIS — I49.3 PVC'S (PREMATURE VENTRICULAR CONTRACTIONS): Primary | ICD-10-CM

## 2024-05-10 ENCOUNTER — TELEPHONE (OUTPATIENT)
Dept: ELECTROPHYSIOLOGY | Facility: CLINIC | Age: 69
End: 2024-05-10
Payer: MEDICARE

## 2024-05-10 DIAGNOSIS — I49.3 PVC'S (PREMATURE VENTRICULAR CONTRACTIONS): Primary | ICD-10-CM

## 2024-05-10 NOTE — TELEPHONE ENCOUNTER
Does patient have a pacemaker/ICD/Loop: No  Has patient been treated for this condition before by an outside physician:No  If so, what doctor  Has patient had any recent testing done on the heart like holter, stress test, echo: No  Has patient had any prior surgeries for an arrhythmiaNo

## 2024-05-12 NOTE — PROGRESS NOTES
PATIENT: Moustapha Murray  MRN: [id number]  DATE: 5/13/2024      Diagnosis:   1. Abnormal laboratory test result    2. Pulmonary fibrosis    3. Localized enlarged lymph nodes    4. Normocytic anemia    5. Other iron deficiency anemia    6. Positive AUGUSTA (antinuclear antibody)    7. History of bladder cancer            Chief Complaint: Abnormal laboratory test result  (Follow up for pathology results )      Oncologic History:      Oncologic History     Oncologic Treatment     Pathology           Subjective:    Interval History: Mr. Murray is a 69 y.o. male with CAD, Depression, fatty liver, GERD, HTN, RLS, pulmonary fibrosis, pulmonary fibrosis, h/o bladder cancer who presents for positive TIF1 gamma antibody.   Since the last clinic visit the patient underwent colonoscopy on 03/04/2024 which was normal.  Cystoscopy on 3/21/24 showed no evidence of recurrence.  PET/CT 3/22/24 showed enlarged lymph nodes and superior mediastinum, anterior mediastinum, periaortic region, pretracheal subcarinal, and left paraesophageal region of the diaphragm hiatus; moderate prominent peripheral distribution of interlobular interstitial thickening; area of uptake in the region of right posterior prostate.  Patient underwent iron infusions on 04/02/2024 and 04/09/2024.  Patient underwent EUS on 05/08/2024 with biopsy of subcarinal mediastinum station 7 lymph node with path showing positive for lm material but negative for malignant cells.    Pt has SOB on occasion.  He also endorses palpitations and it to see an Electro Physiologist.   The patient denies CP, cough, abdominal pain, nausea, vomiting, constipation, diarrhea.  The patient denies fever, chills, night sweats, weight loss, new lumps or bumps, easy bruising or bleeding.    Prior History:  Patient with a history of noninvasive bladder cancer under the care of Dr. Lr.  Patient last underwent cystoscopy on 08/17/2023 with no lesions seen.  Patient with history of T1  disease status post bladder installation of mitomycin-C and BCG.  The patient is currently being followed by Dr. Arellano for pulmonary fibrosis and underwent laboratory testing on 1/08/24 showing positive TIF1 gamma antibody.  The patient is currently scheduled for colonoscopy 3/04/2024 and cystoscopy 3/21/2024.  PSA on 1/08/24 was normal at 0.49ng/mL.   The patient underwent a CT of the chest, abdomen, and pelvis on 02/27/2024 showing a lower right paratracheal enlarged lymph node measuring 10 mm; enlarged subcarinal lymph node measuring 14 mm; mildly enlarged lymph node adjacent to the distal esophagus; interstitial thickening bilaterally in the subpleural pattern suggesting fibrosis; diffuse fatty infiltration of the liver; anterior wedge compression fracture of L1 and T12; wall thickening in the region of the ascending colon and cecum.  CT of the neck showed no acute findings.    Past Medical History:   Past Medical History:   Diagnosis Date    Anticoagulant long-term use     Bladder cancer 08/2020    Colon polyps     Coronary artery disease 01/31/2023    CORONARY STENTSx3    DDD (degenerative disc disease), lumbar     Depression     Fatty liver     GERD (gastroesophageal reflux disease)     H/O motion sickness     Hypertension     PONV (postoperative nausea and vomiting)     Pulmonary fibrosis     RLS (restless legs syndrome)     Sleep apnea, unspecified     uses cpap       Past Surgical HIstory:   Past Surgical History:   Procedure Laterality Date    ANGIOGRAM, CORONARY, WITH LEFT HEART CATHETERIZATION N/A 1/31/2023    Procedure: Left Heart Cath;  Surgeon: Nitin Wheat MD;  Location: [facility] CATH;  Service: Cardiology;  Laterality: N/A;    ANGIOGRAM, CORONARY, WITH LEFT HEART CATHETERIZATION  4/17/2024    Procedure: Left Heart Cath;  Surgeon: Nitin Wheat MD;  Location: [facility] CATH;  Service: Cardiology;;    APPENDECTOMY      CARPAL TUNNEL RELEASE      CERVICAL FUSION      CHOLECYSTECTOMY      CIRCUMCISION       COLONOSCOPY N/A 3/16/2020    Procedure: COLONOSCOPY;  Surgeon: Braulio Best MD;  Location: [facility] ENDO;  Service: Endoscopy;  Laterality: N/A; 3 colon polyps removed; biopsy: tubular adenoma and hyperplastic; repeat in 5 years for surveillance    COLONOSCOPY N/A 3/4/2024    Procedure: COLONOSCOPY;  Surgeon: Braulio Best MD;  Location: [facility] ENDO;  Service: Gastroenterology;  Laterality: N/A;    CORONARY ANGIOGRAPHY  2/13/2023    Procedure: ANGIOGRAM, CORONARY ARTERY;  Surgeon: Nitin Wheat MD;  Location: [facility] CATH;  Service: Cardiology;;    CORONARY ANGIOPLASTY WITH STENT PLACEMENT N/A 1/31/2023    Procedure: ANGIOPLASTY, CORONARY ARTERY, WITH STENT INSERTION;  Surgeon: Nitin Wheat MD;  Location: [facility] CATH;  Service: Cardiology;  Laterality: N/A;    CYSTOSCOPY W/ RETROGRADES Bilateral 8/24/2020    Procedure: CYSTOSCOPY, WITH RETROGRADE PYELOGRAM -WITH URETHRAL DILATION;  Surgeon: Greg Claudio MD;  Location: [facility] OR;  Service: Urology;  Laterality: Bilateral;    CYSTOSCOPY W/ URETERAL STENT PLACEMENT Right 8/24/2020    Procedure: CYSTOSCOPY, WITH URETERAL STENT INSERTION;  Surgeon: Greg Claudio MD;  Location: [facility] OR;  Service: Urology;  Laterality: Right;    ENDOSCOPIC ULTRASOUND OF UPPER GASTROINTESTINAL TRACT Left 5/8/2024    Procedure: ULTRASOUND, UPPER GI TRACT, ENDOSCOPIC;  Surgeon: Brad Ochoa MD;  Location: [facility];  Service: Endoscopy;  Laterality: Left;    ESOPHAGOGASTRODUODENOSCOPY N/A 5/8/2024    Procedure: EGD (ESOPHAGOGASTRODUODENOSCOPY);  Surgeon: Brad Ochoa MD;  Location: [facility];  Service: Endoscopy;  Laterality: N/A;    INSTILLATION OF URINARY BLADDER N/A 8/24/2020    Procedure: INSTILLATION, BLADDER - Mitomycin;  Surgeon: Greg Claudio MD;  Location: [facility] OR;  Service: Urology;  Laterality: N/A;    IVUS, CORONARY  1/31/2023    Procedure: IVUS, Coronary;  Surgeon: Nitin Wheat MD;  Location: [facility] CATH;  Service: Cardiology;;    IVUS, CORONARY   2/13/2023    Procedure: IVUS, Coronary;  Surgeon: Nitin Wheat MD;  Location: [facility] CATH;  Service: Cardiology;;    PERCUTANEOUS CORONARY INTERVENTION, ARTERY  2/13/2023    Procedure: Percutaneous coronary intervention;  Surgeon: Nitin Wheat MD;  Location: [facility] CATH;  Service: Cardiology;;    TRANSFORAMINAL EPIDURAL INJECTION OF STEROID Right 7/17/2019    Procedure: Injection,steroid,epidural,transforaminal approach L3-4, L4-5;  Surgeon: Chris Palencia MD;  Location: [facility] OR;  Service: Pain Management;  Laterality: Right;    TRANSFORAMINAL EPIDURAL INJECTION OF STEROID Right 8/28/2019    Procedure: Injection,steroid,epidural,transforaminal approach;  Surgeon: Chris Palencia MD;  Location: [facility] OR;  Service: Pain Management;  Laterality: Right;  L3-4, L4-L5    WISDOM TOOTH EXTRACTION         Family History:   Family History   Problem Relation Name Age of Onset    Stroke Mother      Ulcerative colitis Mother      Kidney cancer Mother      Cancer Neg Hx      Colon cancer Neg Hx      Crohn's disease Neg Hx      Celiac disease Neg Hx         Social History:  reports that he quit smoking about 10 years ago. His smoking use included cigarettes. He has never used smokeless tobacco. He reports current alcohol use of about 2.0 standard drinks of alcohol per week. He reports that he does not currently use drugs.    Allergies:  Review of patient's allergies indicates:   Allergen Reactions    Sulfa (sulfonamide antibiotics) Anaphylaxis       Medications:  Current Outpatient Medications   Medication Sig Dispense Refill    albuterol (PROVENTIL/VENTOLIN HFA) 90 mcg/actuation inhaler Inhale 2 puffs into the lungs every 6 (six) hours as needed for Wheezing. Rescue 18 g 6    aspirin 81 MG Chew Take 1 tablet (81 mg total) by mouth once daily. 90 tablet 3    budesonide (PULMICORT) 0.5 mg/2 mL nebulizer solution Take 2 mLs (0.5 mg total) by nebulization 2 (two) times daily. Controller 120 mL 3    clopidogreL (PLAVIX) 75 mg tablet TAKE 1 TABLET BY  MOUTH EVERY DAY (Patient taking differently: Take 75 mg by mouth once daily.) 90 tablet 3    cyproheptadine (PERIACTIN) 4 mg tablet TAKE 1 TABLET BY MOUTH 3 TIMES DAILY AS NEEDED. 270 tablet 1    droNABinol (MARINOL) 2.5 MG capsule Take 1 capsule (2.5 mg total) by mouth 2 (two) times daily before meals. 60 capsule 3    EScitalopram oxalate (LEXAPRO) 10 MG tablet Take 1 tablet (10 mg total) by mouth once daily. 30 tablet 11    fluticasone propionate (FLONASE) 50 mcg/actuation nasal spray 1 spray (50 mcg total) by Each Nostril route 2 (two) times a day. 16 g 11    formoterol (PERFOROMIST) 20 mcg/2 mL Nebu Take 2 mLs (20 mcg total) by nebulization 2 (two) times a day. Controller 90 mL 3    hydroCHLOROthiazide (HYDRODIURIL) 12.5 MG Tab TAKE 1 TABLET BY MOUTH EVERY DAY (Patient taking differently: Take 12.5 mg by mouth once daily.) 90 tablet 1    ibuprofen (ADVIL,MOTRIN) 600 MG tablet Take 1 tablet (600 mg total) by mouth every 6 (six) hours as needed for Pain. 20 tablet 0    Lactobac no.41/Bifidobact no.7 (PROBIOTIC-10 ORAL) Take 1 capsule by mouth once daily.      melatonin 5 mg Cap Take 5 mg by mouth nightly as needed. TAKE AS SCHEDULED      methocarbamoL (ROBAXIN) 500 MG Tab Take 500 mg by mouth daily as needed.      metoprolol succinate (TOPROL-XL) 25 MG 24 hr tablet Take 1 tablet (25 mg total) by mouth once daily. 30 tablet 11    multivitamin (THERAGRAN) per tablet Take 1 tablet by mouth once daily.       pantoprazole (PROTONIX) 40 MG tablet TAKE 1 TABLET BY MOUTH EVERY DAY (Patient taking differently: Take 40 mg by mouth nightly.) 90 tablet 3    pramipexole (MIRAPEX) 1 MG tablet TAKE 1 TABLET BY MOUTH EVERY DAY NIGHTLY 90 tablet 3    pregabalin (LYRICA) 50 MG capsule Take 1 capsule (50 mg total) by mouth every evening. 30 capsule 2    rosuvastatin (CRESTOR) 40 MG Tab TAKE 1 TABLET BY MOUTH EVERY DAY 90 tablet 3    scopolamine (TRANSDERM-SCOP) 1.3-1.5 mg (1 mg over 3 days) Place 1 patch onto the skin every 72 hours  "as needed. As needed      telmisartan (MICARDIS) 40 MG Tab TAKE 1 TABLET BY MOUTH EVERY DAY (Patient taking differently: Take 40 mg by mouth once daily.) 90 tablet 2    triamcinolone acetonide 0.1% (KENALOG) 0.1 % cream APPLY TOPICALLY 2 TIMES DAILY AS NEEDED. (Patient taking differently: Apply topically 2 (two) times daily as needed.) 45 g 5     No current facility-administered medications for this visit.     Facility-Administered Medications Ordered in Other Visits   Medication Dose Route Frequency Provider Last Rate Last Admin    lactated ringers infusion   Intravenous Continuous Braulio Best MD 75 mL/hr at 03/04/24 1104 New Bag at 03/04/24 1104    ondansetron injection 4 mg  4 mg Intravenous Daily PRN Gustavo Chu MD        sodium chloride 0.9% flush 10 mL  10 mL Intravenous PRN Braulio Best MD           Review of Systems   Constitutional:  Negative for appetite change, chills, diaphoresis, fatigue, fever and unexpected weight change.   HENT:  Negative for mouth sores.    Eyes:  Negative for visual disturbance.   Respiratory:  Positive for shortness of breath. Negative for cough.    Cardiovascular:  Positive for palpitations. Negative for chest pain.   Gastrointestinal:  Negative for abdominal pain, constipation, diarrhea, nausea and vomiting.   Genitourinary:  Negative for frequency.   Musculoskeletal:  Negative for back pain.   Skin:  Negative for color change and rash.   Neurological:  Negative for headaches.   Hematological:  Negative for adenopathy. Does not bruise/bleed easily.   Psychiatric/Behavioral:  The patient is not nervous/anxious.        ECOG Performance Status: 1   Objective:      Vitals:   Vitals:    05/13/24 1120   BP: 130/70   BP Location: Right arm   Patient Position: Sitting   BP Method: Medium (Manual)   Pulse: (!) 40   Resp: (!) 24   Temp: 97 °F (36.1 °C)   TempSrc: Temporal   SpO2: (!) 92%   Weight: 94.3 kg (207 lb 14.3 oz)   Height: 5' 10" (1.778 m) "         Physical Exam  Constitutional:       General: He is not in acute distress.     Appearance: He is well-developed. He is not diaphoretic.   HENT:      Head: Normocephalic and atraumatic.   Cardiovascular:      Rate and Rhythm: Normal rate and regular rhythm.      Heart sounds: Normal heart sounds. No murmur heard.     No friction rub. No gallop.   Pulmonary:      Effort: Pulmonary effort is normal. No respiratory distress.      Breath sounds: Normal breath sounds. No wheezing or rales.   Chest:      Chest wall: No tenderness.   Abdominal:      General: Bowel sounds are normal. There is no distension.      Palpations: Abdomen is soft. There is no mass.      Tenderness: There is no abdominal tenderness. There is no rebound.   Musculoskeletal:      Cervical back: Normal range of motion.   Lymphadenopathy:      Cervical: No cervical adenopathy.      Upper Body:      Right upper body: No supraclavicular or axillary adenopathy.      Left upper body: No supraclavicular or axillary adenopathy.   Skin:     General: Skin is warm and dry.      Findings: No erythema or rash.   Neurological:      Mental Status: He is alert and oriented to person, place, and time.   Psychiatric:         Behavior: Behavior normal.         Laboratory Data:  No visits with results within 1 Week(s) from this visit.   Latest known visit with results is:   Hospital Outpatient Visit on 05/03/2024   Component Date Value Ref Range Status    Event Monitor Day 05/03/2024 0   Final    holter length minutes 05/03/2024 0   Final    Holter length hours 05/03/2024 48   Final    holter length dec hours 05/03/2024 48.00   Final    Sinus min HR 05/03/2024 41   Final    Sinus max hr 05/03/2024 109   Final    Sinus avg hr 05/03/2024 77   Final         Imaging:     PET/CT 3/22/24  Neck: No abnormal radiotracer uptake to indicate metastatic disease.     Chest: There are enlarged lymph nodes in the superior mediastinum, anterior mediastinum, periaortic region,  pretracheal and subcarina, and left para esophageal region near the diaphragm hiatus. Mild uptake in the region of the right hilum. The pretracheal lymph node measures 1.2 cm on image 88 series 3 with activity up to 3.5 SUV.     Bilateral hilar lymph nodes are small by imaging criteria the demonstrate minimal activity measuring up to 1 S UV.     Moderately prominent peripheral distribution intra lobular interstitial thickening. Scattered areas of more confluent subpleural ill-defined opacity most significant in the left mid lung zone anterolateral region. No significant uptake.     Abdomen and pelvis: There is uptake in the region of the right posterior prostate measuring up to 9.1 SUV. There is localized uptake at the rectoanal junction measuring up to 4.5 SUV. Otherwise no abnormal radiotracer uptake to indicate malignancy.     Atherosclerotic calcification noted.     Musculoskeletal system: No abnormal radiotracer uptake, sclerosis, lucency to indicate metastatic disease to bone. Uptake associated with a nonpathologic appearing fracture of the posterior aspect of the right 11th rib. Chronic appearing rib deformities of also noted additionally.       Assessment:       1. Abnormal laboratory test result    2. Pulmonary fibrosis    3. Localized enlarged lymph nodes    4. Normocytic anemia    5. Other iron deficiency anemia    6. Positive AUGUSTA (antinuclear antibody)    7. History of bladder cancer               Plan:     Positive TIF1 Gamma Antibody - can be associated with risk for pulmonary fibrosis and for occult malignancy  -Pt with h/o non invasive bladder cancer  -PSA on 1/08/24 was normal at 0.49ng/mL  -CT C/A/P on 2/27/24 showed thickening of the colon and mediastinal, paraesophageal LAD  -Colonoscopy 3/04/24 showed MEG  -Cystoscopy on 3/21/24 showed no evidence of recurrence  -PET/CT 3/22/24 showed enlarged mediastinal LN's  lymph nodes and superior mediastinum, anterior mediastinum, periaortic region, -EUS  on 05/08/2024 with biopsy of subcarinal mediastinum station 7 lymph node had path showing positive for lm material but negative for malignant cells  -Will repeat Ct chest to assess mediastinal LAD    Pulmonary Fibrosis - managed by Dr Arellano  -Unclear etiology but possibly related to Positive TIF1 Gamma Antibody as above    Lymphadenopathy - see above    Iron Deficiency Anemia - pt given iron infusions 4/02/24 and 4/09/24 with injectafer  -Will repeat iron studies next month  -Hemoglobin improving to 11.1g/dL on 4/17/24  -Unclear cause as colonoscopy and EUS showed no significant findings    Bladder Cancer -  history of noninvasive bladder cancer under the care of Dr. Lr with prior T1 disease  -Pt status post bladder installation of mitomycin-C and BCG  -Last cystoscopy on 08/17/2023 with no lesions seen  -Repeat cystoscopy 3/21/2024 MEG  -Pt to have it repeated in 9 months    Positive AUGUSTA - unclear if responsible for disease  -Consider rheumatology referral if work up for malignancy negative    Route Chart for Scheduling    Med Onc Chart Routing      Follow up with physician 1 month. Pt needs a ferritin, iron/TIBC, MEME, FLC assay and CT chest in a month.  Pt needs an appt with me a week after the labs.   Follow up with MAXI    Infusion scheduling note    Injection scheduling note    Labs    Imaging    Pharmacy appointment    Other referrals                Supportive Plan Information  OP FERRIC CARBOXYMALTOSE Q2W   Fredi West MD   Upcoming Treatment Dates - OP FERRIC CARBOXYMALTOSE Q2W    No upcoming days in selected categories.      Fredi West MD  [facility]  Hematology and Oncology  [facility] [address]   [place] [address]   O: [phone]  F: [phone]

## 2024-05-13 ENCOUNTER — OFFICE VISIT (OUTPATIENT)
Dept: HEMATOLOGY/ONCOLOGY | Facility: CLINIC | Age: 69
End: 2024-05-13
Payer: MEDICARE

## 2024-05-13 VITALS
HEIGHT: 70 IN | RESPIRATION RATE: 24 BRPM | WEIGHT: 207.88 LBS | OXYGEN SATURATION: 92 % | BODY MASS INDEX: 29.76 KG/M2 | HEART RATE: 40 BPM | DIASTOLIC BLOOD PRESSURE: 70 MMHG | TEMPERATURE: 97 F | SYSTOLIC BLOOD PRESSURE: 130 MMHG

## 2024-05-13 DIAGNOSIS — R76.8 POSITIVE ANA (ANTINUCLEAR ANTIBODY): ICD-10-CM

## 2024-05-13 DIAGNOSIS — D64.9 NORMOCYTIC ANEMIA: ICD-10-CM

## 2024-05-13 DIAGNOSIS — R59.0 LOCALIZED ENLARGED LYMPH NODES: ICD-10-CM

## 2024-05-13 DIAGNOSIS — Z85.51 HISTORY OF BLADDER CANCER: ICD-10-CM

## 2024-05-13 DIAGNOSIS — D50.8 OTHER IRON DEFICIENCY ANEMIA: ICD-10-CM

## 2024-05-13 DIAGNOSIS — J84.10 PULMONARY FIBROSIS: ICD-10-CM

## 2024-05-13 DIAGNOSIS — R89.9 ABNORMAL LABORATORY TEST RESULT: Primary | ICD-10-CM

## 2024-05-13 PROCEDURE — 99999 PR PBB SHADOW E&M-EST. PATIENT-LVL V: CPT | Mod: PBBFAC,,, | Performed by: INTERNAL MEDICINE

## 2024-05-13 PROCEDURE — 99214 OFFICE O/P EST MOD 30 MIN: CPT | Mod: S$PBB,,, | Performed by: INTERNAL MEDICINE

## 2024-05-13 PROCEDURE — 99215 OFFICE O/P EST HI 40 MIN: CPT | Mod: PBBFAC,PN | Performed by: INTERNAL MEDICINE

## 2024-05-17 ENCOUNTER — OFFICE VISIT (OUTPATIENT)
Dept: NEUROLOGY | Facility: CLINIC | Age: 69
End: 2024-05-17
Payer: MEDICARE

## 2024-05-17 VITALS — SYSTOLIC BLOOD PRESSURE: 120 MMHG | DIASTOLIC BLOOD PRESSURE: 73 MMHG | HEART RATE: 70 BPM

## 2024-05-17 DIAGNOSIS — G25.81 RESTLESS LEGS SYNDROME (RLS): ICD-10-CM

## 2024-05-17 PROCEDURE — 99999 PR PBB SHADOW E&M-EST. PATIENT-LVL III: CPT | Mod: PBBFAC,GC,,

## 2024-05-17 PROCEDURE — 99499 UNLISTED E&M SERVICE: CPT | Mod: S$PBB,,, | Performed by: PSYCHIATRY & NEUROLOGY

## 2024-05-17 PROCEDURE — 99214 OFFICE O/P EST MOD 30 MIN: CPT | Mod: S$PBB,GC,ICN, | Performed by: PSYCHIATRY & NEUROLOGY

## 2024-05-17 PROCEDURE — 99213 OFFICE O/P EST LOW 20 MIN: CPT | Mod: PBBFAC

## 2024-05-17 RX ORDER — PREGABALIN 75 MG/1
75 CAPSULE ORAL NIGHTLY
Qty: 30 CAPSULE | Refills: 2 | Status: SHIPPED | OUTPATIENT
Start: 2024-05-17 | End: 2024-08-15

## 2024-05-17 NOTE — PROGRESS NOTES
"[facility] - NEUROLOGY [address]TH [address] [facility], [place]    Date: 5/17/24  Patient Name: Moustapha Murray   MRN: [id number]   PCP: Colton Johnson  Referring Provider: No ref. provider found    Assessment:   Moustapha Murray is a 69 y.o. male presenting for follow up of RLS. Remains poorly controlled on current regimen. Has gotten iron infusions and continuing to f/u w/ hematology in regards to BELLE. Discussed effects of augmentation w/ dopamine agonists. Will switch to rotigotine patch from mirapex. Also can increase lyrica to 75mg QHS.   Increase magnesium to 400mg (can do 200mg BID)  Lifestyle modifications (decrease caffeine/alcohol)  RTC in 3 months    Plan:     Problem List Items Addressed This Visit          Neuro    Restless legs syndrome (RLS)    Relevant Medications    pregabalin (LYRICA) 75 MG capsule    rotigotine 3 mg/24 hour PT24       Kade Prather MD      Subjective:   Interval Hx 5/17/24:  Plan from last visit:  "Decrease Mirapex to 1mg twice a day  Continue with your iron infusions  Check labs as above  Start taking lyrica (pregabalin) only at night time - starting at 50mg and can increasae  Start taking magnesium glycinate and ensure that it is at least 400mg  RTC in 6 weeks"    Patient underwent iron infusions on 04/02/2024 and 04/09/2024.     From hematology:  "-Will repeat iron studies next month  -Hemoglobin improving to 11.1g/dL on 4/17/24 (from 9.8)  -Unclear cause as colonoscopy and EUS showed no significant findings"    Started lyrica   Got iron infusions pending repeat studies  Can't tell difference w/ lyrica but no side effects  Current meds: mirapex 1mg QHS and lyrica 50mg QHS; still takes magnesium but only 200mg    Denies gambling/impulsive behaviors       HPI 4/3/24:   Mr. Moustapha Murray is a 69 y.o. male presenting for evaluation of RLS    Feels like legs have "add" and always need to move  No tingling, not much pain, sometimes " sensitive to touch  If up walking around gets better  Present all the time but worse at night  Hasn't sleep all night in many years- not sure if from legs or overactive mind  Temporary relief from moving/taking aleeve     Dx w/ RLS over 25 years ago- used to take requip (ropinerole) - wasn't working well- changed to mirapex in 2014 - was controlled on 0.5g mirapex from 2014 to 2021 when his dose was increased to 1mg   12/2023 was having worsening symptoms and was changed from mirapex to gabapentin - gabapentin made him too sleepy and messed up sleep schedule so he switched back to mirapex - was increased to 1.5mg BID and referred to neurology    Had fall while on gabapentin    Takes magnesium BID     Can't tell much difference    Wears compression stockings- helps w/ long car rides    Has BELLE- sees hematology- started on iron infusions yesterday    Lost 30lbs last 3 months- unknown why- ordered appetite stimulants    +TIF1 gamma - unclear from what    Iron labs:  Iron 45 - 160 ug/dL 30 Low    Transferrin 200 - 375 mg/dL 344   TIBC 250 - 450 ug/dL 509 High    Saturated Iron 20 - 50 % 6 Low      Ferritin: 127      PAST MEDICAL HISTORY:    Pulmonary fibrosis/ILD  TASIA  Past Medical History:   Diagnosis Date    Anticoagulant long-term use     Bladder cancer 08/2020    Colon polyps     Coronary artery disease 01/31/2023    CORONARY STENTSx3    DDD (degenerative disc disease), lumbar     Depression     Fatty liver     GERD (gastroesophageal reflux disease)     H/O motion sickness     Hypertension     PONV (postoperative nausea and vomiting)     Pulmonary fibrosis     RLS (restless legs syndrome)     Sleep apnea, unspecified     uses cpap       PAST SURGICAL HISTORY:  Past Surgical History:   Procedure Laterality Date    ANGIOGRAM, CORONARY, WITH LEFT HEART CATHETERIZATION N/A 1/31/2023    Procedure: Left Heart Cath;  Surgeon: Nitin Wheat MD;  Location: [facility] CATH;  Service: Cardiology;  Laterality: N/A;    ANGIOGRAM,  CORONARY, WITH LEFT HEART CATHETERIZATION  4/17/2024    Procedure: Left Heart Cath;  Surgeon: Nitin Wheat MD;  Location: [facility] CATH;  Service: Cardiology;;    APPENDECTOMY      CARPAL TUNNEL RELEASE      CERVICAL FUSION      CHOLECYSTECTOMY      CIRCUMCISION      COLONOSCOPY N/A 3/16/2020    Procedure: COLONOSCOPY;  Surgeon: Braulio Best MD;  Location: [facility] ENDO;  Service: Endoscopy;  Laterality: N/A; 3 colon polyps removed; biopsy: tubular adenoma and hyperplastic; repeat in 5 years for surveillance    COLONOSCOPY N/A 3/4/2024    Procedure: COLONOSCOPY;  Surgeon: Braulio Best MD;  Location: [facility];  Service: Gastroenterology;  Laterality: N/A;    CORONARY ANGIOGRAPHY  2/13/2023    Procedure: ANGIOGRAM, CORONARY ARTERY;  Surgeon: Nitin Wheat MD;  Location: [facility] CATH;  Service: Cardiology;;    CORONARY ANGIOPLASTY WITH STENT PLACEMENT N/A 1/31/2023    Procedure: ANGIOPLASTY, CORONARY ARTERY, WITH STENT INSERTION;  Surgeon: Nitin Wheat MD;  Location: [facility] CATH;  Service: Cardiology;  Laterality: N/A;    CYSTOSCOPY W/ RETROGRADES Bilateral 8/24/2020    Procedure: CYSTOSCOPY, WITH RETROGRADE PYELOGRAM -WITH URETHRAL DILATION;  Surgeon: Greg Claudio MD;  Location: [facility] OR;  Service: Urology;  Laterality: Bilateral;    CYSTOSCOPY W/ URETERAL STENT PLACEMENT Right 8/24/2020    Procedure: CYSTOSCOPY, WITH URETERAL STENT INSERTION;  Surgeon: Greg Claudio MD;  Location: [facility] OR;  Service: Urology;  Laterality: Right;    ENDOSCOPIC ULTRASOUND OF UPPER GASTROINTESTINAL TRACT Left 5/8/2024    Procedure: ULTRASOUND, UPPER GI TRACT, ENDOSCOPIC;  Surgeon: Brad Ochoa MD;  Location: [facility];  Service: Endoscopy;  Laterality: Left;    ESOPHAGOGASTRODUODENOSCOPY N/A 5/8/2024    Procedure: EGD (ESOPHAGOGASTRODUODENOSCOPY);  Surgeon: Brad Ochoa MD;  Location: [facility];  Service: Endoscopy;  Laterality: N/A;    INSTILLATION OF URINARY BLADDER N/A 8/24/2020    Procedure: INSTILLATION,  BLADDER - Mitomycin;  Surgeon: Greg Claudio MD;  Location: [facility] OR;  Service: Urology;  Laterality: N/A;    IVUS, CORONARY  1/31/2023    Procedure: IVUS, Coronary;  Surgeon: Nitin Wheat MD;  Location: [facility] CATH;  Service: Cardiology;;    IVUS, CORONARY  2/13/2023    Procedure: IVUS, Coronary;  Surgeon: Nitin Wheat MD;  Location: [facility] CATH;  Service: Cardiology;;    PERCUTANEOUS CORONARY INTERVENTION, ARTERY  2/13/2023    Procedure: Percutaneous coronary intervention;  Surgeon: Nitin Wheat MD;  Location: [facility] CATH;  Service: Cardiology;;    TRANSFORAMINAL EPIDURAL INJECTION OF STEROID Right 7/17/2019    Procedure: Injection,steroid,epidural,transforaminal approach L3-4, L4-5;  Surgeon: Chris Palencia MD;  Location: [facility] OR;  Service: Pain Management;  Laterality: Right;    TRANSFORAMINAL EPIDURAL INJECTION OF STEROID Right 8/28/2019    Procedure: Injection,steroid,epidural,transforaminal approach;  Surgeon: Chris Palencia MD;  Location: [facility] OR;  Service: Pain Management;  Laterality: Right;  L3-4, L4-L5    WISDOM TOOTH EXTRACTION         CURRENT MEDS:  Current Outpatient Medications   Medication Sig Dispense Refill    albuterol (PROVENTIL/VENTOLIN HFA) 90 mcg/actuation inhaler Inhale 2 puffs into the lungs every 6 (six) hours as needed for Wheezing. Rescue 18 g 6    aspirin 81 MG Chew Take 1 tablet (81 mg total) by mouth once daily. 90 tablet 3    budesonide (PULMICORT) 0.5 mg/2 mL nebulizer solution Take 2 mLs (0.5 mg total) by nebulization 2 (two) times daily. Controller 120 mL 3    clopidogreL (PLAVIX) 75 mg tablet TAKE 1 TABLET BY MOUTH EVERY DAY (Patient taking differently: Take 75 mg by mouth once daily.) 90 tablet 3    cyproheptadine (PERIACTIN) 4 mg tablet TAKE 1 TABLET BY MOUTH 3 TIMES DAILY AS NEEDED. 270 tablet 1    droNABinol (MARINOL) 2.5 MG capsule Take 1 capsule (2.5 mg total) by mouth 2 (two) times daily before meals. 60 capsule 3    EScitalopram oxalate (LEXAPRO) 10 MG tablet Take 1 tablet (10 mg  total) by mouth once daily. 30 tablet 11    fluticasone propionate (FLONASE) 50 mcg/actuation nasal spray 1 spray (50 mcg total) by Each Nostril route 2 (two) times a day. 16 g 11    formoterol (PERFOROMIST) 20 mcg/2 mL Nebu Take 2 mLs (20 mcg total) by nebulization 2 (two) times a day. Controller 90 mL 3    hydroCHLOROthiazide (HYDRODIURIL) 12.5 MG Tab TAKE 1 TABLET BY MOUTH EVERY DAY (Patient taking differently: Take 12.5 mg by mouth once daily.) 90 tablet 1    ibuprofen (ADVIL,MOTRIN) 600 MG tablet Take 1 tablet (600 mg total) by mouth every 6 (six) hours as needed for Pain. 20 tablet 0    Lactobac no.41/Bifidobact no.7 (PROBIOTIC-10 ORAL) Take 1 capsule by mouth once daily.      melatonin 5 mg Cap Take 5 mg by mouth nightly as needed. TAKE AS SCHEDULED      methocarbamoL (ROBAXIN) 500 MG Tab Take 500 mg by mouth daily as needed.      metoprolol succinate (TOPROL-XL) 25 MG 24 hr tablet Take 1 tablet (25 mg total) by mouth once daily. 30 tablet 11    multivitamin (THERAGRAN) per tablet Take 1 tablet by mouth once daily.       pantoprazole (PROTONIX) 40 MG tablet TAKE 1 TABLET BY MOUTH EVERY DAY (Patient taking differently: Take 40 mg by mouth nightly.) 90 tablet 3    rosuvastatin (CRESTOR) 40 MG Tab TAKE 1 TABLET BY MOUTH EVERY DAY 90 tablet 3    scopolamine (TRANSDERM-SCOP) 1.3-1.5 mg (1 mg over 3 days) Place 1 patch onto the skin every 72 hours as needed. As needed      telmisartan (MICARDIS) 40 MG Tab TAKE 1 TABLET BY MOUTH EVERY DAY (Patient taking differently: Take 40 mg by mouth once daily.) 90 tablet 2    triamcinolone acetonide 0.1% (KENALOG) 0.1 % cream APPLY TOPICALLY 2 TIMES DAILY AS NEEDED. (Patient taking differently: Apply topically 2 (two) times daily as needed.) 45 g 5    pregabalin (LYRICA) 75 MG capsule Take 1 capsule (75 mg total) by mouth every evening. 30 capsule 2    rotigotine 3 mg/24 hour PT24 Place 1 patch onto the skin once daily. Change patch at the same time every day. 30 patch 3     No  current facility-administered medications for this visit.     Facility-Administered Medications Ordered in Other Visits   Medication Dose Route Frequency Provider Last Rate Last Admin    lactated ringers infusion   Intravenous Continuous Braulio Best MD 75 mL/hr at 03/04/24 1104 New Bag at 03/04/24 1104    ondansetron injection 4 mg  4 mg Intravenous Daily PRN Gustavo Chu MD        sodium chloride 0.9% flush 10 mL  10 mL Intravenous PRN Braulio Best MD           ALLERGIES:  Review of patient's allergies indicates:   Allergen Reactions    Sulfa (sulfonamide antibiotics) Anaphylaxis       FAMILY HISTORY:  Family History   Problem Relation Name Age of Onset    Stroke Mother      Ulcerative colitis Mother      Kidney cancer Mother      Cancer Neg Hx      Colon cancer Neg Hx      Crohn's disease Neg Hx      Celiac disease Neg Hx         SOCIAL HISTORY:  Social History     Tobacco Use    Smoking status: Former     Current packs/day: 0.00     Types: Cigarettes     Quit date: 10/16/2013     Years since quitting: 10.5    Smokeless tobacco: Never   Substance Use Topics    Alcohol use: Yes     Alcohol/week: 2.0 standard drinks of alcohol     Types: 2 Cans of beer per week     Comment: couple beers weekly    Drug use: Not Currently       Review of Systems:  12 system review of systems is negative except for the symptoms mentioned in HPI.      Objective:     Vitals:    05/17/24 1350   BP: 120/73   Pulse: 70       General: NAD, well-appearing  Eyes: no discharge or erythema   ENT: mucous membranes moist  Neck: Supple, full range of motion  Lungs: Normal work of breathing, not in respiratory distress  Skin: No rash or lesions  Psychiatry: Mood and affect are appropriate   Abdomen: flat, non-distended  Extremeties: No cyanosis or edema.    Neurological Exam:  MENTAL STATUS: Alert and oriented to person, place, and time. Attention and concentration within normal limits. Speech without dysarthria, able to  name and repeat without difficulty. Recent and remote memory within normal limits.   CRANIAL NERVES: Visual fields intact. PERRL. EOMI. Facial sensation intact. Facial expression symmetrical. Hearing grossly intact. Full shoulder shrug bilaterally.   SENSORY: Sensation is intact to light touch throughout. Negative Romberg.  MOTOR: Normal bulk and tone. 5/5 deltoid, biceps, triceps, interosseous, hand  bilaterally. 5/5 hip flexion/extension, knee extension/flexion, and dorsi-/plantarflexion bilaterally.    REFLEXES: Symmetric and 2+ throughout.  CEREBELLAR/COORDINATION/GAIT: Gait steady with normal arm swing and stride length. Finger to nose intact.    Labs/Imaging:  Lab Results   Component Value Date    IRON 30 (L) 03/01/2024    TRANSFERRIN 344 03/01/2024    TIBC 509 (H) 03/01/2024    FESATURATED 6 (L) 03/01/2024      Lab Results   Component Value Date    FERRITIN 127 03/01/2024

## 2024-05-18 ENCOUNTER — PATIENT MESSAGE (OUTPATIENT)
Dept: CARDIOLOGY | Facility: CLINIC | Age: 69
End: 2024-05-18
Payer: MEDICARE

## 2024-05-24 NOTE — PROGRESS NOTES
I have reviewed the notes, assessments, and/or procedures performed this visit, and I concur with the documentation.    I was present for the visit and personally examined the patient.    Darshana Abbasi MD  Department of Neurology  Neuro-oncology, Movement Disorders

## 2024-06-04 PROBLEM — I25.118 CORONARY ARTERY DISEASE OF NATIVE ARTERY OF NATIVE HEART WITH STABLE ANGINA PECTORIS: Status: ACTIVE | Noted: 2024-06-04

## 2024-06-04 PROBLEM — I28.8 OTHER DISEASES OF PULMONARY VESSELS: Status: ACTIVE | Noted: 2024-06-04

## 2024-06-04 PROBLEM — I49.3 PREMATURE VENTRICULAR CONTRACTIONS (PVCS) (VPCS): Status: ACTIVE | Noted: 2024-06-04

## 2024-06-04 NOTE — PROGRESS NOTES
Subjective   Patient ID:  Moustapha Murray is a 69 y.o. male who presents for evaluation of PVCs      HPI 68 yo male with PVC's, CAD, Htn, Bladder Cancer, COPD and pulmonary fibrosis.  Primary cardiologist is Dr. Wheat.    Has been reported to have low HR's, ie on recent colonoscopy.  Notes occasional chest pain over the last couple of months. Notes fatigue since initial CAD. Denies syncope.  3/7/24 PVC burden 31.4%.     Toprol 25 mg daily added.    48 hr holter 5/13/24 nsr with 78003 PVC's (22.5% burden), NSVT up to 4 consecutive beats, monomorphic.  LHC 5/3/24 stable CAD with patent stents    Echo 4/4/24    Left Ventricle: The left ventricle is normal in size. Normal wall thickness. There is normal systolic function with a visually estimated ejection fraction of 55 - 60%.    Right Ventricle: Normal right ventricular cavity size. Systolic function is normal.    Left Atrium: Left atrium is moderately dilated.    Right Atrium: Right atrium is dilated.    Aortic Valve: The aortic valve is a trileaflet valve. There is aortic valve sclerosis.    Mitral Valve: There is mild regurgitation.    Tricuspid Valve: There is mild to moderate regurgitation.    Pulmonary Artery: There is borderline elevated pulmonary hypertension. The estimated pulmonary artery systolic pressure is 36 mmHg.    IVC/SVC: Normal venous pressure at 3 mmHg.            Review of Systems   Constitutional: Positive for malaise/fatigue. Negative for fever.   HENT:  Negative for congestion and sore throat.    Cardiovascular:  Negative for chest pain, dyspnea on exertion, irregular heartbeat, leg swelling, near-syncope, orthopnea, palpitations, paroxysmal nocturnal dyspnea and syncope.   Respiratory:  Positive for shortness of breath. Negative for cough.    Gastrointestinal:  Negative for abdominal pain, constipation and diarrhea.   Neurological:  Negative for dizziness, light-headedness and weakness.   Psychiatric/Behavioral:  Negative for depression.  The patient is not nervous/anxious.    All other systems reviewed and are negative.         Objective     Physical Exam  Constitutional:       Appearance: He is well-developed.   Eyes:      General: No scleral icterus.     Conjunctiva/sclera: Conjunctivae normal.   Neck:      Vascular: No JVD.      Trachea: No tracheal deviation.   Cardiovascular:      Rate and Rhythm: Normal rate and regular rhythm.      Chest Wall: PMI is not displaced.      Heart sounds: Normal heart sounds.   Pulmonary:      Effort: Pulmonary effort is normal. No respiratory distress.      Breath sounds: Normal breath sounds.   Abdominal:      Palpations: Abdomen is soft.      Tenderness: There is no abdominal tenderness.   Musculoskeletal:         General: No tenderness.   Skin:     General: Skin is warm and dry.      Findings: No rash.   Neurological:      Mental Status: He is alert and oriented to person, place, and time.   Psychiatric:         Behavior: Behavior normal.            Assessment and Plan     1. Premature ventricular contractions (PVCs) (VPCs)    2. Coronary artery disease of native artery of native heart with stable angina pectoris    3. Primary hypertension    4. Malignant neoplasm of lateral wall of urinary bladder        Plan:       Very frequent monomorphic PVC's. Unclear whether symptomatic, suspect no. EF normal.  Discussed options of 1) continue/increase Toprol 2) Alternative agent, ie verapamil 3) RFA.  Given paucity of symptoms and normal EF >> discontinue Toprol and add Verapamil 240 mg daily.  Repeat monitor in 6 months, f/u upon completion.

## 2024-06-05 ENCOUNTER — OFFICE VISIT (OUTPATIENT)
Dept: ELECTROPHYSIOLOGY | Facility: CLINIC | Age: 69
End: 2024-06-05
Payer: MEDICARE

## 2024-06-05 ENCOUNTER — HOSPITAL ENCOUNTER (OUTPATIENT)
Dept: CARDIOLOGY | Facility: CLINIC | Age: 69
Discharge: HOME | End: 2024-06-05
Payer: MEDICARE

## 2024-06-05 VITALS
HEART RATE: 74 BPM | HEIGHT: 70 IN | BODY MASS INDEX: 29.63 KG/M2 | DIASTOLIC BLOOD PRESSURE: 68 MMHG | SYSTOLIC BLOOD PRESSURE: 126 MMHG | WEIGHT: 207 LBS

## 2024-06-05 DIAGNOSIS — I49.3 PREMATURE VENTRICULAR CONTRACTIONS (PVCS) (VPCS): Primary | ICD-10-CM

## 2024-06-05 DIAGNOSIS — I25.118 CORONARY ARTERY DISEASE OF NATIVE ARTERY OF NATIVE HEART WITH STABLE ANGINA PECTORIS: ICD-10-CM

## 2024-06-05 DIAGNOSIS — I49.3 PVC'S (PREMATURE VENTRICULAR CONTRACTIONS): ICD-10-CM

## 2024-06-05 DIAGNOSIS — C67.2 MALIGNANT NEOPLASM OF LATERAL WALL OF URINARY BLADDER: ICD-10-CM

## 2024-06-05 DIAGNOSIS — J84.10 PULMONARY FIBROSIS: ICD-10-CM

## 2024-06-05 DIAGNOSIS — I10 PRIMARY HYPERTENSION: ICD-10-CM

## 2024-06-05 LAB
OHS QRS DURATION: 84 MS
OHS QTC CALCULATION: 479 MS

## 2024-06-05 PROCEDURE — 99213 OFFICE O/P EST LOW 20 MIN: CPT | Mod: PBBFAC,25 | Performed by: INTERNAL MEDICINE

## 2024-06-05 PROCEDURE — 99999 PR PBB SHADOW E&M-EST. PATIENT-LVL III: CPT | Mod: PBBFAC,,, | Performed by: INTERNAL MEDICINE

## 2024-06-05 PROCEDURE — 93005 ELECTROCARDIOGRAM TRACING: CPT | Mod: PBBFAC | Performed by: INTERNAL MEDICINE

## 2024-06-05 PROCEDURE — 93010 ELECTROCARDIOGRAM REPORT: CPT | Mod: S$PBB,,, | Performed by: INTERNAL MEDICINE

## 2024-06-05 PROCEDURE — 99205 OFFICE O/P NEW HI 60 MIN: CPT | Mod: S$PBB,,, | Performed by: INTERNAL MEDICINE

## 2024-06-05 RX ORDER — VERAPAMIL HYDROCHLORIDE 240 MG/1
240 TABLET, FILM COATED, EXTENDED RELEASE ORAL NIGHTLY
Qty: 30 TABLET | Refills: 11 | Status: SHIPPED | OUTPATIENT
Start: 2024-06-05 | End: 2025-06-05

## 2024-06-10 DIAGNOSIS — R59.0 VIRCHOW'S NODE: Primary | ICD-10-CM

## 2024-06-11 ENCOUNTER — HOSPITAL ENCOUNTER (OUTPATIENT)
Dept: RADIOLOGY | Facility: HOSPITAL | Age: 69
Discharge: HOME | End: 2024-06-11
Attending: INTERNAL MEDICINE
Payer: MEDICARE

## 2024-06-11 DIAGNOSIS — R59.0 LOCALIZED ENLARGED LYMPH NODES: ICD-10-CM

## 2024-06-11 PROCEDURE — 71260 CT THORAX DX C+: CPT | Mod: 26,,, | Performed by: RADIOLOGY

## 2024-06-11 PROCEDURE — 71260 CT THORAX DX C+: CPT | Mod: TC,PO

## 2024-06-11 PROCEDURE — 25500020 PHARM REV CODE 255: Mod: PO | Performed by: INTERNAL MEDICINE

## 2024-06-11 RX ADMIN — IOHEXOL 75 ML: 350 INJECTION, SOLUTION INTRAVENOUS at 10:06

## 2024-06-14 DIAGNOSIS — F32.A DEPRESSION, UNSPECIFIED DEPRESSION TYPE: ICD-10-CM

## 2024-06-14 RX ORDER — ESCITALOPRAM OXALATE 10 MG/1
10 TABLET ORAL
Qty: 90 TABLET | Refills: 1 | Status: SHIPPED | OUTPATIENT
Start: 2024-06-14

## 2024-06-14 NOTE — TELEPHONE ENCOUNTER
Refill Decision Note   Moustapha Murray  is requesting a refill authorization.  Brief Assessment and Rationale for Refill:  Approve     Medication Therapy Plan:         Comments:     Note composed:3:10 AM 06/14/2024

## 2024-06-14 NOTE — TELEPHONE ENCOUNTER
No care due was identified.  [facility] Embedded Care Due Messages. Reference number: [id number].   6/14/2024 12:32:56 AM CDT

## 2024-06-18 ENCOUNTER — OFFICE VISIT (OUTPATIENT)
Dept: HEMATOLOGY/ONCOLOGY | Facility: CLINIC | Age: 69
End: 2024-06-18
Payer: MEDICARE

## 2024-06-18 VITALS
WEIGHT: 210.56 LBS | BODY MASS INDEX: 30.14 KG/M2 | SYSTOLIC BLOOD PRESSURE: 114 MMHG | DIASTOLIC BLOOD PRESSURE: 60 MMHG | OXYGEN SATURATION: 95 % | HEART RATE: 61 BPM | TEMPERATURE: 96 F | RESPIRATION RATE: 18 BRPM | HEIGHT: 70 IN

## 2024-06-18 DIAGNOSIS — R76.8 POSITIVE ANA (ANTINUCLEAR ANTIBODY): ICD-10-CM

## 2024-06-18 DIAGNOSIS — R59.0 LOCALIZED ENLARGED LYMPH NODES: ICD-10-CM

## 2024-06-18 DIAGNOSIS — R89.9 ABNORMAL LABORATORY TEST RESULT: ICD-10-CM

## 2024-06-18 DIAGNOSIS — J84.10 PULMONARY FIBROSIS: ICD-10-CM

## 2024-06-18 DIAGNOSIS — R76.8 ELEVATED SERUM IMMUNOGLOBULIN FREE LIGHT CHAIN LEVEL: Primary | ICD-10-CM

## 2024-06-18 DIAGNOSIS — Z85.51 HISTORY OF BLADDER CANCER: ICD-10-CM

## 2024-06-18 DIAGNOSIS — D64.9 NORMOCYTIC ANEMIA: ICD-10-CM

## 2024-06-18 PROCEDURE — 99999 PR PBB SHADOW E&M-EST. PATIENT-LVL IV: CPT | Mod: PBBFAC,,, | Performed by: INTERNAL MEDICINE

## 2024-06-18 PROCEDURE — 99214 OFFICE O/P EST MOD 30 MIN: CPT | Mod: S$PBB,,, | Performed by: INTERNAL MEDICINE

## 2024-06-18 PROCEDURE — 99214 OFFICE O/P EST MOD 30 MIN: CPT | Mod: PBBFAC,PN | Performed by: INTERNAL MEDICINE

## 2024-06-18 RX ORDER — METOPROLOL SUCCINATE 25 MG/1
25 TABLET, EXTENDED RELEASE ORAL DAILY
COMMUNITY
End: 2024-06-21

## 2024-06-18 NOTE — PROGRESS NOTES
PATIENT: Moustapha Murray  MRN: [id number]  DATE: 6/18/2024      Diagnosis:   1. Elevated serum immunoglobulin free light chain level    2. Abnormal laboratory test result    3. Pulmonary fibrosis    4. Localized enlarged lymph nodes    5. Normocytic anemia    6. Positive AUGUSTA (antinuclear antibody)    7. History of bladder cancer              Chief Complaint: Abnormal laboratory test result (1 month follow up)      Oncologic History:      Oncologic History     Oncologic Treatment     Pathology           Subjective:    Interval History: Mr. Murray is a 69 y.o. male with CAD, Depression, fatty liver, GERD, HTN, RLS, pulmonary fibrosis, pulmonary fibrosis, h/o bladder cancer who presents for positive TIF1 gamma antibody.   Since the last clinic visit the patient underwent CT chest on 06/11/2024 showing multiple large lymph nodes throughout the mediastinum which have increasing size; centrilobular and paraseptal emphysematous changes with upper lobe predominance as well as extensive subpleural reticulation, interlobular septal thickening, scattered bronchiectasis throughout both lungs mildly progressed at the bases when compared to scan from February, 2024.  Labs on 06/11/2024 showed oligoclonal banding pattern on immunofixation electrophoresis and free light chain assay with elevated kappa light chains at 4.18 mg/dL, elevated lambda light chains at 3.21 mg/dL, and normal kappa lambda ratio 1.3.    The patient continues to have shortness breath with exertion.  The patient denies CP, cough, abdominal pain, nausea, vomiting, constipation, diarrhea.  The patient denies fever, chills, night sweats, weight loss, new lumps or bumps, easy bruising or bleeding.    Prior History:  Patient with a history of noninvasive bladder cancer under the care of Dr. Lr.  Patient last underwent cystoscopy on 08/17/2023 with no lesions seen.  Patient with history of T1 disease status post bladder installation of mitomycin-C and  BCG.  The patient is currently being followed by Dr. Arellano for pulmonary fibrosis and underwent laboratory testing on 1/08/24 showing positive TIF1 gamma antibody.  The patient is currently scheduled for colonoscopy 3/04/2024 and cystoscopy 3/21/2024.  PSA on 1/08/24 was normal at 0.49ng/mL.   The patient underwent a CT of the chest, abdomen, and pelvis on 02/27/2024 showing a lower right paratracheal enlarged lymph node measuring 10 mm; enlarged subcarinal lymph node measuring 14 mm; mildly enlarged lymph node adjacent to the distal esophagus; interstitial thickening bilaterally in the subpleural pattern suggesting fibrosis; diffuse fatty infiltration of the liver; anterior wedge compression fracture of L1 and T12; wall thickening in the region of the ascending colon and cecum.  CT of the neck showed no acute findings.   The patient underwent colonoscopy on 03/04/2024 which was normal.  Cystoscopy on 3/21/24 showed no evidence of recurrence.  PET/CT 3/22/24 showed enlarged lymph nodes and superior mediastinum, anterior mediastinum, periaortic region, pretracheal subcarinal, and left paraesophageal region of the diaphragm hiatus; moderate prominent peripheral distribution of interlobular interstitial thickening; area of uptake in the region of right posterior prostate.  Patient underwent iron infusions on 04/02/2024 and 04/09/2024.  Patient underwent EUS on 05/08/2024 with biopsy of subcarinal mediastinum station 7 lymph node with path showing positive for lm material but negative for malignant cells.    Past Medical History:   Past Medical History:   Diagnosis Date    Anticoagulant long-term use     Bladder cancer 08/2020    Colon polyps     Coronary artery disease 01/31/2023    CORONARY STENTSx3    DDD (degenerative disc disease), lumbar     Depression     Fatty liver     GERD (gastroesophageal reflux disease)     H/O motion sickness     Hypertension     PONV (postoperative nausea and vomiting)      Pulmonary fibrosis     RLS (restless legs syndrome)     Sleep apnea, unspecified     uses cpap       Past Surgical HIstory:   Past Surgical History:   Procedure Laterality Date    ANGIOGRAM, CORONARY, WITH LEFT HEART CATHETERIZATION N/A 1/31/2023    Procedure: Left Heart Cath;  Surgeon: Nitin Wheat MD;  Location: [facility] CATH;  Service: Cardiology;  Laterality: N/A;    ANGIOGRAM, CORONARY, WITH LEFT HEART CATHETERIZATION  4/17/2024    Procedure: Left Heart Cath;  Surgeon: Nitin Wheat MD;  Location: [facility] CATH;  Service: Cardiology;;    APPENDECTOMY      CARPAL TUNNEL RELEASE      CERVICAL FUSION      CHOLECYSTECTOMY      CIRCUMCISION      COLONOSCOPY N/A 3/16/2020    Procedure: COLONOSCOPY;  Surgeon: Braulio Best MD;  Location: [facility] ENDO;  Service: Endoscopy;  Laterality: N/A; 3 colon polyps removed; biopsy: tubular adenoma and hyperplastic; repeat in 5 years for surveillance    COLONOSCOPY N/A 3/4/2024    Procedure: COLONOSCOPY;  Surgeon: Braulio Best MD;  Location: [facility] ENDO;  Service: Gastroenterology;  Laterality: N/A;    CORONARY ANGIOGRAPHY  2/13/2023    Procedure: ANGIOGRAM, CORONARY ARTERY;  Surgeon: Nitin Wheat MD;  Location: [facility] CATH;  Service: Cardiology;;    CORONARY ANGIOPLASTY WITH STENT PLACEMENT N/A 1/31/2023    Procedure: ANGIOPLASTY, CORONARY ARTERY, WITH STENT INSERTION;  Surgeon: Nitin Wheat MD;  Location: [facility] CATH;  Service: Cardiology;  Laterality: N/A;    CYSTOSCOPY W/ RETROGRADES Bilateral 8/24/2020    Procedure: CYSTOSCOPY, WITH RETROGRADE PYELOGRAM -WITH URETHRAL DILATION;  Surgeon: Greg Claudio MD;  Location: [facility] OR;  Service: Urology;  Laterality: Bilateral;    CYSTOSCOPY W/ URETERAL STENT PLACEMENT Right 8/24/2020    Procedure: CYSTOSCOPY, WITH URETERAL STENT INSERTION;  Surgeon: Greg Claudio MD;  Location: [facility] OR;  Service: Urology;  Laterality: Right;    ENDOSCOPIC ULTRASOUND OF UPPER GASTROINTESTINAL TRACT Left 5/8/2024    Procedure:  ULTRASOUND, UPPER GI TRACT, ENDOSCOPIC;  Surgeon: Brad Ochoa MD;  Location: [facility] ENDO;  Service: Endoscopy;  Laterality: Left;    ESOPHAGOGASTRODUODENOSCOPY N/A 5/8/2024    Procedure: EGD (ESOPHAGOGASTRODUODENOSCOPY);  Surgeon: Brad Ochoa MD;  Location: [facility] ENDO;  Service: Endoscopy;  Laterality: N/A;    INSTILLATION OF URINARY BLADDER N/A 8/24/2020    Procedure: INSTILLATION, BLADDER - Mitomycin;  Surgeon: Greg Claudio MD;  Location: [facility] OR;  Service: Urology;  Laterality: N/A;    IVUS, CORONARY  1/31/2023    Procedure: IVUS, Coronary;  Surgeon: Nitin Wheat MD;  Location: [facility] CATH;  Service: Cardiology;;    IVUS, CORONARY  2/13/2023    Procedure: IVUS, Coronary;  Surgeon: Nitin Wheat MD;  Location: [facility] CATH;  Service: Cardiology;;    PERCUTANEOUS CORONARY INTERVENTION, ARTERY  2/13/2023    Procedure: Percutaneous coronary intervention;  Surgeon: Nitin Wheat MD;  Location: [facility] CATH;  Service: Cardiology;;    TRANSFORAMINAL EPIDURAL INJECTION OF STEROID Right 7/17/2019    Procedure: Injection,steroid,epidural,transforaminal approach L3-4, L4-5;  Surgeon: Chris Palencia MD;  Location: [facility] OR;  Service: Pain Management;  Laterality: Right;    TRANSFORAMINAL EPIDURAL INJECTION OF STEROID Right 8/28/2019    Procedure: Injection,steroid,epidural,transforaminal approach;  Surgeon: Chris Palencia MD;  Location: [facility] OR;  Service: Pain Management;  Laterality: Right;  L3-4, L4-L5    WISDOM TOOTH EXTRACTION         Family History:   Family History   Problem Relation Name Age of Onset    Stroke Mother      Ulcerative colitis Mother      Kidney cancer Mother      Cancer Neg Hx      Colon cancer Neg Hx      Crohn's disease Neg Hx      Celiac disease Neg Hx         Social History:  reports that he quit smoking about 10 years ago. His smoking use included cigarettes. He has never used smokeless tobacco. He reports current alcohol use of about 2.0 standard drinks of alcohol per week. He  reports that he does not currently use drugs.    Allergies:  Review of patient's allergies indicates:   Allergen Reactions    Sulfa (sulfonamide antibiotics) Anaphylaxis       Medications:  Current Outpatient Medications   Medication Sig Dispense Refill    albuterol (PROVENTIL/VENTOLIN HFA) 90 mcg/actuation inhaler Inhale 2 puffs into the lungs every 6 (six) hours as needed for Wheezing. Rescue 18 g 6    aspirin 81 MG Chew Take 1 tablet (81 mg total) by mouth once daily. 90 tablet 3    budesonide (PULMICORT) 0.5 mg/2 mL nebulizer solution Take 2 mLs (0.5 mg total) by nebulization 2 (two) times daily. Controller 120 mL 3    clopidogreL (PLAVIX) 75 mg tablet TAKE 1 TABLET BY MOUTH EVERY DAY (Patient taking differently: Take 75 mg by mouth once daily.) 90 tablet 3    cyproheptadine (PERIACTIN) 4 mg tablet TAKE 1 TABLET BY MOUTH 3 TIMES DAILY AS NEEDED. 270 tablet 1    droNABinol (MARINOL) 2.5 MG capsule Take 1 capsule (2.5 mg total) by mouth 2 (two) times daily before meals. 60 capsule 3    EScitalopram oxalate (LEXAPRO) 10 MG tablet TAKE 1 TABLET BY MOUTH EVERY DAY 90 tablet 1    fluticasone propionate (FLONASE) 50 mcg/actuation nasal spray 1 spray (50 mcg total) by Each Nostril route 2 (two) times a day. 16 g 11    formoterol (PERFOROMIST) 20 mcg/2 mL Nebu Take 2 mLs (20 mcg total) by nebulization 2 (two) times a day. Controller 90 mL 3    ibuprofen (ADVIL,MOTRIN) 600 MG tablet Take 1 tablet (600 mg total) by mouth every 6 (six) hours as needed for Pain. 20 tablet 0    Lactobac no.41/Bifidobact no.7 (PROBIOTIC-10 ORAL) Take 1 capsule by mouth once daily.      melatonin 5 mg Cap Take 5 mg by mouth nightly as needed. TAKE AS SCHEDULED      methocarbamoL (ROBAXIN) 500 MG Tab Take 500 mg by mouth daily as needed.      metoprolol succinate (TOPROL-XL) 25 MG 24 hr tablet Take 25 mg by mouth once daily.      multivitamin (THERAGRAN) per tablet Take 1 tablet by mouth once daily.       pantoprazole (PROTONIX)  40 MG tablet TAKE 1 TABLET BY MOUTH EVERY DAY (Patient taking differently: Take 40 mg by mouth nightly.) 90 tablet 3    pregabalin (LYRICA) 75 MG capsule Take 1 capsule (75 mg total) by mouth every evening. 30 capsule 2    rosuvastatin (CRESTOR) 40 MG Tab TAKE 1 TABLET BY MOUTH EVERY DAY 90 tablet 3    rotigotine 3 mg/24 hour PT24 Place 1 patch onto the skin once daily. Change patch at the same time every day. 30 patch 3    scopolamine (TRANSDERM-SCOP) 1.3-1.5 mg (1 mg over 3 days) Place 1 patch onto the skin every 72 hours as needed. As needed      triamcinolone acetonide 0.1% (KENALOG) 0.1 % cream APPLY TOPICALLY 2 TIMES DAILY AS NEEDED. (Patient taking differently: Apply topically 2 (two) times daily as needed.) 45 g 5    verapamiL (CALAN-SR) 240 MG CR tablet Take 1 tablet (240 mg total) by mouth every evening. 30 tablet 11     No current facility-administered medications for this visit.     Facility-Administered Medications Ordered in Other Visits   Medication Dose Route Frequency Provider Last Rate Last Admin    lactated ringers infusion   Intravenous Continuous Braulio Best MD 75 mL/hr at 03/04/24 1104 New Bag at 03/04/24 1104    ondansetron injection 4 mg  4 mg Intravenous Daily PRN Gustavo Chu MD        sodium chloride 0.9% flush 10 mL  10 mL Intravenous PRN Braulio Best MD           Review of Systems   Constitutional:  Negative for appetite change, chills, diaphoresis, fatigue, fever and unexpected weight change.   HENT:  Negative for mouth sores.    Eyes:  Negative for visual disturbance.   Respiratory:  Positive for shortness of breath. Negative for cough.    Cardiovascular:  Negative for chest pain.   Gastrointestinal:  Negative for abdominal pain, constipation, diarrhea, nausea and vomiting.   Genitourinary:  Negative for frequency.   Musculoskeletal:  Negative for back pain.   Skin:  Negative for color change and rash.   Neurological:  Negative for headaches.  "  Hematological:  Negative for adenopathy. Does not bruise/bleed easily.   Psychiatric/Behavioral:  The patient is not nervous/anxious.        ECOG Performance Status: 1   Objective:      Vitals:   Vitals:    06/18/24 1129   BP: 114/60   BP Location: Right arm   Patient Position: Sitting   BP Method: Medium (Manual)   Pulse: 61   Resp: 18   Temp: 96.4 °F (35.8 °C)   TempSrc: Temporal   SpO2: 95%   Weight: 95.5 kg (210 lb 8.6 oz)   Height: 5' 10" (1.778 m)           Physical Exam  Constitutional:       General: He is not in acute distress.     Appearance: He is well-developed. He is not diaphoretic.   HENT:      Head: Normocephalic and atraumatic.   Cardiovascular:      Rate and Rhythm: Normal rate and regular rhythm.      Heart sounds: Normal heart sounds. No murmur heard.     No friction rub. No gallop.   Pulmonary:      Effort: Pulmonary effort is normal. No respiratory distress.      Breath sounds: Normal breath sounds. No wheezing or rales.   Chest:      Chest wall: No tenderness.   Abdominal:      General: Bowel sounds are normal. There is no distension.      Palpations: Abdomen is soft. There is no mass.      Tenderness: There is no abdominal tenderness. There is no rebound.   Musculoskeletal:      Cervical back: Normal range of motion.   Lymphadenopathy:      Cervical: No cervical adenopathy.      Upper Body:      Right upper body: No supraclavicular or axillary adenopathy.      Left upper body: No supraclavicular or axillary adenopathy.   Skin:     General: Skin is warm and dry.      Findings: No erythema or rash.   Neurological:      Mental Status: He is alert and oriented to person, place, and time.   Psychiatric:         Behavior: Behavior normal.       Laboratory Data:  No visits with results within 1 Week(s) from this visit.   Latest known visit with results is:   Lab Visit on 06/11/2024   Component Date Value Ref Range Status    Ferritin 06/11/2024 305 (H)  20.0 - 300.0 ng/mL Final    Iron 06/11/2024 " 75  45 - 160 ug/dL Final    Transferrin 06/11/2024 249  200 - 375 mg/dL Final    TIBC 06/11/2024 369  250 - 450 ug/dL Final    Saturated Iron 06/11/2024 20  20 - 50 % Final    Immunofix Interp. 06/11/2024 SEE COMMENT   Final    Comment: Serum protein electrophoresis and immunofixation results should be   interpreted in clinical context in that some therapeutic agents can   result   in false positive results (example, daratumumab). Correlation with   the   patient s therapeutic regimen is required.  See pathologist's interpretation.      [place] Free Light Chains 06/11/2024 4.18 (H)  0.33 - 1.94 mg/dL Final    Lambda Free Light Chains 06/11/2024 3.21 (H)  0.57 - 2.63 mg/dL Final    Kappa/Lambda FLC Ratio 06/11/2024 1.30  0.26 - 1.65 Final    Comment: Undetected antigen excess is a rare event but cannot   be excluded. If these free light chain results do not   agree with other clinical or laboratory findings or   if the sample is from a patient that has previously   demonstrated antigen excess, discuss with the testing   laboratory.   Results should always be interpreted in conjunction   with other laboratory tests and clinical evidence.      Creatinine 06/11/2024 1.2  0.5 - 1.4 mg/dL Final    eGFR 06/11/2024 >60  >60 mL/min/1.73 m^2 Final    Pathologist Interpretation MEME 06/11/2024 REVIEWED   Final    Comment:   Electronically reviewed and signed by:  Rosario Reyna D.O.  Signed on 06/14/24 at 00:33  Faint oligoclonal banding pattern is seen with no predominant band.   Oligoclonal banding patterns can be seen in a wide variety of   conditions including but not limited to infections, autoimmune   diseases, and lymphoproliferative processes. Correlate clinically and   consider repeat testing in 3-6 months or as clinically indicated.           Imaging:     CT Chest 6/11/24  Base of Neck: No significant abnormality.     Thoracic soft tissues: Unremarkable.     Aorta: Left-sided aortic arch. No aneurysm and no  significant atherosclerosis     Heart: Normal size. No effusion. Marked calcification of the coronary arteries. Calcification of the mitral valve annulus.     Pulmonary vasculature: Pulmonary arteries distribute normally. No discrete filling defects identified within the proximal pulmonary arterial branches to suggest pulmonary thromboembolism. There are four pulmonary veins.     Tran/Mediastinum: Multiple enlarged lymph nodes throughout the mediastinum, increased in size as compared to the prior PET-CT on 03/22/2024. Index right lower paratracheal lymph node measures 12 mm in short axis dimension (series 2, image 40), previously 10 mm when directly remeasured. Prevascular lymph node measures 11 mm in short axis dimension (series 2, image 40), previously 8 mm when directly remeasured. Subcarinal lymph node now measures approximately 18 mm in short axis dimension (series 2, image 49), previously 15 mm.     Airways: Patent.     Lungs/Pleura: Lungs are expanded again demonstrate centrilobular and paraseptal emphysematous changes with an upper lobe predominance as well as extensive subpleural reticulation, interlobular septal thickening, scattered bronchiectasis throughout both lungs, mildly progressed at the lung bases in comparison to the prior CT on 02/27/2024, with superimposed interstitial edema not excluded, noting interval development of small bilateral dependent pleural effusions. No discrete new suspicious pulmonary nodules or masses. No lobar consolidation. No pneumothorax.     Esophagus: Unremarkable.     Upper Abdomen: No abnormality of the partially imaged upper abdomen. Cholecystectomy.     Bones: Chronic anterior wedge compression deformity of the T12 vertebral body. No definite acute fracture. No suspicious lytic or sclerotic lesions. Partially imaged anterior fusion hardware in the lower cervical spine. Chronic healed and nonunited posterior right rib fractures again noted.       Assessment:       1.  Elevated serum immunoglobulin free light chain level    2. Abnormal laboratory test result    3. Pulmonary fibrosis    4. Localized enlarged lymph nodes    5. Normocytic anemia    6. Positive AUGUSTA (antinuclear antibody)    7. History of bladder cancer                 Plan:     Positive TIF1 Gamma Antibody - can be associated with risk for pulmonary fibrosis and for occult malignancy  -Pt with h/o non invasive bladder cancer  -PSA on 1/08/24 was normal at 0.49ng/mL  -CT C/A/P on 2/27/24 showed thickening of the colon and mediastinal, paraesophageal LAD  -Colonoscopy 3/04/24 showed MEG  -Cystoscopy on 3/21/24 showed no evidence of recurrence  -PET/CT 3/22/24 showed enlarged mediastinal LN's  lymph nodes and superior mediastinum, anterior mediastinum, periaortic region, -EUS on 05/08/2024 with biopsy of subcarinal mediastinum station 7 lymph node had path showing positive for lm material but negative for malignant cells  -CT chest on 6/11/24 showeind increasing mediastinal LAD  -PT to see pulmonary    Pulmonary Fibrosis - managed by Dr Arellano  -Unclear etiology but possibly related to Positive TIF1 Gamma Antibody as above    Lymphadenopathy - see above    Iron Deficiency Anemia - pt given iron infusions 4/02/24 and 4/09/24 with injectafer  -Ferritin normal at 3/05ng/mL on 6/11/24.  Improved  -Hemoglobin stable at 11.1g/dL on 4/17/24  -Unclear cause as colonoscopy and EUS showed no significant findings    Bladder Cancer -  history of noninvasive bladder cancer under the care of Dr. Lr with prior T1 disease  -Pt status post bladder installation of mitomycin-C and BCG  -Last cystoscopy on 08/17/2023 with no lesions seen  -Repeat cystoscopy 3/21/2024 MEG  -Pt to have it repeated in 6 months    Positive AUGUSTA - unclear if responsible for disease  -Pt following with rheumatology    Elevated Free Light Chains - Labs on 06/11/2024 showed oligoclonal banding pattern on immunofixation electrophoresis and free light chain  assay with elevated kappa light chains at 4.18 mg/dL, elevated lambda light chains at 3.21 mg/dL, and normal kappa lambda ratio 1.3  -Will repeat SPEP, MEME and FLC in 3 months    Route Chart for Scheduling    Med Onc Chart Routing      Follow up with physician 3 months. Pt needs a close follow up with Dr Arellano in Pulmonary in the enxt few weeks.  Pt needs a CBC, CMP, SPEP, MEME and FLC in 3 months with an appt with me a week after jama labs   Follow up with [facility]I    Infusion scheduling note    Injection scheduling note    Labs    Imaging    Pharmacy appointment    Other referrals            Supportive Plan Information  OP FERRIC CARBOXYMALTOSE Q2W   Fredi West MD   Upcoming Treatment Dates - OP FERRIC CARBOXYMALTOSE Q2W    No upcoming days in selected categories.      Fredi West MD  [facility]  Hematology and Oncology  [facility]   [address] [place] [address]   O: [phone]  F: [phone]

## 2024-06-20 ENCOUNTER — TELEPHONE (OUTPATIENT)
Dept: PAIN MEDICINE | Facility: CLINIC | Age: 69
End: 2024-06-20
Payer: MEDICARE

## 2024-06-20 ENCOUNTER — OFFICE VISIT (OUTPATIENT)
Dept: PAIN MEDICINE | Facility: CLINIC | Age: 69
End: 2024-06-20
Payer: MEDICARE

## 2024-06-20 VITALS — HEIGHT: 70 IN | WEIGHT: 210 LBS | BODY MASS INDEX: 30.06 KG/M2

## 2024-06-20 DIAGNOSIS — M54.17 LUMBOSACRAL RADICULOPATHY: Primary | ICD-10-CM

## 2024-06-20 DIAGNOSIS — M54.9 DORSALGIA, UNSPECIFIED: ICD-10-CM

## 2024-06-20 PROCEDURE — 99999 PR PBB SHADOW E&M-EST. PATIENT-LVL IV: CPT | Mod: PBBFAC,,, | Performed by: STUDENT IN AN ORGANIZED HEALTH CARE EDUCATION/TRAINING PROGRAM

## 2024-06-20 PROCEDURE — 99214 OFFICE O/P EST MOD 30 MIN: CPT | Mod: PBBFAC,PO | Performed by: STUDENT IN AN ORGANIZED HEALTH CARE EDUCATION/TRAINING PROGRAM

## 2024-06-20 NOTE — TELEPHONE ENCOUNTER
Types of orders made on 06/20/2024: Procedure Request      Order Date:6/20/2024   Ordering User:SYLVAIN LIVINGSTON [[id number]]   Encounter Provider:Sylvain Livingston MD [[id number]]   Authorizing    Provider: Sylvain Livingston MD [[id number]]   Department:[facility] PAIN MANAGEMENT[[id number]]      Common Order Information   Procedure -> Transforaminal Injection (Specify level and laterality) Cmt: L             TFESI L5-S1 and S1 (Cov)      Order Specific Information   Order: Procedure Order to Pain Management [Custom: [id number]]  Order #:          [id number]: 1 FUTURE     Priority: Routine  Class: Clinic Performed     Fut   ure Order Information       Expires on:06/20/2025            Expected by:06/20/2024                   Associated Diagnoses       M54.17 Lumbosacral radiculopathy       Facility Name: -> [place]              Priority: Routine  Class: Clinic Performed     Future Order Information       Expires on:06/20/2025            Expected by:06/20/2024                   Associated Diagnoses       M54.17 Lumbosacral radiculo

## 2024-06-20 NOTE — H&P (VIEW-ONLY)
[place] - Department    Colton Johnson MD      First Office Visit: 6/20/24  Today' Date: 6/20/2024  Last Office Visit: None    Chief complaint: back pain     HPI: Pt is a pleasant 69 y.o., who presents for evaluation. Referred by Dr. Johnson. Pt complains of back pain and L leg pain for years. Endorses sharp, shooting pain going down the back of the L leg to the calf. Of note, pt states he has had CHANEL's 3 yrs ago and it helped significantly with the pain. His pain previously was different than the pain he is experiencing currently. No BB changes. Has been seen for back pain back in Feb of this year and has been doing HEP.       Pain disability index score: 63  Pain score: 6    Relevant Imaging/ Testing:   CT L-spine 12/23  CT T-spine 12/23  XR L-spine complete 2/23    Procedures: None    Date of board of pharmacy review:6/20/2024  Date of opioid risk screening/ pain psych: None  Date of opioid agreement and consent: None  Date of urine drug screen: None  Date of random pill count: None     was reviewed today: reviewed, no concerns    Prescribed medications: None    See EHR for  PMH, PSH, FH, SH, Medications and Allergy    ROS:  Positive for pain  ROS     PE:  There were no vitals filed for this visit.  General: Pleasant, no distress  HEENT: NC/ AT. PERRLA  CV: Radial pulses intact  Pulm: No distress  Ext: No edema    Physical Exam     Neuromusculoskeletal:  Head: NC, AT. PERRLA  Neck: Intact range of motions  Shoulder: Intact range of motion  Lumbar: Intact range of motion. Bilat Facet loading. Min Tenderness. Neg SL. No pain with flexion. No pain with extension.   Hip: Intact range of motion  SI: Level  Knee: Intact range of motion  Reflexes: normal Knee  Strength: 5/5 globally   Sensory: Grossly intact   Skin: No bruising, erythema  Gait: Normal      Impression:  Back pain  L leg pain   Lumbar radiculopathy   Relevant History  BMI 30.13  RLS  Pulm fibrosis  CAD s/p stent   PVC's  H/o bladder  cancer        Plan:  Discussed options  Imaging/ relevant records viewed/ reviewed/ discussed  Imaging results viewed and reviewed (noted above)/ reviewed with patient   reviewed  Cont HEP  MR L-spine  L TFESI L5-S1 and S1       Prescribed medications:  1. None    The impression and plan were discussed and explained in detail. All the questions were answered. Education was provided accordingly.     The procedure was explained in detail, along with risks and potential side effects.      Follow-up:  For procedure     Ann Soto MD

## 2024-06-20 NOTE — PROGRESS NOTES
[place] - Department    Colton Johnson MD      First Office Visit: 6/20/24  Today' Date: 6/20/2024  Last Office Visit: None    Chief complaint: back pain     HPI: Pt is a pleasant 69 y.o., who presents for evaluation. Referred by Dr. Johnson. Pt complains of back pain and L leg pain for years. Endorses sharp, shooting pain going down the back of the L leg to the calf. Of note, pt states he has had CHANEL's 3 yrs ago and it helped significantly with the pain. His pain previously was different than the pain he is experiencing currently. No BB changes. Has been seen for back pain back in Feb of this year and has been doing HEP.       Pain disability index score: 63  Pain score: 6    Relevant Imaging/ Testing:   CT L-spine 12/23  CT T-spine 12/23  XR L-spine complete 2/23    Procedures: None    Date of board of pharmacy review:6/20/2024  Date of opioid risk screening/ pain psych: None  Date of opioid agreement and consent: None  Date of urine drug screen: None  Date of random pill count: None     was reviewed today: reviewed, no concerns    Prescribed medications: None    See EHR for  PMH, PSH, FH, SH, Medications and Allergy    ROS:  Positive for pain  ROS     PE:  There were no vitals filed for this visit.  General: Pleasant, no distress  HEENT: NC/ AT. PERRLA  CV: Radial pulses intact  Pulm: No distress  Ext: No edema    Physical Exam     Neuromusculoskeletal:  Head: NC, AT. PERRLA  Neck: Intact range of motions  Shoulder: Intact range of motion  Lumbar: Intact range of motion. Bilat Facet loading. Min Tenderness. Neg SL. No pain with flexion. No pain with extension.   Hip: Intact range of motion  SI: Level  Knee: Intact range of motion  Reflexes: normal Knee  Strength: 5/5 globally   Sensory: Grossly intact   Skin: No bruising, erythema  Gait: Normal      Impression:  Back pain  L leg pain   Lumbar radiculopathy   Relevant History  BMI 30.13  RLS  Pulm fibrosis  CAD s/p stent   PVC's  H/o bladder  cancer        Plan:  Discussed options  Imaging/ relevant records viewed/ reviewed/ discussed  Imaging results viewed and reviewed (noted above)/ reviewed with patient   reviewed  Cont HEP  MR L-spine  L TFESI L5-S1 and S1       Prescribed medications:  1. None    The impression and plan were discussed and explained in detail. All the questions were answered. Education was provided accordingly.     The procedure was explained in detail, along with risks and potential side effects.      Follow-up:  For procedure     Ann Soto MD

## 2024-06-21 ENCOUNTER — OFFICE VISIT (OUTPATIENT)
Dept: CARDIOLOGY | Facility: CLINIC | Age: 69
End: 2024-06-21
Payer: MEDICARE

## 2024-06-21 VITALS
DIASTOLIC BLOOD PRESSURE: 71 MMHG | HEART RATE: 63 BPM | BODY MASS INDEX: 29.61 KG/M2 | HEIGHT: 70 IN | SYSTOLIC BLOOD PRESSURE: 154 MMHG | WEIGHT: 206.81 LBS | RESPIRATION RATE: 18 BRPM

## 2024-06-21 DIAGNOSIS — I25.10 CORONARY ARTERY DISEASE INVOLVING NATIVE CORONARY ARTERY OF NATIVE HEART WITHOUT ANGINA PECTORIS: ICD-10-CM

## 2024-06-21 DIAGNOSIS — I83.93 ASYMPTOMATIC VARICOSE VEINS OF BOTH LOWER EXTREMITIES: ICD-10-CM

## 2024-06-21 DIAGNOSIS — I10 PRIMARY HYPERTENSION: ICD-10-CM

## 2024-06-21 DIAGNOSIS — I49.3 FREQUENT PVCS: Primary | ICD-10-CM

## 2024-06-21 DIAGNOSIS — Z95.5 S/P DRUG ELUTING CORONARY STENT PLACEMENT: ICD-10-CM

## 2024-06-21 PROBLEM — I28.8 OTHER DISEASES OF PULMONARY VESSELS: Status: RESOLVED | Noted: 2024-06-04 | Resolved: 2024-06-21

## 2024-06-21 PROCEDURE — 99999 PR PBB SHADOW E&M-EST. PATIENT-LVL IV: CPT | Mod: PBBFAC,,, | Performed by: INTERNAL MEDICINE

## 2024-06-21 PROCEDURE — 99214 OFFICE O/P EST MOD 30 MIN: CPT | Mod: PBBFAC,PO | Performed by: INTERNAL MEDICINE

## 2024-06-21 NOTE — PROGRESS NOTES
Cardiology Clinic Note      Patient: Moustapha Murray, 1955, [id number]  Primary Care Provider: Colton Johnson MD     Chief Complaint/Reason for Referral: abnormal CACS     Subjective:       Moustapha Murray is a 69 y.o. male who presents for follow up. He is not accompanied by his wife      Feels about the same since switching to verapamil. Some constipation. BP recently has been okay. Some twinges in the chest for seconds at a time and with no trigger. No other new symptoms. No chest discomfort, palpitations, slight edema, no orthopnea, no syncope, little CHANDRA, no claudication.     Planning for lower back injection.     Focused Past History includes:  Exercise stress EKG 1/2023 by report: negative at 86% MPHR, 8 METs.   CACS 2317 by CT 1/2023  CAD:   Coronary angiogram and PCI 1/31/23 ([facility]): Severe segmental stenosis extending from proximal to mid LAD.  Successful IVUS guided PCI with overlapping 4.0 x 38 and 2.5 x 38 Synergy XD FERNANDO from ostial to mid LAD  Staged PCI 2/13/23: 80% stenosis in a large right posterior AV branch feeding a large PLB s/p single FERNANDO. Ostial RCA insignificant by IVUS  TTE 4/4/24;' EF 55-60%.  Normal RV.  Moderate left atrial dilation.  Mild MR.  Mild-to-moderate TR.  PASP 36  Angiogram 4/21/24: patent stents. Nonobstructive elsewhere.   PVCs;  7-day monitor 3/2024: PVC 31.4% with 73 runs (<5 beats). No AF.   48h Holter 5/3/24: 22.5% PVCs. HR    Dr. Pineda 6/5/24: switched to verapamil  HTN  GERD  Bladder cancer treated with surgery and BCG; no systemic chemo or XRT - 2022  No AAA by US 1/2023  BLE varicose veins  Former smoker of 60 pack-years - quit in 2015  No history of stroke, DM  No family history of premature CAD    Review of Systems  Constitutional: negative for fevers, night sweats, and weight loss  Eyes: negative for visual disturbance, diplopia  Respiratory: negative for cough, hemoptysis, sputum, and wheezing  Cardiovascular: see HPI  Gastrointestinal:  negative for abdominal pain, bright red blood per rectum, change in bowel habits, dysphagia, melena, and reflux symptoms  Genitourinary:negative for dysuria, frequency, and hematuria  Hematologic/lymphatic: negative for bleeding, easy bruising, and lymphadenopathy  Musculoskeletal:negative for arthralgias, back pain, and myalgias  Neurological: negative for gait problems, paresthesia, speech problems, vertigo, and weakness  Behavioral/Psych: negative for excessive alcohol consumption, illegal drug usage, and sleep disturbance    -------------------------------------    Anticoagulant long-term use    Bladder cancer    Colon polyps    Coronary artery disease    CORONARY STENTSx3    DDD (degenerative disc disease), lumbar    Depression    Fatty liver    GERD (gastroesophageal reflux disease)    H/O motion sickness    Hypertension    PONV (postoperative nausea and vomiting)    Pulmonary fibrosis    RLS (restless legs syndrome)    Sleep apnea, unspecified    uses cpap     ----------------------------    Angiogram, coronary, with left heart catheterization    Procedure: Left Heart Cath;  Surgeon: Nitin Wheat MD;  Location: [facility] CATH;  Service: Cardiology;  Laterality: N/A;    Angiogram, coronary, with left heart catheterization    Procedure: Left Heart Cath;  Surgeon: Nitin Wheat MD;  Location: [facility] CATH;  Service: Cardiology;;    Appendectomy    Carpal tunnel release    Cervical fusion    Cholecystectomy    Circumcision    Colonoscopy    Procedure: COLONOSCOPY;  Surgeon: Braulio Best MD;  Location: [facility];  Service: Endoscopy;  Laterality: N/A; 3 colon polyps removed; biopsy: tubular adenoma and hyperplastic; repeat in 5 years for surveillance    Colonoscopy    Procedure: COLONOSCOPY;  Surgeon: Braulio Best MD;  Location: [facility];  Service: Gastroenterology;  Laterality: N/A;    Coronary angiography    Procedure: ANGIOGRAM, CORONARY ARTERY;  Surgeon: Nitin Wheat MD;  Location: [facility] CATH;  Service:  Cardiology;;    Coronary angioplasty with stent placement    Procedure: ANGIOPLASTY, CORONARY ARTERY, WITH STENT INSERTION;  Surgeon: Nitin Wheat MD;  Location: [facility] CATH;  Service: Cardiology;  Laterality: N/A;    Cystoscopy w/ retrogrades    Procedure: CYSTOSCOPY, WITH RETROGRADE PYELOGRAM -WITH URETHRAL DILATION;  Surgeon: Greg Claudio MD;  Location: [facility] OR;  Service: Urology;  Laterality: Bilateral;    Cystoscopy w/ ureteral stent placement    Procedure: CYSTOSCOPY, WITH URETERAL STENT INSERTION;  Surgeon: Greg Claudio MD;  Location: [facility] OR;  Service: Urology;  Laterality: Right;    Endoscopic ultrasound of upper gastrointestinal tract    Procedure: ULTRASOUND, UPPER GI TRACT, ENDOSCOPIC;  Surgeon: Brad Ochoa MD;  Location: [facility] ENDO;  Service: Endoscopy;  Laterality: Left;    Esophagogastroduodenoscopy    Procedure: EGD (ESOPHAGOGASTRODUODENOSCOPY);  Surgeon: Brad Ochoa MD;  Location: [facility] ENDO;  Service: Endoscopy;  Laterality: N/A;    Instillation of urinary bladder    Procedure: INSTILLATION, BLADDER - Mitomycin;  Surgeon: Greg Claudio MD;  Location: [facility] OR;  Service: Urology;  Laterality: N/A;    Ivus, coronary    Procedure: IVUS, Coronary;  Surgeon: Nitin Wheat MD;  Location: [facility] CATH;  Service: Cardiology;;    Ivus, coronary    Procedure: IVUS, Coronary;  Surgeon: Nitin Wheat MD;  Location: [facility] CATH;  Service: Cardiology;;    Percutaneous coronary intervention, artery    Procedure: Percutaneous coronary intervention;  Surgeon: Nitin Wheat MD;  Location: [facility] CATH;  Service: Cardiology;;    Transforaminal epidural injection of steroid    Procedure: Injection,steroid,epidural,transforaminal approach L3-4, L4-5;  Surgeon: Chris Palencia MD;  Location: [facility] OR;  Service: Pain Management;  Laterality: Right;    Transforaminal epidural injection of steroid    Procedure: Injection,steroid,epidural,transforaminal approach;  Surgeon: Chris Palencia MD;  Location: [facility] OR;   Service: Pain Management;  Laterality: Right;  L3-4, L4-L5    [place] tooth extraction        Family History   Problem Relation Name Age of Onset    Stroke Mother      Ulcerative colitis Mother      Kidney cancer Mother      Cancer Neg Hx      Colon cancer Neg Hx      Crohn's disease Neg Hx      Celiac disease Neg Hx       Social History     Tobacco Use    Smoking status: Former     Current packs/day: 0.00     Types: Cigarettes     Quit date: 10/16/2013     Years since quitting: 10.6    Smokeless tobacco: Never   Substance Use Topics    Alcohol use: Yes     Alcohol/week: 2.0 standard drinks of alcohol     Types: 2 Cans of beer per week     Comment: couple beers weekly    Drug use: Not Currently       Current Outpatient Medications   Medication Sig Dispense Refill    albuterol (PROVENTIL/VENTOLIN HFA) 90 mcg/actuation inhaler Inhale 2 puffs into the lungs every 6 (six) hours as needed for Wheezing. Rescue 18 g 6    aspirin 81 MG Chew Take 1 tablet (81 mg total) by mouth once daily. 90 tablet 3    budesonide (PULMICORT) 0.5 mg/2 mL nebulizer solution Take 2 mLs (0.5 mg total) by nebulization 2 (two) times daily. Controller 120 mL 3    clopidogreL (PLAVIX) 75 mg tablet TAKE 1 TABLET BY MOUTH EVERY DAY (Patient taking differently: Take 75 mg by mouth once daily.) 90 tablet 3    cyproheptadine (PERIACTIN) 4 mg tablet TAKE 1 TABLET BY MOUTH 3 TIMES DAILY AS NEEDED. 270 tablet 1    droNABinol (MARINOL) 2.5 MG capsule Take 1 capsule (2.5 mg total) by mouth 2 (two) times daily before meals. 60 capsule 3    EScitalopram oxalate (LEXAPRO) 10 MG tablet TAKE 1 TABLET BY MOUTH EVERY DAY 90 tablet 1    fluticasone propionate (FLONASE) 50 mcg/actuation nasal spray 1 spray (50 mcg total) by Each Nostril route 2 (two) times a day. 16 g 11    formoterol (PERFOROMIST) 20 mcg/2 mL Nebu Take 2 mLs (20 mcg total) by nebulization 2 (two) times a day. Controller 90 mL 3    ibuprofen (ADVIL,MOTRIN) 600 MG tablet Take 1 tablet (600 mg total)  "by mouth every 6 (six) hours as needed for Pain. 20 tablet 0    Lactobac no.41/Bifidobact no.7 (PROBIOTIC-10 ORAL) Take 1 capsule by mouth once daily.      melatonin 5 mg Cap Take 5 mg by mouth nightly as needed. TAKE AS SCHEDULED      methocarbamoL (ROBAXIN) 500 MG Tab Take 500 mg by mouth daily as needed.      multivitamin (THERAGRAN) per tablet Take 1 tablet by mouth once daily.       pantoprazole (PROTONIX) 40 MG tablet TAKE 1 TABLET BY MOUTH EVERY DAY (Patient taking differently: Take 40 mg by mouth nightly.) 90 tablet 3    pregabalin (LYRICA) 75 MG capsule Take 1 capsule (75 mg total) by mouth every evening. 30 capsule 2    rosuvastatin (CRESTOR) 40 MG Tab TAKE 1 TABLET BY MOUTH EVERY DAY 90 tablet 3    rotigotine 3 mg/24 hour PT24 Place 1 patch onto the skin once daily. Change patch at the same time every day. 30 patch 3    scopolamine (TRANSDERM-SCOP) 1.3-1.5 mg (1 mg over 3 days) Place 1 patch onto the skin every 72 hours as needed. As needed      triamcinolone acetonide 0.1% (KENALOG) 0.1 % cream APPLY TOPICALLY 2 TIMES DAILY AS NEEDED. (Patient taking differently: Apply topically 2 (two) times daily as needed.) 45 g 5    verapamiL (CALAN-SR) 240 MG CR tablet Take 1 tablet (240 mg total) by mouth every evening. 30 tablet 11     No current facility-administered medications for this visit.     Facility-Administered Medications Ordered in Other Visits   Medication Dose Route Frequency Provider Last Rate Last Admin    lactated ringers infusion   Intravenous Continuous Braulio Best MD 75 mL/hr at 03/04/24 1104 New Bag at 03/04/24 1104    ondansetron injection 4 mg  4 mg Intravenous Daily PRN Gustavo Chu MD        sodium chloride 0.9% flush 10 mL  10 mL Intravenous PRN Braulio Best MD            Objective:      Physical Exam  BP (!) 154/71   Pulse 63   Resp 18   Ht 5' 10" (1.778 m)   Wt 93.8 kg (206 lb 12.7 oz)   BMI 29.67 kg/m²   Body surface area is 2.15 meters squared.  Body mass " index is 29.67 kg/m².    General appearance: alert, appears stated age, cooperative, and no distress  Head: Normocephalic, without obvious abnormality, atraumatic  Neck: no carotid bruit, no JVD, and supple, symmetrical, trachea midline  Lungs: clear to auscultation bilaterally  Heart: regular rate and rhythm; S1, S2 normal, no murmur, click, rub or gallop  Abdomen: soft, non-tender, no distended  Extremities: extremities atraumatic, no pitting edema. Large varicose veins in LLE>RLE with marked telangiectasias  Skin: warm, no cyanosis, no pathologic ecchymosis in exposed portions  Neurologic: Grossly normal. A&O x3      Lab Review   Lab Results   Component Value Date    WBC 6.80 04/17/2024    HGB 11.1 (L) 04/17/2024    HCT 35.8 (L) 04/17/2024    MCV 83 04/17/2024     04/17/2024         BMP  Lab Results   Component Value Date     04/17/2024    K 4.0 04/17/2024     04/17/2024    CO2 29 04/17/2024    BUN 14 04/17/2024    CREATININE 1.2 06/11/2024    CALCIUM 9.2 04/17/2024    ANIONGAP 3 (L) 04/17/2024    ESTGFRAFRICA >60 03/11/2022    EGFRNONAA >60 03/11/2022       Lab Results   Component Value Date    ALBUMIN 3.9 04/17/2024       Lab Results   Component Value Date    ALT 46 04/17/2024    AST 67 (H) 04/17/2024    ALKPHOS 190 (H) 04/17/2024    BILITOT 0.8 04/17/2024       Lab Results   Component Value Date    TSH 1.63 07/06/2011       Lab Results   Component Value Date    CHOL 119 (L) 06/14/2023    CHOL 219 (H) 01/05/2023    CHOL 190 12/11/2020     Lab Results   Component Value Date    HDL 53 06/14/2023    HDL 59 01/05/2023    HDL 49 12/11/2020     Lab Results   Component Value Date    LDLCALC 56.2 (L) 06/14/2023    LDLCALC 141.4 01/05/2023    LDLCALC 124.6 12/11/2020     Lab Results   Component Value Date    TRIG 49 06/14/2023    TRIG 93 01/05/2023    TRIG 82 12/11/2020     Lab Results   Component Value Date    CHOLHDL 44.5 06/14/2023    CHOLHDL 26.9 01/05/2023    CHOLHDL 25.8 12/11/2020       Assessment & Plan:      This is a 69 y.o. pleasant  male with CHANDRA that has improved significantly after PCI and cardiac rehab.  Coronary angiogram revealed two vessel disease which was revascularized percutaneously. He has frequent PVCs which improved some, not adequately on metoprolol, so he as switched to verapamil by Dr. Pineda. f    1. Frequent PVCs        2. Primary hypertension  Hypertension Digital Medicine (HDMP) Enrollment Order      3. S/P drug eluting coronary stent placement        4. Coronary artery disease involving native coronary artery of native heart without angina pectoris        5. Asymptomatic varicose veins of both lower extremities                   Next visit will discuss deescalation to single antiplatelet - DAPT score 1  Continue rosuva 40 mg QD   Continue verapamil  QD for PVCs - Holter in 10/2024, he requested it changed to [place]    Emphasized the importance of modifying lifestyle related risk factors including limiting alcohol intake, exercise, diet most resembling a Mediterranean diet.    I appreciate the opportunity to participate in Moustapha Murray 's care today.  Please follow up with me in 6 months.      Nitin Wheat MD, [facility]  Interventional Cardiology/Structural Heart Disease  [facility]  Office: [phone]     Parts of this note were completed using voice recognition software. Please excuse any misspellings or syntax errors and reach out to me with questions.        Everybody is going to no [place]

## 2024-06-24 ENCOUNTER — E-CONSULT (OUTPATIENT)
Dept: CARDIOLOGY | Facility: CLINIC | Age: 69
End: 2024-06-24
Payer: MEDICARE

## 2024-06-24 DIAGNOSIS — Z95.5 S/P DRUG ELUTING CORONARY STENT PLACEMENT: Primary | ICD-10-CM

## 2024-06-24 PROCEDURE — 99449 NTRPROF PH1/NTRNET/EHR 31/>: CPT | Mod: ,,, | Performed by: INTERNAL MEDICINE

## 2024-06-24 NOTE — TELEPHONE ENCOUNTER
Thank you Dr. hWeat. Karly we will not be able to do CHANEL per Dr. Wheat's rec.    MK     View previous messages    Nitin Wheat MD   to Ann Soto MD 6/24/2024 12:31 PM   Not safe to hold aspirin. He has multiple stents in multiple vessels.              Ann Soto MD   to Nitin Wheat MD 6/24/2024 11:53 AM   Karly this is the note from cardiology:    Recommendation:  · may proceed without further cardiac work-up.  · May hold clopidogrel for CHANEL for as long as needed. Resume when able  · Should NOT hold aspirin 81 mg once daily    We can't do the CHANEL if we can't hold the ASA. Am I reading this correctly?    KARLA

## 2024-06-24 NOTE — CONSULTS
[place] - Cardiology  Response for E-Consult     Patient Name: Moustapha Murray  MRN: [id number]  Primary Care Provider: Colton Johnson MD   Requesting Provider: Ann Soto MD  Consults  68 yo white male known to me. Multivessel PCI last year, last was in February 2023. Recent angiogram (April 2024) with no obstructive lesions.     Recommendation:   may proceed without further cardiac work-up.   May hold clopidogrel for CHANEL for as long as needed. Resume when able  Should NOT hold aspirin 81 mg once daily.       Total time of Consultation: 20 minute    I did not speak to the requesting provider verbally about this.     *This eConsult is based on the clinical data available to me and is furnished without benefit of a physical examination. The eConsult will need to be interpreted in light of any clinical issues or changes in patient status not available to me at the time of filing this eConsults. Significant changes in patient condition or level of acuity should result in immediate formal consultation and reevaluation. Please alert me if you have further questions.    Thank you for this eConsult referral.     Nitin Wheat MD  [place] - Cardiology

## 2024-06-25 DIAGNOSIS — M54.17 LUMBOSACRAL RADICULOPATHY: Primary | ICD-10-CM

## 2024-06-25 DIAGNOSIS — M54.16 LUMBAR RADICULOPATHY: ICD-10-CM

## 2024-06-25 RX ORDER — ALPRAZOLAM 1 MG/1
1 TABLET, ORALLY DISINTEGRATING ORAL ONCE AS NEEDED
OUTPATIENT
Start: 2024-06-25 | End: 2035-11-22

## 2024-06-25 NOTE — TELEPHONE ENCOUNTER
Spoke with pt and scheduled MRI for 7/2 and procedure for 7/11 in [place] went over instructions pt instructed to get off plavix x 7 days and stay on baby ASA ok per MD.

## 2024-07-02 ENCOUNTER — HOSPITAL ENCOUNTER (OUTPATIENT)
Dept: RADIOLOGY | Facility: HOSPITAL | Age: 69
Discharge: HOME | End: 2024-07-02
Attending: STUDENT IN AN ORGANIZED HEALTH CARE EDUCATION/TRAINING PROGRAM
Payer: MEDICARE

## 2024-07-02 DIAGNOSIS — M54.9 DORSALGIA, UNSPECIFIED: ICD-10-CM

## 2024-07-02 PROCEDURE — 72148 MRI LUMBAR SPINE W/O DYE: CPT | Mod: TC,PO

## 2024-07-02 PROCEDURE — 72148 MRI LUMBAR SPINE W/O DYE: CPT | Mod: 26,,, | Performed by: RADIOLOGY

## 2024-07-03 ENCOUNTER — TELEPHONE (OUTPATIENT)
Dept: PAIN MEDICINE | Facility: CLINIC | Age: 69
End: 2024-07-03
Payer: MEDICARE

## 2024-07-03 NOTE — TELEPHONE ENCOUNTER
Called this patient to give him his imaging results per his doctor and they will continue as planned with his procedure.

## 2024-07-11 ENCOUNTER — HOSPITAL ENCOUNTER (OUTPATIENT)
Dept: RADIOLOGY | Facility: HOSPITAL | Age: 69
Discharge: HOME | End: 2024-07-11
Attending: STUDENT IN AN ORGANIZED HEALTH CARE EDUCATION/TRAINING PROGRAM | Admitting: STUDENT IN AN ORGANIZED HEALTH CARE EDUCATION/TRAINING PROGRAM
Payer: MEDICARE

## 2024-07-11 ENCOUNTER — HOSPITAL ENCOUNTER (OUTPATIENT)
Facility: HOSPITAL | Age: 69
Discharge: HOME | End: 2024-07-11
Attending: STUDENT IN AN ORGANIZED HEALTH CARE EDUCATION/TRAINING PROGRAM | Admitting: STUDENT IN AN ORGANIZED HEALTH CARE EDUCATION/TRAINING PROGRAM
Payer: MEDICARE

## 2024-07-11 DIAGNOSIS — M54.17 LUMBOSACRAL RADICULOPATHY: ICD-10-CM

## 2024-07-11 DIAGNOSIS — M54.16 LUMBAR RADICULOPATHY: ICD-10-CM

## 2024-07-11 DIAGNOSIS — M54.50 LOWER BACK PAIN: ICD-10-CM

## 2024-07-11 PROCEDURE — 64483 NJX AA&/STRD TFRM EPI L/S 1: CPT | Mod: LT,,, | Performed by: STUDENT IN AN ORGANIZED HEALTH CARE EDUCATION/TRAINING PROGRAM

## 2024-07-11 PROCEDURE — 25000003 PHARM REV CODE 250: Mod: PO | Performed by: STUDENT IN AN ORGANIZED HEALTH CARE EDUCATION/TRAINING PROGRAM

## 2024-07-11 PROCEDURE — 64483 NJX AA&/STRD TFRM EPI L/S 1: CPT | Mod: PO,LT | Performed by: STUDENT IN AN ORGANIZED HEALTH CARE EDUCATION/TRAINING PROGRAM

## 2024-07-11 PROCEDURE — 76000 FLUOROSCOPY <1 HR PHYS/QHP: CPT | Mod: TC,PO

## 2024-07-11 RX ORDER — ALPRAZOLAM 0.5 MG/1
1 TABLET, ORALLY DISINTEGRATING ORAL ONCE AS NEEDED
Status: COMPLETED | OUTPATIENT
Start: 2024-07-11 | End: 2024-07-11

## 2024-07-11 RX ADMIN — ALPRAZOLAM 1 MG: 0.5 TABLET, ORALLY DISINTEGRATING ORAL at 08:07

## 2024-07-11 NOTE — OP NOTE
Patient: Moustapha Murray                                                    MRN: [id number]  : 1955                                              Date of procedure: 2024      Pre Procedure Diagnosis: Lumbar, Lumbosacral radiculopathy    Post Procedure Diagnosis: Same    Procedure:   Transforaminal Epidural Steroid Injection Under Fluoroscopy at L L5-S1  Transforaminal Epidural Steroid Injection Under Fluoroscopy at L S1    Attending: Ann Soto MD    Local Anesthetic Injected: Lidocaine 1% 6ml    Sedation Medications: None    Estimated Blood Loss: None    Complication: None        Procedure:  After informed consent was obtained, patient was taken to the fluoroscopy suite and placed in a prone position.  The skin was prepped and draped in the usual sterile fashion using chlorhexidine.  Anatomical landmarks were identified with the aid of fluoroscopy, and the skin and subcutaneous tissue overlying the neural foramen was infiltrated with 3mL of 1% Lidocaine using a 25-gauge 1.5 inch needle.  A 22-gauge 5-inch needle was advanced with the aid of fluoroscopy in an oblique view such that the needle tip entered the neural foramen.   Needle placement was confirmed with fluoroscopy in PA and Lateral views.  After a negative aspiration, 0.5mL of contrast was injected.  The contrast delineated the nerve root as well as the epidural spread.  After negative aspiration, an injectate consisting of 0.5mL of 10mg/mL of Dexamethasone, 0.5mL of 1% preservative free lidocaine and 3mL of preservative normal saline was injected.  This was repeated at the other level. Patient tolerated the procedure well and all needles were removed intact.  There were no complications.    Patient was observed in recovery and discharged home with written instructions under supervision in stable condition.

## 2024-07-11 NOTE — DISCHARGE SUMMARY
[facility] - Surgery  Discharge Note  Short Stay    Procedure(s) (LRB):  Injection,steroid,epidural,transforaminal approach l5-s1 and s1 (Left)      OUTCOME: Patient tolerated treatment/procedure well without complication and is now ready for discharge.    DISPOSITION: Home or Self Care    FINAL DIAGNOSIS:  <principal problem not specified>    FOLLOWUP: In clinic    DISCHARGE INSTRUCTIONS:    Discharge Procedure Orders   Notify your health care provider if you experience any of the following:  temperature >100.4     Notify your health care provider if you experience any of the following:  severe uncontrolled pain     Notify your health care provider if you experience any of the following:  redness, tenderness, or signs of infection (pain, swelling, redness, odor or green/yellow discharge around incision site)     Activity as tolerated        TIME SPENT ON DISCHARGE: 20 minutes

## 2024-07-12 VITALS
WEIGHT: 206 LBS | OXYGEN SATURATION: 95 % | DIASTOLIC BLOOD PRESSURE: 78 MMHG | TEMPERATURE: 98 F | BODY MASS INDEX: 29.49 KG/M2 | SYSTOLIC BLOOD PRESSURE: 141 MMHG | HEIGHT: 70 IN | HEART RATE: 55 BPM | RESPIRATION RATE: 16 BRPM

## 2024-08-02 ENCOUNTER — PATIENT MESSAGE (OUTPATIENT)
Dept: CARDIOLOGY | Facility: CLINIC | Age: 69
End: 2024-08-02
Payer: MEDICARE

## 2024-08-02 DIAGNOSIS — J84.10 PULMONARY FIBROSIS: ICD-10-CM

## 2024-08-02 DIAGNOSIS — I27.20 PULMONARY HYPERTENSION: Primary | ICD-10-CM

## 2024-08-02 RX ORDER — SODIUM CHLORIDE 9 MG/ML
INJECTION, SOLUTION INTRAVENOUS ONCE
OUTPATIENT
Start: 2024-08-02 | End: 2024-08-02

## 2024-08-02 RX ORDER — SODIUM CHLORIDE 0.9 % (FLUSH) 0.9 %
10 SYRINGE (ML) INJECTION
Status: SHIPPED | OUTPATIENT
Start: 2024-08-02

## 2024-08-23 ENCOUNTER — TELEPHONE (OUTPATIENT)
Dept: FAMILY MEDICINE | Facility: CLINIC | Age: 69
End: 2024-08-23
Payer: MEDICARE

## 2024-08-23 NOTE — TELEPHONE ENCOUNTER
----- Message from Nam Morin sent at 8/23/2024  3:36 PM CDT -----  Type:  Pharmacy Calling to Clarify an RX    Name of Caller:  Farzana Mckeonbrynns   Pharmacy Name:    [id number] [place], [place] - [address] AT [facility] & [place]  [address] [address]  Phone: [phone] Fax: [phone]  Prescription Name:  torsemide (DEMADEX) 10 MG Tab  What do they need to clarify?:  pt advised pharm he has allergy to sulfa and this is a sulfa related property in rx   Best Call Back Number:  [phone]  Additional Information:  Farzana stated pt has sulfa allergy and rx has sulfa properties in rx please ensure to call back to advise asap thanks!

## 2024-08-24 ENCOUNTER — PATIENT MESSAGE (OUTPATIENT)
Dept: CARDIOLOGY | Facility: CLINIC | Age: 69
End: 2024-08-24
Payer: MEDICARE

## 2024-08-27 ENCOUNTER — TELEPHONE (OUTPATIENT)
Dept: CARDIOLOGY | Facility: CLINIC | Age: 69
End: 2024-08-27
Payer: MEDICARE

## 2024-08-27 NOTE — TELEPHONE ENCOUNTER
----- Message from Gregory Ritchie sent at 8/27/2024  1:35 PM CDT -----  Regarding: Returning call  Type:  Patient Returning Call    Who Called:Pt    Who Left Message for Patient:Sukhdev    Does the patient know what this is regarding?:     Would the patient rather a call back or a response via "[employer]ner? Call back    Best Call Back Number:[phone]      Additional Information:   Please advise -- Thank you

## 2024-08-27 NOTE — TELEPHONE ENCOUNTER
Pt stated pharmacy will not issue medication ([id number]) due to him having a sulfa allergy...please advise

## 2024-08-27 NOTE — TELEPHONE ENCOUNTER
----- Message from Patty Washburn sent at 8/27/2024 10:44 AM CDT -----  Regarding: Needs Refill/RX Status  Contact: neri at [phone]  Type: Needs Refill/RX Status  Who Called:    [id number] [address] [facility]  [address] [facility] [address] [facility] [address]  Phone: [phone] Fax: [phone]      Additional Information: regarding the torsemide (DEMADEX) 10 MG Tab. Please call and advise. Thank you

## 2024-08-30 DIAGNOSIS — G25.81 RESTLESS LEGS SYNDROME (RLS): ICD-10-CM

## 2024-09-04 RX ORDER — PREGABALIN 75 MG/1
75 CAPSULE ORAL NIGHTLY
Qty: 30 CAPSULE | Refills: 2 | Status: SHIPPED | OUTPATIENT
Start: 2024-09-04 | End: 2024-12-03

## 2024-09-10 ENCOUNTER — LAB VISIT (OUTPATIENT)
Dept: LAB | Facility: HOSPITAL | Age: 69
End: 2024-09-10
Attending: INTERNAL MEDICINE
Payer: MEDICARE

## 2024-09-10 ENCOUNTER — OFFICE VISIT (OUTPATIENT)
Dept: OTOLARYNGOLOGY | Facility: CLINIC | Age: 69
End: 2024-09-10
Payer: MEDICARE

## 2024-09-10 VITALS — WEIGHT: 201.94 LBS | HEIGHT: 70 IN | BODY MASS INDEX: 28.91 KG/M2

## 2024-09-10 DIAGNOSIS — R76.8 ELEVATED SERUM IMMUNOGLOBULIN FREE LIGHT CHAIN LEVEL: ICD-10-CM

## 2024-09-10 DIAGNOSIS — J34.2 DNS (DEVIATED NASAL SEPTUM): ICD-10-CM

## 2024-09-10 DIAGNOSIS — J34.3 NASAL TURBINATE HYPERTROPHY: ICD-10-CM

## 2024-09-10 DIAGNOSIS — G47.33 OSA (OBSTRUCTIVE SLEEP APNEA): Primary | ICD-10-CM

## 2024-09-10 DIAGNOSIS — I25.10 CORONARY ARTERY DISEASE INVOLVING NATIVE CORONARY ARTERY OF NATIVE HEART WITHOUT ANGINA PECTORIS: ICD-10-CM

## 2024-09-10 DIAGNOSIS — Z79.02 ANTIPLATELET OR ANTITHROMBOTIC LONG-TERM USE: ICD-10-CM

## 2024-09-10 DIAGNOSIS — J84.10 PULMONARY FIBROSIS: ICD-10-CM

## 2024-09-10 DIAGNOSIS — Z78.9 INTOLERANCE OF CONTINUOUS POSITIVE AIRWAY PRESSURE (CPAP) VENTILATION: ICD-10-CM

## 2024-09-10 LAB
ALBUMIN SERPL BCP-MCNC: 4.1 G/DL (ref 3.5–5.2)
ALP SERPL-CCNC: 152 U/L (ref 55–135)
ALT SERPL W/O P-5'-P-CCNC: 30 U/L (ref 10–44)
ANION GAP SERPL CALC-SCNC: 12 MMOL/L (ref 8–16)
AST SERPL-CCNC: 26 U/L (ref 10–40)
BASOPHILS # BLD AUTO: 0.07 K/UL (ref 0–0.2)
BASOPHILS NFR BLD: 0.7 % (ref 0–1.9)
BILIRUB SERPL-MCNC: 0.6 MG/DL (ref 0.1–1)
BUN SERPL-MCNC: 21 MG/DL (ref 8–23)
CALCIUM SERPL-MCNC: 9.9 MG/DL (ref 8.7–10.5)
CHLORIDE SERPL-SCNC: 103 MMOL/L (ref 95–110)
CO2 SERPL-SCNC: 24 MMOL/L (ref 23–29)
CREAT SERPL-MCNC: 1.1 MG/DL (ref 0.5–1.4)
DIFFERENTIAL METHOD BLD: ABNORMAL
EOSINOPHIL # BLD AUTO: 0 K/UL (ref 0–0.5)
EOSINOPHIL NFR BLD: 0 % (ref 0–8)
ERYTHROCYTE [DISTWIDTH] IN BLOOD BY AUTOMATED COUNT: 15.9 % (ref 11.5–14.5)
EST. GFR  (NO RACE VARIABLE): >60 ML/MIN/1.73 M^2
GLUCOSE SERPL-MCNC: 131 MG/DL (ref 70–110)
HCT VFR BLD AUTO: 45.5 % (ref 40–54)
HGB BLD-MCNC: 15.5 G/DL (ref 14–18)
IMM GRANULOCYTES # BLD AUTO: 0.09 K/UL (ref 0–0.04)
IMM GRANULOCYTES NFR BLD AUTO: 0.9 % (ref 0–0.5)
LYMPHOCYTES # BLD AUTO: 1 K/UL (ref 1–4.8)
LYMPHOCYTES NFR BLD: 9.8 % (ref 18–48)
MCH RBC QN AUTO: 31.3 PG (ref 27–31)
MCHC RBC AUTO-ENTMCNC: 34.1 G/DL (ref 32–36)
MCV RBC AUTO: 92 FL (ref 82–98)
MONOCYTES # BLD AUTO: 0.5 K/UL (ref 0.3–1)
MONOCYTES NFR BLD: 5.4 % (ref 4–15)
NEUTROPHILS # BLD AUTO: 8.3 K/UL (ref 1.8–7.7)
NEUTROPHILS NFR BLD: 83.2 % (ref 38–73)
NRBC BLD-RTO: 0 /100 WBC
PLATELET # BLD AUTO: 274 K/UL (ref 150–450)
PMV BLD AUTO: 9.9 FL (ref 9.2–12.9)
POTASSIUM SERPL-SCNC: 4.1 MMOL/L (ref 3.5–5.1)
PROT SERPL-MCNC: 7.6 G/DL (ref 6–8.4)
RBC # BLD AUTO: 4.95 M/UL (ref 4.6–6.2)
SODIUM SERPL-SCNC: 139 MMOL/L (ref 136–145)
WBC # BLD AUTO: 10.02 K/UL (ref 3.9–12.7)

## 2024-09-10 PROCEDURE — 99999 PR PBB SHADOW E&M-EST. PATIENT-LVL V: CPT | Mod: PBBFAC,,, | Performed by: OTOLARYNGOLOGY

## 2024-09-10 PROCEDURE — 99215 OFFICE O/P EST HI 40 MIN: CPT | Mod: PBBFAC,PO | Performed by: OTOLARYNGOLOGY

## 2024-09-10 PROCEDURE — 83521 IG LIGHT CHAINS FREE EACH: CPT | Mod: 59 | Performed by: INTERNAL MEDICINE

## 2024-09-10 PROCEDURE — 86334 IMMUNOFIX E-PHORESIS SERUM: CPT | Mod: 26,,, | Performed by: PATHOLOGY

## 2024-09-10 PROCEDURE — 80053 COMPREHEN METABOLIC PANEL: CPT | Mod: PO | Performed by: INTERNAL MEDICINE

## 2024-09-10 PROCEDURE — 85025 COMPLETE CBC W/AUTO DIFF WBC: CPT | Mod: PO | Performed by: INTERNAL MEDICINE

## 2024-09-10 PROCEDURE — 84165 PROTEIN E-PHORESIS SERUM: CPT | Performed by: INTERNAL MEDICINE

## 2024-09-10 PROCEDURE — 84165 PROTEIN E-PHORESIS SERUM: CPT | Mod: 26,,, | Performed by: PATHOLOGY

## 2024-09-10 PROCEDURE — 86334 IMMUNOFIX E-PHORESIS SERUM: CPT | Performed by: INTERNAL MEDICINE

## 2024-09-10 PROCEDURE — 36415 COLL VENOUS BLD VENIPUNCTURE: CPT | Mod: PO | Performed by: INTERNAL MEDICINE

## 2024-09-10 PROCEDURE — 99214 OFFICE O/P EST MOD 30 MIN: CPT | Mod: S$PBB,,, | Performed by: OTOLARYNGOLOGY

## 2024-09-10 NOTE — PROGRESS NOTES
Subjective:       Patient ID: Moustapha Murray is a 69 y.o. male.    Chief Complaint: Sleep Apnea (Discuss inspire)    Moustapha is here for Obstructive sleep apnea and intolerance of CPAP.  he was diagnosed with TASIA years ago.   Last sleep study: 12/2023. ALDA / AHI: 26;- was 60 when BMI was 34  he has issues with CPAP compliance: cannot acclimate to mask. Cannot maintain sleep at night with the mas. Tried 3 different iterations and multiple masks.   Previous surgical treatments for sleep apnea: none  Body mass index is 28.98 kg/m².   Has lost a substantial amount of weight but still TASIA as above   ESS - 13  Pertinent medical issues: ILD, cardiac issues,   Anticoagulation: Plavix  Pertinent surgery: none    Patient validated questionnaires (if applicable):     NOSE score:: (P) 45%            No data to display                   No data to display                   No data to display                     Social History     Tobacco Use   Smoking Status Former    Current packs/day: 0.00    Types: Cigarettes    Quit date: 10/16/2013    Years since quitting: 10.9   Smokeless Tobacco Never     Social History     Substance and Sexual Activity   Alcohol Use Yes    Alcohol/week: 2.0 standard drinks of alcohol    Types: 2 Cans of beer per week    Comment: couple beers weekly          Objective:        Constitutional:   Vital signs are normal. He appears well-developed and well-nourished.     Head:  Normocephalic and atraumatic.     Ears:  Hearing normal to normal and whispered voice; external ear normal without scars, lesions, or masses; ear canal, tympanic membrane, and middle ear normal..     Nose:  Nose normal including turbinates, nasal mucosa, sinuses and nasal septum. Septal deviation (mild) present. Turbinate hypertrophy.      Mouth/Throat  Oropharynx clear and moist without lesions or asymmetry.     Neck:  Neck normal without thyromegaly masses, asymmetry, normal tracheal structure, crepitus, and tenderness.          Tests / Results:  I personally reviewed the HST/PSG as noted above in the HPI    Assessment:       1. TASIA (obstructive sleep apnea)    2. Intolerance of continuous positive airway pressure (CPAP) ventilation    3. DNS (deviated nasal septum)    4. Nasal turbinate hypertrophy    5. Pulmonary fibrosis    6. Coronary artery disease involving native coronary artery of native heart without angina pectoris    7. Antiplatelet or antithrombotic long-term use          Plan:         Discussed background of TASIA at length including medical therapy and surgical therapy    Patient interested in Inspire  Discussed   DISE (drug induced sleep endoscopy)    I discussed the risks of hypoglossal nerve stimulation including: scar, bleeding, numbness of incision, numbness or weakness of tongue or marginal mandibular nerve, irritation of tongue from stimulation, implant failure, inadequate improvement, need for further procedures, need for removal of implant, infection, pneumothorax, conditional MRI compatibility.

## 2024-09-10 NOTE — PATIENT INSTRUCTIONS
Information regarding Hypoglossal Nerve Stimulation Therapy:    Hypoglossal Nerve Stimulation Therapy (HGNS) is a surgical treatment for sleep apnea when a patient fails standard positive pressure (CPAP) therapy. This is an effective surgical therapy for sleep apnea with long term effectiveness in many patients.    What is HGNS and how does it work?  The hypoglossal nerve is the nerve that controls nearly all of the tongue movements.   During sleep, the tongue will often fall into the back of the throat. This causes obstruction and apneas by narrowing the throat.  By targeting the tongue muscles, we can often control the prevention of tongue collapse during sleep thereby preventing collapse and apnea.  This procedure involves inserting an implant over the muscles of the chest wall (similar to a pacemaker.) This implant is connected to the nerve that moves the tongue forward. During sleep, the implant will detect breathing effort, and during inspiration, it will cause a gentle pulse of the tongue forward to prevent collapse.   The machine can be turned on and off, and it can be dialed up or down depending on strength needed to move the tongue forward.    Will I feel it? Will it affect my sleep at night?  Once the implant is in place, it is activated after 1 month. After this, you will begin increasing the settings to find the most comfortable setting for you. We work hard with you to get the right settings.   A majority of patients are not disturbed at night with the device, and satisfaction is extremely high (95%.) This is especially true when compared to wearing an external device (CPAP.)   A post-titration sleep study is typically obtained about 3 months after surgery to assess response.     Am I a Candidate?  Candidacy for HGNS is changing over time; however, current general candidacy requirements are:  Moderate or Severe Sleep Apnea (AHI 15-65) as measured by a recent sleep study  Body Mass Index (BMI) < 35    Failure to tolerate CPAP therapy  Pre-operative endoscopy exam confirming candidacy (performed during a quick outpatient procedure)  No contraindications to implant  Approval by insurance company  **The current device is currently under conditional acceptance for use with MRI of the thoracic, shoulder, or abdomen area.    How is the surgery performed:  The surgery is usually performed in an outpatient setting, under general anesthesia.  The procedure generally takes a few hours.  The procedure involved two incisions: one in the neck, just below the jaw line. The other is in the chest between the 2nd and third rib. The surgical scars generally heal very well and are minimally noticeable.   Please let your doctor know if you have had surgery or significant trauma to the chest or neck.     What are the risks?  General surgical risks, similar to any procedure include  Bleeding or hematoma  Numbness over incision site  Incisional scar  Pain over incision site  Infection of implant requiring revision or replacement (<1%)  Intolerance to the therapy resulting in non-usage    Specific risks to this surgery include:  Temporary tongue weakness or tingling, rarely permanent (< 1%)  Mechanical failure of the implant  Temporary or permanent weakness to the muscles of the lip (< 1%)  Stimulation related discomfort or tongue abrasions (8%)  Pneumothorax (dropped lung)    After care:  Instructions will be given following the surgery  Generally, light activity for 2 weeks is recommended.  Blood thinners are held prior to surgery at the discretion of the surgeon and can usually be resumed within 48 hours.     Post-operative timeline:    WEEK 1:  About 7 days after your procedure, you will return to the office for a follow up visit. At this time any surtures that we placed will be removed and your incisions will be checked by the pysician. Please note that device will remain inactive for ONE MONTH. This is refered to as your  healing phase. We want to give your body time to heal before we turn the device on. During this time it would be benefical for you to watch the vidoes on the inspire donna. Simply download the donna if you have not done so, then click on the RESOURCES tab. From there, scrol to the INSPIRE CARE section. There are vidoes here that walk you though this phase and will prepare you for the activation visit. Focus on the vidoes entitled: Rest and Heal, Learning your sleep remote, and Tuning Inspire. This will help you feel educated and empowered for your visit.     WEEK 4:  At the one month visit you will come to the office for the device activation. Please wear a shirt or blouse that can allow the physician to check all the incisions and access the implant. Your physician will use a  to check the device for proper function and turn it on for you to begin using Inspire therapy. The process of activation is NOT painful. At this visit you will be given your remote control and instructions on how to use. You will return home to begin using your therapy with the goal of using it ALL NIGHT, EVERY NIGHT. If you have any issues with discomfort, please call the office so we can help you reach this goal.    WEEK 6-8:  Someone from our office will be doing a check in call to see how you are progressing. If you are having any issues, we can bring you in to the office and make some appropratie adjustments as needed. Otherwise, you will continue to keep stepping up your therapy and using it all night.     WEEK 12:  Roughly 3 months after your device is turned on, you will return to the sleep lab. This will help us determine if your therapy is at the appropriate level for you. A couple of weeks after your sleep study, you will return to the office to review your results and make any changes that your provider feels necessary.       FAQ:  I don't feel the stimulation?   Answer: During the tuning phase, you will get used to  stimulation.  It's very common to not feel stimulation.  Try stepping up your level.    The stimulation does not synchronize with my breathing?    Answer: This is normal.  Stimulation timing is designed to work best while you are asleep.  Your breathing pattern is much different while you are awake.  When you go to sleep, the sensor will work normally.     Can I receive an MRI?    Answer: If you have the model 3028 generator it is MRI Conditional, make sure your doctor goes to the Inspire website for more information on how to perform a safe MRI for you.     Can I go to the airport?    Answer: Yes.  You may go through metal detectors and scanners.  Make sure to show the TSA official your medical device registration card and tell them you have an implantable device in your body.  They may require extra screening, so make sure to plan for extra time your first time going to the airport.     Can I go scuba diving and mountain climbing?    Answer: You can go to as high in elevation as you want.  However, you can only go 30 meters below sea level or 4 atmospheres of pressure.     Can I continue welding?    Answer: Inspire does not recommend electrical welding.  Consult your patient manual for more details.    Can I go to the dentist?    Answer: Going to the dentist is perfectly fine. Let your dentist know you have an implantable device and the dentist will take any precautions needed.     Can I receive X-Rays or CT Scans?    Answer: Yes.  X-Ray and CT Scans are safe with Inspire.  If you are having a mammogram though, make sure to let the technician know where your device is implanted so the technician can make the mammogram as comfortable as possible.     Why is stimulation on when I'm awake?    Answer: This is normal.  Inspire works by stimulating during every breath.  Inspire does not know when you are asleep or awake, so once you turn on Inspire, it will stimulate with each breath. Remember, you are in complete  control of your Inspire and can turn Inspire ON or OFF using your Inspire Sleep Remote.     Why is stimulation off when I wake up?    Answer: You may have slept longer than the Therapy Duration Setting, which by default is 8 hours.  Inspire will turn off automatically after 8 hours.  If you are sleeping longer than 8 hours, ask your physician to change the duration setting to a longer time (for example 9 or 10 hours).  It is also possible that you are getting used to Inspire and just don't feel the stimulation when you wake up.  If you're sleep remote is green when you gently shake it, your Inspire is still on.  If your Inspire is white, then it is no longer stimulating.       More information can be found at:  Www.Inspiresleep.com

## 2024-09-12 LAB
ALBUMIN SERPL ELPH-MCNC: 4.23 G/DL (ref 3.35–5.55)
ALPHA1 GLOB SERPL ELPH-MCNC: 0.29 G/DL (ref 0.17–0.41)
ALPHA2 GLOB SERPL ELPH-MCNC: 1.03 G/DL (ref 0.43–0.99)
B-GLOBULIN SERPL ELPH-MCNC: 0.85 G/DL (ref 0.5–1.1)
GAMMA GLOB SERPL ELPH-MCNC: 0.91 G/DL (ref 0.67–1.58)
INTERPRETATION SERPL IFE-IMP: NORMAL
KAPPA LC SER QL IA: 2.65 MG/DL (ref 0.33–1.94)
KAPPA LC/LAMBDA SER IA: 1.51 (ref 0.26–1.65)
LAMBDA LC SER QL IA: 1.76 MG/DL (ref 0.57–2.63)
PATHOLOGIST INTERPRETATION IFE: NORMAL
PATHOLOGIST INTERPRETATION SPE: NORMAL
PROT SERPL-MCNC: 7.3 G/DL (ref 6–8.4)

## 2024-09-23 ENCOUNTER — TELEPHONE (OUTPATIENT)
Dept: OTOLARYNGOLOGY | Facility: CLINIC | Age: 69
End: 2024-09-23
Payer: MEDICARE

## 2024-09-23 NOTE — TELEPHONE ENCOUNTER
----- Message from Jacqui Driscoll sent at 9/23/2024 11:26 AM CDT -----  Contact: self  Type:  Reschedule Procedure  Who Called:  Pt  Symptoms (please be specific):  procedure scheduled 10/02  / pt trying to get the 10/4 slot if still available...  Best Call Back Number:  [phone] // Please call to advise... Thank you...

## 2024-09-24 ENCOUNTER — OFFICE VISIT (OUTPATIENT)
Dept: HEMATOLOGY/ONCOLOGY | Facility: CLINIC | Age: 69
End: 2024-09-24
Payer: MEDICARE

## 2024-09-24 ENCOUNTER — TELEPHONE (OUTPATIENT)
Dept: HEMATOLOGY/ONCOLOGY | Facility: CLINIC | Age: 69
End: 2024-09-24
Payer: MEDICARE

## 2024-09-24 VITALS
TEMPERATURE: 98 F | DIASTOLIC BLOOD PRESSURE: 70 MMHG | RESPIRATION RATE: 14 BRPM | SYSTOLIC BLOOD PRESSURE: 120 MMHG | WEIGHT: 201.25 LBS | HEIGHT: 70 IN | BODY MASS INDEX: 28.81 KG/M2 | OXYGEN SATURATION: 96 % | HEART RATE: 66 BPM

## 2024-09-24 DIAGNOSIS — R76.8 ELEVATED SERUM IMMUNOGLOBULIN FREE LIGHT CHAIN LEVEL: Primary | ICD-10-CM

## 2024-09-24 DIAGNOSIS — D64.9 NORMOCYTIC ANEMIA: ICD-10-CM

## 2024-09-24 DIAGNOSIS — Z85.51 HISTORY OF BLADDER CANCER: ICD-10-CM

## 2024-09-24 DIAGNOSIS — D50.9 IRON DEFICIENCY ANEMIA, UNSPECIFIED IRON DEFICIENCY ANEMIA TYPE: ICD-10-CM

## 2024-09-24 DIAGNOSIS — J84.10 PULMONARY FIBROSIS: ICD-10-CM

## 2024-09-24 DIAGNOSIS — R59.0 LOCALIZED ENLARGED LYMPH NODES: ICD-10-CM

## 2024-09-24 DIAGNOSIS — R76.8 POSITIVE ANA (ANTINUCLEAR ANTIBODY): ICD-10-CM

## 2024-09-24 PROCEDURE — 99214 OFFICE O/P EST MOD 30 MIN: CPT | Mod: PBBFAC,PN | Performed by: INTERNAL MEDICINE

## 2024-09-24 PROCEDURE — 99999 PR PBB SHADOW E&M-EST. PATIENT-LVL IV: CPT | Mod: PBBFAC,,, | Performed by: INTERNAL MEDICINE

## 2024-09-24 PROCEDURE — 99214 OFFICE O/P EST MOD 30 MIN: CPT | Mod: S$PBB,,, | Performed by: INTERNAL MEDICINE

## 2024-09-24 NOTE — PROGRESS NOTES
PATIENT: Moustapha Murray  MRN: [id number]  DATE: 9/24/2024      Diagnosis:   1. Elevated serum immunoglobulin free light chain level    2. Iron deficiency anemia, unspecified iron deficiency anemia type    3. Pulmonary fibrosis    4. Positive AUGUSTA (antinuclear antibody)    5. Localized enlarged lymph nodes    6. Normocytic anemia    7. History of bladder cancer                Chief Complaint: Elevated serum immunoglobulin free light chain level (3 month follow up )      Oncologic History:      Oncologic History     Oncologic Treatment     Pathology           Subjective:    Interval History: Mr. Murray is a 69 y.o. male with CAD, Depression, fatty liver, GERD, HTN, RLS, pulmonary fibrosis, pulmonary fibrosis, h/o bladder cancer who presents for positive TIF1 gamma antibody.   Since the last clinic visit the patient underwent EBUS on 07/15/2024 with biopsy of station 4R and 7 which was negative for malignancy and showed lm material.  Lab work on 9/10/24 showing no monoclonal protein on SPEP or MEME.  Free light chain assay showed elevated kappa light chains at 2.65 mg/dL, normal lambda light chains at 1.76 mg/dL, and normal kappa lambda ratio 1.51.  The patient denies CP, cough, SOB, abdominal pain, nausea, vomiting, constipation, diarrhea.  The patient denies fever, chills, night sweats, weight loss, new lumps or bumps, easy bruising or bleeding.    Prior History:  Patient with a history of noninvasive bladder cancer under the care of Dr. Lr.  Patient last underwent cystoscopy on 08/17/2023 with no lesions seen.  Patient with history of T1 disease status post bladder installation of mitomycin-C and BCG.  The patient is currently being followed by Dr. Arellano for pulmonary fibrosis and underwent laboratory testing on 1/08/24 showing positive TIF1 gamma antibody.  The patient is currently scheduled for colonoscopy 3/04/2024 and cystoscopy 3/21/2024.  PSA on 1/08/24 was normal at 0.49ng/mL.   The patient  underwent a CT of the chest, abdomen, and pelvis on 02/27/2024 showing a lower right paratracheal enlarged lymph node measuring 10 mm; enlarged subcarinal lymph node measuring 14 mm; mildly enlarged lymph node adjacent to the distal esophagus; interstitial thickening bilaterally in the subpleural pattern suggesting fibrosis; diffuse fatty infiltration of the liver; anterior wedge compression fracture of L1 and T12; wall thickening in the region of the ascending colon and cecum.  CT of the neck showed no acute findings.   The patient underwent colonoscopy on 03/04/2024 which was normal.  Cystoscopy on 3/21/24 showed no evidence of recurrence.  PET/CT 3/22/24 showed enlarged lymph nodes and superior mediastinum, anterior mediastinum, periaortic region, pretracheal subcarinal, and left paraesophageal region of the diaphragm hiatus; moderate prominent peripheral distribution of interlobular interstitial thickening; area of uptake in the region of right posterior prostate.  Patient underwent iron infusions on 04/02/2024 and 04/09/2024.  Patient underwent EUS on 05/08/2024 with biopsy of subcarinal mediastinum station 7 lymph node with path showing positive for lm material but negative for malignant cells.    the patient underwent CT chest on 06/11/2024 showing multiple large lymph nodes throughout the mediastinum which have increasing size; centrilobular and paraseptal emphysematous changes with upper lobe predominance as well as extensive subpleural reticulation, interlobular septal thickening, scattered bronchiectasis throughout both lungs mildly progressed at the bases when compared to scan from February, 2024.  Labs on 06/11/2024 showed oligoclonal banding pattern on immunofixation electrophoresis and free light chain assay with elevated kappa light chains at 4.18 mg/dL, elevated lambda light chains at 3.21 mg/dL, and normal kappa lambda ratio 1.3.    Past Medical History:   Past Medical History:   Diagnosis Date     Anticoagulant long-term use     Bladder cancer 08/2020    Colon polyps     COPD (chronic obstructive pulmonary disease)     Coronary artery disease 01/31/2023    CORONARY STENTSx3    DDD (degenerative disc disease), lumbar     Depression     Fatty liver     GERD (gastroesophageal reflux disease)     H/O motion sickness     Hypertension     Mediastinal lymphadenopathy 07/2024    PONV (postoperative nausea and vomiting)     Pulmonary fibrosis     RLS (restless legs syndrome)     Sleep apnea, unspecified     can't use cpap       Past Surgical HIstory:   Past Surgical History:   Procedure Laterality Date    ANGIOGRAM, CORONARY, WITH LEFT HEART CATHETERIZATION N/A 1/31/2023    Procedure: Left Heart Cath;  Surgeon: Nitin Wheat MD;  Location: [facility] CATH;  Service: Cardiology;  Laterality: N/A;    ANGIOGRAM, CORONARY, WITH LEFT HEART CATHETERIZATION  4/17/2024    Procedure: Left Heart Cath;  Surgeon: Nitin Wheat MD;  Location: [facility] CATH;  Service: Cardiology;;    APPENDECTOMY      CARPAL TUNNEL RELEASE      CERVICAL FUSION      CHOLECYSTECTOMY      CIRCUMCISION      COLONOSCOPY N/A 3/16/2020    Procedure: COLONOSCOPY;  Surgeon: Braulio Best MD;  Location: [facility];  Service: Endoscopy;  Laterality: N/A; 3 colon polyps removed; biopsy: tubular adenoma and hyperplastic; repeat in 5 years for surveillance    COLONOSCOPY N/A 3/4/2024    Procedure: COLONOSCOPY;  Surgeon: Braulio Best MD;  Location: [facility];  Service: Gastroenterology;  Laterality: N/A;    CORONARY ANGIOGRAPHY  2/13/2023    Procedure: ANGIOGRAM, CORONARY ARTERY;  Surgeon: Nitin Wheat MD;  Location: [facility] CATH;  Service: Cardiology;;    CORONARY ANGIOPLASTY WITH STENT PLACEMENT N/A 1/31/2023    Procedure: ANGIOPLASTY, CORONARY ARTERY, WITH STENT INSERTION;  Surgeon: Nitin Wheat MD;  Location: [facility] CATH;  Service: Cardiology;  Laterality: N/A;    CYSTOSCOPY W/ RETROGRADES Bilateral 8/24/2020    Procedure: CYSTOSCOPY, WITH RETROGRADE PYELOGRAM -WITH  URETHRAL DILATION;  Surgeon: Greg Claudio MD;  Location: [facility] OR;  Service: Urology;  Laterality: Bilateral;    CYSTOSCOPY W/ URETERAL STENT PLACEMENT Right 8/24/2020    Procedure: CYSTOSCOPY, WITH URETERAL STENT INSERTION;  Surgeon: Greg Claudio MD;  Location: [facility] OR;  Service: Urology;  Laterality: Right;    ENDOBRONCHIAL ULTRASOUND Bilateral 7/15/2024    Procedure: ENDOBRONCHIAL ULTRASOUND (EBUS);  Surgeon: Umm Arellano MD;  Location: [facility] OR;  Service: Pulmonary;  Laterality: Bilateral;    ENDOSCOPIC ULTRASOUND OF UPPER GASTROINTESTINAL TRACT Left 5/8/2024    Procedure: ULTRASOUND, UPPER GI TRACT, ENDOSCOPIC;  Surgeon: Brad Ochoa MD;  Location: [facility] ENDO;  Service: Endoscopy;  Laterality: Left;    ESOPHAGOGASTRODUODENOSCOPY N/A 5/8/2024    Procedure: EGD (ESOPHAGOGASTRODUODENOSCOPY);  Surgeon: Brad Ochoa MD;  Location: [facility] ENDO;  Service: Endoscopy;  Laterality: N/A;    INSTILLATION OF URINARY BLADDER N/A 8/24/2020    Procedure: INSTILLATION, BLADDER - Mitomycin;  Surgeon: Greg Claudio MD;  Location: [facility] OR;  Service: Urology;  Laterality: N/A;    IVUS, CORONARY  1/31/2023    Procedure: IVUS, Coronary;  Surgeon: Nitin Wheat MD;  Location: [facility] CATH;  Service: Cardiology;;    IVUS, CORONARY  2/13/2023    Procedure: IVUS, Coronary;  Surgeon: Nitin Wheat MD;  Location: [facility] CATH;  Service: Cardiology;;    PERCUTANEOUS CORONARY INTERVENTION, ARTERY  2/13/2023    Procedure: Percutaneous coronary intervention;  Surgeon: Nitin Wheat MD;  Location: [facility] CATH;  Service: Cardiology;;    RIGHT HEART CATHETERIZATION  8/21/2024    Procedure: Right heart cath;  Surgeon: Nitin Wheat MD;  Location: [facility] CATH;  Service: Cardiology;;    TRANSFORAMINAL EPIDURAL INJECTION OF STEROID Right 7/17/2019    Procedure: Injection,steroid,epidural,transforaminal approach L3-4, L4-5;  Surgeon: Chris Palencia MD;  Location: [facility] OR;  Service: Pain Management;  Laterality: Right;    TRANSFORAMINAL  EPIDURAL INJECTION OF STEROID Right 8/28/2019    Procedure: Injection,steroid,epidural,transforaminal approach;  Surgeon: Chris Palencia MD;  Location: [facility] OR;  Service: Pain Management;  Laterality: Right;  L3-4, L4-L5    TRANSFORAMINAL EPIDURAL INJECTION OF STEROID Left 7/11/2024    Procedure: Injection,steroid,epidural,transforaminal approach l5-s1 and s1;  Surgeon: Ann Soto MD;  Location: [facility] OR;  Service: Anesthesiology;  Laterality: Left;    WISDOM TOOTH EXTRACTION         Family History:   Family History   Problem Relation Name Age of Onset    Stroke Mother      Ulcerative colitis Mother      Kidney cancer Mother      Heart disease Father      Heart attack Father      Cancer Neg Hx      Colon cancer Neg Hx      Crohn's disease Neg Hx      Celiac disease Neg Hx         Social History:  reports that he quit smoking about 10 years ago. His smoking use included cigarettes. He has never used smokeless tobacco. He reports current alcohol use of about 2.0 standard drinks of alcohol per week. He reports that he does not currently use drugs.    Allergies:  Review of patient's allergies indicates:   Allergen Reactions    Sulfa (sulfonamide antibiotics) Anaphylaxis       Medications:  Current Outpatient Medications   Medication Sig Dispense Refill    albuterol (PROVENTIL/VENTOLIN HFA) 90 mcg/actuation inhaler Inhale 2 puffs into the lungs every 6 (six) hours as needed for Wheezing. Rescue 18 g 6    aspirin 81 MG Chew Take 1 tablet (81 mg total) by mouth once daily. 90 tablet 3    budesonide (PULMICORT) 0.5 mg/2 mL nebulizer solution Take 2 mLs (0.5 mg total) by nebulization 2 (two) times daily. Controller 120 mL 3    clopidogreL (PLAVIX) 75 mg tablet TAKE 1 TABLET BY MOUTH EVERY DAY 90 tablet 3    droNABinol (MARINOL) 2.5 MG capsule Take 1 capsule (2.5 mg total) by mouth 2 (two) times daily before meals. 60 capsule 3    EScitalopram oxalate (LEXAPRO) 10 MG tablet TAKE 1 TABLET BY MOUTH EVERY DAY 90 tablet 1     formoterol (PERFOROMIST) 20 mcg/2 mL Nebu Take 2 mLs (20 mcg total) by nebulization 2 (two) times a day. Controller 90 mL 3    ibuprofen (ADVIL,MOTRIN) 600 MG tablet Take 1 tablet (600 mg total) by mouth every 6 (six) hours as needed for Pain. 20 tablet 0    melatonin 5 mg Cap Take 5 mg by mouth nightly as needed. TAKE AS SCHEDULED      pantoprazole (PROTONIX) 40 MG tablet TAKE 1 TABLET BY MOUTH EVERY DAY 90 tablet 3    pregabalin (LYRICA) 75 MG capsule Take 1 capsule (75 mg total) by mouth every evening. 30 capsule 2    rosuvastatin (CRESTOR) 40 MG Tab TAKE 1 TABLET BY MOUTH EVERY DAY 90 tablet 3    torsemide (DEMADEX) 10 MG Tab Take 1 tablet (10 mg total) by mouth once daily. 30 tablet 11    verapamiL (CALAN-SR) 240 MG CR tablet Take 1 tablet (240 mg total) by mouth every evening. 30 tablet 11    nintedanib (OFEV) 150 mg Cap Take 1 capsule (150 mg total) by mouth 2 (two) times a day. (Patient not taking: Reported on 8/30/2024) 60 capsule 6    triamcinolone acetonide 0.1% (KENALOG) 0.1 % cream APPLY TOPICALLY 2 TIMES DAILY AS NEEDED. (Patient not taking: Reported on 9/10/2024) 45 g 5     Current Facility-Administered Medications   Medication Dose Route Frequency Provider Last Rate Last Admin    sodium chloride 0.9% flush 10 mL  10 mL Intravenous PRN Nitin Wheat MD         Facility-Administered Medications Ordered in Other Visits   Medication Dose Route Frequency Provider Last Rate Last Admin    lactated ringers infusion   Intravenous Continuous Braulio Best MD 75 mL/hr at 03/04/24 1104 New Bag at 07/15/24 1100    ondansetron injection 4 mg  4 mg Intravenous Daily PRN Gustavo Chu MD        sodium chloride 0.9% flush 10 mL  10 mL Intravenous PRN Braulio Best MD           Review of Systems   Constitutional:  Negative for chills, diaphoresis, fatigue, fever and unexpected weight change.   Respiratory:  Negative for cough and shortness of breath.    Cardiovascular:  Negative for chest pain and  "palpitations.   Gastrointestinal:  Negative for abdominal pain, constipation, diarrhea, nausea and vomiting.   Skin:  Negative for color change and rash.   Neurological:  Negative for headaches.   Hematological:  Negative for adenopathy. Does not bruise/bleed easily.       ECOG Performance Status: 1   Objective:      Vitals:   Vitals:    09/24/24 1559   BP: 120/70   BP Location: Left arm   Patient Position: Sitting   BP Method: Medium (Manual)   Pulse: 66   Resp: 14   Temp: 97.5 °F (36.4 °C)   TempSrc: Temporal   SpO2: 96%   Weight: 91.3 kg (201 lb 4.5 oz)   Height: 5' 10" (1.778 m)             Physical Exam  Constitutional:       General: He is not in acute distress.     Appearance: He is well-developed. He is not diaphoretic.   HENT:      Head: Normocephalic and atraumatic.   Cardiovascular:      Rate and Rhythm: Normal rate and regular rhythm.      Heart sounds: Normal heart sounds. No murmur heard.     No friction rub. No gallop.   Pulmonary:      Effort: Pulmonary effort is normal. No respiratory distress.      Breath sounds: Normal breath sounds. No wheezing or rales.   Chest:      Chest wall: No tenderness.   Abdominal:      General: Bowel sounds are normal. There is no distension.      Palpations: Abdomen is soft. There is no mass.      Tenderness: There is no abdominal tenderness. There is no rebound.   Musculoskeletal:      Cervical back: Normal range of motion.   Lymphadenopathy:      Cervical: No cervical adenopathy.      Upper Body:      Right upper body: No supraclavicular or axillary adenopathy.      Left upper body: No supraclavicular or axillary adenopathy.   Skin:     General: Skin is warm and dry.      Findings: No erythema or rash.   Neurological:      Mental Status: He is alert and oriented to person, place, and time.   Psychiatric:         Behavior: Behavior normal.         Laboratory Data:  No visits with results within 1 Week(s) from this visit.   Latest known visit with results is:   Lab " Visit on 09/10/2024   Component Date Value Ref Range Status    WBC 09/10/2024 10.02  3.90 - 12.70 K/uL Final    RBC 09/10/2024 4.95  4.60 - 6.20 M/uL Final    Hemoglobin 09/10/2024 15.5  14.0 - 18.0 g/dL Final    Hematocrit 09/10/2024 45.5  40.0 - 54.0 % Final    MCV 09/10/2024 92  82 - 98 fL Final    MCH 09/10/2024 31.3 (H)  27.0 - 31.0 pg Final    MCHC 09/10/2024 34.1  32.0 - 36.0 g/dL Final    RDW 09/10/2024 15.9 (H)  11.5 - 14.5 % Final    Platelets 09/10/2024 274  150 - 450 K/uL Final    MPV 09/10/2024 9.9  9.2 - 12.9 fL Final    Immature Granulocytes 09/10/2024 0.9 (H)  0.0 - 0.5 % Final    Gran # (ANC) 09/10/2024 8.3 (H)  1.8 - 7.7 K/uL Final    Immature Grans (Abs) 09/10/2024 0.09 (H)  0.00 - 0.04 K/uL Final    Comment: Mild elevation in immature granulocytes is non specific and   can be seen in a variety of conditions including stress response,   acute inflammation, trauma and pregnancy. Correlation with other   laboratory and clinical findings is essential.      Lymph # 09/10/2024 1.0  1.0 - 4.8 K/uL Final    Mono # 09/10/2024 0.5  0.3 - 1.0 K/uL Final    Eos # 09/10/2024 0.0  0.0 - 0.5 K/uL Final    Baso # 09/10/2024 0.07  0.00 - 0.20 K/uL Final    nRBC 09/10/2024 0  0 /100 WBC Final    Gran % 09/10/2024 83.2 (H)  38.0 - 73.0 % Final    Lymph % 09/10/2024 9.8 (L)  18.0 - 48.0 % Final    Mono % 09/10/2024 5.4  4.0 - 15.0 % Final    Eosinophil % 09/10/2024 0.0  0.0 - 8.0 % Final    Basophil % 09/10/2024 0.7  0.0 - 1.9 % Final    Differential Method 09/10/2024 Automated   Final    Sodium 09/10/2024 139  136 - 145 mmol/L Final    Potassium 09/10/2024 4.1  3.5 - 5.1 mmol/L Final    Chloride 09/10/2024 103  95 - 110 mmol/L Final    CO2 09/10/2024 24  23 - 29 mmol/L Final    Glucose 09/10/2024 131 (H)  70 - 110 mg/dL Final    BUN 09/10/2024 21  8 - 23 mg/dL Final    Creatinine 09/10/2024 1.1  0.5 - 1.4 mg/dL Final    Calcium 09/10/2024 9.9  8.7 - 10.5 mg/dL Final    Total Protein 09/10/2024 7.6  6.0 - 8.4 g/dL  Final    Albumin 09/10/2024 4.1  3.5 - 5.2 g/dL Final    Total Bilirubin 09/10/2024 0.6  0.1 - 1.0 mg/dL Final    Comment: For infants and newborns, interpretation of results should be based  on gestational age, weight and in agreement with clinical  observations.    Premature Infant recommended reference ranges:  Up to 24 hours.............<8.0 mg/dL  Up to 48 hours............<12.0 mg/dL  3-5 days..................<15.0 mg/dL  6-29 days.................<15.0 mg/dL      Alkaline Phosphatase 09/10/2024 152 (H)  55 - 135 U/L Final    AST 09/10/2024 26  10 - 40 U/L Final    ALT 09/10/2024 30  10 - 44 U/L Final    eGFR 09/10/2024 >60  >60 mL/min/1.73 m^2 Final    Anion Gap 09/10/2024 12  8 - 16 mmol/L Final    Protein, Serum 09/10/2024 7.3  6.0 - 8.4 g/dL Final    Comment: Serum protein electrophoresis and immunofixation results should be   interpreted in clinical context in that some therapeutic agents can   result   in false positive results (example, daratumumab). Correlation with   the   patient s therapeutic regimen is required.      Albumin 09/10/2024 4.23  3.35 - 5.55 g/dL Final    Alpha-1 09/10/2024 0.29  0.17 - 0.41 g/dL Final    Alpha-2 09/10/2024 1.03 (H)  0.43 - 0.99 g/dL Final    Beta 09/10/2024 0.85  0.50 - 1.10 g/dL Final    Gamma 09/10/2024 0.91  0.67 - 1.58 g/dL Final    Immunofix Interp. 09/10/2024 SEE COMMENT   Final    Comment: Serum protein electrophoresis and immunofixation results should be   interpreted in clinical context in that some therapeutic agents can   result   in false positive results (example, daratumumab). Correlation with   the   patient s therapeutic regimen is required.  See pathologist's interpretation.      [place] Free Light Chains 09/10/2024 2.65 (H)  0.33 - 1.94 mg/dL Final    Lambda Free Light Chains 09/10/2024 1.76  0.57 - 2.63 mg/dL Final    Kappa/Lambda FLC Ratio 09/10/2024 1.51  0.26 - 1.65 Final    Comment: Undetected antigen excess is a rare event but cannot   be  excluded. If these free light chain results do not   agree with other clinical or laboratory findings or   if the sample is from a patient that has previously   demonstrated antigen excess, discuss with the testing   laboratory.   Results should always be interpreted in conjunction   with other laboratory tests and clinical evidence.      Pathologist Interpretation MEME 09/10/2024 REVIEWED   Final    Comment:   Electronically reviewed and signed by:  Vera Herrera MD  Signed on 09/12/24 at 15:29  No monoclonal peaks identified.      Pathologist Interpretation SPE 09/10/2024 REVIEWED   Final    Comment:   Electronically reviewed and signed by:  Vera Herrera MD  Signed on 09/12/24 at 15:29  Normal total protein.  No paraprotein bands identified.           Imaging:     CT Chest 6/11/24  Base of Neck: No significant abnormality.     Thoracic soft tissues: Unremarkable.     Aorta: Left-sided aortic arch. No aneurysm and no significant atherosclerosis     Heart: Normal size. No effusion. Marked calcification of the coronary arteries. Calcification of the mitral valve annulus.     Pulmonary vasculature: Pulmonary arteries distribute normally. No discrete filling defects identified within the proximal pulmonary arterial branches to suggest pulmonary thromboembolism. There are four pulmonary veins.     Tran/Mediastinum: Multiple enlarged lymph nodes throughout the mediastinum, increased in size as compared to the prior PET-CT on 03/22/2024. Index right lower paratracheal lymph node measures 12 mm in short axis dimension (series 2, image 40), previously 10 mm when directly remeasured. Prevascular lymph node measures 11 mm in short axis dimension (series 2, image 40), previously 8 mm when directly remeasured. Subcarinal lymph node now measures approximately 18 mm in short axis dimension (series 2, image 49), previously 15 mm.     Airways: Patent.     Lungs/Pleura: Lungs are expanded again demonstrate centrilobular  and paraseptal emphysematous changes with an upper lobe predominance as well as extensive subpleural reticulation, interlobular septal thickening, scattered bronchiectasis throughout both lungs, mildly progressed at the lung bases in comparison to the prior CT on 02/27/2024, with superimposed interstitial edema not excluded, noting interval development of small bilateral dependent pleural effusions. No discrete new suspicious pulmonary nodules or masses. No lobar consolidation. No pneumothorax.     Esophagus: Unremarkable.     Upper Abdomen: No abnormality of the partially imaged upper abdomen. Cholecystectomy.     Bones: Chronic anterior wedge compression deformity of the T12 vertebral body. No definite acute fracture. No suspicious lytic or sclerotic lesions. Partially imaged anterior fusion hardware in the lower cervical spine. Chronic healed and nonunited posterior right rib fractures again noted.       Assessment:       1. Elevated serum immunoglobulin free light chain level    2. Iron deficiency anemia, unspecified iron deficiency anemia type    3. Pulmonary fibrosis    4. Positive AUGUSTA (antinuclear antibody)    5. Localized enlarged lymph nodes    6. Normocytic anemia    7. History of bladder cancer                   Plan:     Positive TIF1 Gamma Antibody - can be associated with risk for pulmonary fibrosis and for occult malignancy  -Pt with h/o non invasive bladder cancer  -PSA on 1/08/24 was normal at 0.49ng/mL  -CT C/A/P on 2/27/24 showed thickening of the colon and mediastinal, paraesophageal LAD  -Colonoscopy 3/04/24 showed MEG  -Cystoscopy on 3/21/24 showed no evidence of recurrence  -PET/CT 3/22/24 showed enlarged mediastinal LN's  lymph nodes and superior mediastinum, anterior mediastinum, periaortic region, -EUS on 05/08/2024 with biopsy of subcarinal mediastinum station 7 lymph node had path showing positive for lm material but negative for malignant cells  -CT chest on 6/11/24 showed increasing  mediastinal LAD  -EBUS 7/15/24 showed no malignancy in station 4R an 7    Pulmonary Fibrosis - managed by Dr Arellano  -Unclear etiology but possibly related to Positive TIF1 Gamma Antibody as above  -Pt to see Rheumatolgy given positive AUGUSTA    Lymphadenopathy - see above    Iron Deficiency Anemia - pt given iron infusions 4/02/24 and 4/09/24 with injectafer  -Ferritin normal at 3/05ng/mL on 6/11/24  -Hemoglobin stable at 15.5g/dL on 9/10/24  -Will repeat iron studies in 3 months  -Unclear cause as colonoscopy and EUS showed no significant findings    Bladder Cancer -  history of noninvasive bladder cancer under the care of Dr. Lr with prior T1 disease  -Pt status post bladder installation of mitomycin-C and BCG  -Last cystoscopy on 08/17/2023 with no lesions seen  -Repeat cystoscopy 3/21/2024 MEG  -Pt to have it repeated 1/09/2025    Positive AUGUSTA - unclear if responsible for disease  -Pt to see rheumatology    Elevated Free Light Chains - Labs on 06/11/2024 showed oligoclonal banding pattern on immunofixation electrophoresis and free light chain assay with elevated kappa light chains at 4.18 mg/dL, elevated lambda light chains at 3.21 mg/dL, and normal kappa lambda ratio 1.3  -Lab work on 9/10/24 showing no monoclonal protein on SPEP or MEME.  Free light chain assay showed elevated kappa light chains at 2.65 mg/dL, normal lambda light chains at 1.76 mg/dL, and normal kappa lambda ratio 1.51  -Will monitor in 6 months    Route Chart for Scheduling    Med Onc Chart Routing      Follow up with physician 3 months. Pt needs appt with rheumatolgoy for positive AGUUSTA and pulmonary fibrosis.  Pt needs a CBC, CMP ferritin adn iron/TIBC in 3 months with an appt with me the day after the labs.   Follow up with [facility]    Infusion scheduling note    Injection scheduling note    Labs    Imaging    Pharmacy appointment    Other referrals                Supportive Plan Information  OP FERRIC CARBOXYMALTOSE Q2W Fredi West MD    Associated Diagnosis: Iron deficiency anemia   noted on 3/8/2024   Line of treatment: Supportive Care   Treatment goal: Supportive     Upcoming Treatment Dates - OP FERRIC CARBOXYMALTOSE Q2W    No upcoming days in selected categories.      Fredi West MD  [facility]  Hematology and Oncology  [facility]   [address] [place], [place] [id number]   O: [phone]  F: [phone]

## 2024-09-24 NOTE — TELEPHONE ENCOUNTER
Call was made to the rheumatology office and they will reach out to the patient to get him schedule.

## 2024-09-25 ENCOUNTER — TELEPHONE (OUTPATIENT)
Dept: RHEUMATOLOGY | Facility: CLINIC | Age: 69
End: 2024-09-25
Payer: MEDICARE

## 2024-09-25 NOTE — TELEPHONE ENCOUNTER
----- Message from Saniya Carrillo MA sent at 9/25/2024  9:38 AM CDT -----    ----- Message -----  From: Saniya Carrillo MA  Sent: 9/24/2024   4:35 PM CDT  To: Saniya Carrillo MA      ----- Message -----  From: Kristin Rice  Sent: 9/24/2024   4:33 PM CDT  To: [facility] Staff    Type:  Sooner Appointment Request    Caller is requesting a sooner appointment.  Caller declined first available appointment listed below.  Caller will not accept being placed on the waitlist and is requesting a message be sent to doctor.    Name of Caller: [facility]   When is the first available appointment?  Needs to be scheduled   Symptoms:  pt has referral   Would the patient rather a call back or a response via [facility]? Call   Best Call Back Number:  [phone] (home)     Additional Information:  please advise

## 2024-09-30 ENCOUNTER — HOSPITAL ENCOUNTER (OUTPATIENT)
Dept: CARDIOLOGY | Facility: HOSPITAL | Age: 69
Discharge: HOME | End: 2024-09-30
Attending: INTERNAL MEDICINE
Payer: MEDICARE

## 2024-09-30 DIAGNOSIS — I49.3 PREMATURE VENTRICULAR CONTRACTIONS (PVCS) (VPCS): ICD-10-CM

## 2024-09-30 PROCEDURE — 93225 XTRNL ECG REC<48 HRS REC: CPT | Mod: PO

## 2024-09-30 PROCEDURE — 93227 XTRNL ECG REC<48 HR R&I: CPT | Mod: ,,, | Performed by: INTERNAL MEDICINE

## 2024-10-04 LAB
OHS CV EVENT MONITOR DAY: 0
OHS CV HOLTER LENGTH DECIMAL HOURS: 24
OHS CV HOLTER LENGTH HOURS: 24
OHS CV HOLTER LENGTH MINUTES: 0
OHS CV HOLTER SINUS AVERAGE HR: 67
OHS CV HOLTER SINUS MAX HR: 92
OHS CV HOLTER SINUS MIN HR: 46

## 2024-10-07 ENCOUNTER — TELEPHONE (OUTPATIENT)
Dept: ELECTROPHYSIOLOGY | Facility: CLINIC | Age: 69
End: 2024-10-07
Payer: MEDICARE

## 2024-10-07 NOTE — TELEPHONE ENCOUNTER
Spoke with patient and relayed results/recommendations, per Dr Pineda's note:    Prabhakar Pineda MD Wilson, Elizabeth B., RN  PVC burden improved, down to 14.8%. please relay to patient, f/u as scheduled.    He verbalized understanding and was appreciative of the call.

## 2024-10-07 NOTE — TELEPHONE ENCOUNTER
----- Message from Prabhakar Pineda MD sent at 10/7/2024  7:07 AM CDT -----  PVC burden improved, down to 14.8%. please relay to patient, f/u as scheduled.  ----- Message -----  From: Nitin Wheat MD  Sent: 10/4/2024   6:55 PM CDT  To: Prabhakar Pineda MD

## 2024-10-09 NOTE — PROGRESS NOTES
Subjective:      Patient ID: Moustapha Murray is a 69 y.o. male.    Chief Complaint: Disease Management    HPI    Rheumatologic History:     - Diagnosis/es: -  - Family History: No autoimmune conditions  - Social History: Former smoker 1.5 PPD x 40 years, quit 2022; drinks roughly 2 beers per week  - Positive serologies: AUGUSTA 1:80 homogenous and speckled, TIF1 gamma  - Negative serologies: dsDNA, SSA, SSB, Sm, RNP, SCL-70, ANCA, RF, CCP, myomarker panel   - Pathology:   - EBUS with biopsy of subcarinal mediastinum station 7 lymph node (05/08/2024) positive for lm material but negative for malignant cells  - EBUS (7/15/24) no malignancy in station 4R an 7  - Infectious screening labs: -  - Imaging:  - CT C/A/P (2/27/24) thickening of the colon and mediastinal, paraesophageal LAD  - PET/CT (3/22/24) enlarged mediastinal lymph nodes and superior mediastinum, anterior mediastinum, periaortic region  - CT chest (6/11/24) increasing mediastinal LAD    - CT cardiac (01/10/2023) interstitial pulmonary abnormalities, traction bronchiectasis, increased peripheral reticular markings.  - Procedures:    - Colonoscopy (3/04/24) MEG  - Cystoscopy (3/21/24) no evidence of recurrence  - PFTs (10/24/2023) mild restrictive pattern with a TLC of 4.56 L or 63% of predicted with spirometry revealing underlying obstruction with an FEV1 of 1.95 L or 58% of predicted.  Level of obstruction unable to be determined as occurs in the setting of restrictive lung disease.  DLCO is moderately reduced at 13.05 or 49% of predicted which may be secondary to pulmonary parenchymal abnormalities, pulmonary hypertension, or anemia.  - RHC (08/05/2024) post capillary pressures elevated  - Previous Treatments: -  - Current Treatments:    - Ofev 150mg OD (did not tolerate BID dosing due to diarrhea)    Interval History:   This is a 69 year old man with history of CAD with PCI X 3 (01/31/2023), GERD, HTN, T1 urothelial bladder cancer with recurrence -  first in 2020 treated with mitomycin - C (intervescicular) then again around 2021 treated with BCG therapy (intervescicular), cervical surgery 20 years ago for herniated disc, TASIA not on CPAP, BELLE on iron infusions, elevated free light chains of unclear etiology, RLS, fatty liver, LAD in the superior mediastinum, anterior mediastinum, periaortic region, pretracheal subcarinal, and left paraesophageal region of the diaphragm hiatus (EBUS on 05/08/2024 with biopsy of subcarinal mediastinum station 7 lymph node with path showing positive for lm material but negative for malignant cells), and pulmonary fibrosis with positive TIF1 gamma antibody positive AUGUSTA 1:80 homogenous and speckled currently on Ofev 150mg OD. He reports some scaling on his scalp but no other rashes. He reports joint pain in both wrists and his lower back, morning stiffness lasting 30 minutes, shortness of breath with exertion (stable), productive cough, and diarrhea from Ofev.     The patient denies unexplained fevers, patchy alopecia, vision changes, red/irritated eyes, ear infections, hearing loss, oral/nasal ulcers, sinusitis, sicca symptoms, photosensitivity, joint swelling, pleuritic chest pain, shortness of breath, abdominal pain, bloody stool, proximal muscle weakness, persistent numbness/tingling, and Raynaud's.     Objective:   /61   Pulse (!) 55   Wt 92.4 kg (203 lb 11.3 oz)   BMI 29.23 kg/m²   Physical Exam   Constitutional: normal appearance.   HENT:   Head: Normocephalic and atraumatic.   Cardiovascular: Normal rate, regular rhythm and normal heart sounds.   Pulmonary/Chest: Effort normal and breath sounds normal.   Musculoskeletal:      Comments: No synovitis, dactylitis, enthesitis, effusions     Neurological: He is alert.   Skin:   No skin thickening, telangiectasias, calcinosis, psoriasiform lesions, lupoid lesions         Right Side Rheumatological Exam     Muscle Strength (0-5 scale):  Deltoid:  5  Biceps: 5/5    Triceps:  5  : 5/5   Iliopsoas: 5  Quadriceps:  5   Distal Lower Extremity: 5    Left Side Rheumatological Exam     Muscle Strength (0-5 scale):  Deltoid:  5  Biceps: 5/5   Triceps:  5  :  5/5   Iliopsoas: 5  Quadriceps:  5   Distal Lower Extremity: 5        No data to display    Labs (9/10/24)   CBC WBC, HGB, PLT WNL   CMP  <- 190 <- 161 <- 195  SPEP normal total protein, no paraprotein bands  MEME WNL     Assessment:     1. Bilateral wrist pain    2. Compression fracture of body of thoracic vertebra    3. Positive AUGUSTA (antinuclear antibody)    4. Elevated alkaline phosphatase level    5. Other specified disorders of bone density and structure, unspecified site    6. Interstitial lung disease      This is a 69 year old man with history of CAD with PCI X 3 (01/31/2023), GERD, HTN, T1 urothelial bladder cancer with recurrence - first in 2020 treated with mitomycin - C (intravescicular) then again around 2021 treated with BCG therapy (intravescicular), cervical surgery 20 years ago for herniated disc, TASIA not on CPAP, BELLE on iron infusions, elevated free light chains of unclear etiology, RLS, fatty liver, LAD in the superior mediastinum, anterior mediastinum, periaortic region, pretracheal subcarinal, and left paraesophageal region of the diaphragm hiatus (EBUS x 2 without malignancy), and pulmonary fibrosis with positive TIF1 gamma antibody and positive AUGUSTA 1:80 homogenous and speckled currently on Ofev 150mg OD. He reports some scaling on his scalp but no other rashes. He reports joint pain in both wrists without swelling, and his lower back, morning stiffness lasting 30 minutes, shortness of breath with exertion (stable), productive cough, and diarrhea from Ofev. Extensive rheum ROS including proximal weakness is negative. Physical examination shows rales to the lung bases but is otherwise normal.  Does not have symptoms or physical exam findings suggestive of active connective tissue disease.  The  TIF1 gamma antibody is strongly associated with malignancy and I am concerned for underlying malignancy.  ALP is elevated but CT of the abdomen did not comment on the biliary tree.  Will obtain an ultrasound of the abdomen.    In terms of his back pain, MRI of the lumbar spine shows compression fractures and x-rays show low bone density.  Suspect that he will need to go on Reclast.  Will obtain a DEXA scan.     Plan:     Problem List Items Addressed This Visit    None  Visit Diagnoses       Bilateral wrist pain    -  Primary    Relevant Orders    X-Ray Wrist Complete Bilateral    Compression fracture of body of thoracic vertebra        Relevant Orders    DXA Bone Density Axial Skeleton 1 or more sites    Alkaline Phosphatase, Isoenzymes    Vitamin D    Positive AUGUSTA (antinuclear antibody)        Relevant Orders    Anti-DNA Ab, Double-Stranded    C3 Complement    C4 Complement    CBC Auto Differential    Sedimentation rate    Protein/Creatinine Ratio, Urine    C-Reactive Protein    Comprehensive Metabolic Panel    Urinalysis    CK    Aldolase    Elevated alkaline phosphatase level        Relevant Orders    Alkaline Phosphatase, Isoenzymes    US Abdomen Complete    Other specified disorders of bone density and structure, unspecified site        Relevant Orders    Vitamin D    Interstitial lung disease              - Pulm (Dr Arellano)  - Heme (Dr West)    Follow up in 6 months or sooner if with significant findings on labs/imaging    60 minutes of total time spent on the encounter, which includes face to face time and non-face to face time preparing to see the patient (eg, review of tests), Obtaining and/or reviewing separately obtained history, Documenting clinical information in the electronic or other health record, Independently interpreting results (not separately reported) and communicating results to the patient/family/caregiver, or Care coordination (not separately reported).     This note was prepared with [employer]  Modal [employer] Direct voice recognition transcription software. Garbled syntax, mangled pronouns, and other bizarre constructions may be attributed to that software system       Farzana Griffiths M.D.  Rheumatology Dept  [place], [place]

## 2024-10-10 ENCOUNTER — HOSPITAL ENCOUNTER (OUTPATIENT)
Dept: RADIOLOGY | Facility: HOSPITAL | Age: 69
Discharge: HOME | End: 2024-10-10
Attending: STUDENT IN AN ORGANIZED HEALTH CARE EDUCATION/TRAINING PROGRAM
Payer: MEDICARE

## 2024-10-10 ENCOUNTER — OFFICE VISIT (OUTPATIENT)
Dept: RHEUMATOLOGY | Facility: CLINIC | Age: 69
End: 2024-10-10
Payer: MEDICARE

## 2024-10-10 ENCOUNTER — PATIENT MESSAGE (OUTPATIENT)
Dept: RHEUMATOLOGY | Facility: CLINIC | Age: 69
End: 2024-10-10

## 2024-10-10 VITALS
DIASTOLIC BLOOD PRESSURE: 61 MMHG | HEART RATE: 55 BPM | BODY MASS INDEX: 29.23 KG/M2 | SYSTOLIC BLOOD PRESSURE: 120 MMHG | WEIGHT: 203.69 LBS

## 2024-10-10 DIAGNOSIS — M25.532 BILATERAL WRIST PAIN: Primary | ICD-10-CM

## 2024-10-10 DIAGNOSIS — S22.000A COMPRESSION FRACTURE OF BODY OF THORACIC VERTEBRA: ICD-10-CM

## 2024-10-10 DIAGNOSIS — R76.8 POSITIVE ANA (ANTINUCLEAR ANTIBODY): ICD-10-CM

## 2024-10-10 DIAGNOSIS — R74.8 ELEVATED ALKALINE PHOSPHATASE LEVEL: ICD-10-CM

## 2024-10-10 DIAGNOSIS — M85.80 OTHER SPECIFIED DISORDERS OF BONE DENSITY AND STRUCTURE, UNSPECIFIED SITE: ICD-10-CM

## 2024-10-10 DIAGNOSIS — M25.531 BILATERAL WRIST PAIN: ICD-10-CM

## 2024-10-10 DIAGNOSIS — M25.532 BILATERAL WRIST PAIN: ICD-10-CM

## 2024-10-10 DIAGNOSIS — M25.531 BILATERAL WRIST PAIN: Primary | ICD-10-CM

## 2024-10-10 DIAGNOSIS — J84.9 INTERSTITIAL LUNG DISEASE: ICD-10-CM

## 2024-10-10 PROCEDURE — 73110 X-RAY EXAM OF WRIST: CPT | Mod: 26,50,, | Performed by: INTERNAL MEDICINE

## 2024-10-10 PROCEDURE — 99214 OFFICE O/P EST MOD 30 MIN: CPT | Mod: PBBFAC,PO,25 | Performed by: STUDENT IN AN ORGANIZED HEALTH CARE EDUCATION/TRAINING PROGRAM

## 2024-10-10 PROCEDURE — 99999 PR PBB SHADOW E&M-EST. PATIENT-LVL IV: CPT | Mod: PBBFAC,,, | Performed by: STUDENT IN AN ORGANIZED HEALTH CARE EDUCATION/TRAINING PROGRAM

## 2024-10-10 PROCEDURE — 73110 X-RAY EXAM OF WRIST: CPT | Mod: TC,50,FY,PO

## 2024-10-10 PROCEDURE — 99205 OFFICE O/P NEW HI 60 MIN: CPT | Mod: S$PBB,,, | Performed by: STUDENT IN AN ORGANIZED HEALTH CARE EDUCATION/TRAINING PROGRAM

## 2024-10-10 RX ORDER — TRAMADOL HYDROCHLORIDE 50 MG/1
50 TABLET ORAL
Qty: 30 TABLET | Refills: 2 | Status: SHIPPED | OUTPATIENT
Start: 2024-10-10

## 2024-10-10 ASSESSMENT — ROUTINE ASSESSMENT OF PATIENT INDEX DATA (RAPID3)
TOTAL RAPID3 SCORE: 3.72
PATIENT GLOBAL ASSESSMENT SCORE: 4.5
MDHAQ FUNCTION SCORE: 0.8
FATIGUE SCORE: 1.1
PAIN SCORE: 4
PSYCHOLOGICAL DISTRESS SCORE: 0

## 2024-10-18 ENCOUNTER — HOSPITAL ENCOUNTER (OUTPATIENT)
Dept: RADIOLOGY | Facility: HOSPITAL | Age: 69
Discharge: HOME | End: 2024-10-18
Attending: STUDENT IN AN ORGANIZED HEALTH CARE EDUCATION/TRAINING PROGRAM
Payer: MEDICARE

## 2024-10-18 DIAGNOSIS — S22.000A COMPRESSION FRACTURE OF BODY OF THORACIC VERTEBRA: ICD-10-CM

## 2024-10-18 DIAGNOSIS — R74.8 ELEVATED ALKALINE PHOSPHATASE LEVEL: ICD-10-CM

## 2024-10-18 PROCEDURE — 77092 TBS I&R FX RSK QHP: CPT | Mod: ,,, | Performed by: RADIOLOGY

## 2024-10-18 PROCEDURE — 77091 TBS TECHL CALCULATION ONLY: CPT | Mod: PO

## 2024-10-18 PROCEDURE — 77080 DXA BONE DENSITY AXIAL: CPT | Mod: 26,,, | Performed by: RADIOLOGY

## 2024-10-18 PROCEDURE — 76700 US EXAM ABDOM COMPLETE: CPT | Mod: 26,,, | Performed by: RADIOLOGY

## 2024-10-18 PROCEDURE — 76700 US EXAM ABDOM COMPLETE: CPT | Mod: TC,PO

## 2024-10-18 PROCEDURE — 77080 DXA BONE DENSITY AXIAL: CPT | Mod: TC,PO

## 2024-10-22 ENCOUNTER — ANESTHESIA EVENT (OUTPATIENT)
Dept: SURGERY | Facility: HOSPITAL | Age: 69
End: 2024-10-22
Payer: MEDICARE

## 2024-10-23 ENCOUNTER — ANESTHESIA (OUTPATIENT)
Dept: SURGERY | Facility: HOSPITAL | Age: 69
End: 2024-10-23
Payer: MEDICARE

## 2024-10-23 ENCOUNTER — HOSPITAL ENCOUNTER (OUTPATIENT)
Facility: HOSPITAL | Age: 69
Discharge: HOME | End: 2024-10-23
Attending: OTOLARYNGOLOGY | Admitting: OTOLARYNGOLOGY
Payer: MEDICARE

## 2024-10-23 DIAGNOSIS — G47.33 OSA (OBSTRUCTIVE SLEEP APNEA): Primary | ICD-10-CM

## 2024-10-23 DIAGNOSIS — Z78.9 INTOLERANCE OF CONTINUOUS POSITIVE AIRWAY PRESSURE (CPAP) VENTILATION: ICD-10-CM

## 2024-10-23 PROCEDURE — 42975 DISE EVAL SLP DO BRTH FLX DX: CPT | Mod: ,,, | Performed by: OTOLARYNGOLOGY

## 2024-10-23 PROCEDURE — 37000008 HC ANESTHESIA 1ST 15 MINUTES: Mod: PO | Performed by: OTOLARYNGOLOGY

## 2024-10-23 PROCEDURE — 36000709 HC OR TIME LEV III EA ADD 15 MIN: Mod: PO | Performed by: OTOLARYNGOLOGY

## 2024-10-23 PROCEDURE — 25000003 PHARM REV CODE 250: Mod: PO | Performed by: OTOLARYNGOLOGY

## 2024-10-23 PROCEDURE — D9220A PRA ANESTHESIA: Mod: CRNA,,, | Performed by: NURSE ANESTHETIST, CERTIFIED REGISTERED

## 2024-10-23 PROCEDURE — 63600175 PHARM REV CODE 636 W HCPCS: Mod: PO | Performed by: OTOLARYNGOLOGY

## 2024-10-23 PROCEDURE — 25000003 PHARM REV CODE 250: Mod: PO | Performed by: INTERNAL MEDICINE

## 2024-10-23 PROCEDURE — 71000015 HC POSTOP RECOV 1ST HR: Mod: PO | Performed by: OTOLARYNGOLOGY

## 2024-10-23 PROCEDURE — 37000009 HC ANESTHESIA EA ADD 15 MINS: Mod: PO | Performed by: OTOLARYNGOLOGY

## 2024-10-23 PROCEDURE — 71000033 HC RECOVERY, INTIAL HOUR: Mod: PO | Performed by: OTOLARYNGOLOGY

## 2024-10-23 PROCEDURE — 63600175 PHARM REV CODE 636 W HCPCS: Mod: PO | Performed by: NURSE ANESTHETIST, CERTIFIED REGISTERED

## 2024-10-23 PROCEDURE — A4216 STERILE WATER/SALINE, 10 ML: HCPCS | Mod: PO | Performed by: INTERNAL MEDICINE

## 2024-10-23 PROCEDURE — 36000708 HC OR TIME LEV III 1ST 15 MIN: Mod: PO | Performed by: OTOLARYNGOLOGY

## 2024-10-23 PROCEDURE — D9220A PRA ANESTHESIA: Mod: ANES,,, | Performed by: ANESTHESIOLOGY

## 2024-10-23 RX ORDER — SODIUM CHLORIDE, SODIUM LACTATE, POTASSIUM CHLORIDE, CALCIUM CHLORIDE 600; 310; 30; 20 MG/100ML; MG/100ML; MG/100ML; MG/100ML
INJECTION, SOLUTION INTRAVENOUS CONTINUOUS
Status: DISCONTINUED | OUTPATIENT
Start: 2024-10-23 | End: 2024-10-23 | Stop reason: HOSPADM

## 2024-10-23 RX ORDER — OXYMETAZOLINE HCL 0.05 %
2 SPRAY, NON-AEROSOL (ML) NASAL
Status: COMPLETED | OUTPATIENT
Start: 2024-10-23 | End: 2024-10-23

## 2024-10-23 RX ORDER — LIDOCAINE HYDROCHLORIDE 20 MG/ML
JELLY TOPICAL ONCE
Status: DISCONTINUED | OUTPATIENT
Start: 2024-10-23 | End: 2024-10-23 | Stop reason: HOSPADM

## 2024-10-23 RX ORDER — LIDOCAINE HYDROCHLORIDE 10 MG/ML
1 INJECTION, SOLUTION EPIDURAL; INFILTRATION; INTRACAUDAL; PERINEURAL ONCE
Status: DISCONTINUED | OUTPATIENT
Start: 2024-10-23 | End: 2024-10-23 | Stop reason: HOSPADM

## 2024-10-23 RX ORDER — GLUCAGON 1 MG
1 KIT INJECTION
Status: DISCONTINUED | OUTPATIENT
Start: 2024-10-23 | End: 2024-10-23 | Stop reason: HOSPADM

## 2024-10-23 RX ORDER — LIDOCAINE HYDROCHLORIDE 20 MG/ML
INJECTION INTRAVENOUS
Status: DISCONTINUED | OUTPATIENT
Start: 2024-10-23 | End: 2024-10-28

## 2024-10-23 RX ORDER — ONDANSETRON HYDROCHLORIDE 2 MG/ML
4 INJECTION, SOLUTION INTRAVENOUS DAILY PRN
Status: DISCONTINUED | OUTPATIENT
Start: 2024-10-23 | End: 2024-10-23 | Stop reason: HOSPADM

## 2024-10-23 RX ORDER — PROPOFOL 10 MG/ML
VIAL (ML) INTRAVENOUS CONTINUOUS PRN
Status: DISCONTINUED | OUTPATIENT
Start: 2024-10-23 | End: 2024-10-28

## 2024-10-23 RX ADMIN — Medication 2 SPRAY: at 09:10

## 2024-10-23 RX ADMIN — SODIUM CHLORIDE 5 ML: 9 INJECTION, SOLUTION INTRAMUSCULAR; INTRAVENOUS; SUBCUTANEOUS at 11:10

## 2024-10-23 RX ADMIN — Medication 2 SPRAY: at 10:10

## 2024-10-23 RX ADMIN — PROPOFOL 125 MCG/KG/MIN: 10 INJECTION, EMULSION INTRAVENOUS at 11:10

## 2024-10-23 RX ADMIN — LIDOCAINE HYDROCHLORIDE 30 MG: 20 INJECTION INTRAVENOUS at 11:10

## 2024-10-23 RX ADMIN — GLYCOPYRROLATE 0.1 MG: 0.2 INJECTION INTRAMUSCULAR; INTRAVENOUS at 10:10

## 2024-10-23 NOTE — ANESTHESIA PREPROCEDURE EVALUATION
10/23/2024  Moustapha Murray is a 69 y.o., male.      Pre-op Assessment    I have reviewed the Patient Summary Reports.     I have reviewed the Nursing Notes. I have reviewed the NPO Status.   I have reviewed the Medications.     Review of Systems  Anesthesia Hx:             Denies Family Hx of Anesthesia complications.   Personal Hx of Anesthesia complications, Post-Operative Nausea/Vomiting, in the past, but not with recent anesthetics / prophylaxis                    Cardiovascular:     Hypertension   CAD  asymptomatic CABG/stent                                       Pulmonary:   COPD, moderate     Sleep Apnea           Education provided regarding risk of obstructive sleep apnea            Hepatic/GI:     GERD Liver Disease,               Musculoskeletal:         Spine Disorders: lumbar            Endocrine:        Obesity / BMI > 30  Psych:  Psychiatric History  depression                Physical Exam  General: Cooperative, Alert and Oriented    Airway:  Mallampati: III / II  Mouth Opening: Normal  TM Distance: Normal  Tongue: Normal  Neck ROM: Normal ROM  Neck: Girth Increased    Dental:  Dentures        Anesthesia Plan  Type of Anesthesia, risks & benefits discussed:    Anesthesia Type: Gen Natural Airway  Intra-op Monitoring Plan: Standard ASA Monitors  Induction:  IV  Informed Consent: Informed consent signed with the Patient and all parties understand the risks and agree with anesthesia plan.  All questions answered.   ASA Score: 3    Ready For Surgery From Anesthesia Perspective.     .

## 2024-10-24 VITALS
OXYGEN SATURATION: 96 % | BODY MASS INDEX: 29.06 KG/M2 | DIASTOLIC BLOOD PRESSURE: 60 MMHG | HEIGHT: 70 IN | SYSTOLIC BLOOD PRESSURE: 93 MMHG | TEMPERATURE: 97 F | HEART RATE: 61 BPM | RESPIRATION RATE: 16 BRPM | WEIGHT: 203 LBS

## 2024-10-28 NOTE — H&P (VIEW-ONLY)
[facility]   Clinic Follow Up  Chief Complaint   Patient presents with    Pulmonary Fibrosis     2 month follow up for pulmonary fibrosis. No complaints today.       SUBJECTIVE:     History of Present Illness:  Patient is a 69 y.o. male presents for follow-up for ILD.    10/24/2023:  Mr. Dee is a 67 yo male ex-smoker having smoked 1 ppd for 40 years and vaped for about 1 year - 5 years ago with past medical history of CAD with PCI X 3 placed 01/31/2023 seen by Dr. Wheat, GERD, HTN, T1 urothelial bladder cancer with recurrence - first in 2020 treated with mitomycin - C (intervescicular) then again 6 months ago treated with BCG therapy (intervescicular)- seen by Dr. Lr, GERD, cervical surgery 20 years ago for herniated disc referred for above.     Noted symptoms of dyspnea starting in January of this year - worse in last 6 months.  Recalls in July on fishing trip - stayed in raised cabin 24 steps up and noted CHANDRA with climbing steps.  No wheezing, no chest tightness, +pleurisy, no dysphagia, +heat rash on sides of legs, no Raynaud's symptoms, no sicca symptoms, no new arthralgias/myalgias, no mouth ulcers, no hemoptysis, no epistaxis, no hematuria.  No arm/leg weakness.  No 's hands on exam.  Tested positive for COVID-19 without symptoms - unsure if this is accurate.     Retired .  Fishing as hobby.  No welding.  2 dogs in home.  No bird exposures.  No known down feathers in home.  No asbestos exposures.  No  experience.  Lives in [place].     PFTs from October 24, 2023 reveal a mild restrictive pattern with a TLC of 4.56 L or 63% of predicted with spirometry revealing underlying obstruction with an FEV1 of 1.95 L or 58% of predicted.  Level of obstruction unable to be determined as occurs in the setting of restrictive lung disease.  DLCO is moderately reduced at 13.05 or 49% of predicted which may be secondary to pulmonary parenchymal abnormalities,  pulmonary hypertension, or anemia.     CT cardiac from 01/10/2023 with evidence interstitial pulmonary abnormalities - traction bronchiectasis, increased peripheral reticular markings.     Family history - mom  of kidney cancer, father  of unknown causes.  No siblings.  Has 2 daughters - both healthy.  No family history of lung disease.     Colonoscopy up to date.     2023:  Has not collected labs.  Has not started Anoro 2/2 cost ($500).  Breathing is about the same.  +pruritic rash on abdomen and legs - new in last 2 weeks.  No dysphagia, no epistaxis, no hematuria, no hemoptysis.  +restless leg syndrome - worse after vein procedure.  No sicca symptoms - no Raynaud's.  Some arthralgias.  +leg weakness - climbing stairs difficult - not new - present for past 2 years.  Diagnosed with TASIA in past but not able to tolerate CPAP device.     2024:  Fell asleep standing up and fell injuring his back.  Breathing has improved on nebulized treatments.  Completed sleep study on 2023 - waiting for interpretation.  Thus far, only AUGUSTA slightly positive at 1:80 - all other CT cascade negative.  Lab did not complete HP panel.     2024:  Feels his breathing improved with nebulized budesonide/brovana.     Myomarker 3 panel positive for T1G1 gamma (P155/P140).      Home sleep study from 2023 reveals moderate sleep apnea with an AHI of 26.9 per hour and lowest oxygen saturations at 80%. Patient spent 78.7 minutes with oxygen saturations less than 88%.  Ordered AutoPAP 4-20 centimeter squared of water for treatment.  Started autoPAP therapy with improvement in his sleep.     Sees Dr. Lr for urology and Dr. Best from GI - last cystoscopy 2023 and last colonsocopy 2020 with sessile polyps - tubular adenoma, hyperplastic polyp, no evidence of high grade dysplasia.  Rash on sides of abdomen - pruritic in nature - treated with triamcilone steroid cream and improving.  No arthralgias or  muscle weakness.     03/08/2024:  Colonoscopy normal.  Cystosocpy scheduled March 21st.  CT C/A/P only with enlarged mediastinal 4R and 7.  Seen by Dr. West - iron low - on iron infusions.  Has lost about 30 lbs unintentionally over 3 months - about 20 lbs since January.     06/26/2024:  CHANDRA worsening.  On Plavix - sees Mary Alice.  No Raynaud's, no arthralgias, no dysphagia, no rash.  PET-CT revealed enlarged, FDG avid lymph nodes - station 7 biopsied by Dr. Ochoa - negative for malignancy.     07/30/2024:  Doing well.  Still with CHANDRA.  On Ofev 150 mg BID.  No diarrhea.  07/15/2024 EBUS biopsy from station 4R and station 7 negative for malignancy and negative for lymphoma.  Ceased nebulized budesonide/brovana.     08/302024:  Doing well.  Ceased Ofev 2/2 diarrhea.  Compliance summary from 03/13/2024 to 06/13/2024 revealed he is not using his CPAP device regularly (5% over 4 hours of use).  Not tolerating.  Started torsemide 10 mg daily.        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  Mean PA 30, PVR 1.6 woods, PA wedge 22  Low normal cardiac index 2.35     Recommendations:  Start torsemide 10 mg once daily   BMP in 1 week  Risk factor modification  Follow-up as currently scheduled with Dr. Arellano and myself    10/28/2024:  Tolerating Ofev 150 mg daily - has diarrhea alleviated with Imodium.  Seeing Dr. Lindquist - qualifies for [facility].  Remains on torsemide 10 mg daily.    Breathing is about the same with CHANDRA after walking 0.5 blocks - same exercise tolerance.  No dysphagia, no heartburn.    Past Medical History:   Diagnosis Date    Anticoagulant long-term use     Bladder cancer 08/2020    Colon polyps     COPD (chronic obstructive pulmonary disease)     Coronary artery disease 01/31/2023    CORONARY STENTSx3    DDD (degenerative disc disease), lumbar     Depression     Fatty liver     GERD (gastroesophageal reflux disease)      H/O motion sickness     Hypertension     Mediastinal lymphadenopathy 07/2024    PONV (postoperative nausea and vomiting)     Pulmonary fibrosis     RLS (restless legs syndrome)     Sleep apnea, unspecified     can't use cpap     Past Surgical History:   Procedure Laterality Date    ANGIOGRAM, CORONARY, WITH LEFT HEART CATHETERIZATION N/A 1/31/2023    Procedure: Left Heart Cath;  Surgeon: Nitin Wheat MD;  Location: [facility] CATH;  Service: Cardiology;  Laterality: N/A;    ANGIOGRAM, CORONARY, WITH LEFT HEART CATHETERIZATION  4/17/2024    Procedure: Left Heart Cath;  Surgeon: Nitin Wheat MD;  [facility];  Service: Cardiology;;    APPENDECTOMY      CARPAL TUNNEL RELEASE      CERVICAL FUSION      CHOLECYSTECTOMY      CIRCUMCISION      COLONOSCOPY N/A 3/16/2020    Procedure: COLONOSCOPY;  Surgeon: Braulio Best MD;  Location: [facility];  Service: Endoscopy;  Laterality: N/A; 3 colon polyps removed; biopsy: tubular adenoma and hyperplastic; repeat in 5 years for surveillance    COLONOSCOPY N/A 3/4/2024    Procedure: COLONOSCOPY;  Surgeon: Braulio Best MD;  Location: [facility];  Service: Gastroenterology;  Laterality: N/A;    CORONARY ANGIOGRAPHY  2/13/2023    Procedure: ANGIOGRAM, CORONARY ARTERY;  Surgeon: Nitin Wheat MD;  Location: [facility] CATH;  Service: Cardiology;;    CORONARY ANGIOPLASTY WITH STENT PLACEMENT N/A 1/31/2023    Procedure: ANGIOPLASTY, CORONARY ARTERY, WITH STENT INSERTION;  Surgeon: Nitin Wheat MD;  Location: [facility] CATH;  Service: Cardiology;  Laterality: N/A;    CYSTOSCOPY W/ RETROGRADES Bilateral 8/24/2020    Procedure: CYSTOSCOPY, WITH RETROGRADE PYELOGRAM -WITH URETHRAL DILATION;  Surgeon: Greg Claudio MD;  [facility];  Service: Urology;  Laterality: Bilateral;    CYSTOSCOPY W/ URETERAL STENT PLACEMENT Right 8/24/2020    Procedure: CYSTOSCOPY, WITH URETERAL STENT INSERTION;  Surgeon: Greg Claudio MD;  Location: [facility] OR;  Service: Urology;  Laterality: Right;     ENDOBRONCHIAL ULTRASOUND Bilateral 7/15/2024    Procedure: ENDOBRONCHIAL ULTRASOUND (EBUS);  Surgeon: Umm Arellano MD;  Location: [facility] OR;  Service: Pulmonary;  Laterality: Bilateral;    ENDOSCOPIC ULTRASOUND OF UPPER GASTROINTESTINAL TRACT Left 5/8/2024    Procedure: ULTRASOUND, UPPER GI TRACT, ENDOSCOPIC;  Surgeon: Brad Ochoa MD;  Location: [facility] ENDO;  Service: Endoscopy;  Laterality: Left;    ESOPHAGOGASTRODUODENOSCOPY N/A 5/8/2024    Procedure: EGD (ESOPHAGOGASTRODUODENOSCOPY);  Surgeon: Brad Ochoa MD;  Location: [facility] ENDO;  Service: Endoscopy;  Laterality: N/A;    INSTILLATION OF URINARY BLADDER N/A 8/24/2020    Procedure: INSTILLATION, BLADDER - Mitomycin;  Surgeon: Greg Claudio MD;  Location: [facility] OR;  Service: Urology;  Laterality: N/A;    IVUS, CORONARY  1/31/2023    Procedure: IVUS, Coronary;  Surgeon: Nitin Wheat MD;  Location: [facility] CATH;  Service: Cardiology;;    IVUS, CORONARY  2/13/2023    Procedure: IVUS, Coronary;  Surgeon: Nitin Wheat MD;  Location: [facility] CATH;  Service: Cardiology;;    PERCUTANEOUS CORONARY INTERVENTION, ARTERY  2/13/2023    Procedure: Percutaneous coronary intervention;  Surgeon: Nitin Wheat MD;  Location: [facility] CATH;  Service: Cardiology;;    RIGHT HEART CATHETERIZATION  8/21/2024    Procedure: Right heart cath;  Surgeon: Nitin Wheat MD;  Location: [facility] CATH;  Service: Cardiology;;    SLEEP ENDOSCOPY, DRUG-INDUCED Bilateral 10/23/2024    Procedure: SLEEP ENDOSCOPY,DRUG-INDUCED;  Surgeon: Hector Lindquist MD;  Location: [facility] OR;  Service: ENT;  Laterality: Bilateral;    TRANSFORAMINAL EPIDURAL INJECTION OF STEROID Right 7/17/2019    Procedure: Injection,steroid,epidural,transforaminal approach L3-4, L4-5;  Surgeon: Chris Palencia MD;  Location: [facility] OR;  Service: Pain Management;  Laterality: Right;    TRANSFORAMINAL EPIDURAL INJECTION OF STEROID Right 8/28/2019    Procedure: Injection,steroid,epidural,transforaminal approach;  Surgeon: Chris Palencia  MD;  Location: [facility] OR;  Service: Pain Management;  Laterality: Right;  L3-4, L4-L5    TRANSFORAMINAL EPIDURAL INJECTION OF STEROID Left 2024    Procedure: Injection,steroid,epidural,transforaminal approach l5-s1 and s1;  Surgeon: Ann Soto MD;  Location: [facility] OR;  Service: Anesthesiology;  Laterality: Left;    WISDOM TOOTH EXTRACTION       Family History   Problem Relation Name Age of Onset    Stroke Mother      Ulcerative colitis Mother      Kidney cancer Mother      Heart disease Father      Heart attack Father      Cancer Neg Hx      Colon cancer Neg Hx      Crohn's disease Neg Hx      Celiac disease Neg Hx       Social History     Tobacco Use    Smoking status: Former     Current packs/day: 0.00     Types: Cigarettes     Quit date: 10/16/2013     Years since quittin.0    Smokeless tobacco: Never   Substance Use Topics    Alcohol use: Yes     Alcohol/week: 2.0 standard drinks of alcohol     Types: 2 Cans of beer per week     Comment: couple beers weekly    Drug use: Not Currently        Review of patient's allergies indicates:   Allergen Reactions    Sulfa (sulfonamide antibiotics) Anaphylaxis       Current Outpatient Medications on File Prior to Visit   Medication Sig Dispense Refill    albuterol (PROVENTIL/VENTOLIN HFA) 90 mcg/actuation inhaler Inhale 2 puffs into the lungs every 6 (six) hours as needed for Wheezing. Rescue 18 g 6    aspirin 81 MG Chew Take 1 tablet (81 mg total) by mouth once daily. 90 tablet 3    budesonide (PULMICORT) 0.5 mg/2 mL nebulizer solution Take 2 mLs (0.5 mg total) by nebulization 2 (two) times daily. Controller 120 mL 3    clopidogreL (PLAVIX) 75 mg tablet TAKE 1 TABLET BY MOUTH EVERY DAY 90 tablet 3    droNABinol (MARINOL) 2.5 MG capsule Take 1 capsule (2.5 mg total) by mouth 2 (two) times daily before meals. 60 capsule 3    EScitalopram oxalate (LEXAPRO) 10 MG tablet TAKE 1 TABLET BY MOUTH EVERY DAY 90 tablet 1    formoterol (PERFOROMIST) 20 mcg/2 mL Nebu Take 2 mLs  (20 mcg total) by nebulization 2 (two) times a day. Controller 90 mL 3    ibuprofen (ADVIL,MOTRIN) 600 MG tablet Take 1 tablet (600 mg total) by mouth every 6 (six) hours as needed for Pain. 20 tablet 0    melatonin 5 mg Cap Take 5 mg by mouth nightly as needed. TAKE AS SCHEDULED      nintedanib (OFEV) 150 mg Cap Take 1 capsule (150 mg total) by mouth 2 (two) times a day. 60 capsule 6    pantoprazole (PROTONIX) 40 MG tablet TAKE 1 TABLET BY MOUTH EVERY DAY 90 tablet 3    pregabalin (LYRICA) 75 MG capsule Take 1 capsule (75 mg total) by mouth every evening. 30 capsule 2    rosuvastatin (CRESTOR) 40 MG Tab TAKE 1 TABLET BY MOUTH EVERY DAY 90 tablet 3    torsemide (DEMADEX) 10 MG Tab Take 1 tablet (10 mg total) by mouth once daily. 30 tablet 11    traMADoL (ULTRAM) 50 mg tablet Take 1 tablet (50 mg total) by mouth every 24 hours as needed for Pain. 30 tablet 2    triamcinolone acetonide 0.1% (KENALOG) 0.1 % cream APPLY TOPICALLY 2 TIMES DAILY AS NEEDED. 45 g 5    verapamiL (CALAN-SR) 240 MG CR tablet Take 1 tablet (240 mg total) by mouth every evening. 30 tablet 11     Current Facility-Administered Medications on File Prior to Visit   Medication Dose Route Frequency Provider Last Rate Last Admin    ondansetron injection 4 mg  4 mg Intravenous Daily PRN Gustavo Chu MD        sodium chloride 0.9% flush 10 mL  10 mL Intravenous PRN Braulio Best MD   5 mL at 10/23/24 1130    sodium chloride 0.9% flush 10 mL  10 mL Intravenous PRN Nitin Wheat MD        [DISCONTINUED] LIDOcaine (cardiac) injection   Intravenous PRN Cathie White CRNA   30 mg at 10/23/24 1116    [DISCONTINUED] propofol (DIPRIVAN) 10 mg/mL infusion   Intravenous Continuous PRN Cathie White CRNA   Stopped at 10/23/24 1125         Review of Systems     Constitutional: Negative unless otherwise commented above  HENT: Negative unless otherwise commented above  Eyes: Negative unless otherwise commented above  Respiratory: Negative unless  "otherwise commented above  Cardiovascular: Negative unless otherwise commented above  Musculoskeletal: Negative unless otherwise commented above  Skin: Negative unless otherwise commented above  Neurological: Negative unless otherwise commented above  Hematological: Negative unless otherwise commented above  Endocrine: Negative unless otherwise commented above    OBJECTIVE:     Vital Signs (Most Recent)  Visit Vitals  BP 90/60 (BP Location: Left arm, Patient Position: Sitting)   Pulse (!) 55   Ht 5' 10" (1.778 m)   Wt 91.6 kg (202 lb)   SpO2 (!) 94%   BMI 28.98 kg/m²     5' 10" (1.778 m)  91.6 kg (202 lb)     Physical Exam:    General: Alert and oriented, No acute distress.   Eye: Extraocular movements are intact, Normal conjunctiva.  No scleral icterus  HENT: Normocephalic, Normal hearing, Oral mucosa is moist, No pharyngeal erythema.   Neck: Supple, Non-tender, No jugular venous distention.  Respiratory: slight squeaks in left base and velcro crackles bilaterally at bases - otherwise CTA  Cardiovascular: SB with SM LLSB  Extremities: no c/c/e +Joseph's nails  Gastrointestinal: Soft, Non-tender, Non-distended   Lymphatics: No lymphadenopathy appreciated, no masses.   Integumentary: Warm, Dry.   Neurologic: Moves all extremities, No focal defects.     Diagnostic Results:  PFT Results  FVC: 2.68 liters  FVC%: 61     FEV1: 1.95 liters  FEV1%: 58     FEV1/FVC: 72.8 %     TLC (liters): 4.56 liters  TLC%: 63     RV: 1.88  RV%: 77     DLCO (ml/mmHg sec): 13.05 ml/mmHg sec  DLCO%: 49     SpO2: 97 % (sats on ra at rest)     PFTs from October 24, 2023 reveal a mild restrictive pattern with a TLC of 4.56 L or 63% of predicted with spirometry revealing underlying obstruction with an FEV1 of 1.95 L or 58% of predicted.  Level of obstruction unable to be determined as occurs in the setting of restrictive lung disease.  DLCO is moderately reduced at 13.05 or 49% of predicted which may be secondary to pulmonary parenchymal " abnormalities, pulmonary hypertension, or anemia.     PFTs from March 8, 2024 revealed a mild restrictive physiology with a TLC of 4.36 L or 61% of predicted.  There is no evidence of underlying obstruction by spirometry.  FEV1 is 1.93 L or 58% of predicted.  DLCO is severely reduced at 11.75 or 44% of predicted which may be secondary to pulmonary parenchymal abnormalities, pulmonary hypertension, or anemia.     July 5, 2024 revealed no evidence of underlying obstruction with an FEV1 of 2.16 L or 65% of predicted. There is evidence of moderate restriction with a TLC of 3.84 L or 54% of predicted. DLCO is severely reduced at 8.36 or 32% of predicted. Reduced DLCO may be secondary to pulmonary parenchymal abnormalities, pulmonary hypertension, or anemia. FVC/DLCO is 2 suggesting underlying pulmonary hypertension.      Significant Imaging:  10/17/2023 CXR:   Impression:     Chronic interstitial changes in both law thoraces.  Interstitial lung disease is suggested and clinical correlation is advised.     11/20/2023 CT chest with evidence of bilateral centrilobular emphysema, diffuse traction bronchiectasis as well as increased reticular marking and areas of suspected honeycombing peripherally consistent with underlying pulmonary fibrosis.     02/27/2024 CT C/A/P:  mpression:     No definite metastatic disease in the chest, abdomen or pelvis.     Fatty liver infiltration and mild hepatomegaly with the right lobe of the liver measuring 16.3 cm.     Wall thickening about the cecum which may relate to colitis.  This is less diffuse than was seen previously.     Extensive atherosclerosis.     Thoracolumbar compression fractures and evidence of a spondylolisthesis of L5-S1.     Extensive interstitial changes in the lungs.     Mildly enlarged mediastinal lymph nodes with extensive parenchymal interstitial changes which are chronic.     03/22/2024 PET-CT:  Impression:     Mediastinal lymphadenopathy as above within the  superior mediastinum, mid mediastinum para aortic region, anterior mediastinum, paratracheal region, right hilum, left periesophageal station near the hiatus with uptake of up to 3.5 SUV suspicious for malignancy.     Intense uptake in the region of the right prostate which could indicate malignancy.     Nonspecific uptake rectoanal junction.  No obvious thickening or mass in the area.     Axillary lymph nodes small by imaging criteria with borderline detectable metabolic activity.     Peripheral distribution interstitial lung changes.     06/11/2024 CT-chest:  Impression:     1. Mediastinal lymphadenopathy, as above, progressed in comparison to the prior PET-CT dated 03/22/2024.  While these may be reactive, a neoplastic process cannot be excluded.  Further evaluation, as warranted.  2. Centrilobular and paraseptal emphysematous changes with extensive chronic interstitial lung changes with subpleural reticulation, interlobular septal thickening and scattered bronchiectasis, mildly progressed at the lung bases, with superimposed interstitial edema not excluded, noting interval development of small bilateral dependent pleural effusions.  No discrete new pulmonary nodules or masses.  No lobar consolidation.  3. Marked calcification of the coronary arteries.     03/28/2024 TTE:    Left Ventricle: The left ventricle is normal in size. Normal wall thickness. There is normal systolic function with a visually estimated ejection fraction of 55 - 60%.    Right Ventricle: Normal right ventricular cavity size. Systolic function is normal.    Left Atrium: Left atrium is moderately dilated.    Right Atrium: Right atrium is dilated.    Aortic Valve: The aortic valve is a trileaflet valve. There is aortic valve sclerosis.    Mitral Valve: There is mild regurgitation.    Tricuspid Valve: There is mild to moderate regurgitation.    Pulmonary Artery: There is borderline elevated pulmonary hypertension. The estimated pulmonary artery  systolic pressure is 36 mmHg.    IVC/SVC: Normal venous pressure at 3 mmHg.       Test(s) Reviewed  I have personally reviewed the following in detail:  [] Chest Xray Images   [] CT Images   [] Echocardiogram   [] Labs   [] Other:       Assessment:         ICD-10-CM ICD-9-CM   1. Pulmonary fibrosis  J84.10 515   2. Mediastinal lymphadenopathy  R59.0 785.6   3. Chronic obstructive pulmonary disease, unspecified COPD type  J44.9 496   4. Restrictive lung disease  J98.4 518.89   5. Urothelial cancer  C68.9 189.8   6. TASIA (obstructive sleep apnea)  G47.33 327.23   7. Encounter for immunization  Z23 V03.89        Plan:   Pulmonary fibrosis    Mediastinal lymphadenopathy    Chronic obstructive pulmonary disease, unspecified COPD type    Restrictive lung disease    Urothelial cancer    TASIA (obstructive sleep apnea)    Encounter for immunization  -     Influenza - Quadrivalent (Adjuvanted)         Query if ILD 2/2 myomycin, COVID-19 related,  or underlying malignancy.  Given positive T1G1 gamma antibodies, concern for underlying myositis ILD 2/2 malignancy.  Referred to Dr. West to screen for underlying malignancy.  Thus far malignancy work-up has been negative.      ILD appears to be mildly worse on imaging from June 2024 with increase in size of mediastinal and hilar lymphadenopathy.  Prior PET showed these nodules to be hypermetabolic.  05/08/2024 EUS biopsy of station 7 was negative for malignancy.  07/15/2024 EBUS biopsy of Station 4R and 7 were negative for malignancy and lymphoma.  Started on Ofev 150 mg BID but not tolerating 2/2 diarrhea - decreased to 150 mg daily - may use Imodium for diarrhea - tolerating this dose.  Seen by Rheum - agrees with concern for malignancy.  Repeat CXR/PFTs in December - CXR and PFTs previously ordered.     Given FVC/DLCO of 2 suggesting underlying pulmonary hypertension, he underwent RHC with Dr. Wheat on 08/05/2024 - post capillary pressures elevated hence started on torsemide.  No  indication for Tyvaso therapy.     HST c/w moderate TASIA with AHI of 26.9/h from HST on 12/21/2024 - autopap started; however, did not tolerate.  Inspire per Dr. Lindquist.     Ceased budesonide/performist bid with nebulizer as did not note benefit with use.     Bladder cancer initially diagnosed in 2020 with recurrence in 2022 - now cancer free.     PCV-20 01/2023.  Needs flu, covid, RSV vaccines.  Flu today.    Follow up in about 8 weeks (around 12/23/2024) for fu pfts/ and cxr in ILD.     See labs and med orders.    Medications have been reviewed and reconciled.    Portions of this note may have been created with voice recognition software.  Grammatical, syntax and spelling errors may be inevitable.

## 2024-11-04 NOTE — ANESTHESIA POSTPROCEDURE EVALUATION
Anesthesia Post Evaluation    Patient: Moustapha Murray    Procedure(s) Performed: Procedure(s) (LRB):  SLEEP ENDOSCOPY,DRUG-INDUCED (Bilateral)    Final Anesthesia Type: MAC      Patient location during evaluation: PACU  Patient participation: Yes- Able to Participate  Level of consciousness: awake and alert  Post-procedure vital signs: reviewed and stable  Pain management: adequate  Airway patency: patent    PONV status at discharge: No PONV  Anesthetic complications: no      Cardiovascular status: blood pressure returned to baseline  Respiratory status: unassisted  Hydration status: euvolemic  Follow-up not needed.              Vitals Value Taken Time   BP 93/60 10/23/24 1155   Temp 36.2 °C (97.2 °F) 10/23/24 1133   Pulse 61 10/23/24 1155   Resp 16 10/23/24 1155   SpO2 96 % 10/23/24 1155         Event Time   Out of Recovery 11:37:00         Pain/Myles Score: No data recorded

## 2024-11-08 NOTE — TELEPHONE ENCOUNTER
Mr. Dee scheduled on line   Patient to establish care with a new provider.  Patient on LW last on 11/7/24.  LW message sent to patient to establish care with new provider.

## 2024-11-20 ENCOUNTER — TELEPHONE (OUTPATIENT)
Dept: SURGERY | Facility: HOSPITAL | Age: 69
End: 2024-11-20
Payer: MEDICARE

## 2024-11-20 NOTE — TELEPHONE ENCOUNTER
Pt is scheduled for INSERTION, NEUROSTIMULATOR, HYPOGLOSSAL - Right on 11/22. Pt took Aspirin and Plavix yesterday. Pt took Ibuprofen on 11/16. Please reach out to pt if this is going to interfere with procedure. Thank you.

## 2024-11-21 ENCOUNTER — ANESTHESIA EVENT (OUTPATIENT)
Dept: SURGERY | Facility: HOSPITAL | Age: 69
End: 2024-11-21
Payer: MEDICARE

## 2024-11-21 DIAGNOSIS — S22.000A COMPRESSION FRACTURE OF BODY OF THORACIC VERTEBRA: ICD-10-CM

## 2024-11-21 DIAGNOSIS — M25.532 BILATERAL WRIST PAIN: ICD-10-CM

## 2024-11-21 DIAGNOSIS — M25.531 BILATERAL WRIST PAIN: ICD-10-CM

## 2024-11-22 ENCOUNTER — HOSPITAL ENCOUNTER (OUTPATIENT)
Dept: RADIOLOGY | Facility: HOSPITAL | Age: 69
Discharge: HOME | End: 2024-11-22
Attending: OTOLARYNGOLOGY | Admitting: OTOLARYNGOLOGY
Payer: MEDICARE

## 2024-11-22 ENCOUNTER — ANESTHESIA (OUTPATIENT)
Dept: SURGERY | Facility: HOSPITAL | Age: 69
End: 2024-11-22
Payer: MEDICARE

## 2024-11-22 ENCOUNTER — HOSPITAL ENCOUNTER (OUTPATIENT)
Facility: HOSPITAL | Age: 69
Discharge: HOME | End: 2024-11-22
Attending: OTOLARYNGOLOGY | Admitting: OTOLARYNGOLOGY
Payer: MEDICARE

## 2024-11-22 VITALS
HEART RATE: 65 BPM | DIASTOLIC BLOOD PRESSURE: 83 MMHG | SYSTOLIC BLOOD PRESSURE: 187 MMHG | TEMPERATURE: 98 F | WEIGHT: 195 LBS | OXYGEN SATURATION: 98 % | BODY MASS INDEX: 27.92 KG/M2 | RESPIRATION RATE: 15 BRPM | HEIGHT: 70 IN

## 2024-11-22 DIAGNOSIS — Z78.9 INTOLERANCE OF CONTINUOUS POSITIVE AIRWAY PRESSURE (CPAP) VENTILATION: ICD-10-CM

## 2024-11-22 DIAGNOSIS — G47.33 OSA (OBSTRUCTIVE SLEEP APNEA): Primary | ICD-10-CM

## 2024-11-22 PROCEDURE — 71045 X-RAY EXAM CHEST 1 VIEW: CPT | Mod: 26,,, | Performed by: RADIOLOGY

## 2024-11-22 PROCEDURE — 36000706: Mod: PO | Performed by: OTOLARYNGOLOGY

## 2024-11-22 PROCEDURE — 64582 OPN MPLTJ HPGLSL NSTM ARY PG: CPT | Mod: RT,,, | Performed by: OTOLARYNGOLOGY

## 2024-11-22 PROCEDURE — 25000003 PHARM REV CODE 250: Mod: PO | Performed by: NURSE ANESTHETIST, CERTIFIED REGISTERED

## 2024-11-22 PROCEDURE — 63600175 PHARM REV CODE 636 W HCPCS: Mod: PO | Performed by: OTOLARYNGOLOGY

## 2024-11-22 PROCEDURE — 72020 X-RAY EXAM OF SPINE 1 VIEW: CPT | Mod: TC,FY,PO

## 2024-11-22 PROCEDURE — 71000015 HC POSTOP RECOV 1ST HR: Mod: PO | Performed by: OTOLARYNGOLOGY

## 2024-11-22 PROCEDURE — 27201423 OPTIME MED/SURG SUP & DEVICES STERILE SUPPLY: Mod: PO | Performed by: OTOLARYNGOLOGY

## 2024-11-22 PROCEDURE — 63600175 PHARM REV CODE 636 W HCPCS: Mod: PO | Performed by: NURSE ANESTHETIST, CERTIFIED REGISTERED

## 2024-11-22 PROCEDURE — D9220A PRA ANESTHESIA: Mod: ANES,,, | Performed by: ANESTHESIOLOGY

## 2024-11-22 PROCEDURE — 25000003 PHARM REV CODE 250: Mod: PO | Performed by: ANESTHESIOLOGY

## 2024-11-22 PROCEDURE — 37000009 HC ANESTHESIA EA ADD 15 MINS: Mod: PO | Performed by: OTOLARYNGOLOGY

## 2024-11-22 PROCEDURE — 36000707: Mod: PO | Performed by: OTOLARYNGOLOGY

## 2024-11-22 PROCEDURE — 71045 X-RAY EXAM CHEST 1 VIEW: CPT | Mod: TC,FY,PO

## 2024-11-22 PROCEDURE — C1778 LEAD, NEUROSTIMULATOR: HCPCS | Mod: PO | Performed by: OTOLARYNGOLOGY

## 2024-11-22 PROCEDURE — 37000008 HC ANESTHESIA 1ST 15 MINUTES: Mod: PO | Performed by: OTOLARYNGOLOGY

## 2024-11-22 PROCEDURE — C1787 PATIENT PROGR, NEUROSTIM: HCPCS | Mod: PO | Performed by: OTOLARYNGOLOGY

## 2024-11-22 PROCEDURE — C1767 GENERATOR, NEURO NON-RECHARG: HCPCS | Mod: PO | Performed by: OTOLARYNGOLOGY

## 2024-11-22 PROCEDURE — D9220A PRA ANESTHESIA: Mod: CRNA,,, | Performed by: NURSE ANESTHETIST, CERTIFIED REGISTERED

## 2024-11-22 PROCEDURE — 71000033 HC RECOVERY, INTIAL HOUR: Mod: PO | Performed by: OTOLARYNGOLOGY

## 2024-11-22 PROCEDURE — 63600175 PHARM REV CODE 636 W HCPCS: Mod: PO | Performed by: ANESTHESIOLOGY

## 2024-11-22 PROCEDURE — 72020 X-RAY EXAM OF SPINE 1 VIEW: CPT | Mod: 26,,, | Performed by: RADIOLOGY

## 2024-11-22 DEVICE — GENERATOR PULSE IMPLANTABLE: Type: IMPLANTABLE DEVICE | Site: CHEST | Status: FUNCTIONAL

## 2024-11-22 DEVICE — LEAD STIMULATION IMPLANTABLE: Type: IMPLANTABLE DEVICE | Site: NECK | Status: FUNCTIONAL

## 2024-11-22 RX ORDER — ATROPINE SULFATE 0.4 MG/ML
INJECTION, SOLUTION ENDOTRACHEAL; INTRAMEDULLARY; INTRAMUSCULAR; INTRAVENOUS; SUBCUTANEOUS
Status: DISCONTINUED | OUTPATIENT
Start: 2024-11-22 | End: 2024-11-22

## 2024-11-22 RX ORDER — GLUCAGON 1 MG
1 KIT INJECTION
Status: DISCONTINUED | OUTPATIENT
Start: 2024-11-22 | End: 2024-11-22 | Stop reason: HOSPADM

## 2024-11-22 RX ORDER — ACETAMINOPHEN 10 MG/ML
INJECTION, SOLUTION INTRAVENOUS
Status: DISCONTINUED | OUTPATIENT
Start: 2024-11-22 | End: 2024-11-22

## 2024-11-22 RX ORDER — ROCURONIUM BROMIDE 10 MG/ML
INJECTION, SOLUTION INTRAVENOUS
Status: DISCONTINUED | OUTPATIENT
Start: 2024-11-22 | End: 2024-11-22

## 2024-11-22 RX ORDER — OXYCODONE HYDROCHLORIDE 5 MG/1
5 TABLET ORAL
Status: DISCONTINUED | OUTPATIENT
Start: 2024-11-22 | End: 2024-11-22 | Stop reason: HOSPADM

## 2024-11-22 RX ORDER — FENTANYL CITRATE 50 UG/ML
INJECTION, SOLUTION INTRAMUSCULAR; INTRAVENOUS
Status: DISCONTINUED | OUTPATIENT
Start: 2024-11-22 | End: 2024-11-22

## 2024-11-22 RX ORDER — HYDROCODONE BITARTRATE AND ACETAMINOPHEN 5; 325 MG/1; MG/1
1 TABLET ORAL EVERY 6 HOURS PRN
Qty: 15 TABLET | Refills: 0 | Status: SHIPPED | OUTPATIENT
Start: 2024-11-22 | End: 2024-11-26

## 2024-11-22 RX ORDER — CEFAZOLIN SODIUM 1 G/3ML
2 INJECTION, POWDER, FOR SOLUTION INTRAMUSCULAR; INTRAVENOUS ONCE
Status: COMPLETED | OUTPATIENT
Start: 2024-11-22 | End: 2024-11-22

## 2024-11-22 RX ORDER — LIDOCAINE HYDROCHLORIDE 20 MG/ML
INJECTION INTRAVENOUS
Status: DISCONTINUED | OUTPATIENT
Start: 2024-11-22 | End: 2024-11-22

## 2024-11-22 RX ORDER — EPHEDRINE SULFATE 50 MG/ML
INJECTION, SOLUTION INTRAVENOUS
Status: DISCONTINUED | OUTPATIENT
Start: 2024-11-22 | End: 2024-11-22

## 2024-11-22 RX ORDER — MIDAZOLAM HYDROCHLORIDE 1 MG/ML
INJECTION INTRAMUSCULAR; INTRAVENOUS
Status: DISCONTINUED | OUTPATIENT
Start: 2024-11-22 | End: 2024-11-22

## 2024-11-22 RX ORDER — DIPHENHYDRAMINE HYDROCHLORIDE 50 MG/ML
12.5 INJECTION INTRAMUSCULAR; INTRAVENOUS EVERY 6 HOURS PRN
Status: DISCONTINUED | OUTPATIENT
Start: 2024-11-22 | End: 2024-11-22 | Stop reason: HOSPADM

## 2024-11-22 RX ORDER — ONDANSETRON HYDROCHLORIDE 2 MG/ML
INJECTION, SOLUTION INTRAVENOUS
Status: DISCONTINUED | OUTPATIENT
Start: 2024-11-22 | End: 2024-11-22

## 2024-11-22 RX ORDER — KETAMINE HCL IN 0.9 % NACL 50 MG/5 ML
SYRINGE (ML) INTRAVENOUS
Status: DISCONTINUED | OUTPATIENT
Start: 2024-11-22 | End: 2024-11-22

## 2024-11-22 RX ORDER — PROPOFOL 10 MG/ML
VIAL (ML) INTRAVENOUS
Status: DISCONTINUED | OUTPATIENT
Start: 2024-11-22 | End: 2024-11-22

## 2024-11-22 RX ORDER — MEPERIDINE HYDROCHLORIDE 50 MG/ML
12.5 INJECTION INTRAMUSCULAR; INTRAVENOUS; SUBCUTANEOUS ONCE AS NEEDED
Status: DISCONTINUED | OUTPATIENT
Start: 2024-11-22 | End: 2024-11-22 | Stop reason: HOSPADM

## 2024-11-22 RX ORDER — FENTANYL CITRATE 50 UG/ML
25 INJECTION, SOLUTION INTRAMUSCULAR; INTRAVENOUS EVERY 5 MIN PRN
Status: DISCONTINUED | OUTPATIENT
Start: 2024-11-22 | End: 2024-11-22 | Stop reason: HOSPADM

## 2024-11-22 RX ORDER — LIDOCAINE HYDROCHLORIDE 10 MG/ML
1 INJECTION, SOLUTION EPIDURAL; INFILTRATION; INTRACAUDAL; PERINEURAL ONCE
Status: DISCONTINUED | OUTPATIENT
Start: 2024-11-22 | End: 2024-11-22 | Stop reason: HOSPADM

## 2024-11-22 RX ORDER — DEXAMETHASONE SODIUM PHOSPHATE 4 MG/ML
INJECTION, SOLUTION INTRA-ARTICULAR; INTRALESIONAL; INTRAMUSCULAR; INTRAVENOUS; SOFT TISSUE
Status: DISCONTINUED | OUTPATIENT
Start: 2024-11-22 | End: 2024-11-22

## 2024-11-22 RX ORDER — ONDANSETRON 4 MG/1
4 TABLET, ORALLY DISINTEGRATING ORAL EVERY 6 HOURS PRN
Qty: 10 TABLET | Refills: 0 | Status: SHIPPED | OUTPATIENT
Start: 2024-11-22

## 2024-11-22 RX ORDER — LIDOCAINE HYDROCHLORIDE AND EPINEPHRINE 10; 10 UG/ML; MG/ML
INJECTION, SOLUTION INFILTRATION; PERINEURAL
Status: DISCONTINUED | OUTPATIENT
Start: 2024-11-22 | End: 2024-11-22 | Stop reason: HOSPADM

## 2024-11-22 RX ORDER — ONDANSETRON HYDROCHLORIDE 2 MG/ML
4 INJECTION, SOLUTION INTRAVENOUS DAILY PRN
Status: DISCONTINUED | OUTPATIENT
Start: 2024-11-22 | End: 2024-11-22 | Stop reason: HOSPADM

## 2024-11-22 RX ORDER — SODIUM CHLORIDE, SODIUM LACTATE, POTASSIUM CHLORIDE, CALCIUM CHLORIDE 600; 310; 30; 20 MG/100ML; MG/100ML; MG/100ML; MG/100ML
INJECTION, SOLUTION INTRAVENOUS CONTINUOUS
Status: DISCONTINUED | OUTPATIENT
Start: 2024-11-22 | End: 2024-11-22 | Stop reason: HOSPADM

## 2024-11-22 RX ORDER — HYDROMORPHONE HYDROCHLORIDE 2 MG/ML
0.2 INJECTION, SOLUTION INTRAMUSCULAR; INTRAVENOUS; SUBCUTANEOUS EVERY 5 MIN PRN
Status: DISCONTINUED | OUTPATIENT
Start: 2024-11-22 | End: 2024-11-22 | Stop reason: HOSPADM

## 2024-11-22 RX ADMIN — CEFAZOLIN 2 G: 330 INJECTION, POWDER, FOR SOLUTION INTRAMUSCULAR; INTRAVENOUS at 02:11

## 2024-11-22 RX ADMIN — OXYCODONE HYDROCHLORIDE 5 MG: 5 TABLET ORAL at 04:11

## 2024-11-22 RX ADMIN — EPHEDRINE SULFATE 10 MG: 50 INJECTION INTRAVENOUS at 02:11

## 2024-11-22 RX ADMIN — EPHEDRINE SULFATE 5 MG: 50 INJECTION INTRAVENOUS at 02:11

## 2024-11-22 RX ADMIN — MIDAZOLAM HYDROCHLORIDE 2 MG: 2 INJECTION, SOLUTION INTRAMUSCULAR; INTRAVENOUS at 01:11

## 2024-11-22 RX ADMIN — LIDOCAINE HYDROCHLORIDE 75 MG: 20 INJECTION INTRAVENOUS at 01:11

## 2024-11-22 RX ADMIN — ROCURONIUM BROMIDE 10 MG: 10 INJECTION, SOLUTION INTRAVENOUS at 01:11

## 2024-11-22 RX ADMIN — ONDANSETRON 4 MG: 2 INJECTION, SOLUTION INTRAMUSCULAR; INTRAVENOUS at 02:11

## 2024-11-22 RX ADMIN — GLYCOPYRROLATE 0.2 MG: 0.2 INJECTION, SOLUTION INTRAMUSCULAR; INTRAVENOUS at 01:11

## 2024-11-22 RX ADMIN — ATROPINE SULFATE 0.2 MG: 0.4 INJECTION, SOLUTION INTRAMUSCULAR; INTRAVENOUS; SUBCUTANEOUS at 02:11

## 2024-11-22 RX ADMIN — ACETAMINOPHEN 1000 MG: 10 INJECTION INTRAVENOUS at 02:11

## 2024-11-22 RX ADMIN — DEXAMETHASONE SODIUM PHOSPHATE 12 MG: 4 INJECTION, SOLUTION INTRAMUSCULAR; INTRAVENOUS at 02:11

## 2024-11-22 RX ADMIN — Medication 20 MG: at 01:11

## 2024-11-22 RX ADMIN — SODIUM CHLORIDE, SODIUM LACTATE, POTASSIUM CHLORIDE, AND CALCIUM CHLORIDE: .6; .31; .03; .02 INJECTION, SOLUTION INTRAVENOUS at 02:11

## 2024-11-22 RX ADMIN — FENTANYL CITRATE 100 MCG: 50 INJECTION, SOLUTION INTRAMUSCULAR; INTRAVENOUS at 01:11

## 2024-11-22 RX ADMIN — PROPOFOL 150 MG: 10 INJECTION, EMULSION INTRAVENOUS at 01:11

## 2024-11-22 RX ADMIN — SODIUM CHLORIDE, SODIUM LACTATE, POTASSIUM CHLORIDE, AND CALCIUM CHLORIDE: .6; .31; .03; .02 INJECTION, SOLUTION INTRAVENOUS at 01:11

## 2024-11-22 RX ADMIN — PROPOFOL 50 MG: 10 INJECTION, EMULSION INTRAVENOUS at 02:11

## 2024-11-22 NOTE — ANESTHESIA PROCEDURE NOTES
Intubation    Date/Time: 11/22/2024 1:56 PM    Performed by: Olga Rodriguez CRNA  Authorized by: Lynne Britton MD    Intubation:     Induction:  Intravenous    Intubated:  Postinduction    Mask Ventilation:  Easy with oral airway    Attempts:  1    Attempted By:  CRNA    Method of Intubation:  Video laryngoscopy    Blade:  Soriano 4    Laryngeal View Grade: Grade I - full view of cords      Difficult Airway Encountered?: No      Complications:  None    Airway Device:  Oral endotracheal tube    Airway Device Size:  8.0    Style/Cuff Inflation:  Cuffed    Inflation Amount (mL):  8    Tube secured:  23    Secured at:  The lips    Placement Verified By:  Capnometry    Complicating Factors:  None    Findings Post-Intubation:  BS equal bilateral and atraumatic/condition of teeth unchanged

## 2024-11-22 NOTE — ANESTHESIA PREPROCEDURE EVALUATION
11/22/2024  Moustapha Murray is a 69 y.o., male.      Pre-op Assessment          Review of Systems  Cardiovascular:     Hypertension   CAD      Angina             Cardiovascular Symptoms: Angina       Coronary Artery Disease:                            Hypertension         Pulmonary:   COPD     Sleep Apnea    Chronic Obstructive Pulmonary Disease (COPD):           Obstructive Sleep Apnea (TASIA).           Hepatic/GI:     GERD Liver Disease,        Gerd       Liver Disease        Musculoskeletal:  Arthritis        Arthritis          Neurological:    Neuromuscular Disease,           Arthritis                         Neuromuscular Disease   Psych:  Psychiatric History                  Physical Exam  General: Well nourished        Anesthesia Plan  Type of Anesthesia, risks & benefits discussed:    Anesthesia Type: Gen ETT  Intra-op Monitoring Plan: Standard ASA Monitors  Post Op Pain Control Plan: multimodal analgesia and IV/PO Opioids PRN  Induction:  IV  Airway Plan: Video  Informed Consent: Informed consent signed with the Patient and all parties understand the risks and agree with anesthesia plan.  All questions answered.   ASA Score: 3  Day of Surgery Review of History & Physical: H&P Update referred to the surgeon/provider.    Ready For Surgery From Anesthesia Perspective.     .

## 2024-11-22 NOTE — BRIEF OP NOTE
[facility] - Surgery  Brief Operative Note     SUMMARY     Surgery Date: 11/22/2024     Surgeons and Role:     * Hector Lindquist MD - Primary    Assisting Surgeon: None    Pre-op Diagnosis:  TASIA (obstructive sleep apnea) [G47.33]  BMI 31.0-31.9,adult [Z68.31]  Intolerance of continuous positive airway pressure (CPAP) ventilation [Z78.9]    Post-op Diagnosis:  Post-Op Diagnosis Codes:     * TASIA (obstructive sleep apnea) [G47.33]     * BMI 31.0-31.9,adult [Z68.31]     * Intolerance of continuous positive airway pressure (CPAP) ventilation [Z78.9]    Procedure(s) (LRB):  INSERTION,NEUROSTIMULATOR,HYPOGLOSSAL (Right)    Anesthesia: General    Description of the findings of the procedure: Insertion of hypoglossal nerve stimulator    Findings/Key Components: Successful implantation of Inspire    Estimated Blood Loss: * No values recorded between 11/22/2024  2:19 PM and 11/22/2024  3:37 PM *         Specimens:   Specimen (24h ago, onward)      None            Discharge Note    SUMMARY     Admit Date: 11/22/2024    Discharge Date and Time:  11/22/2024 3:37 PM    Hospital Course (synopsis of major diagnoses, care, treatment, and services provided during the course of the hospital stay): Did well following surgery and was discharged uneventfully     Final Diagnosis: Post-Op Diagnosis Codes:     * TASIA (obstructive sleep apnea) [G47.33]     * BMI 31.0-31.9,adult [Z68.31]     * Intolerance of continuous positive airway pressure (CPAP) ventilation [Z78.9]    Disposition: Home or Self Care    Follow Up/Patient Instructions: Regular diet, Follow-up 1 week. Activity light    Medications:  Reconciled Home Medications:   Current Discharge Medication List        START taking these medications    Details   ondansetron (ZOFRAN-ODT) 4 MG TbDL Take 1 tablet (4 mg total) by mouth every 6 (six) hours as needed (Nausea).  Qty: 10 tablet, Refills: 0      HYDROcodone-acetaminophen (NORCO) 5-325 mg per tablet Take 1 tablet by mouth every 6  (six) hours as needed for Pain.  Qty: 15 tablet, Refills: 0    Associated Diagnoses: Intolerance of continuous positive airway pressure (CPAP) ventilation; BMI 27.0-27.9,adult           CONTINUE these medications which have NOT CHANGED    Details   albuterol (PROVENTIL/VENTOLIN HFA) 90 mcg/actuation inhaler Inhale 2 puffs into the lungs every 6 (six) hours as needed for Wheezing. Rescue  Qty: 18 g, Refills: 6    Associated Diagnoses: Chronic obstructive pulmonary disease, unspecified COPD type      aspirin 81 MG Chew Take 1 tablet (81 mg total) by mouth once daily.  Qty: 90 tablet, Refills: 3    Associated Diagnoses: Agatston CAC score, >400      clopidogreL (PLAVIX) 75 mg tablet TAKE 1 TABLET BY MOUTH EVERY DAY  Qty: 90 tablet, Refills: 3    Associated Diagnoses: Coronary artery disease involving native coronary artery of native heart without angina pectoris      EScitalopram oxalate (LEXAPRO) 10 MG tablet TAKE 1 TABLET BY MOUTH EVERY DAY  Qty: 90 tablet, Refills: 1    Associated Diagnoses: Depression, unspecified depression type      ibuprofen (ADVIL,MOTRIN) 600 MG tablet Take 1 tablet (600 mg total) by mouth every 6 (six) hours as needed for Pain.  Qty: 20 tablet, Refills: 0      melatonin 5 mg Cap Take 5 mg by mouth nightly as needed. TAKE AS SCHEDULED      nintedanib (OFEV) 150 mg Cap Take 1 capsule (150 mg total) by mouth 2 (two) times a day.  Qty: 60 capsule, Refills: 6    Associated Diagnoses: Pulmonary fibrosis      pantoprazole (PROTONIX) 40 MG tablet TAKE 1 TABLET BY MOUTH EVERY DAY  Qty: 90 tablet, Refills: 3      pregabalin (LYRICA) 75 MG capsule Take 1 capsule (75 mg total) by mouth every evening.  Qty: 30 capsule, Refills: 2    Associated Diagnoses: Restless legs syndrome (RLS)      rosuvastatin (CRESTOR) 40 MG Tab TAKE 1 TABLET BY MOUTH EVERY DAY  Qty: 90 tablet, Refills: 3    Associated Diagnoses: Agatston CAC score, >400      torsemide (DEMADEX) 10 MG Tab Take 1 tablet (10 mg total) by mouth once  daily.  Qty: 30 tablet, Refills: 11      traMADoL (ULTRAM) 50 mg tablet Take 1 tablet (50 mg total) by mouth every 24 hours as needed for Pain.  Qty: 30 tablet, Refills: 2    Comments: Quantity prescribed more than 7 day supply? Yes, quantity medically necessary  Associated Diagnoses: Bilateral wrist pain; Compression fracture of body of thoracic vertebra      verapamiL (CALAN-SR) 240 MG CR tablet Take 1 tablet (240 mg total) by mouth every evening.  Qty: 30 tablet, Refills: 11      budesonide (PULMICORT) 0.5 mg/2 mL nebulizer solution Take 2 mLs (0.5 mg total) by nebulization 2 (two) times daily. Controller  Qty: 120 mL, Refills: 3    Comments: Run under part B j44.9 copd  Associated Diagnoses: Chronic obstructive pulmonary disease, unspecified COPD type      droNABinol (MARINOL) 2.5 MG capsule Take 1 capsule (2.5 mg total) by mouth 2 (two) times daily before meals.  Qty: 60 capsule, Refills: 3    Associated Diagnoses: Anorexia      formoterol (PERFOROMIST) 20 mcg/2 mL Nebu Take 2 mLs (20 mcg total) by nebulization 2 (two) times a day. Controller  Qty: 90 mL, Refills: 3    Comments: Run under part B j44.9 copd  Associated Diagnoses: Chronic obstructive pulmonary disease, unspecified COPD type      triamcinolone acetonide 0.1% (KENALOG) 0.1 % cream APPLY TOPICALLY 2 TIMES DAILY AS NEEDED.  Qty: 45 g, Refills: 5    Associated Diagnoses: Dermatitis           No discharge procedures on file.

## 2024-11-22 NOTE — OP NOTE
11/22/2024     Moustapha Murray  [id number]  1955    PRE-OPERATIVE DIAGNOSIS  1. TASIA (obstructive sleep apnea)    2. Intolerance of continuous positive airway pressure (CPAP) ventilation    3. BMI 27.0-27.9,adult        POST-OPERATIVE DIAGNOSIS  1. TASIA (obstructive sleep apnea)    2. Intolerance of continuous positive airway pressure (CPAP) ventilation    3. BMI 27.0-27.9,adult        PROCEDURES PERFORMED  Insertion of hypoglossal nerve stimulator electrode and generator and breathing electrode sensor (CPT 25420)    SURGEON: Hector Lindquist MD    ASSISTANT: Karly Haskins    ANESTHESIA: General    COMPLICATIONS: None    BLOOD LOSS: 10 mL    SPECIMENS  None    DISPOSITION  PACU    OPERATIVE FINDINGS  Successful placement of stimulating leads around protruding branches of right CN XII and sensing lead in the right 2nd intercostal space. Successful device check with good activation of muscles and respiration sensing.     INDICATIONS  Moustapha Murray is a 69 y.o. male patient with a history of moderate obstructive sleep apnea as measured by sleep study, dated: 12/23. They were found to have an ALDA /AHI of: 26. They are intolerant of and unable to achieve benefit from positive pressure therapy. he has undergone a pre-operative drug induced sleep endoscopy which confirmed that the velopharynx closure was NOT concentric. They have passed the clinical, polysomnographic, and endoscopic screening criteria and presents today for the implant.     PROCEDURE IN DETAIL  The patient was seen in the pre-operative holding area, and consent was signed. They were wheeled into the operating room and placed supine on the table. Anesthesia was induced via general endotracheal tube and this was secured to the left. The bed was turned. A shoulder roll was placed and the patient was prepped and draped in usual sterile fashion with the head turned to the left. Prior to prepping and draping, electrodes were placed in the  genioglossus and hyo/styloglossus muscle and continuous EMG monitoring was undertaken.     A modified sub-mandibular incision was made in the right upper neck approximately 1 cm below the mandible in the natural skin crease. Dissection was carried down through the subcutaneous tissue and platysma. The inferior border of the submandibular gland was identified as well as the digastric tendon. The submandibular gland and the overlying fascia with the marginal mandibular nerve were retracted superiorly. The digastric was retracted inferiorly. Dissection was carried down into the digastric triangle where the hypoglossal nerve was identified in its usual fashion. The mylohyoid muscle was retracted anteriorly, and the hypoglossal nerve was dissected up towards the floor of the mouth. The lateral branches to retrusor muscles were identified, and tested intra-operatively using the bipolar NIM stimulator. I sequentially identified inclusion branches of C1, genioglossus, and transverse / vertical. I then identified stylo/hyoglossus exclusion branches. I carefully dissected the break point between inclusion and exclusion and the cuff electrode for the hypoglossal nerve stimulator was placed carefully around the inclusion branches. Diagnostic evaluation confirmed activation of the genioglossus nerve, resulting in genioglossal activation and tongue protrusion, confirmed visually. The stimulation electrode was then looped under and secured to the digastric tendon on its lateral surface with the provided anchor.     A second 5 cm incision was made in the right upper chest approximately 3 cm lateral to the lateral sternal border over the 2nd rib space. Dissection was carried down to the pectoralis muscle. An inferior pocket was created deep to the subcutaneous layer and superficial to the pectoralis muscle. fascia The stimulation lead was then tunneled in a subplatysmal plane and brought out into the sub-clavicular pocket. I placed  my stay sutures (2-0 silk) for the IPG in the pectoralis fascia. I then carefully divided the fibers of the pectoralis major and retracted the muscle. I identified the subpectoralis fat pad. I then identified the external and intercostal muscles and placed a 3-0 silk in the external intercostal at the medial aspect in preparation for the sensing lead insertion. I carefully dissected a plane between the external and intercostal muscles and the pleural respiration sensor was placed laterally into the pocket overlying the internal intercostals. The sensor was sutured to the fascia using the provided anchors to maintain the sensor facing the pleural space. The hypoglossal lead was tunneled through to the chest incision, avoiding external jugular and anterior jugular. The cuff electrode and the respiration sensing lead were connected to the implantable pulse generator. Diagnostic evaluation was again run, which confirmed a good respiration sensing signal as well as good tongue protrusion stimulation.     The implantable pulse generator was placed in the subclavicular pocket and secured loosely to the pectoralis fascia at the superior aspect using non-resorbable sutures. All the wounds were thoroughly irrigated with bacitracin irrigation. The wounds were then closed in three layers with 3-0 Vicryl, 4-0 Vicryl and Dermabond. Pressure dressings were then applied.      This marked the end of the procedure. The patient was turned back over to the Anesthesia team.  They were awoken and transported back to the PACU in stable condition. Counts were correct. I performed the entire procedure.

## 2024-11-22 NOTE — TRANSFER OF CARE
"Anesthesia Transfer of Care Note    Patient: Moustapha Murray    Procedure(s) Performed: Procedure(s) (LRB):  INSERTION,NEUROSTIMULATOR,HYPOGLOSSAL (Right)    Patient location: PACU    Anesthesia Type: general    Transport from OR: Transported from OR on 2-3 L/min O2 by NC with adequate spontaneous ventilation    Post pain: adequate analgesia    Post assessment: no apparent anesthetic complications and tolerated procedure well    Post vital signs: stable    Level of consciousness: sedated    Nausea/Vomiting: no nausea/vomiting    Complications: none    Transfer of care protocol was followed      Last vitals: Visit Vitals  /61   Pulse 61   Temp 36.4 °C (97.6 °F) (Skin)   Resp 12   Ht 5' 10" (1.778 m)   Wt 88.5 kg (195 lb)   SpO2 98%   BMI 27.98 kg/m²     "

## 2024-11-22 NOTE — DISCHARGE INSTRUCTIONS
Post-op Insertion of Hypoglossal Nerve Stimulator  Hector Lindquist MD  Otolaryngology - [facility] - [phone]  Cell Phone (after hours) - [phone]    WHAT TO DO    1. You should follow-up in 1 week. We will schedule this appointment, but call if you need to change.   2. Use the recommended medications / treatments as described  3. Familiarize yourself with the information in this pamphlet  4. Call me with questions. Also, please call me the day after surgery to let me know how you are doing.  5. Cell phone: [phone], please call me with any concerns or questions. I am not always available to answer my cell phone, so for any urgent or important issues, use the other numbers provided above.    Pain and Activity  You will have pain from the incision sites for 1-2 weeks. This will decrease between day 3-7.  You will have moderate swelling under the jaw which may become a little firm before it softens and goes away. If you believe this is more than expected, you can reach out for advice.   You will have pain in the back of the tongue and right sided ear pain. This will gradually improve.    NO repetitive or strenuous activity for 4 weeks. You should especially avoid vigorous movement of the right upper extremity. No lifting more than 10 lb from the right extremity for 4 weeks. An exception is the exercises for the NECK, listed below.  Avoid heavy backpack or straps for 4 weeks, particularly on the right side.  You should perform circular neck rolls (10 clockwise and 10 counter clockwise) three times daily, beginning on post-operative day 1. This will reduce the scarring between the lead stimulator and the generator in the chest.     Incision Care  You may remove the bandages after 24 hours  You can shower after the bandages have been removed. You can get the incisions wet with warm,soapy water. Do not scrub the incisions, but it is OK to wash around them.  The incision is closed with skin  glue and this will dissolve  Do not apply ointment, lotion, or cream to the incision until your follow-up  A small amount of redness and swelling over the incision is normal. Call me if this is continuing to enlarge or the pain begins to increase after it had started to feel better.      Diet  Make sure to get enough fluids and nutrients. Food and drink guidelines include:  Take lots of fluids. Good choices are smoothies, water, and mild juices. Hydration is the MOST IMPORTANT factor in your nutrition during the healing process. Other examples include Boost, Ensure, and electrolyte containing juices)  No diet restrictions. You may want to eat more of a modified diet (with softer foods) but I am fine with you eating anything in your normal diet.  You may want to avoid spicy/acidic and hard foods during this time, strictly for comfort.     Medication  Give only medications approved by your doctor. Follow directions closely when giving your child medications.  Pain medication  You should alternate the pain medications so that you are taking one medication up to every 3 hours. An example is Ibuprofen, then Oxycodone 3 hours later.   Oxycodone / Acetaminophen - this can be taken every 6 hours as needed for pain. It is usually needed for the first few days regularly. Do not take plain Tylenol (acetaminophen) within 6 hours of this medication.   You should also take an anti-inflammatory for pain in addition to the narcotic, including Ibuprofen/Advil/Motrin. You can take 600 mg every 6 hours as needed for pain (You can take 800 mg if weight > 175 lb..)       Medication Tips  Avoid fish oil (omega acids) and vitamin E for 1 week.  As your pain lessens, you may Replace doses of prescription pain medications with acetaminophen (Tylenol). However, Do Not take doses of prescription pain medications at the same time as Tylenol because this could cause an overdose of Tylenol.  Do Not drive or operate machinery if taking  prescription pain medications  Read each medication label carefully, ask your pharmacist if you have any questions regarding warnings on the label  Do not measure liquid with a kitchen spoon. There are pediatric measuring devices available at the pharmacy. Ask for one when you get your prescription filled.  Store all mediations out of reach of children.      When to Call the Doctor  Mild pain and a slight fever are normal after surgery. But call the doctor right if you have any of the following:  Fever: temperature greater than 101  Trouble breathing  Bright red bleeding  Any other concerns

## 2024-11-23 NOTE — ANESTHESIA POSTPROCEDURE EVALUATION
Anesthesia Post Evaluation    Patient: Moustapha Murray    Procedure(s) Performed: Procedure(s) (LRB):  INSERTION,NEUROSTIMULATOR,HYPOGLOSSAL (Right)    Final Anesthesia Type: general      Patient location during evaluation: PACU  Patient participation: Yes- Able to Participate  Level of consciousness: awake and alert  Post-procedure vital signs: reviewed and stable  Pain management: adequate  Airway patency: patent    PONV status at discharge: No PONV  Anesthetic complications: no      Cardiovascular status: blood pressure returned to baseline  Respiratory status: unassisted and room air  Hydration status: euvolemic  Follow-up not needed.              Vitals Value Taken Time   /83 11/22/24 1629   Temp  11/23/24 0733   Pulse 65 11/22/24 1629   Resp 15 11/22/24 1632   SpO2 98 % 11/22/24 1629         Event Time   Out of Recovery 16:09:17         Pain/Myles Score: Pain Rating Prior to Med Admin: 6 (11/22/2024  4:32 PM)  Pain Rating Post Med Admin: 4 (11/22/2024  4:48 PM)  Myles Score: 10 (11/22/2024  4:32 PM)

## 2024-11-25 ENCOUNTER — DOCUMENTATION ONLY (OUTPATIENT)
Dept: HEMATOLOGY/ONCOLOGY | Facility: CLINIC | Age: 69
End: 2024-11-25
Payer: MEDICARE

## 2024-11-26 RX ORDER — TRAMADOL HYDROCHLORIDE 50 MG/1
50 TABLET ORAL
Qty: 30 TABLET | Refills: 2 | Status: SHIPPED | OUTPATIENT
Start: 2024-11-26

## 2024-11-26 NOTE — TELEPHONE ENCOUNTER
Spoke to the pt and states that he is aware to not take the narco and tramadol together. States understanding

## 2024-11-29 ENCOUNTER — PATIENT MESSAGE (OUTPATIENT)
Dept: FAMILY MEDICINE | Facility: CLINIC | Age: 69
End: 2024-11-29
Payer: MEDICARE

## 2024-11-29 ENCOUNTER — PATIENT MESSAGE (OUTPATIENT)
Dept: OTOLARYNGOLOGY | Facility: CLINIC | Age: 69
End: 2024-11-29

## 2024-11-29 ENCOUNTER — OFFICE VISIT (OUTPATIENT)
Dept: OTOLARYNGOLOGY | Facility: CLINIC | Age: 69
End: 2024-11-29
Payer: MEDICARE

## 2024-11-29 DIAGNOSIS — G47.33 OSA (OBSTRUCTIVE SLEEP APNEA): Primary | ICD-10-CM

## 2024-11-29 PROCEDURE — 99999 PR PBB SHADOW E&M-EST. PATIENT-LVL I: CPT | Mod: PBBFAC,,, | Performed by: OTOLARYNGOLOGY

## 2024-11-29 PROCEDURE — 99211 OFF/OP EST MAY X REQ PHY/QHP: CPT | Mod: PBBFAC,PO | Performed by: OTOLARYNGOLOGY

## 2024-11-29 NOTE — PROGRESS NOTES
Moustapha is here for a post-operative visit following   Hypoglossal nerve stimulator    No issues, doing well    Exam:  Alert, active, appropriate  Incisions c/d/I  Expected amount of swelling; no hematoma or seroma  Tongue midline with protrusion and strong  No facial weakness    Healing well  RTC 3 weeks for activation       EMS

## 2024-12-02 ENCOUNTER — PATIENT MESSAGE (OUTPATIENT)
Dept: OTOLARYNGOLOGY | Facility: CLINIC | Age: 69
End: 2024-12-02
Payer: MEDICARE

## 2024-12-04 ENCOUNTER — TELEPHONE (OUTPATIENT)
Dept: CARDIOLOGY | Facility: CLINIC | Age: 69
End: 2024-12-04
Payer: MEDICARE

## 2024-12-04 NOTE — TELEPHONE ENCOUNTER
Spoke with patient and 12/9/24 appt changed to a virtual visit at 10 am. Patient agreeable and verbalized understanding.

## 2024-12-09 ENCOUNTER — OFFICE VISIT (OUTPATIENT)
Dept: FAMILY MEDICINE | Facility: CLINIC | Age: 69
End: 2024-12-09
Payer: MEDICARE

## 2024-12-09 ENCOUNTER — OFFICE VISIT (OUTPATIENT)
Dept: CARDIOLOGY | Facility: CLINIC | Age: 69
End: 2024-12-09
Attending: INTERNAL MEDICINE
Payer: MEDICARE

## 2024-12-09 VITALS
OXYGEN SATURATION: 97 % | DIASTOLIC BLOOD PRESSURE: 72 MMHG | HEIGHT: 70 IN | WEIGHT: 197.75 LBS | BODY MASS INDEX: 28.31 KG/M2 | HEART RATE: 74 BPM | SYSTOLIC BLOOD PRESSURE: 128 MMHG | RESPIRATION RATE: 18 BRPM

## 2024-12-09 DIAGNOSIS — Z95.5 S/P DRUG ELUTING CORONARY STENT PLACEMENT: ICD-10-CM

## 2024-12-09 DIAGNOSIS — Z12.5 PROSTATE CANCER SCREENING: ICD-10-CM

## 2024-12-09 DIAGNOSIS — J84.10 PULMONARY FIBROSIS: ICD-10-CM

## 2024-12-09 DIAGNOSIS — I10 PRIMARY HYPERTENSION: ICD-10-CM

## 2024-12-09 DIAGNOSIS — G25.81 RLS (RESTLESS LEGS SYNDROME): ICD-10-CM

## 2024-12-09 DIAGNOSIS — I25.10 CORONARY ARTERY DISEASE INVOLVING NATIVE CORONARY ARTERY OF NATIVE HEART WITHOUT ANGINA PECTORIS: ICD-10-CM

## 2024-12-09 DIAGNOSIS — F32.A DEPRESSION, UNSPECIFIED DEPRESSION TYPE: ICD-10-CM

## 2024-12-09 DIAGNOSIS — I27.22 WHO GROUP 2 PULMONARY ARTERIAL HYPERTENSION: ICD-10-CM

## 2024-12-09 DIAGNOSIS — I49.3 FREQUENT PVCS: Primary | ICD-10-CM

## 2024-12-09 DIAGNOSIS — I50.32 CHRONIC HEART FAILURE WITH PRESERVED EJECTION FRACTION (HFPEF): ICD-10-CM

## 2024-12-09 DIAGNOSIS — I10 HYPERTENSION, UNSPECIFIED TYPE: Primary | ICD-10-CM

## 2024-12-09 PROBLEM — I27.20 PULMONARY HYPERTENSION: Status: ACTIVE | Noted: 2024-06-04

## 2024-12-09 PROCEDURE — 99214 OFFICE O/P EST MOD 30 MIN: CPT | Mod: S$PBB,,, | Performed by: FAMILY MEDICINE

## 2024-12-09 PROCEDURE — G2211 COMPLEX E/M VISIT ADD ON: HCPCS | Mod: S$PBB,,, | Performed by: FAMILY MEDICINE

## 2024-12-09 PROCEDURE — 99213 OFFICE O/P EST LOW 20 MIN: CPT | Mod: PBBFAC,PO | Performed by: FAMILY MEDICINE

## 2024-12-09 PROCEDURE — 99214 OFFICE O/P EST MOD 30 MIN: CPT | Mod: 95,,, | Performed by: INTERNAL MEDICINE

## 2024-12-09 PROCEDURE — 99999 PR PBB SHADOW E&M-EST. PATIENT-LVL III: CPT | Mod: PBBFAC,,, | Performed by: FAMILY MEDICINE

## 2024-12-09 NOTE — PROGRESS NOTES
Cardiology Clinic Note      Patient: Moustapha Murray, 1955, [id number]  Primary Care Provider: Colton Johnson MD     Chief Complaint/Reason for Referral: abnormal CACS     Subjective:       Moustapha Murray is a 69 y.o. male who presents for follow up. He is not accompanied by his wife    He is doing okay overall. No chest discomfort, edema, orthopnea, PND, syncope, pathologic bleeding. Breathing improved after starting torsemide. Weight stable. No palpitations. BP at home in 120-130s systolic.     Focused Past History includes:  CAD:   Coronary angiogram and PCI 1/31/23 (RK): Severe segmental stenosis extending from proximal to mid LAD.  Successful IVUS guided PCI with overlapping 4.0 x 38 and 2.5 x 38 Synergy XD FERNANDO from ostial to mid LAD  Staged PCI 2/13/23: 80% stenosis in a large right posterior AV branch feeding a large PLB s/p single FERNANDO. Ostial RCA insignificant by IVUS  TTE 4/4/24: EF 55-60%.  Normal RV.  Moderate left atrial dilation.  Mild MR.  Mild-to-moderate TR.  PASP 36  Angiogram 4/21/24: patent stents. Nonobstructive elsewhere.   RHC 8/2024: mild to moderate postcapillary PH and elevated right and left filling pressures - started torsemide   PVCs;  7-day monitor 3/2024: PVC 31.4% with 73 runs (<5 beats). No AF.   48h Holter 5/3/24: 22.5% PVCs. HR    Dr. Pineda 6/5/24: switched to verapamil  Holter 9/2024: PVC burden 14.8%. HR 46-92  Seeing Jenna Loving and Aye for lymphadenopathy, pulmonary fibrosis and elevated light chains - now on nintedanib  HTN  GERD  TASIA starting Inspire   Bladder cancer treated with surgery and BCG; no systemic chemo or XRT - 2022  No AAA by US 1/2023  BLE varicose veins  Former smoker of 60 pack-years - quit in 2015  No history of stroke, DM  No family history of premature CAD    Review of Systems  Constitutional: negative for fevers, night sweats, and weight loss  Eyes: negative for visual disturbance, diplopia  Respiratory: negative  for cough, hemoptysis, sputum, and wheezing  Cardiovascular: see HPI  Gastrointestinal: negative for abdominal pain, bright red blood per rectum, change in bowel habits, dysphagia, melena, and reflux symptoms  Genitourinary:negative for dysuria, frequency, and hematuria  Hematologic/lymphatic: negative for bleeding, easy bruising, and lymphadenopathy  Musculoskeletal:negative for arthralgias, back pain, and myalgias  Neurological: negative for gait problems, paresthesia, speech problems, vertigo, and weakness  Behavioral/Psych: negative for excessive alcohol consumption, illegal drug usage, and sleep disturbance    -------------------------------------    Anticoagulant long-term use    Bladder cancer    Colon polyps    COPD (chronic obstructive pulmonary disease)    Coronary artery disease    CORONARY STENTSx3    DDD (degenerative disc disease), lumbar    Depression    Fatty liver    GERD (gastroesophageal reflux disease)    H/O motion sickness    Hypertension    Mediastinal lymphadenopathy    PONV (postoperative nausea and vomiting)    Pulmonary fibrosis    RLS (restless legs syndrome)    Sleep apnea, unspecified    can't use cpap     ----------------------------    Angiogram, coronary, with left heart catheterization    Procedure: Left Heart Cath;  Surgeon: Nitin Wheat MD;  Location: [facility] CATH;  Service: Cardiology;  Laterality: N/A;    Angiogram, coronary, with left heart catheterization    Procedure: Left Heart Cath;  Surgeon: Nitin Wheat MD;  Location: [facility] CATH;  Service: Cardiology;;    Appendectomy    Carpal tunnel release    Cervical fusion    Cholecystectomy    Circumcision    Colonoscopy    Procedure: COLONOSCOPY;  Surgeon: Braulio Best MD;  Location: [facility];  Service: Endoscopy;  Laterality: N/A; 3 colon polyps removed; biopsy: tubular adenoma and hyperplastic; repeat in 5 years for surveillance    Colonoscopy    Procedure: COLONOSCOPY;  Surgeon: Braulio Best MD;  Location: [facility];   Service: Gastroenterology;  Laterality: N/A;    Coronary angiography    Procedure: ANGIOGRAM, CORONARY ARTERY;  Surgeon: Nitin Wheat MD;  Location: [facility] [place] CATH;  Service: Cardiology;;    Coronary angioplasty with stent placement    Procedure: ANGIOPLASTY, CORONARY ARTERY, WITH STENT INSERTION;  Surgeon: Nitin Wheat MD;  Location: [facility] [place] CATH;  Service: Cardiology;  Laterality: N/A;    Cystoscopy w/ retrogrades    Procedure: CYSTOSCOPY, WITH RETROGRADE PYELOGRAM -WITH URETHRAL DILATION;  Surgeon: Greg Claudio MD;  Location: [facility] [place] OR;  Service: Urology;  Laterality: Bilateral;    Cystoscopy w/ ureteral stent placement    Procedure: CYSTOSCOPY, WITH URETERAL STENT INSERTION;  Surgeon: Greg Claudio MD;  Location: [facility] [place] OR;  Service: Urology;  Laterality: Right;    Endobronchial ultrasound    Procedure: ENDOBRONCHIAL ULTRASOUND (EBUS);  Surgeon: Umm Arellano MD;  Location: [facility] [place] OR;  Service: Pulmonary;  Laterality: Bilateral;    Endoscopic ultrasound of upper gastrointestinal tract    Procedure: ULTRASOUND, UPPER GI TRACT, ENDOSCOPIC;  Surgeon: Brad Ochoa MD;  Location: [facility] [place] ENDO;  Service: Endoscopy;  Laterality: Left;    Esophagogastroduodenoscopy    Procedure: EGD (ESOPHAGOGASTRODUODENOSCOPY);  Surgeon: Brad Ochoa MD;  Location: [facility] [place] ENDO;  Service: Endoscopy;  Laterality: N/A;    Insertion, neurostimulator, hypoglossal    Procedure: INSERTION,NEUROSTIMULATOR,HYPOGLOSSAL;  Surgeon: Hector Lindquist MD;  Location: [facility] OR;  Service: ENT;  Laterality: Right;    Instillation of urinary bladder    Procedure: INSTILLATION, BLADDER - Mitomycin;  Surgeon: Greg Claudio MD;  Location: [facility] [place] OR;  Service: Urology;  Laterality: N/A;    Ivus, coronary    Procedure: IVUS, Coronary;  Surgeon: Nitin Wheat MD;  Location: [facility] [place] CATH;  Service: Cardiology;;    Ivus, coronary    Procedure: IVUS, Coronary;  Surgeon: Nitin Wheat MD;  Location: [facility] [place] CATH;  Service: Cardiology;;    Percutaneous  coronary intervention, artery    Procedure: Percutaneous coronary intervention;  Surgeon: Nitin Wheat MD;  Location: [facility] CATH;  Service: Cardiology;;    Right heart catheterization    Procedure: Right heart cath;  Surgeon: Nitin Wheat MD;  Location: [facility] CATH;  Service: Cardiology;;    Sleep endoscopy, drug-induced    Procedure: SLEEP ENDOSCOPY,DRUG-INDUCED;  Surgeon: Hector Lindquist MD;  Location: [facility] OR;  Service: ENT;  Laterality: Bilateral;    Transforaminal epidural injection of steroid    Procedure: Injection,steroid,epidural,transforaminal approach L3-4, L4-5;  Surgeon: Chris Palencia MD;  Location: [facility] OR;  Service: Pain Management;  Laterality: Right;    Transforaminal epidural injection of steroid    Procedure: Injection,steroid,epidural,transforaminal approach;  Surgeon: Chris Palencia MD;  Location: [facility] OR;  Service: Pain Management;  Laterality: Right;  L3-4, L4-L5    Transforaminal epidural injection of steroid    Procedure: Injection,steroid,epidural,transforaminal approach l5-s1 and s1;  Surgeon: Ann Soto MD;  Location: [facility] OR;  Service: Anesthesiology;  Laterality: Left;    [place] tooth extraction        Family History   Problem Relation Name Age of Onset    Stroke Mother      Ulcerative colitis Mother      Kidney cancer Mother      Heart disease Father      Heart attack Father      Cancer Neg Hx      Colon cancer Neg Hx      Crohn's disease Neg Hx      Celiac disease Neg Hx       Social History     Tobacco Use    Smoking status: Former     Current packs/day: 0.00     Types: Cigarettes     Quit date: 10/16/2013     Years since quittin.1    Smokeless tobacco: Never   Substance Use Topics    Alcohol use: Yes     Alcohol/week: 2.0 standard drinks of alcohol     Types: 2 Cans of beer per week     Comment: couple beers weekly    Drug use: Not Currently       Current Outpatient Medications   Medication Sig Dispense Refill    albuterol (PROVENTIL/VENTOLIN HFA) 90 mcg/actuation inhaler Inhale 2  puffs into the lungs every 6 (six) hours as needed for Wheezing. Rescue 18 g 6    aspirin 81 MG Chew Take 1 tablet (81 mg total) by mouth once daily. 90 tablet 3    budesonide (PULMICORT) 0.5 mg/2 mL nebulizer solution Take 2 mLs (0.5 mg total) by nebulization 2 (two) times daily. Controller 120 mL 3    droNABinol (MARINOL) 2.5 MG capsule Take 1 capsule (2.5 mg total) by mouth 2 (two) times daily before meals. 60 capsule 3    EScitalopram oxalate (LEXAPRO) 10 MG tablet TAKE 1 TABLET BY MOUTH EVERY DAY 90 tablet 1    formoterol (PERFOROMIST) 20 mcg/2 mL Nebu Take 2 mLs (20 mcg total) by nebulization 2 (two) times a day. Controller 90 mL 3    ibuprofen (ADVIL,MOTRIN) 600 MG tablet Take 1 tablet (600 mg total) by mouth every 6 (six) hours as needed for Pain. 20 tablet 0    melatonin 5 mg Cap Take 5 mg by mouth nightly as needed. TAKE AS SCHEDULED      nintedanib (OFEV) 150 mg Cap Take 1 capsule (150 mg total) by mouth 2 (two) times a day. 60 capsule 6    ondansetron (ZOFRAN-ODT) 4 MG TbDL Take 1 tablet (4 mg total) by mouth every 6 (six) hours as needed (Nausea). 10 tablet 0    pantoprazole (PROTONIX) 40 MG tablet TAKE 1 TABLET BY MOUTH EVERY DAY 90 tablet 3    pregabalin (LYRICA) 75 MG capsule Take 1 capsule (75 mg total) by mouth every evening. 30 capsule 2    rosuvastatin (CRESTOR) 40 MG Tab TAKE 1 TABLET BY MOUTH EVERY DAY 90 tablet 3    torsemide (DEMADEX) 10 MG Tab Take 1 tablet (10 mg total) by mouth once daily. 30 tablet 11    traMADoL (ULTRAM) 50 mg tablet Take 1 tablet (50 mg total) by mouth every 24 hours as needed for Pain. 30 tablet 2    triamcinolone acetonide 0.1% (KENALOG) 0.1 % cream APPLY TOPICALLY 2 TIMES DAILY AS NEEDED. 45 g 5    verapamiL (CALAN-SR) 240 MG CR tablet Take 1 tablet (240 mg total) by mouth every evening. 30 tablet 11     Current Facility-Administered Medications   Medication Dose Route Frequency Provider Last Rate Last Admin    sodium chloride 0.9% flush 10 mL  10 mL Intravenous PRN  Nitin Wheat MD         Facility-Administered Medications Ordered in Other Visits   Medication Dose Route Frequency Provider Last Rate Last Admin    ondansetron injection 4 mg  4 mg Intravenous Daily PRN Gustavo Chu MD        sodium chloride 0.9% flush 10 mL  10 mL Intravenous PRN Braulio Best MD   5 mL at 10/23/24 1130        Objective:      Virtual visit      Lab Review   Lab Results   Component Value Date    WBC 8.43 10/10/2024    HGB 14.4 10/10/2024    HCT 43.6 10/10/2024    MCV 96 10/10/2024     10/10/2024         BMP  Lab Results   Component Value Date     10/10/2024    K 3.7 10/10/2024     10/10/2024    CO2 27 10/10/2024    BUN 14 10/10/2024    CREATININE 1.1 10/10/2024    CALCIUM 9.3 10/10/2024    ANIONGAP 8 10/10/2024    ESTGFRAFRICA >60 03/11/2022    EGFRNONAA >60 03/11/2022       Lab Results   Component Value Date    ALBUMIN 3.9 10/10/2024       Lab Results   Component Value Date    ALT 22 10/10/2024    AST 27 10/10/2024    ALKPHOS 131 10/10/2024    BILITOT 0.8 10/10/2024       Lab Results   Component Value Date    TSH 1.63 07/06/2011       Lab Results   Component Value Date    CHOL 119 (L) 06/14/2023    CHOL 219 (H) 01/05/2023    CHOL 190 12/11/2020     Lab Results   Component Value Date    HDL 53 06/14/2023    HDL 59 01/05/2023    HDL 49 12/11/2020     Lab Results   Component Value Date    LDLCALC 56.2 (L) 06/14/2023    LDLCALC 141.4 01/05/2023    LDLCALC 124.6 12/11/2020     Lab Results   Component Value Date    TRIG 49 06/14/2023    TRIG 93 01/05/2023    TRIG 82 12/11/2020     Lab Results   Component Value Date    CHOLHDL 44.5 06/14/2023    CHOLHDL 26.9 01/05/2023    CHOLHDL 25.8 12/11/2020      Assessment & Plan:      This is a 69 y.o. pleasant  male with revascularized CAD, mild-moderate postcapillary PH due to HFpEF, early ILD phenotype on nintedanib, asymptomatic frequent unifocal PVCs down to 14.8% from 31.4% with maximally tolerated verapamil.     1.  Frequent PVCs  Echo    Ambulatory referral/consult to Electrophysiology      2. S/P drug eluting coronary stent placement        3. WHO group 2 pulmonary arterial hypertension  Echo      4. Pulmonary fibrosis        5. Primary hypertension        6. Coronary artery disease involving native coronary artery of native heart without angina pectoris        7. Chronic heart failure with preserved ejection fraction (HFpEF)                     Stop clopidogrel. Continue aspirin 81 mg daily. DAPT 1  Continue rosuvastatin 40, verapamil 240, torsemide 10 once daily  Refer to Dr. Melton for EP follow-up (he would like to stay on [place])  TTE for follow-up on PA pressures and EF to help determine whether to pursue ablation or not    Emphasized the importance of modifying lifestyle related risk factors including limiting alcohol intake, exercise, diet most resembling a Mediterranean diet.    I appreciate the opportunity to participate in Moustapha Murray 's care today.  Please follow up with me in 4-6 months.      Nitin Wheat MD, [facility]  Interventional Cardiology/Structural Heart Disease  [facility]  Office: [phone]     Parts of this note were completed using voice recognition software. Please excuse any misspellings or syntax errors and reach out to me with questions.      The patient location is: parked on the side of the road   The chief complaint leading to consultation is:  Please see problem list above  Visit type: Virtual visit with synchronous audio and video  Total time spent with patient: 15 minutes   Each patient to whom he or she provides medical services by telemedicine is:  (1) informed of the relationship between the physician and patient and the respective role of any other health care provider with respect to management of the patient; and (2) notified that he or she may decline to receive medical services by telemedicine and may withdraw from such care at  any time.    Notes: See above

## 2024-12-09 NOTE — PROGRESS NOTES
Chief Complaint   Patient presents with    Preventative health care     Physical     HISTORY OF PRESENT ILLNESS: This is a 68-year-old white male presents today for annual physical    He will be having cataract surgery soon.    CAD s/p stents- taking plavix 75mg QD, crestor 40mg; following with cardiology  HTN - Denies HA, CP or dizziness. Home /80.  GERD -  He has uses Omeprazole 40mg daily with good control.   RLS - tolerating Lyrica with good control  Lumbar DDD - stable  Walking or elltical daily  Depression - mood stable Lexapro 10mg; tolerating this without side effects  Bladder cancer - following with urology every 3 months  Pulmonary fibrosis - stable; following with Dr. Arellano      Past Medical History:   Diagnosis Date    Anticoagulant long-term use     Bladder cancer 08/2020    Colon polyps     COPD (chronic obstructive pulmonary disease)     Coronary artery disease 01/31/2023    CORONARY STENTSx3    DDD (degenerative disc disease), lumbar     Depression     Fatty liver     GERD (gastroesophageal reflux disease)     H/O motion sickness     Hypertension     Mediastinal lymphadenopathy 07/2024    PONV (postoperative nausea and vomiting)     Pulmonary fibrosis     RLS (restless legs syndrome)     Sleep apnea, unspecified     can't use cpap       Past Surgical History:   Procedure Laterality Date    ANGIOGRAM, CORONARY, WITH LEFT HEART CATHETERIZATION N/A 1/31/2023    Procedure: Left Heart Cath;  Surgeon: Nitin Wehat MD;  Location: [facility] CATH;  Service: Cardiology;  Laterality: N/A;    ANGIOGRAM, CORONARY, WITH LEFT HEART CATHETERIZATION  4/17/2024    Procedure: Left Heart Cath;  Surgeon: Nitin Wheat MD;  Location: [facility] CATH;  Service: Cardiology;;    APPENDECTOMY      CARPAL TUNNEL RELEASE      CERVICAL FUSION      CHOLECYSTECTOMY      CIRCUMCISION      COLONOSCOPY N/A 3/16/2020    Procedure: COLONOSCOPY;  Surgeon: Braulio Best MD;  Location: [facility] ENDO;  Service: Endoscopy;  Laterality: N/A; 3  colon polyps removed; biopsy: tubular adenoma and hyperplastic; repeat in 5 years for surveillance    COLONOSCOPY N/A 3/4/2024    Procedure: COLONOSCOPY;  Surgeon: Braulio Best MD;  Location: [facility] ENDO;  Service: Gastroenterology;  Laterality: N/A;    CORONARY ANGIOGRAPHY  2/13/2023    Procedure: ANGIOGRAM, CORONARY ARTERY;  Surgeon: Nitin Wheat MD;  Location: [facility] CATH;  Service: Cardiology;;    CORONARY ANGIOPLASTY WITH STENT PLACEMENT N/A 1/31/2023    Procedure: ANGIOPLASTY, CORONARY ARTERY, WITH STENT INSERTION;  Surgeon: Nitin Wheat MD;  Location: [facility] CATH;  Service: Cardiology;  Laterality: N/A;    CYSTOSCOPY W/ RETROGRADES Bilateral 8/24/2020    Procedure: CYSTOSCOPY, WITH RETROGRADE PYELOGRAM -WITH URETHRAL DILATION;  Surgeon: Greg Claudio MD;  Location: [facility] OR;  Service: Urology;  Laterality: Bilateral;    CYSTOSCOPY W/ URETERAL STENT PLACEMENT Right 8/24/2020    Procedure: CYSTOSCOPY, WITH URETERAL STENT INSERTION;  Surgeon: Greg Claudio MD;  Location: [facility] OR;  Service: Urology;  Laterality: Right;    ENDOBRONCHIAL ULTRASOUND Bilateral 7/15/2024    Procedure: ENDOBRONCHIAL ULTRASOUND (EBUS);  Surgeon: Umm Arellano MD;  Location: [facility] OR;  Service: Pulmonary;  Laterality: Bilateral;    ENDOSCOPIC ULTRASOUND OF UPPER GASTROINTESTINAL TRACT Left 5/8/2024    Procedure: ULTRASOUND, UPPER GI TRACT, ENDOSCOPIC;  Surgeon: Brad Ochoa MD;  Location: [facility];  Service: Endoscopy;  Laterality: Left;    ESOPHAGOGASTRODUODENOSCOPY N/A 5/8/2024    Procedure: EGD (ESOPHAGOGASTRODUODENOSCOPY);  Surgeon: Brad Ochoa MD;  Location: [facility] ENDO;  Service: Endoscopy;  Laterality: N/A;    INSERTION, NEUROSTIMULATOR, HYPOGLOSSAL Right 11/22/2024    Procedure: INSERTION,NEUROSTIMULATOR,HYPOGLOSSAL;  Surgeon: Hector Lindquist MD;  Location: [facility] OR;  Service: ENT;  Laterality: Right;    INSTILLATION OF URINARY BLADDER N/A 8/24/2020    Procedure: INSTILLATION, BLADDER -  Mitomycin;  Surgeon: Greg Claudio MD;  Location: [facility] OR;  Service: Urology;  Laterality: N/A;    IVUS, CORONARY  2023    Procedure: IVUS, Coronary;  Surgeon: Nitin Wheat MD;  Location: [facility] CATH;  Service: Cardiology;;    IVUS, CORONARY  2023    Procedure: IVUS, Coronary;  Surgeon: Nitin Wheat MD;  Location: [facility] CATH;  Service: Cardiology;;    PERCUTANEOUS CORONARY INTERVENTION, ARTERY  2023    Procedure: Percutaneous coronary intervention;  Surgeon: Nitin Wheat MD;  Location: [facility] CATH;  Service: Cardiology;;    RIGHT HEART CATHETERIZATION  2024    Procedure: Right heart cath;  Surgeon: Nitin Wheat MD;  Location: [facility] CATH;  Service: Cardiology;;    SLEEP ENDOSCOPY, DRUG-INDUCED Bilateral 10/23/2024    Procedure: SLEEP ENDOSCOPY,DRUG-INDUCED;  Surgeon: Hector Lindquist MD;  Location: [facility] OR;  Service: ENT;  Laterality: Bilateral;    TRANSFORAMINAL EPIDURAL INJECTION OF STEROID Right 2019    Procedure: Injection,steroid,epidural,transforaminal approach L3-4, L4-5;  Surgeon: Chris Palencia MD;  Location: [facility] OR;  Service: Pain Management;  Laterality: Right;    TRANSFORAMINAL EPIDURAL INJECTION OF STEROID Right 2019    Procedure: Injection,steroid,epidural,transforaminal approach;  Surgeon: Chris Palencia MD;  Location: [facility] OR;  Service: Pain Management;  Laterality: Right;  L3-4, L4-L5    TRANSFORAMINAL EPIDURAL INJECTION OF STEROID Left 2024    Procedure: Injection,steroid,epidural,transforaminal approach l5-s1 and s1;  Surgeon: Ann Soto MD;  Location: [facility] OR;  Service: Anesthesiology;  Laterality: Left;    WISDOM TOOTH EXTRACTION       FAMILY HISTORY: cancer in his grandparent.       Social History     Socioeconomic History    Marital status:    Occupational History    Occupation:    Tobacco Use    Smoking status: Former     Current packs/day: 0.00     Types: Cigarettes     Quit date: 10/16/2013     Years since quittin.1    Smokeless  tobacco: Never   Substance and Sexual Activity    Alcohol use: Yes     Alcohol/week: 2.0 standard drinks of alcohol     Types: 2 Cans of beer per week     Comment: couple beers weekly    Drug use: Not Currently     Social Drivers of Health     Financial Resource Strain: Low Risk  (12/8/2024)    Overall Financial Resource Strain (CARDIA)     Difficulty of Paying Living Expenses: Not hard at all   Food Insecurity: No Food Insecurity (12/8/2024)    Hunger Vital Sign     Worried About Running Out of Food in the Last Year: Never true     Ran Out of Food in the Last Year: Never true   Transportation Needs: No Transportation Needs (12/4/2023)    PRAPARE - Transportation     Lack of Transportation (Medical): No     Lack of Transportation (Non-Medical): No   Physical Activity: Unknown (12/8/2024)    Exercise Vital Sign     Days of Exercise per Week: 0 days   Stress: Stress Concern Present (12/8/2024)    [employer] - Occupational Stress Questionnaire     Feeling of Stress : To some extent   Housing Stability: Low Risk  (12/4/2023)    Housing Stability Vital Sign     Unable to Pay for Housing in the Last Year: No     Number of Places Lived in the Last Year: 1     Unstable Housing in the Last Year: No         Current Outpatient Medications on File Prior to Visit   Medication Sig Dispense Refill    albuterol (PROVENTIL/VENTOLIN HFA) 90 mcg/actuation inhaler Inhale 2 puffs into the lungs every 6 (six) hours as needed for Wheezing. Rescue 18 g 6    aspirin 81 MG Chew Take 1 tablet (81 mg total) by mouth once daily. 90 tablet 3    budesonide (PULMICORT) 0.5 mg/2 mL nebulizer solution Take 2 mLs (0.5 mg total) by nebulization 2 (two) times daily. Controller 120 mL 3    clopidogreL (PLAVIX) 75 mg tablet TAKE 1 TABLET BY MOUTH EVERY DAY 90 tablet 3    droNABinol (MARINOL) 2.5 MG capsule Take 1 capsule (2.5 mg total) by mouth 2 (two) times daily before meals. 60 capsule 3    EScitalopram oxalate (LEXAPRO)  10 MG tablet TAKE 1 TABLET BY MOUTH EVERY DAY 90 tablet 1    formoterol (PERFOROMIST) 20 mcg/2 mL Nebu Take 2 mLs (20 mcg total) by nebulization 2 (two) times a day. Controller 90 mL 3    ibuprofen (ADVIL,MOTRIN) 600 MG tablet Take 1 tablet (600 mg total) by mouth every 6 (six) hours as needed for Pain. 20 tablet 0    melatonin 5 mg Cap Take 5 mg by mouth nightly as needed. TAKE AS SCHEDULED      nintedanib (OFEV) 150 mg Cap Take 1 capsule (150 mg total) by mouth 2 (two) times a day. 60 capsule 6    ondansetron (ZOFRAN-ODT) 4 MG TbDL Take 1 tablet (4 mg total) by mouth every 6 (six) hours as needed (Nausea). 10 tablet 0    pantoprazole (PROTONIX) 40 MG tablet TAKE 1 TABLET BY MOUTH EVERY DAY 90 tablet 3    pregabalin (LYRICA) 75 MG capsule Take 1 capsule (75 mg total) by mouth every evening. 30 capsule 2    rosuvastatin (CRESTOR) 40 MG Tab TAKE 1 TABLET BY MOUTH EVERY DAY 90 tablet 3    torsemide (DEMADEX) 10 MG Tab Take 1 tablet (10 mg total) by mouth once daily. 30 tablet 11    traMADoL (ULTRAM) 50 mg tablet Take 1 tablet (50 mg total) by mouth every 24 hours as needed for Pain. 30 tablet 2    triamcinolone acetonide 0.1% (KENALOG) 0.1 % cream APPLY TOPICALLY 2 TIMES DAILY AS NEEDED. 45 g 5    verapamiL (CALAN-SR) 240 MG CR tablet Take 1 tablet (240 mg total) by mouth every evening. 30 tablet 11     Current Facility-Administered Medications on File Prior to Visit   Medication Dose Route Frequency Provider Last Rate Last Admin    ondansetron injection 4 mg  4 mg Intravenous Daily PRN Gustavo Chu MD        sodium chloride 0.9% flush 10 mL  10 mL Intravenous PRN Braulio Best MD   5 mL at 10/23/24 1130    sodium chloride 0.9% flush 10 mL  10 mL Intravenous PRN Nitin Wheat MD             REVIEW OF SYSTEMS:   GENERAL: No fever, chills, fatigability or weight changes.   SKIN/BREASTS: No rashes, sores, itching or changes in color or texture of skin. No changes in moles. No pain, swelling, tenderness,  "lumps, bleeding or discharge from nipples.   HEAD: No headaches or recent head trauma.   EYES: Visual acuity fine. Denies blurriness, tearing, itching, photophobia, diplopia, or visual changes.   EARS: Denies ear pain, discharge, tinnitus or vertigo. Denies hearing loss.   NOSE: No loss of smell, epistaxis, postnasal drip, discharge, obstruction, or sneezing.   MOUTH & THROAT: No hoarseness, change in voice, swallowing difficulty. No excessive gum bleeding.   HEMATOLOGICAL/NODES: Denies swollen glands. No bleeding or bruising.   CHEST: No CHANDRA, cyanosis, wheezing, cough or sputum production.   CARDIOVASCULAR: Denies chest pain, orthopnea, or palpitations.   GI/ABDOMEN: Appetite fine. No weight loss. Denies nausea, vomiting, diarrhea, constipation, abdominal pain, hematemesis or blood in stool.   URINARY: No dysuria,hematuria, nocturia, incontinence, flank pain, urgency, or urinary difficulty.   PERIPHERAL VASCULAR: No claudication, cold intolerance or cyanosis.   MUSCULOSKELETAL: Some intermittent right wrist pain, low back pain.  NEUROLOGIC: No history of seizures, paralysis, alteration of gait or coordination. Some intermittent numbness in the left 2nd and 3rd fingers with driving that impoves with changes in position.  ENDOCRINE: Sexual maturation. Denies weight change, heat/cold intolerance, hair loss or gain, loss of libido, polyuria, polydipsia, polyphagia, pruritus, unexplained flushing, or excessive sweating.   PSYCH: No crying spells. Denies anxiety symptoms.     IMMUNIZATIONS: Up-to-date.    PHYSICAL EXAM:     /72   Pulse 74   Resp 18   Ht 5' 10" (1.778 m)   Wt 89.7 kg (197 lb 12 oz)   SpO2 97%   BMI 28.37 kg/m²   GENERAL: This is a healthy-appearing 68-year-old white male, sitting   upright, in no apparent distress. Alert and oriented x4.   SKIN: Reveals erythematous rash over right breast. No suspicious neoplasms.   NEUROLOGICALLY: Cranial nerves 2-12 are grossly intact with no sensory or " motor deficits.   PSYCHOLOGICALLY: Mood and affect are normal. Insight and judgment are   excellent. Appearance is well groomed.   Pupils are equal, round and reactive to light. Extraocular movements are intact.   NECK: Supple. There is no lymphadenopathy, thyromegaly or JVD.   CHEST: Clear to auscultation bilaterally with good respiratory movement.   CARDIOVASCULAR: S1, S2. Regular rate and rhythm. No murmurs, rubs or gallops.   EXTREMITIES: Show no cyanosis, clubbing or edema with 2+ distal pulses.   MUSCULOSKELETAL: Reveals full range of motion in all extremities.    Right forearm: FAROM; pain with wrist pronation and supination    Results for orders placed or performed in visit on 10/10/24   Anti-DNA Ab, Double-Stranded    Collection Time: 10/10/24 12:20 PM   Result Value Ref Range    ds DNA Ab Negative 1:10 Negative 1:10   C3 Complement    Collection Time: 10/10/24 12:20 PM   Result Value Ref Range    Complement (C-3) 144 50 - 180 mg/dL   C4 Complement    Collection Time: 10/10/24 12:20 PM   Result Value Ref Range    Complement (C-4) 27 11 - 44 mg/dL   CBC Auto Differential    Collection Time: 10/10/24 12:20 PM   Result Value Ref Range    WBC 8.43 3.90 - 12.70 K/uL    RBC 4.54 (L) 4.60 - 6.20 M/uL    Hemoglobin 14.4 14.0 - 18.0 g/dL    Hematocrit 43.6 40.0 - 54.0 %    MCV 96 82 - 98 fL    MCH 31.7 (H) 27.0 - 31.0 pg    MCHC 33.0 32.0 - 36.0 g/dL    RDW 15.0 (H) 11.5 - 14.5 %    Platelets 237 150 - 450 K/uL    MPV 11.1 9.2 - 12.9 fL    Immature Granulocytes 0.7 (H) 0.0 - 0.5 %    Gran # (ANC) 5.3 1.8 - 7.7 K/uL    Immature Grans (Abs) 0.06 (H) 0.00 - 0.04 K/uL    Lymph # 1.6 1.0 - 4.8 K/uL    Mono # 1.0 0.3 - 1.0 K/uL    Eos # 0.5 0.0 - 0.5 K/uL    Baso # 0.12 0.00 - 0.20 K/uL    nRBC 0 0 /100 WBC    Gran % 62.5 38.0 - 73.0 %    Lymph % 18.6 18.0 - 48.0 %    Mono % 11.3 4.0 - 15.0 %    Eosinophil % 5.5 0.0 - 8.0 %    Basophil % 1.4 0.0 - 1.9 %    Differential Method Automated    Sedimentation rate    Collection  Time: 10/10/24 12:20 PM   Result Value Ref Range    Sed Rate 12 0 - 23 mm/Hr   C-Reactive Protein    Collection Time: 10/10/24 12:20 PM   Result Value Ref Range    CRP 1.6 0.0 - 8.2 mg/L   Comprehensive Metabolic Panel    Collection Time: 10/10/24 12:20 PM   Result Value Ref Range    Sodium 141 136 - 145 mmol/L    Potassium 3.7 3.5 - 5.1 mmol/L    Chloride 106 95 - 110 mmol/L    CO2 27 23 - 29 mmol/L    Glucose 77 70 - 110 mg/dL    BUN 14 8 - 23 mg/dL    Creatinine 1.1 0.5 - 1.4 mg/dL    Calcium 9.3 8.7 - 10.5 mg/dL    Total Protein 7.1 6.0 - 8.4 g/dL    Albumin 3.9 3.5 - 5.2 g/dL    Total Bilirubin 0.8 0.1 - 1.0 mg/dL    Alkaline Phosphatase 131 55 - 135 U/L    AST 27 10 - 40 U/L    ALT 22 10 - 44 U/L    eGFR >60.0 >60 mL/min/1.73 m^2    Anion Gap 8 8 - 16 mmol/L   CK    Collection Time: 10/10/24 12:20 PM   Result Value Ref Range    CPK 74 20 - 200 U/L   Aldolase    Collection Time: 10/10/24 12:20 PM   Result Value Ref Range    Aldolase 3.5 1.2 - 7.6 U/L   Alkaline Phosphatase, Isoenzymes    Collection Time: 10/10/24 12:20 PM   Result Value Ref Range    Alkaline Phosphatase, Total 119 35 - 144 U/L    Bone, Alk Phos 25 (L) 28 - 66 %    Liver, Alk Phos 53 25 - 69 %    Intestine, Alk Phos 22 1 - 24 %    PLACENTAL ISOENZYME 0 0 %    Macrohepatic Isoenzyme Test Not Performed     Alkaline Phosphatase Isoenzyme Interpretation Test Not Performed    Vitamin D    Collection Time: 10/10/24 12:20 PM   Result Value Ref Range    Vit D, 25-Hydroxy 32 30 - 96 ng/mL   Protein/Creatinine Ratio, Urine    Collection Time: 10/10/24 12:27 PM   Result Value Ref Range    Protein, Urine Random 7 0 - 15 mg/dL    Creatinine, Urine 55.0 23.0 - 375.0 mg/dL    Prot/Creat Ratio, Urine 0.13 0.00 - 0.20   Urinalysis    Collection Time: 10/10/24 12:27 PM   Result Value Ref Range    Specimen UA Urine, Clean Catch     Color, UA Yellow Yellow, Straw, Kiley    Appearance, UA Clear Clear    pH, UA 6.0 5.0 - 8.0    Specific Gravity, UA 1.025 1.005 -  1.030    Protein, UA Negative Negative    Glucose, UA Negative Negative    Ketones, UA Negative Negative    Bilirubin (UA) Negative Negative    Occult Blood UA Negative Negative    Nitrite, UA Negative Negative    Leukocytes, UA Negative Negative     ASSESSMENT/PLAN:     Hypertension, unspecified type  -     CBC Auto Differential; Future; Expected date: 01/08/2025  -     Comprehensive Metabolic Panel; Future; Expected date: 01/08/2025    RLS (restless legs syndrome)    Depression, unspecified depression type    Coronary artery disease involving native coronary artery of native heart without angina pectoris  -     Lipid Panel; Future; Expected date: 01/08/2025    Prostate cancer screening  -     PSA, Screening; Future; Expected date: 01/08/2025    Cleared to proceed with cataract surgery as planned  Labs soon  continue verapamil  continue Lexapro 10mg  Continue omeprazole  Continue f/u with present specialists  Continue home BP monitoring   Discussed healthy lifestyle measures including maintenance of a healthy weight, increasing fruits and vegetables in the diet.   F/u 12 months    Visit today included increased complexity associated with the care of the episodic problems listed above addressed and managing the longitudinal care of the patient due to the serious and/or complex managed problem(s) listed above.

## 2024-12-10 DIAGNOSIS — G25.81 RESTLESS LEGS SYNDROME (RLS): ICD-10-CM

## 2024-12-10 RX ORDER — PREGABALIN 75 MG/1
75 CAPSULE ORAL NIGHTLY
Qty: 30 CAPSULE | Refills: 2 | Status: SHIPPED | OUTPATIENT
Start: 2024-12-10 | End: 2025-03-10

## 2024-12-14 NOTE — PROGRESS NOTES
SUBJECTIVE:    Patient ID:  Moustapha Murray is a 69 y.o. male who presents for evaluation of PVCs      Medical history:  PVCs  CAD s/p PCI (most recently 2/2023)  HTN  TASIA on [employer]  Pulmonary fibrosis (moderate restriction with severe reduction DLCO)  Elevated free light chains  Mediastinal lymphadenopathy  COPD  Bladder CA    Cardiologist: Dr. Wheat  Seen previously by Dr. Pineda    Has been reported to have low HR's, ie on recent colonoscopy.  Notes occasional chest pain over the last couple of months. Notes fatigue since initial CAD. Denies syncope.  3/7/24 PVC burden 31.4%.      Toprol 25 mg daily added.     48 hr holter 5/13/24 22.5% burden    LHC with PCI LAD 1/31/23 and staged PCI 2/13/23 to large right posterior AV branch  Most recent LHC 4/2024 with patent stents. No PCI performed.    RHC 8/24: elevated right and left filling pressures.     TTE 4/4/24: LVEF 55-60%. No significant vavlulopathy.     12/14/2024  Started on verapamil 6/24 with reduction PVCs on most recent monitor. Holter 15% PVC burden  Overall he is feeling well.  Reports improved symptoms with the verapamil.    ECGs with PVCs reviewed which appears to start around 3/2024:  6/5/24: LS axis, RB + in precordium with QR in V1.   4/17/24: LS axis, RB with reverse rebound transition (+V2, -V3) which eventually transitions + at V5  4/4/24: LS, LB with reverse rebound transition and -V3-V6.  3/7/24: LS, RB with V3 transition  3/4/24: LS, RB with V3 transition    I personally reviewed the ECG/telemetry strip today. My interpretation is normal sinus bradycardia at 57 beats per minute without PVCs including the rhythm strip.   milliseconds    Past Medical History:   Diagnosis Date    Anticoagulant long-term use     Bladder cancer 08/2020    Colon polyps     COPD (chronic obstructive pulmonary disease)     Coronary artery disease 01/31/2023    CORONARY STENTSx3    DDD (degenerative disc disease), lumbar     Depression     Fatty liver      GERD (gastroesophageal reflux disease)     H/O motion sickness     Hypertension     Mediastinal lymphadenopathy 07/2024    PONV (postoperative nausea and vomiting)     Pulmonary fibrosis     RLS (restless legs syndrome)     Sleep apnea, unspecified     can't use cpap       Past Surgical History:   Procedure Laterality Date    ANGIOGRAM, CORONARY, WITH LEFT HEART CATHETERIZATION N/A 1/31/2023    Procedure: Left Heart Cath;  Surgeon: iNtin Wheat MD;  Location: [facility] CATH;  Service: Cardiology;  Laterality: N/A;    ANGIOGRAM, CORONARY, WITH LEFT HEART CATHETERIZATION  4/17/2024    Procedure: Left Heart Cath;  Surgeon: Nitin Wheat MD;  Location: [facility] CATH;  Service: Cardiology;;    APPENDECTOMY      CARPAL TUNNEL RELEASE      CERVICAL FUSION      CHOLECYSTECTOMY      CIRCUMCISION      COLONOSCOPY N/A 3/16/2020    Procedure: COLONOSCOPY;  Surgeon: Braulio Best MD;  Location: [facility];  Service: Endoscopy;  Laterality: N/A; 3 colon polyps removed; biopsy: tubular adenoma and hyperplastic; repeat in 5 years for surveillance    COLONOSCOPY N/A 3/4/2024    Procedure: COLONOSCOPY;  Surgeon: Braulio Best MD;  Location: [facility];  Service: Gastroenterology;  Laterality: N/A;    CORONARY ANGIOGRAPHY  2/13/2023    Procedure: ANGIOGRAM, CORONARY ARTERY;  Surgeon: Nitin Wheat MD;  Location: [facility] CATH;  Service: Cardiology;;    CORONARY ANGIOPLASTY WITH STENT PLACEMENT N/A 1/31/2023    Procedure: ANGIOPLASTY, CORONARY ARTERY, WITH STENT INSERTION;  Surgeon: Nitin Wheat MD;  Location: [facility] CATH;  Service: Cardiology;  Laterality: N/A;    CYSTOSCOPY W/ RETROGRADES Bilateral 8/24/2020    Procedure: CYSTOSCOPY, WITH RETROGRADE PYELOGRAM -WITH URETHRAL DILATION;  Surgeon: Greg Claudio MD;  Location: [facility] OR;  Service: Urology;  Laterality: Bilateral;    CYSTOSCOPY W/ URETERAL STENT PLACEMENT Right 8/24/2020    Procedure: CYSTOSCOPY, WITH URETERAL STENT INSERTION;  Surgeon: Greg Claudio MD;  Location: [facility]  OR;  Service: Urology;  Laterality: Right;    ENDOBRONCHIAL ULTRASOUND Bilateral 7/15/2024    Procedure: ENDOBRONCHIAL ULTRASOUND (EBUS);  Surgeon: Umm Arellano MD;  Location: [facility] OR;  Service: Pulmonary;  Laterality: Bilateral;    ENDOSCOPIC ULTRASOUND OF UPPER GASTROINTESTINAL TRACT Left 5/8/2024    Procedure: ULTRASOUND, UPPER GI TRACT, ENDOSCOPIC;  Surgeon: Brad Ochoa MD;  Location: [facility] ENDO;  Service: Endoscopy;  Laterality: Left;    ESOPHAGOGASTRODUODENOSCOPY N/A 5/8/2024    Procedure: EGD (ESOPHAGOGASTRODUODENOSCOPY);  Surgeon: Brad Ochoa MD;  Location: [facility] ENDO;  Service: Endoscopy;  Laterality: N/A;    INSERTION, NEUROSTIMULATOR, HYPOGLOSSAL Right 11/22/2024    Procedure: INSERTION,NEUROSTIMULATOR,HYPOGLOSSAL;  Surgeon: Hector Lindquist MD;  Location: [facility] OR;  Service: ENT;  Laterality: Right;    INSTILLATION OF URINARY BLADDER N/A 8/24/2020    Procedure: INSTILLATION, BLADDER - Mitomycin;  Surgeon: Greg Claudio MD;  Location: [facility] OR;  Service: Urology;  Laterality: N/A;    IVUS, CORONARY  1/31/2023    Procedure: IVUS, Coronary;  Surgeon: Nitin Wheat MD;  Location: [facility] CATH;  Service: Cardiology;;    IVUS, CORONARY  2/13/2023    Procedure: IVUS, Coronary;  Surgeon: Nitin Wheat MD;  Location: [facility] CATH;  Service: Cardiology;;    PERCUTANEOUS CORONARY INTERVENTION, ARTERY  2/13/2023    Procedure: Percutaneous coronary intervention;  Surgeon: Nitin Wheat MD;  Location: [facility] CATH;  Service: Cardiology;;    RIGHT HEART CATHETERIZATION  8/21/2024    Procedure: Right heart cath;  Surgeon: Nitin Wheat MD;  Location: [facility] CATH;  Service: Cardiology;;    SLEEP ENDOSCOPY, DRUG-INDUCED Bilateral 10/23/2024    Procedure: SLEEP ENDOSCOPY,DRUG-INDUCED;  Surgeon: Hector Lindquist MD;  Location: [facility] OR;  Service: ENT;  Laterality: Bilateral;    TRANSFORAMINAL EPIDURAL INJECTION OF STEROID Right 7/17/2019    Procedure: Injection,steroid,epidural,transforaminal approach L3-4, L4-5;   Surgeon: Chris Palencia MD;  Location: [facility] OR;  Service: Pain Management;  Laterality: Right;    TRANSFORAMINAL EPIDURAL INJECTION OF STEROID Right 2019    Procedure: Injection,steroid,epidural,transforaminal approach;  Surgeon: Chris Palencia MD;  Location: [facility] OR;  Service: Pain Management;  Laterality: Right;  L3-4, L4-L5    TRANSFORAMINAL EPIDURAL INJECTION OF STEROID Left 2024    Procedure: Injection,steroid,epidural,transforaminal approach l5-s1 and s1;  Surgeon: Ann Soto MD;  Location: [facility] OR;  Service: Anesthesiology;  Laterality: Left;    WISDOM TOOTH EXTRACTION         Family History   Problem Relation Name Age of Onset    Stroke Mother      Ulcerative colitis Mother      Kidney cancer Mother      Heart disease Father      Heart attack Father      Cancer Neg Hx      Colon cancer Neg Hx      Crohn's disease Neg Hx      Celiac disease Neg Hx         Social History     Socioeconomic History    Marital status:    Occupational History    Occupation: [employer]   Tobacco Use    Smoking status: Former     Current packs/day: 0.00     Types: Cigarettes     Quit date: 10/16/2013     Years since quittin.1    Smokeless tobacco: Never   Substance and Sexual Activity    Alcohol use: Yes     Alcohol/week: 2.0 standard drinks of alcohol     Types: 2 Cans of beer per week     Comment: couple beers weekly    Drug use: Not Currently     Social Drivers of Health     Financial Resource Strain: Low Risk  (2024)    Overall Financial Resource Strain (CARDIA)     Difficulty of Paying Living Expenses: Not hard at all   Food Insecurity: No Food Insecurity (2024)    Hunger Vital Sign     Worried About Running Out of Food in the Last Year: Never true     Ran Out of Food in the Last Year: Never true   Transportation Needs: No Transportation Needs (2023)    PRAPARE - Transportation     Lack of Transportation (Medical): No     Lack of Transportation (Non-Medical): No   Physical Activity:  Unknown (12/8/2024)    Exercise Vital Sign     Days of Exercise per Week: 0 days   Stress: Stress Concern Present (12/8/2024)    [employer] - Occupational Stress Questionnaire     Feeling of Stress : To some extent   Housing Stability: Low Risk  (12/4/2023)    Housing Stability Vital Sign     Unable to Pay for Housing in the Last Year: No     Number of Places Lived in the Last Year: 1     Unstable Housing in the Last Year: No       Review of patient's allergies indicates:   Allergen Reactions    Sulfa (sulfonamide antibiotics) Anaphylaxis       Review of Systems   Constitutional: Negative for chills and fever.   HENT:  Negative for congestion and hearing loss.    Eyes:  Negative for blurred vision and double vision.   Cardiovascular:         See HPI   Respiratory:  Negative for cough, hemoptysis and shortness of breath.    Endocrine: Negative for cold intolerance and heat intolerance.   Musculoskeletal:  Negative for joint pain and joint swelling.   Gastrointestinal:  Negative for abdominal pain, nausea and vomiting.   Genitourinary:  Negative for dysuria and hematuria.   Neurological:  Negative for focal weakness and headaches.   Psychiatric/Behavioral:  Negative for altered mental status.    All other systems reviewed and are negative.           OBJECTIVE:     Vitals:    12/16/24 1352   BP: 104/66   Pulse: (!) 58         BP Readings from Last 5 Encounters:   12/09/24 128/72   11/22/24 (!) 187/83   10/28/24 90/60   10/23/24 93/60   10/10/24 120/61        Physical Exam  Vitals and nursing note reviewed.   Constitutional:       General: He is not in acute distress.     Appearance: He is well-developed. He is not diaphoretic.   HENT:      Head: Normocephalic and atraumatic.   Eyes:      General: No scleral icterus.        Right eye: No discharge.         Left eye: No discharge.   Cardiovascular:      Rate and Rhythm: Normal rate and regular rhythm. No extrasystoles are present.      Pulses: Normal pulses and intact distal pulses.           Radial pulses are 2+ on the right side and 2+ on the left side.      Heart sounds: Normal heart sounds, S1 normal and S2 normal. Heart sounds not distant. No opening snap. No murmur heard.     No friction rub. No gallop. No S3 or S4 sounds.   Pulmonary:      Effort: Pulmonary effort is normal. No respiratory distress.      Breath sounds: No wheezing or rales.   Abdominal:      General: Bowel sounds are normal. There is no distension.      Palpations: Abdomen is soft.      Tenderness: There is no abdominal tenderness.   Musculoskeletal:         General: No deformity. Normal range of motion.      Cervical back: Normal range of motion and neck supple.   Skin:     General: Skin is warm and dry.      Findings: No erythema or rash.   Neurological:      Mental Status: He is alert.             Current Outpatient Medications   Medication Instructions    albuterol (PROVENTIL/VENTOLIN HFA) 90 mcg/actuation inhaler 2 puffs, Inhalation, Every 6 hours PRN, Rescue    aspirin 81 mg, Oral, Daily    budesonide (PULMICORT) 0.5 mg, Nebulization, 2 times daily, Controller    droNABinol (MARINOL) 2.5 mg, Oral, 2 times daily before meals    EScitalopram oxalate (LEXAPRO) 10 mg, Oral    formoterol (PERFOROMIST) 20 mcg, Nebulization, 2 times daily, Controller    ibuprofen (ADVIL,MOTRIN) 600 mg, Oral, Every 6 hours PRN    melatonin 5 mg, Nightly PRN    OFEV 150 mg, Oral, 2 times daily    ondansetron (ZOFRAN-ODT) 4 mg, Oral, Every 6 hours PRN    pantoprazole (PROTONIX) 40 mg, Oral    pregabalin (LYRICA) 75 mg, Oral, Nightly    rosuvastatin (CRESTOR) 40 mg, Oral    torsemide (DEMADEX) 10 mg, Oral, Daily    traMADoL (ULTRAM) 50 mg, Oral, Every 24 hours as needed    triamcinolone acetonide 0.1% (KENALOG) 0.1 % cream APPLY TOPICALLY 2 TIMES DAILY AS NEEDED.    verapamiL (CALAN-SR) 240 mg, Oral, Nightly       Lipid Panel:   Lab Results   Component Value Date    CHOL 119 (L) 06/14/2023     HDL 53 06/14/2023    LDLCALC 56.2 (L) 06/14/2023    TRIG 49 06/14/2023    CHOLHDL 44.5 06/14/2023         The ASCVD Risk score (Elier DESIR, et al., 2019) failed to calculate for the following reasons:    The valid total cholesterol range is 130 to 320 mg/dL    Most Recent EKG Results  Results for orders placed or performed during the hospital encounter of 08/21/24   EKG 12-lead    Collection Time: 08/21/24 11:17 AM   Result Value Ref Range    QRS Duration 94 ms    [facility] QTC Calculation 450 ms    Narrative    Test Reason : I10,    Vent. Rate : 048 BPM     Atrial Rate : 048 BPM     P-R Int : 174 ms          QRS Dur : 094 ms      QT Int : 504 ms       P-R-T Axes : 057 022 028 degrees     QTc Int : 450 ms    Sinus bradycardia with sinus arrhythmia  Otherwise normal ECG  When compared with ECG of 05-JUN-2024 10:47,  Premature ventricular complexes are no longer Present  Vent. rate has decreased BY  26 BPM  Confirmed by Babar CISSE, Jose De Jesus SHERIDAN ([id number]) on 8/21/2024 2:54:29 PM    Referred By: Nitin Wheat MD           Confirmed By:Jose De Jesus Eckert MD       Most Recent Echocardiogram Results  Results for orders placed during the hospital encounter of 04/04/24    Echo    Interpretation Summary    Left Ventricle: The left ventricle is normal in size. Normal wall thickness. There is normal systolic function with a visually estimated ejection fraction of 55 - 60%.    Right Ventricle: Normal right ventricular cavity size. Systolic function is normal.    Left Atrium: Left atrium is moderately dilated.    Right Atrium: Right atrium is dilated.    Aortic Valve: The aortic valve is a trileaflet valve. There is aortic valve sclerosis.    Mitral Valve: There is mild regurgitation.    Tricuspid Valve: There is mild to moderate regurgitation.    Pulmonary Artery: There is borderline elevated pulmonary hypertension. The estimated pulmonary artery systolic pressure is 36 mmHg.    IVC/SVC: Normal venous pressure at 3 mmHg.      Most Recent Nuclear  Stress Test Results  Results for orders placed during the hospital encounter of 04/04/24    Nuclear Stress - Cardiology Interpreted    Interpretation Summary    The patient exercised for 4 minutes 35 seconds on a high ramp protocol, corresponding to a functional capacity of 7.0 METS, achieving a peak heart rate of 134 bpm, which is 88 % of the age predicted maximum heart rate. Their exercise capacity was average.    Abnormal myocardial perfusion scan.    There is a moderate intensity, moderate to large sized, reversible perfusion abnormality that is consistent with ischemia in the basal to apical anterior wall(s) in the typical distribution of the LAD territory.    There are no other significant perfusion abnormalities.    The gated perfusion images showed an ejection fraction of 62% post stress.    LV cavity size is normal at rest and mildly enlarged at stress.    The ECG portion of the study is negative for ischemia.    Stress ECG: During rest and stress, frequent PVCs are noted including dimorphic couplets.    This is a high risk study due to size of defect and ischemic dilation.      Most Recent Cardiac PET Stress Test Results  No results found for this or any previous visit.      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  Mean PA 30, PVR 1.6 woods, PA wedge 22  Low normal cardiac index 2.35    Recommendations:  Start torsemide 10 mg once daily  BMP in 1 week  Risk factor modification  Follow-up as currently scheduled with Dr. Arellano and myself          Description of procedure:  Access was obtained in the right basilic vein by exchanging a peripheral IV.  There was difficulty advancing through the subclavian area but was traversed successfully with a   200 wire.    See details below.      Hemostasis:  Manual pressure        Complications:  None  Estimated blood loss:  Minimal      The patient tolerated the procedure well and left the cath lab in stable condition.      Nitin Wheat MD, [facility]  Interventional Cardiology/Structural Heart Disease  [facility]  Office: [phone]    The clinically important portion of this report has been dictated in the section above. Our Epic reporting system does not allow us to provide a clinically meaningful report without this dictation. Please beware that there may be errors and discrepancies in the computer transcription and all of the clicks required to finalize the report.  The procedure log was documented by Documenter: Baldomero Martinez RT and verified by Nitin Wheat MD.    Date: 8/21/2024  Time: 3:23 PM      Other Most Recent Cardiology Results  Results for orders placed during the hospital encounter of 09/30/24    Holter monitor - 24 hour    Interpretation Summary  Monitored time 24h  Heart rate range 46 to 92; average 67  Ventricular burden 14.8%, unifocal, LBBB morphology  Ventricular runs: none  Supraventricular burden: 0.1%  Supraventricular runs: none  Atrial fibrillation/flutter: none  Significant bradyarrhythmia: none        All pertinent data including labs, imaging, EKGs, and studies listed above were reviewed.  All EKG tracing in [facility] were personally interpreted by this provider.    ASSESSMENT:   Moustapha Murray is a 69 y.o. male who presents for evaluation of PVCs. Review of ECGs shows inferior left sided PVC typically with RB morphology, large R wave V2, and transition to negative at V3 likely a mid inferior septal LV location which could be scar mediated or papillary muscle focus. EF is normal. His burden has improved on verapamil. Limited AAD options given ILD and CAD.  He is currently doing well with current management.  Would continue to monitor LVEF and  burden.    If the patient has high burden of PVCs on current management with symptoms are reduction EF, would recommend trial of antiarrhythmic drug therapy (likely sotalol) or ablation.    1. Left ventricular PVCs        2. Coronary artery disease of native artery of native heart with stable angina pectoris        3. Pulmonary hypertension        4. S/P coronary artery stent placement        5. Primary hypertension        6. Chronic heart failure with preserved ejection fraction (HFpEF)        7. Hypercholesterolemia          PLAN FOR TREATMENT OF ABOVE DIAGNOSES:     Three day cardiac event monitor prior to next visit   Continue verapamil 240 mg daily.  Bradycardia limiting uptitration.  Continue aspirin 81 mg daily   Continue Crestor 40 mg daily     F/u in 6 months with cardiac monitor prior to visit    Nabor Melton MD  Cardiac Electrophysiology    This note was partially generated using voice recognition and generative artificial intelligence software. While every effort has been made to ensure its accuracy, transcription errors may exist. Please reach out to me with any questions or if clarification is needed.

## 2024-12-15 DIAGNOSIS — F32.A DEPRESSION, UNSPECIFIED DEPRESSION TYPE: ICD-10-CM

## 2024-12-15 NOTE — TELEPHONE ENCOUNTER
No care due was identified.  [facility] Embedded Care Due Messages. Reference number: [id number].   12/15/2024 8:50:51 AM CST

## 2024-12-16 ENCOUNTER — OFFICE VISIT (OUTPATIENT)
Dept: CARDIOLOGY | Facility: CLINIC | Age: 69
End: 2024-12-16
Payer: MEDICARE

## 2024-12-16 VITALS
DIASTOLIC BLOOD PRESSURE: 66 MMHG | HEART RATE: 58 BPM | BODY MASS INDEX: 27.9 KG/M2 | WEIGHT: 194.88 LBS | SYSTOLIC BLOOD PRESSURE: 104 MMHG | HEIGHT: 70 IN

## 2024-12-16 DIAGNOSIS — I49.3 PVC'S (PREMATURE VENTRICULAR CONTRACTIONS): Primary | ICD-10-CM

## 2024-12-16 DIAGNOSIS — I50.32 CHRONIC HEART FAILURE WITH PRESERVED EJECTION FRACTION (HFPEF): ICD-10-CM

## 2024-12-16 DIAGNOSIS — I27.20 PULMONARY HYPERTENSION: ICD-10-CM

## 2024-12-16 DIAGNOSIS — I49.3 FREQUENT PVCS: ICD-10-CM

## 2024-12-16 DIAGNOSIS — I10 PRIMARY HYPERTENSION: ICD-10-CM

## 2024-12-16 DIAGNOSIS — Z95.5 S/P CORONARY ARTERY STENT PLACEMENT: ICD-10-CM

## 2024-12-16 DIAGNOSIS — E78.00 HYPERCHOLESTEROLEMIA: ICD-10-CM

## 2024-12-16 DIAGNOSIS — I25.118 CORONARY ARTERY DISEASE OF NATIVE ARTERY OF NATIVE HEART WITH STABLE ANGINA PECTORIS: ICD-10-CM

## 2024-12-16 PROCEDURE — 99999 PR PBB SHADOW E&M-EST. PATIENT-LVL IV: CPT | Mod: PBBFAC,,, | Performed by: INTERNAL MEDICINE

## 2024-12-16 PROCEDURE — 99214 OFFICE O/P EST MOD 30 MIN: CPT | Mod: S$PBB,,, | Performed by: INTERNAL MEDICINE

## 2024-12-16 PROCEDURE — 99214 OFFICE O/P EST MOD 30 MIN: CPT | Mod: PBBFAC,PO | Performed by: INTERNAL MEDICINE

## 2024-12-16 RX ORDER — ESCITALOPRAM OXALATE 10 MG/1
10 TABLET ORAL
Qty: 90 TABLET | Refills: 3 | Status: SHIPPED | OUTPATIENT
Start: 2024-12-16

## 2024-12-16 NOTE — TELEPHONE ENCOUNTER
Refill Decision Note   Moustapha Murray  is requesting a refill authorization.  Brief Assessment and Rationale for Refill:  Approve     Medication Therapy Plan:         Comments:     Note composed:11:54 AM 12/16/2024

## 2024-12-17 ENCOUNTER — PATIENT MESSAGE (OUTPATIENT)
Dept: NEUROLOGY | Facility: CLINIC | Age: 69
End: 2024-12-17
Payer: MEDICARE

## 2024-12-18 ENCOUNTER — PATIENT MESSAGE (OUTPATIENT)
Dept: HEMATOLOGY/ONCOLOGY | Facility: CLINIC | Age: 69
End: 2024-12-18
Payer: MEDICARE

## 2024-12-18 ENCOUNTER — HOSPITAL ENCOUNTER (OUTPATIENT)
Dept: CARDIOLOGY | Facility: HOSPITAL | Age: 69
Discharge: HOME | End: 2024-12-18
Attending: INTERNAL MEDICINE
Payer: MEDICARE

## 2024-12-18 VITALS — BODY MASS INDEX: 27.77 KG/M2 | HEIGHT: 70 IN | WEIGHT: 194 LBS

## 2024-12-18 DIAGNOSIS — I27.22 WHO GROUP 2 PULMONARY ARTERIAL HYPERTENSION: ICD-10-CM

## 2024-12-18 DIAGNOSIS — I49.3 FREQUENT PVCS: ICD-10-CM

## 2024-12-18 PROCEDURE — 93306 TTE W/DOPPLER COMPLETE: CPT | Mod: 26,,, | Performed by: INTERNAL MEDICINE

## 2024-12-18 PROCEDURE — 93306 TTE W/DOPPLER COMPLETE: CPT | Mod: PO

## 2024-12-21 LAB
ASCENDING AORTA: 2.79 CM
AV INDEX (PROSTH): 0.47
AV MEAN GRADIENT: 13.1 MMHG
AV PEAK GRADIENT: 23 MMHG
AV VALVE AREA BY VELOCITY RATIO: 1.7 CM²
AV VALVE AREA: 1.8 CM²
AV VELOCITY RATIO: 0.46
BSA FOR ECHO PROCEDURE: 2.08 M2
CV ECHO LV RWT: 0.42 CM
DOP CALC AO PEAK VEL: 2.4 M/S
DOP CALC AO VTI: 56.1 CM
DOP CALC LVOT AREA: 3.8 CM2
DOP CALC LVOT DIAMETER: 2.2 CM
DOP CALC LVOT PEAK VEL: 1.1 M/S
DOP CALC LVOT STROKE VOLUME: 100.7 CM3
DOP CALCLVOT PEAK VEL VTI: 26.5 CM
E WAVE DECELERATION TIME: 210.1 MSEC
E/A RATIO: 1.02
E/E' RATIO: 11.22 M/S
ECHO LV POSTERIOR WALL: 1 CM (ref 0.6–1.1)
FRACTIONAL SHORTENING: 31.3 % (ref 28–44)
HR MV ECHO: 58 BPM
INTERVENTRICULAR SEPTUM: 1.1 CM (ref 0.6–1.1)
IVRT: 122.74 MSEC
LEFT ATRIUM AREA SYSTOLIC (APICAL 2 CHAMBER): 23.63 CM2
LEFT ATRIUM AREA SYSTOLIC (APICAL 4 CHAMBER): 24.73 CM2
LEFT ATRIUM SIZE: 4.86 CM
LEFT ATRIUM VOLUME INDEX MOD: 41 ML/M2
LEFT ATRIUM VOLUME MOD: 84.39 ML
LEFT INTERNAL DIMENSION IN SYSTOLE: 3.3 CM (ref 2.1–4)
LEFT VENTRICLE DIASTOLIC VOLUME INDEX: 51.93 ML/M2
LEFT VENTRICLE DIASTOLIC VOLUME: 106.98 ML
LEFT VENTRICLE END SYSTOLIC VOLUME APICAL 2 CHAMBER: 79.98 ML
LEFT VENTRICLE END SYSTOLIC VOLUME APICAL 4 CHAMBER: 87.04 ML
LEFT VENTRICLE MASS INDEX: 88.3 G/M2
LEFT VENTRICLE SYSTOLIC VOLUME INDEX: 21.4 ML/M2
LEFT VENTRICLE SYSTOLIC VOLUME: 44.08 ML
LEFT VENTRICULAR INTERNAL DIMENSION IN DIASTOLE: 4.8 CM (ref 3.5–6)
LEFT VENTRICULAR MASS: 181.9 G
LV LATERAL E/E' RATIO: 10.1 M/S
LV SEPTAL E/E' RATIO: 12.63 M/S
LVED V (TEICH): 106.98 ML
LVES V (TEICH): 44.08 ML
LVOT MG: 2.46 MMHG
LVOT MV: 0.73 CM/S
MV MEAN GRADIENT: 2 MMHG
MV PEAK A VEL: 0.99 M/S
MV PEAK E VEL: 1.01 M/S
MV STENOSIS PRESSURE HALF TIME: 60.93 MS
MV VALVE AREA P 1/2 METHOD: 3.61 CM2
OHS CV RV/LV RATIO: 0.75 CM
PISA TR MAX VEL: 2.66 M/S
PULM VEIN S/D RATIO: 0.98
PV PEAK D VEL: 0.44 M/S
PV PEAK S VEL: 0.43 M/S
RA PRESSURE ESTIMATED: 3 MMHG
RA VOL SYS: 65.83 ML
RIGHT ATRIAL AREA: 19.4 CM2
RIGHT ATRIUM VOLUME AREA LENGTH APICAL 4 CHAMBER: 56.42 ML
RIGHT VENTRICLE DIASTOLIC BASEL DIMENSION: 3.6 CM
RIGHT VENTRICLE DIASTOLIC LENGTH: 6.6 CM
RIGHT VENTRICLE DIASTOLIC MID DIMENSION: 3.1 CM
RIGHT VENTRICULAR END-DIASTOLIC DIMENSION: 3.56 CM
RIGHT VENTRICULAR LENGTH IN DIASTOLE (APICAL 4-CHAMBER VIEW): 6.6 CM
RV MID DIAMA: 3.09 CM
RV TB RVSP: 6 MMHG
RV TISSUE DOPPLER FREE WALL SYSTOLIC VELOCITY 1 (APICAL 4 CHAMBER VIEW): 11.37 CM/S
SINUS: 2.69 CM
STJ: 2.44 CM
TDI LATERAL: 0.1 M/S
TDI SEPTAL: 0.08 M/S
TDI: 0.09 M/S
TR MAX PG: 28 MMHG
TRICUSPID ANNULAR PLANE SYSTOLIC EXCURSION: 1.96 CM
TV REST PULMONARY ARTERY PRESSURE: 31 MMHG
Z-SCORE OF LEFT VENTRICULAR DIMENSION IN END DIASTOLE: -2.59
Z-SCORE OF LEFT VENTRICULAR DIMENSION IN END SYSTOLE: -1.13

## 2024-12-23 ENCOUNTER — PATIENT MESSAGE (OUTPATIENT)
Dept: CARDIOLOGY | Facility: CLINIC | Age: 69
End: 2024-12-23
Payer: MEDICARE

## 2024-12-30 ENCOUNTER — OFFICE VISIT (OUTPATIENT)
Dept: OTOLARYNGOLOGY | Facility: CLINIC | Age: 69
End: 2024-12-30
Payer: MEDICARE

## 2024-12-30 VITALS — BODY MASS INDEX: 27.84 KG/M2 | WEIGHT: 194 LBS

## 2024-12-30 DIAGNOSIS — G47.33 OSA (OBSTRUCTIVE SLEEP APNEA): ICD-10-CM

## 2024-12-30 DIAGNOSIS — Z98.890 POST-OPERATIVE STATE: Primary | ICD-10-CM

## 2024-12-30 PROCEDURE — 99024 POSTOP FOLLOW-UP VISIT: CPT | Mod: POP,,, | Performed by: OTOLARYNGOLOGY

## 2024-12-30 PROCEDURE — 99999 PR PBB SHADOW E&M-EST. PATIENT-LVL II: CPT | Mod: PBBFAC,,, | Performed by: OTOLARYNGOLOGY

## 2024-12-30 PROCEDURE — 99212 OFFICE O/P EST SF 10 MIN: CPT | Mod: PBBFAC,PO | Performed by: OTOLARYNGOLOGY

## 2024-12-31 NOTE — PROGRESS NOTES
Moustapha is here for a post-operative visit following   Hypoglossal nerve stimulator    No issues, doing well    Exam:  Alert, active, appropriate  Incisions c/d/I  Expected amount of swelling; no hematoma or seroma  Tongue midline with protrusion and strong  No facial weakness      Functional 1.7  Range 1.7 - 2.7    Healing well  Post-op phone call in 2 weeks and fu with Lele in 6-8 wks

## 2025-01-02 ENCOUNTER — LAB VISIT (OUTPATIENT)
Dept: LAB | Facility: HOSPITAL | Age: 70
End: 2025-01-02
Attending: INTERNAL MEDICINE
Payer: MEDICARE

## 2025-01-02 DIAGNOSIS — D50.9 IRON DEFICIENCY ANEMIA, UNSPECIFIED IRON DEFICIENCY ANEMIA TYPE: ICD-10-CM

## 2025-01-02 LAB
ALBUMIN SERPL BCP-MCNC: 3.6 G/DL (ref 3.5–5.2)
ALP SERPL-CCNC: 101 U/L (ref 40–150)
ALT SERPL W/O P-5'-P-CCNC: 23 U/L (ref 10–44)
ANION GAP SERPL CALC-SCNC: 11 MMOL/L (ref 8–16)
AST SERPL-CCNC: 31 U/L (ref 10–40)
BASOPHILS # BLD AUTO: 0.05 K/UL (ref 0–0.2)
BASOPHILS NFR BLD: 0.6 % (ref 0–1.9)
BILIRUB SERPL-MCNC: 0.8 MG/DL (ref 0.1–1)
BUN SERPL-MCNC: 15 MG/DL (ref 8–23)
CALCIUM SERPL-MCNC: 9.1 MG/DL (ref 8.7–10.5)
CHLORIDE SERPL-SCNC: 105 MMOL/L (ref 95–110)
CO2 SERPL-SCNC: 23 MMOL/L (ref 23–29)
CREAT SERPL-MCNC: 1.1 MG/DL (ref 0.5–1.4)
DIFFERENTIAL METHOD BLD: ABNORMAL
EOSINOPHIL # BLD AUTO: 0.4 K/UL (ref 0–0.5)
EOSINOPHIL NFR BLD: 5.1 % (ref 0–8)
ERYTHROCYTE [DISTWIDTH] IN BLOOD BY AUTOMATED COUNT: 13.4 % (ref 11.5–14.5)
EST. GFR  (NO RACE VARIABLE): >60 ML/MIN/1.73 M^2
FERRITIN SERPL-MCNC: 505 NG/ML (ref 20–300)
GLUCOSE SERPL-MCNC: 144 MG/DL (ref 70–110)
HCT VFR BLD AUTO: 43.7 % (ref 40–54)
HGB BLD-MCNC: 15.2 G/DL (ref 14–18)
IMM GRANULOCYTES # BLD AUTO: 0.01 K/UL (ref 0–0.04)
IMM GRANULOCYTES NFR BLD AUTO: 0.1 % (ref 0–0.5)
IRON SERPL-MCNC: 181 UG/DL (ref 45–160)
LYMPHOCYTES # BLD AUTO: 1.7 K/UL (ref 1–4.8)
LYMPHOCYTES NFR BLD: 21.6 % (ref 18–48)
MCH RBC QN AUTO: 33 PG (ref 27–31)
MCHC RBC AUTO-ENTMCNC: 34.8 G/DL (ref 32–36)
MCV RBC AUTO: 95 FL (ref 82–98)
MONOCYTES # BLD AUTO: 0.7 K/UL (ref 0.3–1)
MONOCYTES NFR BLD: 9.4 % (ref 4–15)
NEUTROPHILS # BLD AUTO: 4.9 K/UL (ref 1.8–7.7)
NEUTROPHILS NFR BLD: 63.2 % (ref 38–73)
NRBC BLD-RTO: 0 /100 WBC
PLATELET # BLD AUTO: 202 K/UL (ref 150–450)
PMV BLD AUTO: 10.1 FL (ref 9.2–12.9)
POTASSIUM SERPL-SCNC: 4.1 MMOL/L (ref 3.5–5.1)
PROT SERPL-MCNC: 6.4 G/DL (ref 6–8.4)
RBC # BLD AUTO: 4.6 M/UL (ref 4.6–6.2)
SATURATED IRON: 55 % (ref 20–50)
SODIUM SERPL-SCNC: 139 MMOL/L (ref 136–145)
TOTAL IRON BINDING CAPACITY: 327 UG/DL (ref 250–450)
TRANSFERRIN SERPL-MCNC: 221 MG/DL (ref 200–375)
WBC # BLD AUTO: 7.78 K/UL (ref 3.9–12.7)

## 2025-01-02 PROCEDURE — 83540 ASSAY OF IRON: CPT | Performed by: INTERNAL MEDICINE

## 2025-01-02 PROCEDURE — 82728 ASSAY OF FERRITIN: CPT | Performed by: INTERNAL MEDICINE

## 2025-01-02 PROCEDURE — 36415 COLL VENOUS BLD VENIPUNCTURE: CPT | Mod: PN | Performed by: INTERNAL MEDICINE

## 2025-01-02 PROCEDURE — 85025 COMPLETE CBC W/AUTO DIFF WBC: CPT | Mod: PN | Performed by: INTERNAL MEDICINE

## 2025-01-02 PROCEDURE — 80053 COMPREHEN METABOLIC PANEL: CPT | Mod: PN | Performed by: INTERNAL MEDICINE

## 2025-01-03 NOTE — PROGRESS NOTES
PATIENT: Moustapha Murray  MRN: [id number]  DATE: 1/6/2025      Diagnosis:   1. Elevated serum immunoglobulin free light chain level    2. Iron deficiency anemia, unspecified iron deficiency anemia type    3. Pulmonary fibrosis    4. Positive AUGUSTA (antinuclear antibody)    5. Localized enlarged lymph nodes    6. Normocytic anemia    7. History of bladder cancer        Chief Complaint: Follow-up (3mths FU with Labs/)          Subjective:    Interval History: Mr. Murray is a 69 y.o. male who returns for follow up. He reports feeling well overall. Denies fever, chills, cp, palpitations, swelling, fatigue, numbness, tingling, n/v, constipation, abdominal pain, new bumps, lumps, bleeding, bruising.     Oncologic History:    69 y.o. male with CAD, Depression, fatty liver, GERD, HTN, RLS, pulmonary fibrosis, pulmonary fibrosis, h/o bladder cancer who presents for positive TIF1 gamma antibody.   Since the last clinic visit the patient underwent EBUS on 07/15/2024 with biopsy of station 4R and 7 which was negative for malignancy and showed lm material.  Lab work on 9/10/24 showing no monoclonal protein on SPEP or MEME.  Free light chain assay showed elevated kappa light chains at 2.65 mg/dL, normal lambda light chains at 1.76 mg/dL, and normal kappa lambda ratio 1.51.       Prior History:  Patient with a history of noninvasive bladder cancer under the care of Dr. Lr.  Patient last underwent cystoscopy on 08/17/2023 with no lesions seen.  Patient with history of T1 disease status post bladder installation of mitomycin-C and BCG.  The patient is currently being followed by Dr. Arellano for pulmonary fibrosis and underwent laboratory testing on 1/08/24 showing positive TIF1 gamma antibody.  The patient is currently scheduled for colonoscopy 3/04/2024 and cystoscopy 3/21/2024.  PSA on 1/08/24 was normal at 0.49ng/mL.              The patient underwent a CT of the chest, abdomen, and pelvis on 02/27/2024 showing a lower  right paratracheal enlarged lymph node measuring 10 mm; enlarged subcarinal lymph node measuring 14 mm; mildly enlarged lymph node adjacent to the distal esophagus; interstitial thickening bilaterally in the subpleural pattern suggesting fibrosis; diffuse fatty infiltration of the liver; anterior wedge compression fracture of L1 and T12; wall thickening in the region of the ascending colon and cecum.  CT of the neck showed no acute findings.              The patient underwent colonoscopy on 03/04/2024 which was normal.  Cystoscopy on 3/21/24 showed no evidence of recurrence.  PET/CT 3/22/24 showed enlarged lymph nodes and superior mediastinum, anterior mediastinum, periaortic region, pretracheal subcarinal, and left paraesophageal region of the diaphragm hiatus; moderate prominent peripheral distribution of interlobular interstitial thickening; area of uptake in the region of right posterior prostate.  Patient underwent iron infusions on 04/02/2024 and 04/09/2024.  Patient underwent EUS on 05/08/2024 with biopsy of subcarinal mediastinum station 7 lymph node with path showing positive for lm material but negative for malignant cells.               the patient underwent CT chest on 06/11/2024 showing multiple large lymph nodes throughout the mediastinum which have increasing size; centrilobular and paraseptal emphysematous changes with upper lobe predominance as well as extensive subpleural reticulation, interlobular septal thickening, scattered bronchiectasis throughout both lungs mildly progressed at the bases when compared to scan from February, 2024.  Labs on 06/11/2024 showed oligoclonal banding pattern on immunofixation electrophoresis and free light chain assay with elevated kappa light chains at 4.18 mg/dL, elevated lambda light chains at 3.21 mg/dL, and normal kappa lambda ratio 1.3.     Oncology History   Bladder cancer   11/26/2021 Initial Diagnosis    Bladder cancer     2/8/2024 Cancer Staged     Staging form: Urinary Bladder, AJCC 8th Edition  - Clinical: Stage I (cT1, cN0, cM0)         Past Medical History:   Past Medical History:   Diagnosis Date    Anticoagulant long-term use     Bladder cancer 08/2020    Colon polyps     COPD (chronic obstructive pulmonary disease)     Coronary artery disease 01/31/2023    CORONARY STENTSx3    DDD (degenerative disc disease), lumbar     Depression     Fatty liver     GERD (gastroesophageal reflux disease)     H/O motion sickness     Hypertension     Mediastinal lymphadenopathy 07/2024    PONV (postoperative nausea and vomiting)     Pulmonary fibrosis     RLS (restless legs syndrome)     Sleep apnea, unspecified     can't use cpap       Past Surgical HIstory:   Past Surgical History:   Procedure Laterality Date    ANGIOGRAM, CORONARY, WITH LEFT HEART CATHETERIZATION N/A 1/31/2023    Procedure: Left Heart Cath;  Surgeon: Nitin Wheat MD;  Location: [facility] CATH;  Service: Cardiology;  Laterality: N/A;    ANGIOGRAM, CORONARY, WITH LEFT HEART CATHETERIZATION  4/17/2024    Procedure: Left Heart Cath;  Surgeon: Nitin Wheat MD;  Location: [facility] CATH;  Service: Cardiology;;    APPENDECTOMY      CARPAL TUNNEL RELEASE      CERVICAL FUSION      CHOLECYSTECTOMY      CIRCUMCISION      COLONOSCOPY N/A 3/16/2020    Procedure: COLONOSCOPY;  Surgeon: Braulio Best MD;  Location: [facility];  Service: Endoscopy;  Laterality: N/A; 3 colon polyps removed; biopsy: tubular adenoma and hyperplastic; repeat in 5 years for surveillance    COLONOSCOPY N/A 3/4/2024    Procedure: COLONOSCOPY;  Surgeon: Braulio Best MD;  Location: [facility] ENDO;  Service: Gastroenterology;  Laterality: N/A;    CORONARY ANGIOGRAPHY  2/13/2023    Procedure: ANGIOGRAM, CORONARY ARTERY;  Surgeon: Nitin Wheat MD;  Location: [facility] CATH;  Service: Cardiology;;    CORONARY ANGIOPLASTY WITH STENT PLACEMENT N/A 1/31/2023    Procedure: ANGIOPLASTY, CORONARY ARTERY, WITH STENT INSERTION;  Surgeon: Nitin Wheat MD;  Location:  [facility] CATH;  Service: Cardiology;  Laterality: N/A;    CYSTOSCOPY W/ RETROGRADES Bilateral 8/24/2020    Procedure: CYSTOSCOPY, WITH RETROGRADE PYELOGRAM -WITH URETHRAL DILATION;  Surgeon: Greg Claudio MD;  Location: [facility] OR;  Service: Urology;  Laterality: Bilateral;    CYSTOSCOPY W/ URETERAL STENT PLACEMENT Right 8/24/2020    Procedure: CYSTOSCOPY, WITH URETERAL STENT INSERTION;  Surgeon: Greg Claudio MD;  Location: [facility] OR;  Service: Urology;  Laterality: Right;    ENDOBRONCHIAL ULTRASOUND Bilateral 7/15/2024    Procedure: ENDOBRONCHIAL ULTRASOUND (EBUS);  Surgeon: Umm Arellano MD;  Location: [facility] OR;  Service: Pulmonary;  Laterality: Bilateral;    ENDOSCOPIC ULTRASOUND OF UPPER GASTROINTESTINAL TRACT Left 5/8/2024    Procedure: ULTRASOUND, UPPER GI TRACT, ENDOSCOPIC;  Surgeon: Brad Ochoa MD;  Location: [facility] ENDO;  Service: Endoscopy;  Laterality: Left;    ESOPHAGOGASTRODUODENOSCOPY N/A 5/8/2024    Procedure: EGD (ESOPHAGOGASTRODUODENOSCOPY);  Surgeon: Brad Ochoa MD;  Location: [facility] ENDO;  Service: Endoscopy;  Laterality: N/A;    INSERTION, NEUROSTIMULATOR, HYPOGLOSSAL Right 11/22/2024    Procedure: INSERTION,NEUROSTIMULATOR,HYPOGLOSSAL;  Surgeon: Hector Lindquist MD;  Location: [facility] OR;  Service: ENT;  Laterality: Right;    INSTILLATION OF URINARY BLADDER N/A 8/24/2020    Procedure: INSTILLATION, BLADDER - Mitomycin;  Surgeon: Greg Claudio MD;  Location: [facility] OR;  Service: Urology;  Laterality: N/A;    IVUS, CORONARY  1/31/2023    Procedure: IVUS, Coronary;  Surgeon: Nitin Wheat MD;  Location: [facility] CATH;  Service: Cardiology;;    IVUS, CORONARY  2/13/2023    Procedure: IVUS, Coronary;  Surgeon: Nitin Wheat MD;  Location: [facility] CATH;  Service: Cardiology;;    PERCUTANEOUS CORONARY INTERVENTION, ARTERY  2/13/2023    Procedure: Percutaneous coronary intervention;  Surgeon: Nitin Wheat MD;  Location: [facility] CATH;  Service: Cardiology;;    RIGHT HEART CATHETERIZATION   8/21/2024    Procedure: Right heart cath;  Surgeon: Nitin Wheat MD;  Location: [facility] CATH;  Service: Cardiology;;    SLEEP ENDOSCOPY, DRUG-INDUCED Bilateral 10/23/2024    Procedure: SLEEP ENDOSCOPY,DRUG-INDUCED;  Surgeon: Hector Lindquist MD;  Location: [facility] OR;  Service: ENT;  Laterality: Bilateral;    TRANSFORAMINAL EPIDURAL INJECTION OF STEROID Right 7/17/2019    Procedure: Injection,steroid,epidural,transforaminal approach L3-4, L4-5;  Surgeon: Chris Palencia MD;  Location: [facility] OR;  Service: Pain Management;  Laterality: Right;    TRANSFORAMINAL EPIDURAL INJECTION OF STEROID Right 8/28/2019    Procedure: Injection,steroid,epidural,transforaminal approach;  Surgeon: Chris Palencia MD;  Location: [facility] OR;  Service: Pain Management;  Laterality: Right;  L3-4, L4-L5    TRANSFORAMINAL EPIDURAL INJECTION OF STEROID Left 7/11/2024    Procedure: Injection,steroid,epidural,transforaminal approach l5-s1 and s1;  Surgeon: Ann Soto MD;  Location: [facility] OR;  Service: Anesthesiology;  Laterality: Left;    WISDOM TOOTH EXTRACTION         Family History:   Family History   Problem Relation Name Age of Onset    Stroke Mother      Ulcerative colitis Mother      Kidney cancer Mother      Heart disease Father      Heart attack Father      Cancer Neg Hx      Colon cancer Neg Hx      Crohn's disease Neg Hx      Celiac disease Neg Hx         Social History:  reports that he quit smoking about 11 years ago. His smoking use included cigarettes. He has never used smokeless tobacco. He reports current alcohol use of about 2.0 standard drinks of alcohol per week. He reports that he does not currently use drugs.    Allergies:  Review of patient's allergies indicates:   Allergen Reactions    Sulfa (sulfonamide antibiotics) Anaphylaxis       Medications:  Current Outpatient Medications   Medication Sig Dispense Refill    albuterol (PROVENTIL/VENTOLIN HFA) 90 mcg/actuation inhaler Inhale 2 puffs into the lungs every 6 (six) hours as needed for  Wheezing. Rescue 18 g 6    aspirin 81 MG Chew Take 1 tablet (81 mg total) by mouth once daily. 90 tablet 3    budesonide (PULMICORT) 0.5 mg/2 mL nebulizer solution Take 2 mLs (0.5 mg total) by nebulization 2 (two) times daily. Controller 120 mL 3    clopidogreL (PLAVIX) 75 mg tablet Take 75 mg by mouth once daily.      droNABinol (MARINOL) 2.5 MG capsule Take 1 capsule (2.5 mg total) by mouth 2 (two) times daily before meals. 60 capsule 3    EScitalopram oxalate (LEXAPRO) 10 MG tablet TAKE 1 TABLET BY MOUTH EVERY DAY 90 tablet 3    formoterol (PERFOROMIST) 20 mcg/2 mL Nebu Take 2 mLs (20 mcg total) by nebulization 2 (two) times a day. Controller 90 mL 3    ibuprofen (ADVIL,MOTRIN) 600 MG tablet Take 1 tablet (600 mg total) by mouth every 6 (six) hours as needed for Pain. 20 tablet 0    melatonin 5 mg Cap Take 5 mg by mouth nightly as needed. TAKE AS SCHEDULED      nintedanib (OFEV) 150 mg Cap Take 1 capsule (150 mg total) by mouth 2 (two) times a day. 60 capsule 6    ondansetron (ZOFRAN-ODT) 4 MG TbDL Take 1 tablet (4 mg total) by mouth every 6 (six) hours as needed (Nausea). 10 tablet 0    pantoprazole (PROTONIX) 40 MG tablet TAKE 1 TABLET BY MOUTH EVERY DAY 90 tablet 3    pregabalin (LYRICA) 75 MG capsule Take 1 capsule (75 mg total) by mouth every evening. 30 capsule 2    rosuvastatin (CRESTOR) 40 MG Tab TAKE 1 TABLET BY MOUTH EVERY DAY 90 tablet 3    torsemide (DEMADEX) 10 MG Tab Take 1 tablet (10 mg total) by mouth once daily. 30 tablet 11    traMADoL (ULTRAM) 50 mg tablet Take 1 tablet (50 mg total) by mouth every 24 hours as needed for Pain. 30 tablet 2    triamcinolone acetonide 0.1% (KENALOG) 0.1 % cream APPLY TOPICALLY 2 TIMES DAILY AS NEEDED. 45 g 5    verapamiL (CALAN-SR) 240 MG CR tablet Take 1 tablet (240 mg total) by mouth every evening. 30 tablet 11     Current Facility-Administered Medications   Medication Dose Route Frequency Provider Last Rate Last Admin    sodium chloride 0.9% flush 10 mL  10 mL  "Intravenous PRN Nitin Wheat MD         Facility-Administered Medications Ordered in Other Visits   Medication Dose Route Frequency Provider Last Rate Last Admin    ondansetron injection 4 mg  4 mg Intravenous Daily PRN Gustavo Chu MD        sodium chloride 0.9% flush 10 mL  10 mL Intravenous PRN Braulio Best MD   5 mL at 10/23/24 1130       Review of Systems   Constitutional:  Positive for unexpected weight change. Negative for appetite change.   HENT:  Negative for mouth sores.    Eyes:  Negative for visual disturbance.   Respiratory:  Positive for shortness of breath. Negative for cough.    Cardiovascular:  Negative for chest pain.   Gastrointestinal:  Positive for diarrhea. Negative for abdominal pain.   Genitourinary:  Negative for frequency.   Musculoskeletal:  Positive for back pain.   Skin:  Negative for rash.   Neurological:  Negative for headaches.   Hematological:  Negative for adenopathy.   Psychiatric/Behavioral:  The patient is nervous/anxious.        ECOG Performance Status:   ECOG SCORE             Objective:      Vitals:   Vitals:    01/06/25 0913   BP: 134/78   BP Location: Left arm   Patient Position: Sitting   Pulse: 68   Resp: 18   Temp: 98.4 °F (36.9 °C)   TempSrc: Temporal   SpO2: 98%   Weight: 88.8 kg (195 lb 12.3 oz)   Height: 5' 10" (1.778 m)     BMI: Body mass index is 28.09 kg/m².    Physical Exam  HENT:      Head: Normocephalic.      Nose: Nose normal.      Mouth/Throat:      Mouth: Mucous membranes are moist.      Pharynx: Oropharynx is clear.   Eyes:      Pupils: Pupils are equal, round, and reactive to light.   Cardiovascular:      Rate and Rhythm: Normal rate and regular rhythm.      Heart sounds: Normal heart sounds.   Pulmonary:      Effort: Pulmonary effort is normal.      Breath sounds: Normal breath sounds.   Abdominal:      General: Bowel sounds are normal.   Musculoskeletal:         General: Normal range of motion.      Cervical back: Normal range of motion. "   Skin:     General: Skin is warm and dry.   Neurological:      Mental Status: He is alert and oriented to person, place, and time.   Psychiatric:         Mood and Affect: Mood normal.         Behavior: Behavior normal.         Laboratory Data:  No visits with results within 3 Day(s) from this visit.   Latest known visit with results is:   Lab Visit on 01/02/2025   Component Date Value Ref Range Status    WBC 01/02/2025 7.78  3.90 - 12.70 K/uL Final    RBC 01/02/2025 4.60  4.60 - 6.20 M/uL Final    Hemoglobin 01/02/2025 15.2  14.0 - 18.0 g/dL Final    Hematocrit 01/02/2025 43.7  40.0 - 54.0 % Final    MCV 01/02/2025 95  82 - 98 fL Final    MCH 01/02/2025 33.0 (H)  27.0 - 31.0 pg Final    MCHC 01/02/2025 34.8  32.0 - 36.0 g/dL Final    RDW 01/02/2025 13.4  11.5 - 14.5 % Final    Platelets 01/02/2025 202  150 - 450 K/uL Final    MPV 01/02/2025 10.1  9.2 - 12.9 fL Final    Immature Granulocytes 01/02/2025 0.1  0.0 - 0.5 % Final    Gran # (ANC) 01/02/2025 4.9  1.8 - 7.7 K/uL Final    Immature Grans (Abs) 01/02/2025 0.01  0.00 - 0.04 K/uL Final    Lymph # 01/02/2025 1.7  1.0 - 4.8 K/uL Final    Mono # 01/02/2025 0.7  0.3 - 1.0 K/uL Final    Eos # 01/02/2025 0.4  0.0 - 0.5 K/uL Final    Baso # 01/02/2025 0.05  0.00 - 0.20 K/uL Final    nRBC 01/02/2025 0  0 /100 WBC Final    Gran % 01/02/2025 63.2  38.0 - 73.0 % Final    Lymph % 01/02/2025 21.6  18.0 - 48.0 % Final    Mono % 01/02/2025 9.4  4.0 - 15.0 % Final    Eosinophil % 01/02/2025 5.1  0.0 - 8.0 % Final    Basophil % 01/02/2025 0.6  0.0 - 1.9 % Final    Differential Method 01/02/2025 Automated   Final    Sodium 01/02/2025 139  136 - 145 mmol/L Final    Potassium 01/02/2025 4.1  3.5 - 5.1 mmol/L Final    Chloride 01/02/2025 105  95 - 110 mmol/L Final    CO2 01/02/2025 23  23 - 29 mmol/L Final    Glucose 01/02/2025 144 (H)  70 - 110 mg/dL Final    BUN 01/02/2025 15  8 - 23 mg/dL Final    Creatinine 01/02/2025 1.1  0.5 - 1.4 mg/dL Final    Calcium 01/02/2025 9.1  8.7 -  10.5 mg/dL Final    Total Protein 01/02/2025 6.4  6.0 - 8.4 g/dL Final    Albumin 01/02/2025 3.6  3.5 - 5.2 g/dL Final    Total Bilirubin 01/02/2025 0.8  0.1 - 1.0 mg/dL Final    Alkaline Phosphatase 01/02/2025 101  40 - 150 U/L Final    AST 01/02/2025 31  10 - 40 U/L Final    ALT 01/02/2025 23  10 - 44 U/L Final    eGFR 01/02/2025 >60.0  >60 mL/min/1.73 m^2 Final    Anion Gap 01/02/2025 11  8 - 16 mmol/L Final    Ferritin 01/02/2025 505 (H)  20.0 - 300.0 ng/mL Final    Iron 01/02/2025 181 (H)  45 - 160 ug/dL Final    Transferrin 01/02/2025 221  200 - 375 mg/dL Final    TIBC 01/02/2025 327  250 - 450 ug/dL Final    Saturated Iron 01/02/2025 55 (H)  20 - 50 % Final          Imaging: CT Chest 6/11/24  Base of Neck: No significant abnormality.     Thoracic soft tissues: Unremarkable.     Aorta: Left-sided aortic arch. No aneurysm and no significant atherosclerosis     Heart: Normal size. No effusion. Marked calcification of the coronary arteries. Calcification of the mitral valve annulus.     Pulmonary vasculature: Pulmonary arteries distribute normally. No discrete filling defects identified within the proximal pulmonary arterial branches to suggest pulmonary thromboembolism. There are four pulmonary veins.     Tran/Mediastinum: Multiple enlarged lymph nodes throughout the mediastinum, increased in size as compared to the prior PET-CT on 03/22/2024. Index right lower paratracheal lymph node measures 12 mm in short axis dimension (series 2, image 40), previously 10 mm when directly remeasured. Prevascular lymph node measures 11 mm in short axis dimension (series 2, image 40), previously 8 mm when directly remeasured. Subcarinal lymph node now measures approximately 18 mm in short axis dimension (series 2, image 49), previously 15 mm.     Airways: Patent.     Lungs/Pleura: Lungs are expanded again demonstrate centrilobular and paraseptal emphysematous changes with an upper lobe predominance as well as extensive  subpleural reticulation, interlobular septal thickening, scattered bronchiectasis throughout both lungs, mildly progressed at the lung bases in comparison to the prior CT on 02/27/2024, with superimposed interstitial edema not excluded, noting interval development of small bilateral dependent pleural effusions. No discrete new suspicious pulmonary nodules or masses. No lobar consolidation. No pneumothorax.     Esophagus: Unremarkable.     Upper Abdomen: No abnormality of the partially imaged upper abdomen. Cholecystectomy.     Bones: Chronic anterior wedge compression deformity of the T12 vertebral body. No definite acute fracture. No suspicious lytic or sclerotic lesions. Partially imaged anterior fusion hardware in the lower cervical spine. Chronic healed and nonunited posterior right rib fractures again noted.   Assessment:       1. Elevated serum immunoglobulin free light chain level    2. Iron deficiency anemia, unspecified iron deficiency anemia type    3. Pulmonary fibrosis    4. Positive AUGUSTA (antinuclear antibody)    5. Localized enlarged lymph nodes    6. Normocytic anemia    7. History of bladder cancer           Plan:     Positive TIF1 Gamma Antibody - can be associated with risk for pulmonary fibrosis and for occult malignancy  -Pt with h/o non invasive bladder cancer  -PSA on 1/08/24 was normal at 0.49ng/mL  -CT C/A/P on 2/27/24 showed thickening of the colon and mediastinal, paraesophageal LAD  -Colonoscopy 3/04/24 showed MEG  -Cystoscopy on 3/21/24 showed no evidence of recurrence  -PET/CT 3/22/24 showed enlarged mediastinal LN's  lymph nodes and superior mediastinum, anterior mediastinum, periaortic region, -EUS on 05/08/2024 with biopsy of subcarinal mediastinum station 7 lymph node had path showing positive for lm material but negative for malignant cells  -CT chest on 6/11/24 showed increasing mediastinal LAD  -EBUS 7/15/24 showed no malignancy in station 4R an 7     Pulmonary Fibrosis -  managed by Dr Arellano  -Unclear etiology but possibly related to Positive TIF1 Gamma Antibody as above  -Pt to see Rheumatolgy given positive AUGUSTA     Lymphadenopathy   - see above     Iron Deficiency Anemia   - pt given iron infusions 4/02/24 and 4/09/24 with injectafer  -Ferritin normal at 3/05ng/mL on 6/11/24  -Hemoglobin stable at 15.5g/dL on 9/10/24  -Will repeat iron studies in 3 months  -Unclear cause as colonoscopy and EUS showed no significant findings  - Ferritin  505 ng/mL, Hgb stable at 15.2g/dL  on 1/2/25.    Bladder Cancer -  history of noninvasive bladder cancer under the care of Dr. Lr with prior T1 disease  -Pt status post bladder installation of mitomycin-C and BCG  -Last cystoscopy on 08/17/2023 with no lesions seen  -Repeat cystoscopy 3/21/2024 MEG  -Pt to have it repeated 1/09/2025     Positive AUGUSTA   - unclear if responsible for disease  -Followed by rheumatology     Elevated Free Light Chains - Labs on 06/11/2024 showed oligoclonal banding pattern on immunofixation electrophoresis and free light chain assay with elevated kappa light chains at 4.18 mg/dL, elevated lambda light chains at 3.21 mg/dL, and normal kappa lambda ratio 1.3  -Lab work on 9/10/24 showing no monoclonal protein on SPEP or JOSH.  Free light chain assay showed elevated kappa light chains at 2.65 mg/dL, normal lambda light chains at 1.76 mg/dL, and normal kappa lambda ratio 1.51  -Will monitor in 3 months    Visit today included increased complexity associated with the care of the episodic problem  addressed and managing the longitudinal care of the patient due to the serious and/or complex managed problem(s) anemia in the setting of elevated free light chains.     Med Onc Chart Routing      Follow up with physician 3 months. With repeat cbc, cmp, josh, spep, free light chains with Dr West.   Follow up with [facility]    Infusion scheduling note    Injection scheduling note    Labs    Imaging    Pharmacy appointment    Other  referrals                  Plan was discussed with the patient at length, and he verbalized understanding. Moustapha was given an opportunity to ask questions that were answered to his satisfaction, and he was advised to call in the interval if any problems or questions arise.    Assessment/Plan reviewed and approved by Dr West    17 minutes were spent in coordination of patient's care, record review and counseling.    CECI Restrepo, FNP-C  Hematology & Oncology

## 2025-01-06 ENCOUNTER — OFFICE VISIT (OUTPATIENT)
Dept: HEMATOLOGY/ONCOLOGY | Facility: CLINIC | Age: 70
End: 2025-01-06
Payer: MEDICARE

## 2025-01-06 VITALS
HEIGHT: 70 IN | DIASTOLIC BLOOD PRESSURE: 78 MMHG | RESPIRATION RATE: 18 BRPM | HEART RATE: 68 BPM | TEMPERATURE: 98 F | SYSTOLIC BLOOD PRESSURE: 134 MMHG | BODY MASS INDEX: 28.02 KG/M2 | OXYGEN SATURATION: 98 % | WEIGHT: 195.75 LBS

## 2025-01-06 DIAGNOSIS — J84.10 PULMONARY FIBROSIS: ICD-10-CM

## 2025-01-06 DIAGNOSIS — R59.0 LOCALIZED ENLARGED LYMPH NODES: ICD-10-CM

## 2025-01-06 DIAGNOSIS — R76.8 ELEVATED SERUM IMMUNOGLOBULIN FREE LIGHT CHAIN LEVEL: Primary | ICD-10-CM

## 2025-01-06 DIAGNOSIS — D50.9 IRON DEFICIENCY ANEMIA, UNSPECIFIED IRON DEFICIENCY ANEMIA TYPE: ICD-10-CM

## 2025-01-06 DIAGNOSIS — D64.9 NORMOCYTIC ANEMIA: ICD-10-CM

## 2025-01-06 DIAGNOSIS — Z85.51 HISTORY OF BLADDER CANCER: ICD-10-CM

## 2025-01-06 DIAGNOSIS — R76.8 POSITIVE ANA (ANTINUCLEAR ANTIBODY): ICD-10-CM

## 2025-01-06 PROCEDURE — 99212 OFFICE O/P EST SF 10 MIN: CPT | Mod: S$PBB,,,

## 2025-01-06 PROCEDURE — G2211 COMPLEX E/M VISIT ADD ON: HCPCS | Mod: S$PBB,,,

## 2025-01-06 PROCEDURE — 99999 PR PBB SHADOW E&M-EST. PATIENT-LVL V: CPT | Mod: PBBFAC,,,

## 2025-01-06 PROCEDURE — 99215 OFFICE O/P EST HI 40 MIN: CPT | Mod: PBBFAC,PN

## 2025-01-09 ENCOUNTER — LAB VISIT (OUTPATIENT)
Dept: LAB | Facility: HOSPITAL | Age: 70
End: 2025-01-09
Attending: FAMILY MEDICINE
Payer: MEDICARE

## 2025-01-09 ENCOUNTER — PROCEDURE VISIT (OUTPATIENT)
Dept: UROLOGY | Facility: CLINIC | Age: 70
End: 2025-01-09
Payer: MEDICARE

## 2025-01-09 VITALS — WEIGHT: 195.75 LBS | HEIGHT: 70 IN | BODY MASS INDEX: 28.02 KG/M2

## 2025-01-09 DIAGNOSIS — Z12.5 PROSTATE CANCER SCREENING: ICD-10-CM

## 2025-01-09 DIAGNOSIS — C67.2 MALIGNANT NEOPLASM OF LATERAL WALL OF URINARY BLADDER: Primary | ICD-10-CM

## 2025-01-09 DIAGNOSIS — I25.10 CORONARY ARTERY DISEASE INVOLVING NATIVE CORONARY ARTERY OF NATIVE HEART WITHOUT ANGINA PECTORIS: ICD-10-CM

## 2025-01-09 DIAGNOSIS — I10 HYPERTENSION, UNSPECIFIED TYPE: ICD-10-CM

## 2025-01-09 LAB
ALBUMIN SERPL BCP-MCNC: 3.8 G/DL (ref 3.5–5.2)
ALP SERPL-CCNC: 111 U/L (ref 40–150)
ALT SERPL W/O P-5'-P-CCNC: 27 U/L (ref 10–44)
ANION GAP SERPL CALC-SCNC: 12 MMOL/L (ref 8–16)
AST SERPL-CCNC: 33 U/L (ref 10–40)
BASOPHILS # BLD AUTO: 0.07 K/UL (ref 0–0.2)
BASOPHILS NFR BLD: 0.9 % (ref 0–1.9)
BILIRUB SERPL-MCNC: 0.8 MG/DL (ref 0.1–1)
BILIRUBIN, UA POC OHS: ABNORMAL
BLOOD, UA POC OHS: NEGATIVE
BUN SERPL-MCNC: 13 MG/DL (ref 8–23)
CALCIUM SERPL-MCNC: 9.2 MG/DL (ref 8.7–10.5)
CHLORIDE SERPL-SCNC: 107 MMOL/L (ref 95–110)
CHOLEST SERPL-MCNC: 148 MG/DL (ref 120–199)
CHOLEST/HDLC SERPL: 2.8 {RATIO} (ref 2–5)
CLARITY, UA POC OHS: CLEAR
CO2 SERPL-SCNC: 24 MMOL/L (ref 23–29)
COLOR, UA POC OHS: YELLOW
COMPLEXED PSA SERPL-MCNC: 0.46 NG/ML (ref 0–4)
CREAT SERPL-MCNC: 1 MG/DL (ref 0.5–1.4)
DIFFERENTIAL METHOD BLD: ABNORMAL
EOSINOPHIL # BLD AUTO: 0.4 K/UL (ref 0–0.5)
EOSINOPHIL NFR BLD: 5.7 % (ref 0–8)
ERYTHROCYTE [DISTWIDTH] IN BLOOD BY AUTOMATED COUNT: 13.6 % (ref 11.5–14.5)
EST. GFR  (NO RACE VARIABLE): >60 ML/MIN/1.73 M^2
GLUCOSE SERPL-MCNC: 95 MG/DL (ref 70–110)
GLUCOSE, UA POC OHS: NEGATIVE
HCT VFR BLD AUTO: 42.2 % (ref 40–54)
HDLC SERPL-MCNC: 52 MG/DL (ref 40–75)
HDLC SERPL: 35.1 % (ref 20–50)
HGB BLD-MCNC: 14.6 G/DL (ref 14–18)
IMM GRANULOCYTES # BLD AUTO: 0.02 K/UL (ref 0–0.04)
IMM GRANULOCYTES NFR BLD AUTO: 0.3 % (ref 0–0.5)
KETONES, UA POC OHS: NEGATIVE
LDLC SERPL CALC-MCNC: 79.6 MG/DL (ref 63–159)
LEUKOCYTES, UA POC OHS: NEGATIVE
LYMPHOCYTES # BLD AUTO: 1.7 K/UL (ref 1–4.8)
LYMPHOCYTES NFR BLD: 23 % (ref 18–48)
MCH RBC QN AUTO: 33.2 PG (ref 27–31)
MCHC RBC AUTO-ENTMCNC: 34.6 G/DL (ref 32–36)
MCV RBC AUTO: 96 FL (ref 82–98)
MONOCYTES # BLD AUTO: 0.8 K/UL (ref 0.3–1)
MONOCYTES NFR BLD: 10 % (ref 4–15)
NEUTROPHILS # BLD AUTO: 4.5 K/UL (ref 1.8–7.7)
NEUTROPHILS NFR BLD: 60.1 % (ref 38–73)
NITRITE, UA POC OHS: NEGATIVE
NONHDLC SERPL-MCNC: 96 MG/DL
NRBC BLD-RTO: 0 /100 WBC
PH, UA POC OHS: 5.5
PLATELET # BLD AUTO: 182 K/UL (ref 150–450)
PMV BLD AUTO: 11.5 FL (ref 9.2–12.9)
POTASSIUM SERPL-SCNC: 3.8 MMOL/L (ref 3.5–5.1)
PROT SERPL-MCNC: 7 G/DL (ref 6–8.4)
PROTEIN, UA POC OHS: 100
RBC # BLD AUTO: 4.4 M/UL (ref 4.6–6.2)
SODIUM SERPL-SCNC: 143 MMOL/L (ref 136–145)
SPECIFIC GRAVITY, UA POC OHS: >=1.03
TRIGL SERPL-MCNC: 82 MG/DL (ref 30–150)
UROBILINOGEN, UA POC OHS: 2
WBC # BLD AUTO: 7.53 K/UL (ref 3.9–12.7)

## 2025-01-09 PROCEDURE — 36415 COLL VENOUS BLD VENIPUNCTURE: CPT | Mod: PO | Performed by: FAMILY MEDICINE

## 2025-01-09 PROCEDURE — 85025 COMPLETE CBC W/AUTO DIFF WBC: CPT | Performed by: FAMILY MEDICINE

## 2025-01-09 PROCEDURE — 80053 COMPREHEN METABOLIC PANEL: CPT | Performed by: FAMILY MEDICINE

## 2025-01-09 PROCEDURE — 80061 LIPID PANEL: CPT | Performed by: FAMILY MEDICINE

## 2025-01-09 PROCEDURE — 52234 CYSTOSCOPY AND TREATMENT: CPT | Mod: S$PBB,,, | Performed by: UROLOGY

## 2025-01-09 PROCEDURE — 81003 URINALYSIS AUTO W/O SCOPE: CPT | Mod: PBBFAC,PO | Performed by: UROLOGY

## 2025-01-09 PROCEDURE — 52234 CYSTOSCOPY AND TREATMENT: CPT | Mod: PBBFAC,PO | Performed by: UROLOGY

## 2025-01-09 PROCEDURE — 84153 ASSAY OF PSA TOTAL: CPT | Performed by: FAMILY MEDICINE

## 2025-01-09 PROCEDURE — 99999PBSHW POCT URINALYSIS(INSTRUMENT): Mod: PBBFAC,,,

## 2025-01-09 NOTE — PROCEDURES
Cystoscopy with fulguration    Date/Time: 1/9/2025 9:00 AM    Performed by: ROSA Lr MD  Authorized by: ROSA Lr MD    Consent Done?:  Yes (Written)  Timeout: prior to procedure the correct patient, procedure, and site was verified    Prep: patient was prepped and draped in usual sterile fashion    Anesthesia:  Lidocaine jelly  Indications: history bladder cancer    Position:  Supine  Anesthesia:  Lidocaine jelly  Patient sedated?: No    Preparation: Patient was prepped and draped in usual sterile fashion    Scope type:  Flexible cystoscope   patient tolerated the procedure well with no immediate complications    Blood Loss:  None     69-year-old with a history of T1 urothelial cancer.  He underwent TURBT with instillation of mitomycin-C in 08/2020. He has a recurrence and underwent repeat TURBT 03/2022.  Pepeat path again shows low-grade noninvasive urothelial carcinoma.  He then completed an induction course of BCG.  He is here for cystoscopy        The flexible cystoscope was placed into the urethra and carefully advanced into the bladder.  A careful cystoscopic exam was then performed.  The entire bladder mucosa was systematically visualized.  Findings include moderate bladder wall trabeculation.  On the posterior wall there was a suspicious appearing area measuring a proximally 1 cm in diameter.  It was concerning for an early recurrence.  I used the Bugbee to completely fulgurate this lesion.  Hemostasis appeared to be adequate.  There were no other lesions, masses foreign bodies or stones.   TUR scar was evident on the right lateral wall.  The right ureteral orifice was deviated laterally.  Both ureteral orifices were visualized and both had clear efflux of urine.  On retroflexion there was a small intravesical gland.  The cystoscope was then removed and I examined the entire length of the urethra.  There was moderate bilobar enlargement of the prostate.  In the anterior urethra there was  multiple areas of narrowing which were not thought to be clinically significant.  He tolerated the procedure well.  There were no complications.     Impression:  Likely small recurrence completely fulgurated     Plan:  Follow-up 6 months for cystoscopy

## 2025-01-15 ENCOUNTER — PATIENT MESSAGE (OUTPATIENT)
Dept: OTOLARYNGOLOGY | Facility: CLINIC | Age: 70
End: 2025-01-15
Payer: MEDICARE

## 2025-01-15 ENCOUNTER — OFFICE VISIT (OUTPATIENT)
Dept: PAIN MEDICINE | Facility: CLINIC | Age: 70
End: 2025-01-15
Payer: MEDICARE

## 2025-01-15 ENCOUNTER — TELEPHONE (OUTPATIENT)
Dept: OTOLARYNGOLOGY | Facility: CLINIC | Age: 70
End: 2025-01-15
Payer: MEDICARE

## 2025-01-15 ENCOUNTER — TELEPHONE (OUTPATIENT)
Dept: PAIN MEDICINE | Facility: CLINIC | Age: 70
End: 2025-01-15
Payer: MEDICARE

## 2025-01-15 VITALS — HEIGHT: 70 IN | BODY MASS INDEX: 27.92 KG/M2 | WEIGHT: 195 LBS

## 2025-01-15 DIAGNOSIS — M54.17 LUMBOSACRAL RADICULOPATHY: Primary | ICD-10-CM

## 2025-01-15 DIAGNOSIS — M54.16 LUMBAR RADICULOPATHY: ICD-10-CM

## 2025-01-15 PROCEDURE — 99999 PR PBB SHADOW E&M-EST. PATIENT-LVL III: CPT | Mod: PBBFAC,,, | Performed by: STUDENT IN AN ORGANIZED HEALTH CARE EDUCATION/TRAINING PROGRAM

## 2025-01-15 PROCEDURE — 99214 OFFICE O/P EST MOD 30 MIN: CPT | Mod: S$PBB,,, | Performed by: STUDENT IN AN ORGANIZED HEALTH CARE EDUCATION/TRAINING PROGRAM

## 2025-01-15 PROCEDURE — 99213 OFFICE O/P EST LOW 20 MIN: CPT | Mod: PBBFAC,PO | Performed by: STUDENT IN AN ORGANIZED HEALTH CARE EDUCATION/TRAINING PROGRAM

## 2025-01-15 RX ORDER — SODIUM CHLORIDE, SODIUM LACTATE, POTASSIUM CHLORIDE, CALCIUM CHLORIDE 600; 310; 30; 20 MG/100ML; MG/100ML; MG/100ML; MG/100ML
INJECTION, SOLUTION INTRAVENOUS CONTINUOUS
Status: CANCELLED | OUTPATIENT
Start: 2025-01-15

## 2025-01-15 NOTE — PROGRESS NOTES
[facility] - Department    Alex, Colton GARCIA MD      First Office Visit: 6/20/24  Today' Date: 1/15/2025  Last Office Visit: 6/20/24    Chief complaint: back pain     HPI: Pt is a pleasant 69 y.o., who presents for evaluation. Referred by Dr. Johnson. Pt seen previously for back pain and L leg pain for years.  Endorsed sharp, shooting pain going down the back of the left leg to the calf.  Patient had a L TFESI L5-S1 and S1 back in July and states his left leg pain had gone away for 6 months.  Patient had >80% relief of his left leg for at least 6 months.  States he has recently had a return of the same pain that he previously had prior to the CHANEL. No BB changes. Has been seen for back pain back in Feb of this year and has been doing HEP.       Pain disability index score: 63  Pain score: 6    Relevant Imaging/ Testing:   MR L-spine 6/24 - chronic T12 and L1 compression fx, grade 1 anterolisthesis L5 on S1, L L4-5 synovial cyst and disc protrusion through annular fissure affecting L L5 and S1 nerve roots, synovial cyst affecting R L1 nerve root   CT L-spine 12/23  CT T-spine 12/23  XR L-spine complete 2/23    Procedures:   L TFESI L5-S1 and S1 7/11/24 - >80% relief for 6 mos     Date of board of pharmacy review:1/15/2025  Date of opioid risk screening/ pain psych: None  Date of opioid agreement and consent: None  Date of urine drug screen: None  Date of random pill count: None     was reviewed today: reviewed, no concerns    Prescribed medications: None    See EHR for  PMH, PSH, FH, SH, Medications and Allergy    ROS:  Positive for pain  ROS     PE:  There were no vitals filed for this visit.  General: Pleasant, no distress  HEENT: NC/ AT. PERRLA  CV: Radial pulses intact  Pulm: No distress  Ext: No edema    Physical Exam     Neuromusculoskeletal:  Head: NC, AT. PERRLA  Neck: Intact range of motions  Shoulder: Intact range of motion  Lumbar: Intact range of motion. Bilat Facet loading. Min Tenderness. Neg  SL. No pain with flexion. No pain with extension.   Hip: Intact range of motion  SI: Level  Knee: Intact range of motion  Reflexes: normal Knee  Strength: 5/5 globally   Sensory: Grossly intact   Skin: No bruising, erythema  Gait: Normal      Impression:  Back pain  L leg pain   Lumbar radiculopathy   Relevant History  BMI 30.13  RLS  Pulm fibrosis  CAD s/p stent   PVC's  H/o bladder cancer    Assessment:  Lumbar radiculopathy - disc protrusion through annular fissure at L4-5 affecting L L5 and S1 nerve roots     Plan:  Discussed options  Imaging/ relevant records viewed/ reviewed/ discussed  Imaging results viewed and reviewed (noted above)/ reviewed with patient   reviewed  Cont HEP  MR L-spine reviewed showing annular fissure at L4-5 affecting L L5 and S1 nerve roots   Repeat L TFESI L5-S1 and S1 - previously had >80% relief of L leg pain for over 6 mos  Re-eval after  Consider ILESI L4-5 if not relief from above  Spine surgery referral if necessary - pt defers for now       Prescribed medications:  1. None    The impression and plan were discussed and explained in detail. All the questions were answered. Education was provided accordingly.     The procedure was explained in detail, along with risks and potential side effects.      Follow-up:  For procedure     Ann Soto MD

## 2025-01-15 NOTE — TELEPHONE ENCOUNTER
Reviewed post activation questions with pt, his answers are as follows:  Yes, pt is using every night  Pt is on step 4  Pt does not feel that he is more rested during the day. Pt states that he thinks he just needs to continue to higher levels to make that determination.  Lele appt on 2/18, pt confirmed.

## 2025-01-15 NOTE — H&P (VIEW-ONLY)
[facility] - Department    Alex, Colton GARCIA MD      First Office Visit: 6/20/24  Today' Date: 1/15/2025  Last Office Visit: 6/20/24    Chief complaint: back pain     HPI: Pt is a pleasant 69 y.o., who presents for evaluation. Referred by Dr. Johnson. Pt seen previously for back pain and L leg pain for years.  Endorsed sharp, shooting pain going down the back of the left leg to the calf.  Patient had a L TFESI L5-S1 and S1 back in July and states his left leg pain had gone away for 6 months.  Patient had >80% relief of his left leg for at least 6 months.  States he has recently had a return of the same pain that he previously had prior to the CHANEL. No BB changes. Has been seen for back pain back in Feb of this year and has been doing HEP.       Pain disability index score: 63  Pain score: 6    Relevant Imaging/ Testing:   MR L-spine 6/24 - chronic T12 and L1 compression fx, grade 1 anterolisthesis L5 on S1, L L4-5 synovial cyst and disc protrusion through annular fissure affecting L L5 and S1 nerve roots, synovial cyst affecting R L1 nerve root   CT L-spine 12/23  CT T-spine 12/23  XR L-spine complete 2/23    Procedures:   L TFESI L5-S1 and S1 7/11/24 - >80% relief for 6 mos     Date of board of pharmacy review:1/15/2025  Date of opioid risk screening/ pain psych: None  Date of opioid agreement and consent: None  Date of urine drug screen: None  Date of random pill count: None     was reviewed today: reviewed, no concerns    Prescribed medications: None    See EHR for  PMH, PSH, FH, SH, Medications and Allergy    ROS:  Positive for pain  ROS     PE:  There were no vitals filed for this visit.  General: Pleasant, no distress  HEENT: NC/ AT. PERRLA  CV: Radial pulses intact  Pulm: No distress  Ext: No edema    Physical Exam     Neuromusculoskeletal:  Head: NC, AT. PERRLA  Neck: Intact range of motions  Shoulder: Intact range of motion  Lumbar: Intact range of motion. Bilat Facet loading. Min Tenderness. Neg  SL. No pain with flexion. No pain with extension.   Hip: Intact range of motion  SI: Level  Knee: Intact range of motion  Reflexes: normal Knee  Strength: 5/5 globally   Sensory: Grossly intact   Skin: No bruising, erythema  Gait: Normal      Impression:  Back pain  L leg pain   Lumbar radiculopathy   Relevant History  BMI 30.13  RLS  Pulm fibrosis  CAD s/p stent   PVC's  H/o bladder cancer    Assessment:  Lumbar radiculopathy - disc protrusion through annular fissure at L4-5 affecting L L5 and S1 nerve roots     Plan:  Discussed options  Imaging/ relevant records viewed/ reviewed/ discussed  Imaging results viewed and reviewed (noted above)/ reviewed with patient   reviewed  Cont HEP  MR L-spine reviewed showing annular fissure at L4-5 affecting L L5 and S1 nerve roots   Repeat L TFESI L5-S1 and S1 - previously had >80% relief of L leg pain for over 6 mos  Re-eval after  Consider ILESI L4-5 if not relief from above  Spine surgery referral if necessary - pt defers for now       Prescribed medications:  1. None    The impression and plan were discussed and explained in detail. All the questions were answered. Education was provided accordingly.     The procedure was explained in detail, along with risks and potential side effects.      Follow-up:  For procedure     Ann Soto MD

## 2025-01-15 NOTE — TELEPHONE ENCOUNTER
Types of orders made on 01/15/2025: Procedure Request      Order Date:1/15/2025   Ordering User:SYLVAIN LIVINGSTON [[id number]]   Encounter Provider:Sylvain Livingston MD [[id number]]   Authorizing Provider: Sylvian Livingston MD [[id number]]   Department:[facility] PAIN MANAGEMENT[[id number]]      Common Order Information   Procedure -> Transforaminal Injection (Specify level and laterality) Cmt: L             TFESI L5-S1 and S1 (Cov)      Order Specific Information   Order: Procedure Order to Pain Management [Custom: [id number]]  Order #:          [id number]: 1 FUTURE     Priority: Routine  Class: Clinic Performed     Future Order Information       Expires on:01/15/2026            Expected by:01/15/2025                   Associated Diagnoses       M54.17 Lumbosacral radiculopathy       Facility Name: -> [facility]          Follow-up: -> 2 weeks              Priority: Routine  Class: Clinic Performed     Future Order Information       Expires on:01/15/2026            Expected by:01/15/2025                   Associated Diagnoses       M54.17 Lumbosacral radiculopathy       Procedure -> Transforaminal Injection (Specify level and laterality) Cmt: L                 TFESI L5-S1 and S1 (Cov)

## 2025-01-15 NOTE — TELEPHONE ENCOUNTER
----- Message from Nurse Hernandez sent at 12/31/2024  8:03 AM CST -----  Regarding: Post-op phone call in 2 weeks and fu with Leel in 6-8 wks  Post-op phone call in 2 weeks and fu with Lele in 6-8 wks

## 2025-01-15 NOTE — TELEPHONE ENCOUNTER
Dr Wheat this pt is on plavix may he hold his plavix x 7 days prior to upcoming epidural procedure on or after 01/30? Thank you. Please disregard pt states he has been off of plavix x 1 month at this time.       Scheduled for 01/30 in [place] with Dr Soto.

## 2025-01-30 ENCOUNTER — HOSPITAL ENCOUNTER (OUTPATIENT)
Facility: HOSPITAL | Age: 70
Discharge: HOME | End: 2025-01-30
Attending: STUDENT IN AN ORGANIZED HEALTH CARE EDUCATION/TRAINING PROGRAM | Admitting: STUDENT IN AN ORGANIZED HEALTH CARE EDUCATION/TRAINING PROGRAM
Payer: MEDICARE

## 2025-01-30 ENCOUNTER — HOSPITAL ENCOUNTER (OUTPATIENT)
Dept: RADIOLOGY | Facility: HOSPITAL | Age: 70
Discharge: HOME | End: 2025-01-30
Attending: STUDENT IN AN ORGANIZED HEALTH CARE EDUCATION/TRAINING PROGRAM | Admitting: STUDENT IN AN ORGANIZED HEALTH CARE EDUCATION/TRAINING PROGRAM
Payer: MEDICARE

## 2025-01-30 DIAGNOSIS — M54.16 LUMBAR RADICULOPATHY: ICD-10-CM

## 2025-01-30 DIAGNOSIS — M54.17 LUMBOSACRAL RADICULOPATHY: ICD-10-CM

## 2025-01-30 DIAGNOSIS — M54.50 LOWER BACK PAIN: ICD-10-CM

## 2025-01-30 PROCEDURE — A4216 STERILE WATER/SALINE, 10 ML: HCPCS | Mod: PO | Performed by: STUDENT IN AN ORGANIZED HEALTH CARE EDUCATION/TRAINING PROGRAM

## 2025-01-30 PROCEDURE — 64483 NJX AA&/STRD TFRM EPI L/S 1: CPT | Mod: PO,LT | Performed by: STUDENT IN AN ORGANIZED HEALTH CARE EDUCATION/TRAINING PROGRAM

## 2025-01-30 PROCEDURE — 25000003 PHARM REV CODE 250: Mod: PO | Performed by: STUDENT IN AN ORGANIZED HEALTH CARE EDUCATION/TRAINING PROGRAM

## 2025-01-30 PROCEDURE — 63600175 PHARM REV CODE 636 W HCPCS: Mod: PO | Performed by: STUDENT IN AN ORGANIZED HEALTH CARE EDUCATION/TRAINING PROGRAM

## 2025-01-30 PROCEDURE — 25500020 PHARM REV CODE 255: Mod: PO | Performed by: STUDENT IN AN ORGANIZED HEALTH CARE EDUCATION/TRAINING PROGRAM

## 2025-01-30 PROCEDURE — 64484 NJX AA&/STRD TFRM EPI L/S EA: CPT | Mod: PO,LT | Performed by: STUDENT IN AN ORGANIZED HEALTH CARE EDUCATION/TRAINING PROGRAM

## 2025-01-30 PROCEDURE — 64483 NJX AA&/STRD TFRM EPI L/S 1: CPT | Mod: LT,,, | Performed by: STUDENT IN AN ORGANIZED HEALTH CARE EDUCATION/TRAINING PROGRAM

## 2025-01-30 PROCEDURE — 64484 NJX AA&/STRD TFRM EPI L/S EA: CPT | Mod: LT,,, | Performed by: STUDENT IN AN ORGANIZED HEALTH CARE EDUCATION/TRAINING PROGRAM

## 2025-01-30 RX ORDER — SODIUM CHLORIDE, SODIUM LACTATE, POTASSIUM CHLORIDE, CALCIUM CHLORIDE 600; 310; 30; 20 MG/100ML; MG/100ML; MG/100ML; MG/100ML
INJECTION, SOLUTION INTRAVENOUS CONTINUOUS
Status: DISCONTINUED | OUTPATIENT
Start: 2025-01-30 | End: 2025-01-30 | Stop reason: HOSPADM

## 2025-01-30 RX ORDER — MIDAZOLAM HYDROCHLORIDE 1 MG/ML
INJECTION INTRAMUSCULAR; INTRAVENOUS
Status: DISCONTINUED | OUTPATIENT
Start: 2025-01-30 | End: 2025-01-30 | Stop reason: HOSPADM

## 2025-01-30 RX ORDER — LIDOCAINE HYDROCHLORIDE 10 MG/ML
INJECTION, SOLUTION EPIDURAL; INFILTRATION; INTRACAUDAL; PERINEURAL
Status: DISCONTINUED | OUTPATIENT
Start: 2025-01-30 | End: 2025-01-30 | Stop reason: HOSPADM

## 2025-01-30 RX ORDER — DEXAMETHASONE SODIUM PHOSPHATE 10 MG/ML
INJECTION, SOLUTION INTRA-ARTICULAR; INTRALESIONAL; INTRAMUSCULAR; INTRAVENOUS; SOFT TISSUE
Status: DISCONTINUED | OUTPATIENT
Start: 2025-01-30 | End: 2025-01-30 | Stop reason: HOSPADM

## 2025-01-30 RX ORDER — SODIUM CHLORIDE 9 MG/ML
INJECTION, SOLUTION INTRAMUSCULAR; INTRAVENOUS; SUBCUTANEOUS
Status: DISCONTINUED | OUTPATIENT
Start: 2025-01-30 | End: 2025-01-30 | Stop reason: HOSPADM

## 2025-01-30 NOTE — DISCHARGE SUMMARY
[facility] - Surgery  Discharge Note  Short Stay    Procedure(s) (LRB):  Injection,steroid,epidural,transforaminal approach l5-s1 and s1 ( Iv sedation) (Left)      OUTCOME: Patient tolerated treatment/procedure well without complication and is now ready for discharge.    DISPOSITION: Home or Self Care    FINAL DIAGNOSIS:  <principal problem not specified>    FOLLOWUP: In clinic    DISCHARGE INSTRUCTIONS:    Discharge Procedure Orders   Notify your health care provider if you experience any of the following:  temperature >100.4     Notify your health care provider if you experience any of the following:  severe uncontrolled pain     Notify your health care provider if you experience any of the following:  redness, tenderness, or signs of infection (pain, swelling, redness, odor or green/yellow discharge around incision site)     Activity as tolerated        TIME SPENT ON DISCHARGE: 20 minutes

## 2025-01-30 NOTE — OP NOTE
Patient: Moustapha Murray                                                    MRN: [id number]  : 1955                                              Date of procedure: 2025      Pre Procedure Diagnosis: Lumbar, Lumbosacral radiculopathy    Post Procedure Diagnosis: Same    Procedure:   Transforaminal Epidural Steroid Injection Under Fluoroscopy at L L5-S1  Transforaminal Epidural Steroid Injection Under Fluoroscopy at L S1    Attending: Ann Soto MD    Local Anesthetic Injected: Lidocaine 1% 6ml    Sedation Medications: See RN note    Estimated Blood Loss: None    Complication: None        Procedure:  After informed consent was obtained, patient was taken to the fluoroscopy suite and placed in a prone position.  The skin was prepped and draped in the usual sterile fashion using chlorhexidine.  Anatomical landmarks were identified with the aid of fluoroscopy, and the skin and subcutaneous tissue overlying the neural foramen was infiltrated with 3mL of 1% Lidocaine using a 25-gauge 1.5 inch needle.  A 22-gauge 5-inch needle was advanced with the aid of fluoroscopy in an oblique view such that the needle tip entered the neural foramen.   Needle placement was confirmed with fluoroscopy in PA and Lateral views.  After a negative aspiration, 0.5mL of contrast was injected.  The contrast delineated the nerve root as well as the epidural spread.  After negative aspiration, an injectate consisting of 0.5mL of 10mg/mL of Dexamethasone, 0.5mL of 1% preservative free lidocaine and 3mL of preservative normal saline was injected.  This was repeated at the other level. Patient tolerated the procedure well and all needles were removed intact.  There were no complications.    Patient was observed in recovery and discharged home with written instructions under supervision in stable condition.

## 2025-01-31 VITALS
SYSTOLIC BLOOD PRESSURE: 104 MMHG | DIASTOLIC BLOOD PRESSURE: 74 MMHG | WEIGHT: 185 LBS | RESPIRATION RATE: 16 BRPM | OXYGEN SATURATION: 96 % | HEIGHT: 70 IN | BODY MASS INDEX: 26.48 KG/M2 | TEMPERATURE: 98 F | HEART RATE: 59 BPM

## 2025-02-10 ENCOUNTER — PATIENT MESSAGE (OUTPATIENT)
Dept: PAIN MEDICINE | Facility: CLINIC | Age: 70
End: 2025-02-10
Payer: MEDICARE

## 2025-02-11 ENCOUNTER — HOSPITAL ENCOUNTER (OUTPATIENT)
Dept: RADIOLOGY | Facility: HOSPITAL | Age: 70
Discharge: HOME | End: 2025-02-11
Attending: PODIATRIST
Payer: MEDICARE

## 2025-02-11 ENCOUNTER — OFFICE VISIT (OUTPATIENT)
Dept: PODIATRY | Facility: CLINIC | Age: 70
End: 2025-02-11
Payer: MEDICARE

## 2025-02-11 VITALS — HEIGHT: 70 IN | WEIGHT: 184.94 LBS | BODY MASS INDEX: 26.48 KG/M2

## 2025-02-11 DIAGNOSIS — L84 CORN OR CALLUS: ICD-10-CM

## 2025-02-11 DIAGNOSIS — G89.29 CHRONIC TOE PAIN, BILATERAL: ICD-10-CM

## 2025-02-11 DIAGNOSIS — M79.674 CHRONIC TOE PAIN, BILATERAL: ICD-10-CM

## 2025-02-11 DIAGNOSIS — G89.29 CHRONIC TOE PAIN, BILATERAL: Primary | ICD-10-CM

## 2025-02-11 DIAGNOSIS — M79.675 CHRONIC TOE PAIN, BILATERAL: ICD-10-CM

## 2025-02-11 DIAGNOSIS — M79.675 CHRONIC TOE PAIN, BILATERAL: Primary | ICD-10-CM

## 2025-02-11 DIAGNOSIS — M79.674 CHRONIC TOE PAIN, BILATERAL: Primary | ICD-10-CM

## 2025-02-11 DIAGNOSIS — L90.9 PLANTAR FAT PAD ATROPHY: ICD-10-CM

## 2025-02-11 DIAGNOSIS — M77.41 METATARSALGIA, RIGHT FOOT: ICD-10-CM

## 2025-02-11 PROCEDURE — 73630 X-RAY EXAM OF FOOT: CPT | Mod: 26,50,, | Performed by: STUDENT IN AN ORGANIZED HEALTH CARE EDUCATION/TRAINING PROGRAM

## 2025-02-11 PROCEDURE — 73630 X-RAY EXAM OF FOOT: CPT | Mod: TC,50,PO

## 2025-02-11 PROCEDURE — 99204 OFFICE O/P NEW MOD 45 MIN: CPT | Mod: S$PBB,,, | Performed by: PODIATRIST

## 2025-02-11 PROCEDURE — 99999 PR PBB SHADOW E&M-EST. PATIENT-LVL III: CPT | Mod: PBBFAC,,, | Performed by: PODIATRIST

## 2025-02-11 PROCEDURE — 99213 OFFICE O/P EST LOW 20 MIN: CPT | Mod: PBBFAC,25,PO | Performed by: PODIATRIST

## 2025-02-12 ENCOUNTER — TELEPHONE (OUTPATIENT)
Dept: PODIATRY | Facility: CLINIC | Age: 70
End: 2025-02-12
Payer: MEDICARE

## 2025-02-12 NOTE — TELEPHONE ENCOUNTER
Spoke with the patient to provide results per the provider's request.patient was provided with the name of [facility] as requested. He expressed understanding that he could message the provider with any questions going forward. Expressed understanding of the message.

## 2025-02-12 NOTE — TELEPHONE ENCOUNTER
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  It's definitely something that can be removed to give him relief.  Depending on when he would like to have surgery, he can begin the process of obtaining surgical clearance with his primary care doctor.  After getting clearance, we will have a 30 day window in which we can operate.  ----- Message -----  From: Interface, Rad Results In  Sent: 2/11/2025   2:28 PM CST  To: Abdirahman Tubbs DPM

## 2025-02-12 NOTE — PROGRESS NOTES
"Subjective:     Patient ID: Moustapha Murray is a 69 y.o. male.    Chief Complaint: No chief complaint on file.    Moustapha is a 69 y.o. male with a past medical history of Anticoagulant long-term use, Bladder cancer (08/2020), Colon polyps, COPD (chronic obstructive pulmonary disease), Coronary artery disease (01/31/2023), DDD (degenerative disc disease), lumbar, Depression, Fatty liver, GERD (gastroesophageal reflux disease), H/O motion sickness, Hypertension, Mediastinal lymphadenopathy (07/2024), PONV (postoperative nausea and vomiting), Pulmonary fibrosis, RLS (restless legs syndrome), and Sleep apnea, unspecified. The patient's chief complaint consists of pain calluses that develop along the medial aspect of bilateral great toe and to the medial Rt. 5th toe.  States lesions are a source of pain with all weight bearing secondary to pressure from shoe gear.  Describes as sharp and rates as a 3/10.  Symptoms are alleviated only with rest.  Has been applying moisturizer to the hallux lesions.  Notes attempting to treat the Rt. 5th toe lesion with corn remover pads with continued pain and irritation to the adjacent skin.   Also, notes the sensation of a "stone bruise" along the plantar lateral aspect of the Rt. Foot.  Symptoms are localized to the base of the 5th digit with extension proximally.  Denies sustaining trauma to the affected area.  Has attempted to wear decent shoe gear with no relief.  Denies any additional pedal complaints.      Past Medical History:   Diagnosis Date    Anticoagulant long-term use     Bladder cancer 08/2020    Colon polyps     COPD (chronic obstructive pulmonary disease)     Coronary artery disease 01/31/2023    CORONARY STENTSx3    DDD (degenerative disc disease), lumbar     Depression     Fatty liver     GERD (gastroesophageal reflux disease)     H/O motion sickness     Hypertension     Mediastinal lymphadenopathy 07/2024    PONV (postoperative nausea and vomiting)     Pulmonary " fibrosis     RLS (restless legs syndrome)     Sleep apnea, unspecified     can't use cpap       Past Surgical History:   Procedure Laterality Date    ANGIOGRAM, CORONARY, WITH LEFT HEART CATHETERIZATION N/A 1/31/2023    Procedure: Left Heart Cath;  Surgeon: Nitin Wheat MD;  Location: [facility] CATH;  Service: Cardiology;  Laterality: N/A;    ANGIOGRAM, CORONARY, WITH LEFT HEART CATHETERIZATION  4/17/2024    Procedure: Left Heart Cath;  Surgeon: Nitin Wheat MD;  Location: [facility] CATH;  Service: Cardiology;;    APPENDECTOMY      CARPAL TUNNEL RELEASE      CERVICAL FUSION      CHOLECYSTECTOMY      CIRCUMCISION      COLONOSCOPY N/A 3/16/2020    Procedure: COLONOSCOPY;  Surgeon: Braulio Best MD;  Location: [facility];  Service: Endoscopy;  Laterality: N/A; 3 colon polyps removed; biopsy: tubular adenoma and hyperplastic; repeat in 5 years for surveillance    COLONOSCOPY N/A 3/4/2024    Procedure: COLONOSCOPY;  Surgeon: Braulio Best MD;  Location: [facility];  Service: Gastroenterology;  Laterality: N/A;    CORONARY ANGIOGRAPHY  2/13/2023    Procedure: ANGIOGRAM, CORONARY ARTERY;  Surgeon: Nitin Wheat MD;  Location: [facility] CATH;  Service: Cardiology;;    CORONARY ANGIOPLASTY WITH STENT PLACEMENT N/A 1/31/2023    Procedure: ANGIOPLASTY, CORONARY ARTERY, WITH STENT INSERTION;  Surgeon: Nitin Wheat MD;  Location: [facility] CATH;  Service: Cardiology;  Laterality: N/A;    CYSTOSCOPY W/ RETROGRADES Bilateral 8/24/2020    Procedure: CYSTOSCOPY, WITH RETROGRADE PYELOGRAM -WITH URETHRAL DILATION;  Surgeon: Greg Claudio MD;  Location: [facility] OR;  Service: Urology;  Laterality: Bilateral;    CYSTOSCOPY W/ URETERAL STENT PLACEMENT Right 8/24/2020    Procedure: CYSTOSCOPY, WITH URETERAL STENT INSERTION;  Surgeon: Greg Claudio MD;  Location: [facility] OR;  Service: Urology;  Laterality: Right;    ENDOBRONCHIAL ULTRASOUND Bilateral 7/15/2024    Procedure: ENDOBRONCHIAL ULTRASOUND (EBUS);  Surgeon: Umm Arellano MD;  Location:  [facility] OR;  Service: Pulmonary;  Laterality: Bilateral;    ENDOSCOPIC ULTRASOUND OF UPPER GASTROINTESTINAL TRACT Left 5/8/2024    Procedure: ULTRASOUND, UPPER GI TRACT, ENDOSCOPIC;  Surgeon: Brad Ochoa MD;  Location: [facility] ENDO;  Service: Endoscopy;  Laterality: Left;    ESOPHAGOGASTRODUODENOSCOPY N/A 5/8/2024    Procedure: EGD (ESOPHAGOGASTRODUODENOSCOPY);  Surgeon: Brad Ochoa MD;  Location: [facility] ENDO;  Service: Endoscopy;  Laterality: N/A;    INSERTION, NEUROSTIMULATOR, HYPOGLOSSAL Right 11/22/2024    Procedure: INSERTION,NEUROSTIMULATOR,HYPOGLOSSAL;  Surgeon: Hector Lindquist MD;  Location: [facility] OR;  Service: ENT;  Laterality: Right;    INSTILLATION OF URINARY BLADDER N/A 8/24/2020    Procedure: INSTILLATION, BLADDER - Mitomycin;  Surgeon: Greg Claudio MD;  Location: [facility] OR;  Service: Urology;  Laterality: N/A;    IVUS, CORONARY  1/31/2023    Procedure: IVUS, Coronary;  Surgeon: Nitin Wheat MD;  Location: [facility] CATH;  Service: Cardiology;;    IVUS, CORONARY  2/13/2023    Procedure: IVUS, Coronary;  Surgeon: Nitin Wheat MD;  Location: [facility] CATH;  Service: Cardiology;;    PERCUTANEOUS CORONARY INTERVENTION, ARTERY  2/13/2023    Procedure: Percutaneous coronary intervention;  Surgeon: Nitin Wheat MD;  Location: [facility] CATH;  Service: Cardiology;;    RIGHT HEART CATHETERIZATION  8/21/2024    Procedure: Right heart cath;  Surgeon: Nitin Wheat MD;  Location: [facility] CATH;  Service: Cardiology;;    SLEEP ENDOSCOPY, DRUG-INDUCED Bilateral 10/23/2024    Procedure: SLEEP ENDOSCOPY,DRUG-INDUCED;  Surgeon: Hector Lindquist MD;  Location: [facility] OR;  Service: ENT;  Laterality: Bilateral;    TRANSFORAMINAL EPIDURAL INJECTION OF STEROID Right 7/17/2019    Procedure: Injection,steroid,epidural,transforaminal approach L3-4, L4-5;  Surgeon: Chris Palencia MD;  Location: [facility] OR;  Service: Pain Management;  Laterality: Right;    TRANSFORAMINAL EPIDURAL INJECTION OF STEROID Right 8/28/2019    Procedure:  Injection,steroid,epidural,transforaminal approach;  Surgeon: Chris Palencia MD;  Location: [facility] OR;  Service: Pain Management;  Laterality: Right;  L3-4, L4-L5    TRANSFORAMINAL EPIDURAL INJECTION OF STEROID Left 2024    Procedure: Injection,steroid,epidural,transforaminal approach l5-s1 and s1;  Surgeon: Ann Soto MD;  Location: [facility] OR;  Service: Anesthesiology;  Laterality: Left;    TRANSFORAMINAL EPIDURAL INJECTION OF STEROID Left 2025    Procedure: Injection,steroid,epidural,transforaminal approach l5-s1 and s1 ( Iv sedation);  Surgeon: Ann Soto MD;  Location: [facility] OR;  Service: Pain Management;  Laterality: Left;    WISDOM TOOTH EXTRACTION         Family History   Problem Relation Name Age of Onset    Stroke Mother      Ulcerative colitis Mother      Kidney cancer Mother      Heart disease Father      Heart attack Father      Cancer Neg Hx      Colon cancer Neg Hx      Crohn's disease Neg Hx      Celiac disease Neg Hx         Social History     Socioeconomic History    Marital status:    Occupational History    Occupation:    Tobacco Use    Smoking status: Former     Current packs/day: 0.00     Types: Cigarettes     Quit date: 10/16/2013     Years since quittin.3    Smokeless tobacco: Never   Substance and Sexual Activity    Alcohol use: Yes     Alcohol/week: 2.0 standard drinks of alcohol     Types: 2 Cans of beer per week     Comment: couple beers weekly    Drug use: Not Currently     Social Drivers of Health     Financial Resource Strain: Low Risk  (2024)    Overall Financial Resource Strain (CARDIA)     Difficulty of Paying Living Expenses: Not hard at all   Food Insecurity: No Food Insecurity (2024)    Hunger Vital Sign     Worried About Running Out of Food in the Last Year: Never true     Ran Out of Food in the Last Year: Never true   Transportation Needs: No Transportation Needs (2023)    PRAPARE - Transportation     Lack of Transportation  (Medical): No     Lack of Transportation (Non-Medical): No   Physical Activity: Unknown (12/8/2024)    Exercise Vital Sign     Days of Exercise per Week: 0 days   Stress: Stress Concern Present (12/8/2024)    [employer] [place] [employer] - Occupational Stress Questionnaire     Feeling of Stress : To some extent   Housing Stability: Low Risk  (12/4/2023)    Housing Stability Vital Sign     Unable to Pay for Housing in the Last Year: No     Number of Places Lived in the Last Year: 1     Unstable Housing in the Last Year: No       Current Outpatient Medications   Medication Sig Dispense Refill    albuterol (PROVENTIL/VENTOLIN HFA) 90 mcg/actuation inhaler Inhale 2 puffs into the lungs every 6 (six) hours as needed for Wheezing. Rescue 18 g 6    aspirin 81 MG Chew Take 1 tablet (81 mg total) by mouth once daily. 90 tablet 3    budesonide (PULMICORT) 0.5 mg/2 mL nebulizer solution Take 2 mLs (0.5 mg total) by nebulization 2 (two) times daily. Controller 120 mL 3    droNABinol (MARINOL) 2.5 MG capsule Take 1 capsule (2.5 mg total) by mouth 2 (two) times daily before meals. 60 capsule 3    EScitalopram oxalate (LEXAPRO) 10 MG tablet TAKE 1 TABLET BY MOUTH EVERY DAY 90 tablet 3    formoterol (PERFOROMIST) 20 mcg/2 mL Nebu Take 2 mLs (20 mcg total) by nebulization 2 (two) times a day. Controller 90 mL 3    ibuprofen (ADVIL,MOTRIN) 600 MG tablet Take 1 tablet (600 mg total) by mouth every 6 (six) hours as needed for Pain. 20 tablet 0    melatonin 5 mg Cap Take 5 mg by mouth nightly as needed. TAKE AS SCHEDULED      nintedanib (OFEV) 150 mg Cap Take 1 capsule (150 mg total) by mouth 2 (two) times a day. 60 capsule 6    ondansetron (ZOFRAN-ODT) 4 MG TbDL Take 1 tablet (4 mg total) by mouth every 6 (six) hours as needed (Nausea). 10 tablet 0    pantoprazole (PROTONIX) 40 MG tablet TAKE 1 TABLET BY MOUTH EVERY DAY 90 tablet 3    pregabalin (LYRICA) 75 MG capsule Take 1 capsule (75 mg total) by mouth every evening. 30  capsule 2    rosuvastatin (CRESTOR) 40 MG Tab TAKE 1 TABLET BY MOUTH EVERY DAY 90 tablet 3    torsemide (DEMADEX) 10 MG Tab Take 1 tablet (10 mg total) by mouth once daily. 30 tablet 11    traMADoL (ULTRAM) 50 mg tablet Take 1 tablet (50 mg total) by mouth every 24 hours as needed for Pain. 30 tablet 2    triamcinolone acetonide 0.1% (KENALOG) 0.1 % cream APPLY TOPICALLY 2 TIMES DAILY AS NEEDED. 45 g 5    verapamiL (CALAN-SR) 240 MG CR tablet Take 1 tablet (240 mg total) by mouth every evening. 30 tablet 11    clopidogreL (PLAVIX) 75 mg tablet Take 75 mg by mouth once daily.       Current Facility-Administered Medications   Medication Dose Route Frequency Provider Last Rate Last Admin    sodium chloride 0.9% flush 10 mL  10 mL Intravenous PRN Nitin Wheat MD         Facility-Administered Medications Ordered in Other Visits   Medication Dose Route Frequency Provider Last Rate Last Admin    ondansetron injection 4 mg  4 mg Intravenous Daily PRN Gustavo Chu MD        sodium chloride 0.9% flush 10 mL  10 mL Intravenous PRN Braulio Best MD   5 mL at 10/23/24 1130       Review of patient's allergies indicates:   Allergen Reactions    Sulfa (sulfonamide antibiotics) Anaphylaxis        Hemoglobin A1C   Date Value Ref Range Status   12/11/2020 5.9 (H) 4.0 - 5.6 % Final     Comment:     ADA Screening Guidelines:  5.7-6.4%  Consistent with prediabetes  >or=6.5%  Consistent with diabetes  High levels of fetal hemoglobin interfere with the HbA1C  assay. Heterozygous hemoglobin variants (HbS, HgC, etc)do  not significantly interfere with this assay.   However, presence of multiple variants may affect accuracy.     10/24/2019 5.8 (H) 4.0 - 5.6 % Final     Comment:     ADA Screening Guidelines:  5.7-6.4%  Consistent with prediabetes  >or=6.5%  Consistent with diabetes  High levels of fetal hemoglobin interfere with the HbA1C  assay. Heterozygous hemoglobin variants (HbS, HgC, etc)do  not significantly interfere with  this assay.   However, presence of multiple variants may affect accuracy.     11/06/2018 5.9 (H) 4.0 - 5.6 % Final     Comment:     ADA Screening Guidelines:  5.7-6.4%  Consistent with prediabetes  >or=6.5%  Consistent with diabetes  High levels of fetal hemoglobin interfere with the HbA1C  assay. Heterozygous hemoglobin variants (HbS, HgC, etc)do  not significantly interfere with this assay.   However, presence of multiple variants may affect accuracy.         Review of Systems   Constitutional: Negative for chills and fever.   Skin:  Positive for color change, nail changes and suspicious lesions.   Musculoskeletal:  Positive for joint pain. Negative for muscle cramps and muscle weakness.   Gastrointestinal:  Negative for nausea and vomiting.   Neurological:  Negative for numbness and paresthesias.   Psychiatric/Behavioral:  Negative for altered mental status.         Objective:     Physical Exam  Constitutional:       Appearance: Normal appearance. He is not ill-appearing.   Cardiovascular:      Pulses:           Dorsalis pedis pulses are 2+ on the right side and 2+ on the left side.        Posterior tibial pulses are 2+ on the right side and 2+ on the left side.      Comments: CFT is < 3 seconds bilateral.  Pedal hair growth is present bilateral.  No lower extremity edema noted bilateral.  Toes are warm to touch bilateral.    Musculoskeletal:         General: Tenderness and deformity present. No signs of injury.      Comments: Muscle strength 5/5 in all muscle groups bilateral.  No tenderness nor crepitation with ROM of foot/ankle joints bilateral.  Bilateral forefoot fat pad atrophy.  Pain with palpation to the Rt. Sub 5th met head.  Pain with palpation to lesions of bilateral great toe with underlying palpable bony prominence.  Rt. 5th adductovarus toe deformity.     Skin:     Capillary Refill: Capillary refill takes 2 to 3 seconds.      Findings: Lesion present. No bruising, ecchymosis, erythema, signs of  injury, laceration, petechiae, rash or wound.      Comments: Pedal skin has normal turgor, temperature, and texture bilateral.  Toenails x 10 appear mycotic.  Hyperkeratotic lesion noted to the Rt. Medial 5th toe and bilateral medial hallux.        Neurological:      General: No focal deficit present.      Mental Status: He is alert.      Sensory: No sensory deficit.      Motor: No weakness or atrophy.      Comments: Light touch is intact bilateral.             Assessment:      Encounter Diagnoses   Name Primary?    Chronic toe pain, bilateral Yes    Corn or callus     Plantar fat pad atrophy     Metatarsalgia, right foot      Plan:     Diagnoses and all orders for this visit:    Chronic toe pain, bilateral  -     X-Ray Foot Complete 3 view Bilateral; Future    Corn or callus    Plantar fat pad atrophy    Metatarsalgia, right foot      I counseled the patient on his conditions, their implications and medical management.    Orders written for x-rays of bilateral foot.  Personally evaluated plain films which are positive for accessory bones/sesamoids along the medial condyle of bilateral IP joint of the hallux.  Adductovarus deformity of the Rt. 5th toe noted.    Fitted and dispensed silicone toe spacers to offload the Rt. 4th webspace.  He may even use a cotton ball to offload the area.    Briefly discussed applying an occlusive dressing with ebanel to all areas of callus build up daily x 3 weeks to soften the skin.    Briefly discussed a derotational arthroplasty of the 5th PIP joint should this continue to be an issue.    Regarding Rt. Sub 5th metatarsalgia, I recommend Powerstep Jackman orthotics for metatarsalgia to offload the area.    Lastly, recommend surgical excision of accessory bones of bilateral great toe, on an outpatient basis, under local MAC.    If the patient would like to go forward with said procedure, he is to begin the process of obtaining clearance per his PCP.    Will place a case request once  clearance has been obtained.    Staff will call and discuss with him further.    RTC prn.    Abdirahman Tubbs DPM

## 2025-02-13 ENCOUNTER — PATIENT MESSAGE (OUTPATIENT)
Dept: PODIATRY | Facility: CLINIC | Age: 70
End: 2025-02-13
Payer: MEDICARE

## 2025-02-13 ENCOUNTER — PATIENT MESSAGE (OUTPATIENT)
Dept: RHEUMATOLOGY | Facility: CLINIC | Age: 70
End: 2025-02-13
Payer: MEDICARE

## 2025-02-13 ENCOUNTER — PATIENT MESSAGE (OUTPATIENT)
Dept: NEUROLOGY | Facility: CLINIC | Age: 70
End: 2025-02-13
Payer: MEDICARE

## 2025-02-13 DIAGNOSIS — G25.81 RESTLESS LEGS SYNDROME (RLS): ICD-10-CM

## 2025-02-14 RX ORDER — PREGABALIN 75 MG/1
75 CAPSULE ORAL NIGHTLY
Qty: 90 CAPSULE | Refills: 0 | Status: SHIPPED | OUTPATIENT
Start: 2025-02-14 | End: 2025-02-17 | Stop reason: SDUPTHER

## 2025-02-17 DIAGNOSIS — G25.81 RESTLESS LEGS SYNDROME (RLS): ICD-10-CM

## 2025-02-18 RX ORDER — PREGABALIN 75 MG/1
75 CAPSULE ORAL NIGHTLY
Qty: 90 CAPSULE | Refills: 0 | Status: SHIPPED | OUTPATIENT
Start: 2025-02-18 | End: 2025-05-22

## 2025-02-22 DIAGNOSIS — Z00.00 ENCOUNTER FOR MEDICARE ANNUAL WELLNESS EXAM: ICD-10-CM

## 2025-03-06 ENCOUNTER — TELEPHONE (OUTPATIENT)
Dept: PAIN MEDICINE | Facility: CLINIC | Age: 70
End: 2025-03-06
Payer: MEDICARE

## 2025-03-06 NOTE — TELEPHONE ENCOUNTER
Spoke with this patient and he said he just realized he missed his appointment for today and wants to reschedule so I got him scheduled and also put on the wait list.

## 2025-03-12 ENCOUNTER — HOSPITAL ENCOUNTER (OUTPATIENT)
Dept: RADIOLOGY | Facility: HOSPITAL | Age: 70
Discharge: HOME | End: 2025-03-12
Attending: INTERNAL MEDICINE
Payer: MEDICARE

## 2025-03-12 DIAGNOSIS — M54.2 CERVICALGIA: ICD-10-CM

## 2025-03-12 DIAGNOSIS — M54.2 CERVICALGIA: Primary | ICD-10-CM

## 2025-03-12 PROCEDURE — 70360 X-RAY EXAM OF NECK: CPT | Mod: TC,FY,PO

## 2025-03-12 PROCEDURE — 71046 X-RAY EXAM CHEST 2 VIEWS: CPT | Mod: 26,,, | Performed by: RADIOLOGY

## 2025-03-12 PROCEDURE — 70360 X-RAY EXAM OF NECK: CPT | Mod: 26,,, | Performed by: RADIOLOGY

## 2025-03-12 PROCEDURE — 71046 X-RAY EXAM CHEST 2 VIEWS: CPT | Mod: TC,FY,PO

## 2025-03-13 ENCOUNTER — OFFICE VISIT (OUTPATIENT)
Dept: PAIN MEDICINE | Facility: CLINIC | Age: 70
End: 2025-03-13
Payer: MEDICARE

## 2025-03-13 VITALS — BODY MASS INDEX: 26.34 KG/M2 | HEIGHT: 70 IN | WEIGHT: 184 LBS

## 2025-03-13 DIAGNOSIS — M54.16 LUMBAR RADICULOPATHY: Primary | ICD-10-CM

## 2025-03-13 PROCEDURE — 99213 OFFICE O/P EST LOW 20 MIN: CPT | Mod: PBBFAC,PO | Performed by: STUDENT IN AN ORGANIZED HEALTH CARE EDUCATION/TRAINING PROGRAM

## 2025-03-13 PROCEDURE — 99999 PR PBB SHADOW E&M-EST. PATIENT-LVL III: CPT | Mod: PBBFAC,,, | Performed by: STUDENT IN AN ORGANIZED HEALTH CARE EDUCATION/TRAINING PROGRAM

## 2025-03-13 NOTE — H&P (VIEW-ONLY)
[place] - Department    Colton Johnson MD      First Office Visit: 6/20/24  Today' Date: 3/13/2025  Last Office Visit: 1/15/25    Chief complaint: back pain     HPI: Pt is a pleasant 69 y.o., who presents for evaluation. Referred by Dr. Johnson. Pt seen previously for back pain and L leg pain for years.  Endorsed sharp, shooting pain going down the back of the left leg to the calf.  Patient had repeat L TFESI L5-S1 and S1 since the first CHANEL helped significantly with the pain. States it did not help as uch this time around. Still continues to have sharp, shooting pain going down the back of the L leg to the calf. No BB changes. Is doing HEP.       Pain disability index score: 63  Pain score: 6    Relevant Imaging/ Testing:   MR L-spine 6/24 - chronic T12 and L1 compression fx, grade 1 anterolisthesis L5 on S1, L L4-5 synovial cyst and disc protrusion through annular fissure affecting L L5 and S1 nerve roots, synovial cyst affecting R L1 nerve root   CT L-spine 12/23  CT T-spine 12/23  XR L-spine complete 2/23    Procedures:   L TFESI L5-S1 and S1 7/11/24, 1/30/25    Date of board of pharmacy review:3/13/2025  Date of opioid risk screening/ pain psych: None  Date of opioid agreement and consent: None  Date of urine drug screen: None  Date of random pill count: None     was reviewed today: reviewed, no concerns    Prescribed medications: None    See EHR for  PMH, PSH, FH, SH, Medications and Allergy    ROS:  Positive for pain  ROS     PE:  There were no vitals filed for this visit.  General: Pleasant, no distress  HEENT: NC/ AT. PERRLA  CV: Radial pulses intact  Pulm: No distress  Ext: No edema    Physical Exam     Neuromusculoskeletal:  Head: NC, AT. PERRLA  Neck: Intact range of motions  Shoulder: Intact range of motion  Lumbar: Intact range of motion. Bilat Facet loading. Min Tenderness. Neg SL. No pain with flexion. No pain with extension.   Hip: Intact range of motion  SI: Level  Knee: Intact  range of motion  Reflexes: normal Knee  Strength: 5/5 globally   Sensory: Grossly intact   Skin: No bruising, erythema  Gait: Normal      Impression:  Back pain  L leg pain   Lumbar radiculopathy   Relevant History  BMI 30.13  RLS  Pulm fibrosis  CAD s/p stent   PVC's  H/o bladder cancer    Assessment:  Lumbar radiculopathy - disc protrusion through annular fissure at L4-5 affecting L L5 and S1 nerve roots     Plan:  Discussed options  Imaging/ relevant records viewed/ reviewed/ discussed  Imaging results viewed and reviewed (noted above)/ reviewed with patient   reviewed  Cont HEP  ILESI L4-5  - pt understands risks including bleeding, infection, damage to surrounding tissue.   Re-eval after  Spine surgery referral if no response to CHANEL       Prescribed medications:  1. None    The impression and plan were discussed and explained in detail. All the questions were answered. Education was provided accordingly.     The procedure was explained in detail, along with risks and potential side effects.      Follow-up:  For procedure     Ann Soto MD

## 2025-03-13 NOTE — PROGRESS NOTES
[place] - Department    Colton Johnson MD      First Office Visit: 6/20/24  Today' Date: 3/13/2025  Last Office Visit: 1/15/25    Chief complaint: back pain     HPI: Pt is a pleasant 69 y.o., who presents for evaluation. Referred by Dr. Johnson. Pt seen previously for back pain and L leg pain for years.  Endorsed sharp, shooting pain going down the back of the left leg to the calf.  Patient had repeat L TFESI L5-S1 and S1 since the first CHANEL helped significantly with the pain. States it did not help as uch this time around. Still continues to have sharp, shooting pain going down the back of the L leg to the calf. No BB changes. Is doing HEP.       Pain disability index score: 63  Pain score: 6    Relevant Imaging/ Testing:   MR L-spine 6/24 - chronic T12 and L1 compression fx, grade 1 anterolisthesis L5 on S1, L L4-5 synovial cyst and disc protrusion through annular fissure affecting L L5 and S1 nerve roots, synovial cyst affecting R L1 nerve root   CT L-spine 12/23  CT T-spine 12/23  XR L-spine complete 2/23    Procedures:   L TFESI L5-S1 and S1 7/11/24, 1/30/25    Date of board of pharmacy review:3/13/2025  Date of opioid risk screening/ pain psych: None  Date of opioid agreement and consent: None  Date of urine drug screen: None  Date of random pill count: None     was reviewed today: reviewed, no concerns    Prescribed medications: None    See EHR for  PMH, PSH, FH, SH, Medications and Allergy    ROS:  Positive for pain  ROS     PE:  There were no vitals filed for this visit.  General: Pleasant, no distress  HEENT: NC/ AT. PERRLA  CV: Radial pulses intact  Pulm: No distress  Ext: No edema    Physical Exam     Neuromusculoskeletal:  Head: NC, AT. PERRLA  Neck: Intact range of motions  Shoulder: Intact range of motion  Lumbar: Intact range of motion. Bilat Facet loading. Min Tenderness. Neg SL. No pain with flexion. No pain with extension.   Hip: Intact range of motion  SI: Level  Knee: Intact  range of motion  Reflexes: normal Knee  Strength: 5/5 globally   Sensory: Grossly intact   Skin: No bruising, erythema  Gait: Normal      Impression:  Back pain  L leg pain   Lumbar radiculopathy   Relevant History  BMI 30.13  RLS  Pulm fibrosis  CAD s/p stent   PVC's  H/o bladder cancer    Assessment:  Lumbar radiculopathy - disc protrusion through annular fissure at L4-5 affecting L L5 and S1 nerve roots     Plan:  Discussed options  Imaging/ relevant records viewed/ reviewed/ discussed  Imaging results viewed and reviewed (noted above)/ reviewed with patient   reviewed  Cont HEP  ILESI L4-5  - pt understands risks including bleeding, infection, damage to surrounding tissue.   Re-eval after  Spine surgery referral if no response to CHANEL       Prescribed medications:  1. None    The impression and plan were discussed and explained in detail. All the questions were answered. Education was provided accordingly.     The procedure was explained in detail, along with risks and potential side effects.      Follow-up:  For procedure     Ann Soto MD

## 2025-03-14 ENCOUNTER — TELEPHONE (OUTPATIENT)
Dept: PAIN MEDICINE | Facility: CLINIC | Age: 70
End: 2025-03-14
Payer: MEDICARE

## 2025-03-14 DIAGNOSIS — M54.16 LUMBAR RADICULOPATHY: Primary | ICD-10-CM

## 2025-03-14 RX ORDER — ALPRAZOLAM 1 MG/1
1 TABLET, ORALLY DISINTEGRATING ORAL ONCE AS NEEDED
OUTPATIENT
Start: 2025-03-14 | End: 2036-08-10

## 2025-03-14 NOTE — TELEPHONE ENCOUNTER
----- Message from Sylvain Livingston MD sent at 3/13/2025  2:01 PM CDT -----  Regarding: Order for GULSHAN HERNANDEZ    Patient Name: GULSHAN HERNANDEZ([id number])  Sex: Male  : 1955      PCP: KELVIN LUNA    Center: [facility] CENTRAL BILLING OFFICE     Types of orders made on 2025: Procedure Request    Order Date:3/13/2025  Ordering User:SYLVAIN LIVINGSTON [[id number]]  Encounter Provider:Sylvain Livingston MD [[id number]]  Authorizing Provider: Sylvain Livingston MD [[id number]]  Department:[facility] PAIN MANAGEMENT[[id number]]    Common Order Information  Procedure -> Epidural Injection (specify level) Cmt: ILESI L4-5    Order Specific Information  Order: Procedure Request Order for Pain Management [Custom: [id number]]  Order #:          [id number]: 1 FUTURE    Priority: Routine  Class: Clinic Performed    Future Order Information      Expires on:2026            Expected by:2025                   Associated Diagnoses      M54.16 Lumbar radiculopathy      Facility Name: -> [facility]         Follow-up: -> 2 weeks           Priority: Routine  Class: Clinic Performed    Future Order Information      Expires on:2026            Expected by:2025                   Associated Diagnoses      M54.16 Lumbar radiculopathy      Procedure -> Epidural Injection (specify level) Cmt: ILESI L4-5        Facility Name: -> [facility]         Follow-up: -> 2 weeks

## 2025-03-14 NOTE — TELEPHONE ENCOUNTER
Hi! Patient is scheduled for a lumbar CHANEL on 3/20 with Dr. Soto. Is patient cleared to hold ASA x 5 days prior for scheduled epidural steroid injection? Thanks.

## 2025-03-14 NOTE — TELEPHONE ENCOUNTER
Spoke with patient and scheduled for a lumbar CHANEL on 3/20 with Dr. Soto in [place]. Patient to hold ASA x 5 days prior. Message to PCP for clearance. Pre op information given to patient and follow up appointment scheduled.

## 2025-03-18 ENCOUNTER — TELEPHONE (OUTPATIENT)
Dept: SURGERY | Facility: HOSPITAL | Age: 70
End: 2025-03-18
Payer: MEDICARE

## 2025-03-18 NOTE — TELEPHONE ENCOUNTER
Pt is scheduled for Lumbar CHANEL L4/5 on Thurs., 3/20/2025. Pt was instructed to HOLD ASA for 5 days prior to procedure. Pt's last dose of ASA was Mon., 3/17/2025. Please advise if this will interfere with scheduled procedure. Thank you.

## 2025-03-27 ENCOUNTER — HOSPITAL ENCOUNTER (OUTPATIENT)
Dept: RADIOLOGY | Facility: HOSPITAL | Age: 70
Discharge: HOME | End: 2025-03-27
Attending: STUDENT IN AN ORGANIZED HEALTH CARE EDUCATION/TRAINING PROGRAM
Payer: MEDICARE

## 2025-03-27 ENCOUNTER — HOSPITAL ENCOUNTER (OUTPATIENT)
Facility: HOSPITAL | Age: 70
Discharge: HOME | End: 2025-03-27
Attending: STUDENT IN AN ORGANIZED HEALTH CARE EDUCATION/TRAINING PROGRAM | Admitting: STUDENT IN AN ORGANIZED HEALTH CARE EDUCATION/TRAINING PROGRAM
Payer: MEDICARE

## 2025-03-27 DIAGNOSIS — M54.50 LOWER BACK PAIN: ICD-10-CM

## 2025-03-27 DIAGNOSIS — M54.16 LUMBAR RADICULOPATHY: ICD-10-CM

## 2025-03-27 PROCEDURE — 62323 NJX INTERLAMINAR LMBR/SAC: CPT | Mod: ,,, | Performed by: STUDENT IN AN ORGANIZED HEALTH CARE EDUCATION/TRAINING PROGRAM

## 2025-03-27 PROCEDURE — 63600175 PHARM REV CODE 636 W HCPCS: Mod: PO | Performed by: STUDENT IN AN ORGANIZED HEALTH CARE EDUCATION/TRAINING PROGRAM

## 2025-03-27 PROCEDURE — 62323 NJX INTERLAMINAR LMBR/SAC: CPT | Mod: PO | Performed by: STUDENT IN AN ORGANIZED HEALTH CARE EDUCATION/TRAINING PROGRAM

## 2025-03-27 PROCEDURE — 25000003 PHARM REV CODE 250: Mod: PO | Performed by: STUDENT IN AN ORGANIZED HEALTH CARE EDUCATION/TRAINING PROGRAM

## 2025-03-27 PROCEDURE — A4216 STERILE WATER/SALINE, 10 ML: HCPCS | Mod: PO | Performed by: STUDENT IN AN ORGANIZED HEALTH CARE EDUCATION/TRAINING PROGRAM

## 2025-03-27 PROCEDURE — 25500020 PHARM REV CODE 255: Mod: PO | Performed by: STUDENT IN AN ORGANIZED HEALTH CARE EDUCATION/TRAINING PROGRAM

## 2025-03-27 RX ORDER — ACETAMINOPHEN 500 MG
1000 TABLET ORAL EVERY 6 HOURS PRN
Status: DISCONTINUED | OUTPATIENT
Start: 2025-03-27 | End: 2025-03-27 | Stop reason: HOSPADM

## 2025-03-27 RX ORDER — MIDAZOLAM HYDROCHLORIDE 1 MG/ML
INJECTION INTRAMUSCULAR; INTRAVENOUS
Status: DISCONTINUED | OUTPATIENT
Start: 2025-03-27 | End: 2025-03-27 | Stop reason: HOSPADM

## 2025-03-27 RX ORDER — LIDOCAINE HYDROCHLORIDE 10 MG/ML
INJECTION, SOLUTION EPIDURAL; INFILTRATION; INTRACAUDAL; PERINEURAL
Status: DISCONTINUED | OUTPATIENT
Start: 2025-03-27 | End: 2025-03-27 | Stop reason: HOSPADM

## 2025-03-27 RX ORDER — ALPRAZOLAM 0.5 MG/1
1 TABLET, ORALLY DISINTEGRATING ORAL ONCE AS NEEDED
Status: DISCONTINUED | OUTPATIENT
Start: 2025-03-27 | End: 2025-03-27 | Stop reason: HOSPADM

## 2025-03-27 RX ORDER — DEXAMETHASONE SODIUM PHOSPHATE 10 MG/ML
INJECTION, SOLUTION INTRA-ARTICULAR; INTRALESIONAL; INTRAMUSCULAR; INTRAVENOUS; SOFT TISSUE
Status: DISCONTINUED | OUTPATIENT
Start: 2025-03-27 | End: 2025-03-27 | Stop reason: HOSPADM

## 2025-03-27 RX ORDER — SODIUM CHLORIDE 9 MG/ML
INJECTION, SOLUTION INTRAMUSCULAR; INTRAVENOUS; SUBCUTANEOUS
Status: DISCONTINUED | OUTPATIENT
Start: 2025-03-27 | End: 2025-03-27 | Stop reason: HOSPADM

## 2025-03-27 RX ADMIN — ACETAMINOPHEN 1000 MG: 500 TABLET ORAL at 10:03

## 2025-03-27 NOTE — OP NOTE
Patient: Moustapha Murray                                                    MRN: [id number]  : 1955                                              Date of procedure: 3/27/2025    Pre Procedure Diagnosis: Lumbar, lumbosacral radiculopathy    Post Procedure Diagnosis: Same    Procedure: Lumbar Epidural Steroid Injection Under Fluoroscopy at L4-5    Attending: Ann Soto MD    Local Anesthetic Injected: Lidocaine 1% 3 ml    Sedation Medications: See RN note    Estimated Blood Loss: None    Complication: Dural puncture         Procedure:  After informed consent was obtained, patient was taken to the fluoroscopy suite and placed in a prone position.  The skin was prepped and draped in the usual sterile fashion using chlorhexidine. The skin and subcutaneous tissue overlying the Lumbar vertebral level was infiltrated with 3mLs of 1% Lidocaine using a 25 gauge 1.5 inch needle.  The 20-gauge Tuoehy needle was advanced with the aid of fluoroscopy using the water drop loss of resistance technique at the L4-5 interspinous space using an intralaminer approach.  Proper placement into the epidural space was confirmed by the loss of resistance.  There was no CSF, heme or paresthesias.  After negative aspiration, 0.5mL of contrast was injected.  The contrast confirmed the needle placement by spread delineating the epidural space in multiple views.  Then after negative aspiration, an injectate consisting of 6mLs of 0.5mL 10mg/mL of Dexamethasone, 0.5mL of preservative free lidocaine and 5mL of preservative free normal saline was injected.  Patient tolerated the procedure well and all needles were removed intact.  There were no complications.    Patient was observed in recovery and discharged home under supervision with discharge instructions in stable condition.

## 2025-03-27 NOTE — DISCHARGE SUMMARY
[facility] - Surgery  Discharge Note  Short Stay    Procedure(s) (LRB):  Injection-steroid-epidural-lumbar   L4/5 (N/A)      OUTCOME: Patient tolerated treatment/procedure well without complication and is now ready for discharge.    DISPOSITION: Home or Self Care    FINAL DIAGNOSIS:  <principal problem not specified>    FOLLOWUP: In clinic    DISCHARGE INSTRUCTIONS:    Discharge Procedure Orders   Notify your health care provider if you experience any of the following:  temperature >100.4     Notify your health care provider if you experience any of the following:  severe uncontrolled pain     Notify your health care provider if you experience any of the following:  redness, tenderness, or signs of infection (pain, swelling, redness, odor or green/yellow discharge around incision site)     Activity as tolerated        TIME SPENT ON DISCHARGE: 20 minutes

## 2025-03-28 ENCOUNTER — TELEPHONE (OUTPATIENT)
Dept: SURGERY | Facility: HOSPITAL | Age: 70
End: 2025-03-28
Payer: MEDICARE

## 2025-03-28 VITALS
DIASTOLIC BLOOD PRESSURE: 62 MMHG | OXYGEN SATURATION: 97 % | WEIGHT: 184 LBS | RESPIRATION RATE: 16 BRPM | HEIGHT: 70 IN | TEMPERATURE: 98 F | BODY MASS INDEX: 26.34 KG/M2 | HEART RATE: 60 BPM | SYSTOLIC BLOOD PRESSURE: 128 MMHG

## 2025-03-28 NOTE — TELEPHONE ENCOUNTER
During post op call, patient is still stating he's having a headache that's relieved when he lies flat. He's been taking tylenol for the HA, and he says it's not as debilitating as yesterday but still bad. Please reach out to patient for follow-up.    Intermediate Repair Preamble Text (Leave Blank If You Do Not Want): To reduce the postoperative risks of hemorrhage, scarring, and infection, an intermediate linear repair was deemed to be medically necessary.

## 2025-03-28 NOTE — TELEPHONE ENCOUNTER
Spoke with pt, headache is improved since yesterday and still improving. Recommended continuing fluids, tylenol/ibuprofen, and caffeine. Also discussed epidural blood patch if not improving. KARLA

## 2025-04-03 ENCOUNTER — LAB VISIT (OUTPATIENT)
Dept: LAB | Facility: HOSPITAL | Age: 70
End: 2025-04-03
Payer: MEDICARE

## 2025-04-03 DIAGNOSIS — R76.8 ELEVATED SERUM IMMUNOGLOBULIN FREE LIGHT CHAIN LEVEL: ICD-10-CM

## 2025-04-03 LAB
ABSOLUTE EOSINOPHIL (OHS): 0.46 K/UL
ABSOLUTE MONOCYTE (OHS): 0.93 K/UL (ref 0.3–1)
ABSOLUTE NEUTROPHIL COUNT (OHS): 5.85 K/UL (ref 1.8–7.7)
ALBUMIN SERPL BCP-MCNC: 3.6 G/DL (ref 3.5–5.2)
ALP SERPL-CCNC: 105 UNIT/L (ref 40–150)
ALT SERPL W/O P-5'-P-CCNC: 21 UNIT/L (ref 10–44)
ANION GAP (OHS): 9 MMOL/L (ref 8–16)
AST SERPL-CCNC: 22 UNIT/L (ref 11–45)
BASOPHILS # BLD AUTO: 0.06 K/UL
BASOPHILS NFR BLD AUTO: 0.7 %
BILIRUB SERPL-MCNC: 0.8 MG/DL (ref 0.1–1)
BUN SERPL-MCNC: 17 MG/DL (ref 8–23)
CALCIUM SERPL-MCNC: 9.2 MG/DL (ref 8.7–10.5)
CHLORIDE SERPL-SCNC: 104 MMOL/L (ref 95–110)
CO2 SERPL-SCNC: 23 MMOL/L (ref 23–29)
CREAT SERPL-MCNC: 1.2 MG/DL (ref 0.5–1.4)
ERYTHROCYTE [DISTWIDTH] IN BLOOD BY AUTOMATED COUNT: 13.2 % (ref 11.5–14.5)
GFR SERPLBLD CREATININE-BSD FMLA CKD-EPI: >60 ML/MIN/1.73/M2
GLUCOSE SERPL-MCNC: 98 MG/DL (ref 70–110)
HCT VFR BLD AUTO: 39.9 % (ref 40–54)
HGB BLD-MCNC: 13.7 GM/DL (ref 14–18)
IMM GRANULOCYTES # BLD AUTO: 0.03 K/UL (ref 0–0.04)
IMM GRANULOCYTES NFR BLD AUTO: 0.3 % (ref 0–0.5)
LYMPHOCYTES # BLD AUTO: 1.68 K/UL (ref 1–4.8)
MCH RBC QN AUTO: 33.5 PG (ref 27–31)
MCHC RBC AUTO-ENTMCNC: 34.3 G/DL (ref 32–36)
MCV RBC AUTO: 98 FL (ref 82–98)
NUCLEATED RBC (/100WBC) (OHS): 0 /100 WBC
PLATELET # BLD AUTO: 205 K/UL (ref 150–450)
PMV BLD AUTO: 10.1 FL (ref 9.2–12.9)
POTASSIUM SERPL-SCNC: 4.3 MMOL/L (ref 3.5–5.1)
PROT SERPL-MCNC: 6.5 GM/DL (ref 6–8.4)
RBC # BLD AUTO: 4.09 M/UL (ref 4.6–6.2)
RELATIVE EOSINOPHIL (OHS): 5.1 %
RELATIVE LYMPHOCYTE (OHS): 18.6 % (ref 18–48)
RELATIVE MONOCYTE (OHS): 10.3 % (ref 4–15)
RELATIVE NEUTROPHIL (OHS): 65 % (ref 38–73)
SODIUM SERPL-SCNC: 136 MMOL/L (ref 136–145)
WBC # BLD AUTO: 9.01 K/UL (ref 3.9–12.7)

## 2025-04-03 PROCEDURE — 86334 IMMUNOFIX E-PHORESIS SERUM: CPT

## 2025-04-03 PROCEDURE — 84165 PROTEIN E-PHORESIS SERUM: CPT | Mod: ,,, | Performed by: PATHOLOGY

## 2025-04-03 PROCEDURE — 83521 IG LIGHT CHAINS FREE EACH: CPT

## 2025-04-03 PROCEDURE — 85025 COMPLETE CBC W/AUTO DIFF WBC: CPT | Mod: PN

## 2025-04-03 PROCEDURE — 84165 PROTEIN E-PHORESIS SERUM: CPT

## 2025-04-03 PROCEDURE — 80053 COMPREHEN METABOLIC PANEL: CPT | Mod: PN

## 2025-04-03 PROCEDURE — 36415 COLL VENOUS BLD VENIPUNCTURE: CPT | Mod: PN

## 2025-04-04 LAB
ALBUMIN, SPE (OHS): 3.72 G/DL (ref 3.35–5.55)
ALPHA 1 GLOB (OHS): 0.26 GM/DL (ref 0.17–0.41)
ALPHA 2 GLOB (OHS): 0.97 GM/DL (ref 0.43–0.99)
BETA GLOB (OHS): 0.7 GM/DL (ref 0.5–1.1)
GAMMA GLOBULIN (OHS): 0.65 GM/DL (ref 0.67–1.58)
KAPPA LC FREE SER-MCNC: 1.42 MG/L (ref 0.26–1.65)
KAPPA LC FREE/LAMBDA FREE SER: 3.07 MG/DL (ref 0.33–1.94)
LAMBDA LC FREE SERPL-MCNC: 2.16 MG/DL (ref 0.57–2.63)
PATHOLOGIST INTERPRETATION - IFE SERUM (OHS): NORMAL
PATHOLOGIST REVIEW - SPE (OHS): NORMAL
PROT SERPL-MCNC: 6.3 GM/DL (ref 6–8.4)

## 2025-04-07 ENCOUNTER — LAB VISIT (OUTPATIENT)
Dept: LAB | Facility: HOSPITAL | Age: 70
End: 2025-04-07
Attending: INTERNAL MEDICINE
Payer: MEDICARE

## 2025-04-07 ENCOUNTER — OFFICE VISIT (OUTPATIENT)
Dept: HEMATOLOGY/ONCOLOGY | Facility: CLINIC | Age: 70
End: 2025-04-07
Payer: MEDICARE

## 2025-04-07 VITALS
RESPIRATION RATE: 20 BRPM | SYSTOLIC BLOOD PRESSURE: 107 MMHG | WEIGHT: 194.25 LBS | TEMPERATURE: 97 F | OXYGEN SATURATION: 98 % | HEIGHT: 70 IN | HEART RATE: 50 BPM | BODY MASS INDEX: 27.81 KG/M2 | DIASTOLIC BLOOD PRESSURE: 59 MMHG

## 2025-04-07 DIAGNOSIS — J84.10 PULMONARY FIBROSIS: ICD-10-CM

## 2025-04-07 DIAGNOSIS — D50.9 IRON DEFICIENCY ANEMIA, UNSPECIFIED IRON DEFICIENCY ANEMIA TYPE: ICD-10-CM

## 2025-04-07 DIAGNOSIS — R76.8 ELEVATED SERUM IMMUNOGLOBULIN FREE LIGHT CHAIN LEVEL: Primary | ICD-10-CM

## 2025-04-07 DIAGNOSIS — D64.9 NORMOCYTIC ANEMIA: ICD-10-CM

## 2025-04-07 DIAGNOSIS — R59.0 LOCALIZED ENLARGED LYMPH NODES: ICD-10-CM

## 2025-04-07 DIAGNOSIS — Z85.51 HISTORY OF BLADDER CANCER: ICD-10-CM

## 2025-04-07 LAB
FERRITIN SERPL-MCNC: 354 NG/ML (ref 20–300)
IRON SATN MFR SERPL: 25 % (ref 20–50)
IRON SERPL-MCNC: 81 UG/DL (ref 45–160)
TIBC SERPL-MCNC: 330 UG/DL (ref 250–450)
TRANSFERRIN SERPL-MCNC: 223 MG/DL (ref 200–375)

## 2025-04-07 PROCEDURE — 83540 ASSAY OF IRON: CPT

## 2025-04-07 PROCEDURE — 99214 OFFICE O/P EST MOD 30 MIN: CPT | Mod: S$PBB,,, | Performed by: INTERNAL MEDICINE

## 2025-04-07 PROCEDURE — 99999 PR PBB SHADOW E&M-EST. PATIENT-LVL IV: CPT | Mod: PBBFAC,,, | Performed by: INTERNAL MEDICINE

## 2025-04-07 PROCEDURE — 82728 ASSAY OF FERRITIN: CPT

## 2025-04-07 PROCEDURE — 36415 COLL VENOUS BLD VENIPUNCTURE: CPT | Mod: PN

## 2025-04-07 PROCEDURE — 99214 OFFICE O/P EST MOD 30 MIN: CPT | Mod: PBBFAC,PN | Performed by: INTERNAL MEDICINE

## 2025-04-07 NOTE — PROGRESS NOTES
PATIENT: Moustapha Murray  MRN: [id number]  DATE: 4/7/2025      Diagnosis:   1. Elevated serum immunoglobulin free light chain level    2. Iron deficiency anemia, unspecified iron deficiency anemia type    3. Pulmonary fibrosis    4. Localized enlarged lymph nodes    5. Normocytic anemia    6. History of bladder cancer                  Chief Complaint: Follow-up (Elevated serum immunoglobulin free light chain level)      Oncologic History:      Oncologic History     Oncologic Treatment     Pathology           Subjective:    Interval History: Mr. Murray is a 70 y.o. male with CAD, Depression, fatty liver, GERD, HTN, RLS, pulmonary fibrosis, pulmonary fibrosis, h/o bladder cancer who presents for positive TIF1 gamma antibody.   Since the last clinic visit with me the patient was seen by rheumatology on 10/10/24 and was felt not to have a rheumatologic condition.  Pt underwent cystoscopy on 1/05/25 with Dr Lr showing a suspicious area measuring 1 cm in diameter in the posterior wall which was completely fulgurated.  The patient denies CP, cough, SOB, abdominal pain, nausea, vomiting, constipation, diarrhea.  The patient denies fever, chills, night sweats, weight loss, new lumps or bumps, easy bruising or bleeding.    Prior History:  Patient with a history of noninvasive bladder cancer under the care of Dr. Lr.  Patient last underwent cystoscopy on 08/17/2023 with no lesions seen.  Patient with history of T1 disease status post bladder installation of mitomycin-C and BCG.  The patient is currently being followed by Dr. Arellano for pulmonary fibrosis and underwent laboratory testing on 1/08/24 showing positive TIF1 gamma antibody.  The patient is currently scheduled for colonoscopy 3/04/2024 and cystoscopy 3/21/2024.  PSA on 1/08/24 was normal at 0.49ng/mL.   The patient underwent a CT of the chest, abdomen, and pelvis on 02/27/2024 showing a lower right paratracheal enlarged lymph node measuring 10 mm;  enlarged subcarinal lymph node measuring 14 mm; mildly enlarged lymph node adjacent to the distal esophagus; interstitial thickening bilaterally in the subpleural pattern suggesting fibrosis; diffuse fatty infiltration of the liver; anterior wedge compression fracture of L1 and T12; wall thickening in the region of the ascending colon and cecum.  CT of the neck showed no acute findings.   The patient underwent colonoscopy on 03/04/2024 which was normal.  Cystoscopy on 3/21/24 showed no evidence of recurrence.  PET/CT 3/22/24 showed enlarged lymph nodes and superior mediastinum, anterior mediastinum, periaortic region, pretracheal subcarinal, and left paraesophageal region of the diaphragm hiatus; moderate prominent peripheral distribution of interlobular interstitial thickening; area of uptake in the region of right posterior prostate.  Patient underwent iron infusions on 04/02/2024 and 04/09/2024.  Patient underwent EUS on 05/08/2024 with biopsy of subcarinal mediastinum station 7 lymph node with path showing positive for lm material but negative for malignant cells.    the patient underwent CT chest on 06/11/2024 showing multiple large lymph nodes throughout the mediastinum which have increasing size; centrilobular and paraseptal emphysematous changes with upper lobe predominance as well as extensive subpleural reticulation, interlobular septal thickening, scattered bronchiectasis throughout both lungs mildly progressed at the bases when compared to scan from February, 2024.  Labs on 06/11/2024 showed oligoclonal banding pattern on immunofixation electrophoresis and free light chain assay with elevated kappa light chains at 4.18 mg/dL, elevated lambda light chains at 3.21 mg/dL, and normal kappa lambda ratio 1.3.   The patient underwent EBUS on 07/15/2024 with biopsy of station 4R and 7 which was negative for malignancy and showed lm material.  Lab work on 9/10/24 showing no monoclonal protein on SPEP or  MEME.  Free light chain assay showed elevated kappa light chains at 2.65 mg/dL, normal lambda light chains at 1.76 mg/dL, and normal kappa lambda ratio 1.51.    Past Medical History:   Past Medical History:   Diagnosis Date    Anticoagulant long-term use     Bladder cancer 08/2020    Colon polyps     COPD (chronic obstructive pulmonary disease)     Coronary artery disease 01/31/2023    CORONARY STENTSx3    DDD (degenerative disc disease), lumbar     Depression     Fatty liver     GERD (gastroesophageal reflux disease)     H/O motion sickness     Hypertension     Mediastinal lymphadenopathy 07/2024    PONV (postoperative nausea and vomiting)     Pulmonary fibrosis     RLS (restless legs syndrome)     Sleep apnea, unspecified     can't use cpap       Past Surgical HIstory:   Past Surgical History:   Procedure Laterality Date    ANGIOGRAM, CORONARY, WITH LEFT HEART CATHETERIZATION N/A 1/31/2023    Procedure: Left Heart Cath;  Surgeon: Nitin Wheat MD;  Location: [facility] CATH;  Service: Cardiology;  Laterality: N/A;    ANGIOGRAM, CORONARY, WITH LEFT HEART CATHETERIZATION  4/17/2024    Procedure: Left Heart Cath;  Surgeon: Nitin Wheat MD;  Location: [facility] CATH;  Service: Cardiology;;    APPENDECTOMY      CARPAL TUNNEL RELEASE      CERVICAL FUSION      CHOLECYSTECTOMY      CIRCUMCISION      COLONOSCOPY N/A 3/16/2020    Procedure: COLONOSCOPY;  Surgeon: Braulio Best MD;  Location: [facility] ENDO;  Service: Endoscopy;  Laterality: N/A; 3 colon polyps removed; biopsy: tubular adenoma and hyperplastic; repeat in 5 years for surveillance    COLONOSCOPY N/A 3/4/2024    Procedure: COLONOSCOPY;  Surgeon: Braulio Best MD;  Location: [facility] ENDO;  Service: Gastroenterology;  Laterality: N/A;    CORONARY ANGIOGRAPHY  2/13/2023    Procedure: ANGIOGRAM, CORONARY ARTERY;  Surgeon: Nitin Wheat MD;  Location: [facility] CATH;  Service: Cardiology;;    CORONARY ANGIOPLASTY WITH STENT PLACEMENT N/A 1/31/2023    Procedure: ANGIOPLASTY, CORONARY  ARTERY, WITH STENT INSERTION;  Surgeon: Nitin Wheat MD;  Location: [facility] CATH;  Service: Cardiology;  Laterality: N/A;    CYSTOSCOPY W/ RETROGRADES Bilateral 8/24/2020    Procedure: CYSTOSCOPY, WITH RETROGRADE PYELOGRAM -WITH URETHRAL DILATION;  Surgeon: Greg Claudio MD;  Location: [facility] OR;  Service: Urology;  Laterality: Bilateral;    CYSTOSCOPY W/ URETERAL STENT PLACEMENT Right 8/24/2020    Procedure: CYSTOSCOPY, WITH URETERAL STENT INSERTION;  Surgeon: Greg Claudio MD;  Location: [facility] OR;  Service: Urology;  Laterality: Right;    ENDOBRONCHIAL ULTRASOUND Bilateral 7/15/2024    Procedure: ENDOBRONCHIAL ULTRASOUND (EBUS);  Surgeon: Umm Arellano MD;  Location: [facility] OR;  Service: Pulmonary;  Laterality: Bilateral;    ENDOSCOPIC ULTRASOUND OF UPPER GASTROINTESTINAL TRACT Left 5/8/2024    Procedure: ULTRASOUND, UPPER GI TRACT, ENDOSCOPIC;  Surgeon: Brad Ochoa MD;  Location: [facility] ENDO;  Service: Endoscopy;  Laterality: Left;    EPIDURAL STEROID INJECTION INTO LUMBAR SPINE N/A 3/27/2025    Procedure: Injection-steroid-epidural-lumbar   L4/5;  Surgeon: Ann Soto MD;  Location: [facility] OR;  Service: Anesthesiology;  Laterality: N/A;  normal    ESOPHAGOGASTRODUODENOSCOPY N/A 5/8/2024    Procedure: EGD (ESOPHAGOGASTRODUODENOSCOPY);  Surgeon: Brad Ochoa MD;  Location: [facility] ENDO;  Service: Endoscopy;  Laterality: N/A;    INSERTION, NEUROSTIMULATOR, HYPOGLOSSAL Right 11/22/2024    Procedure: INSERTION,NEUROSTIMULATOR,HYPOGLOSSAL;  Surgeon: Hector Lindquist MD;  Location: [facility] OR;  Service: ENT;  Laterality: Right;    INSTILLATION OF URINARY BLADDER N/A 8/24/2020    Procedure: INSTILLATION, BLADDER - Mitomycin;  Surgeon: Greg Claudio MD;  Location: [facility] OR;  Service: Urology;  Laterality: N/A;    IVUS, CORONARY  1/31/2023    Procedure: IVUS, Coronary;  Surgeon: Nitin Wheat MD;  Location: [facility] CATH;  Service: Cardiology;;    IVUS, CORONARY  2/13/2023    Procedure: IVUS, Coronary;   Surgeon: Nitin Wheat MD;  Location: [facility] CATH;  Service: Cardiology;;    PERCUTANEOUS CORONARY INTERVENTION, ARTERY  2/13/2023    Procedure: Percutaneous coronary intervention;  Surgeon: Nitin Wheat MD;  Location: [facility] CATH;  Service: Cardiology;;    RIGHT HEART CATHETERIZATION  8/21/2024    Procedure: Right heart cath;  Surgeon: Nitin Wheat MD;  Location: [facility] CATH;  Service: Cardiology;;    SLEEP ENDOSCOPY, DRUG-INDUCED Bilateral 10/23/2024    Procedure: SLEEP ENDOSCOPY,DRUG-INDUCED;  Surgeon: Hector Lindquist MD;  Location: [facility] OR;  Service: ENT;  Laterality: Bilateral;    TRANSFORAMINAL EPIDURAL INJECTION OF STEROID Right 7/17/2019    Procedure: Injection,steroid,epidural,transforaminal approach L3-4, L4-5;  Surgeon: Chris Palencia MD;  Location: [facility] OR;  Service: Pain Management;  Laterality: Right;    TRANSFORAMINAL EPIDURAL INJECTION OF STEROID Right 8/28/2019    Procedure: Injection,steroid,epidural,transforaminal approach;  Surgeon: Chris Palencia MD;  Location: [facility] OR;  Service: Pain Management;  Laterality: Right;  L3-4, L4-L5    TRANSFORAMINAL EPIDURAL INJECTION OF STEROID Left 7/11/2024    Procedure: Injection,steroid,epidural,transforaminal approach l5-s1 and s1;  Surgeon: Ann Soto MD;  Location: [facility] OR;  Service: Anesthesiology;  Laterality: Left;    TRANSFORAMINAL EPIDURAL INJECTION OF STEROID Left 1/30/2025    Procedure: Injection,steroid,epidural,transforaminal approach l5-s1 and s1 ( Iv sedation);  Surgeon: Ann Soto MD;  Location: [facility] OR;  Service: Pain Management;  Laterality: Left;    WISDOM TOOTH EXTRACTION         Family History:   Family History   Problem Relation Name Age of Onset    Stroke Mother      Ulcerative colitis Mother      Kidney cancer Mother      Heart disease Father      Heart attack Father      Cancer Neg Hx      Colon cancer Neg Hx      Crohn's disease Neg Hx      Celiac disease Neg Hx         Social History:  reports that he quit smoking about 11 years ago. His smoking  use included cigarettes. He has never used smokeless tobacco. He reports current alcohol use of about 2.0 standard drinks of alcohol per week. He reports that he does not currently use drugs.    Allergies:  Review of patient's allergies indicates:   Allergen Reactions    Sulfa (sulfonamide antibiotics) Anaphylaxis       Medications:  Current Outpatient Medications   Medication Sig Dispense Refill    albuterol (PROVENTIL/VENTOLIN HFA) 90 mcg/actuation inhaler Inhale 2 puffs into the lungs every 6 (six) hours as needed for Wheezing. Rescue 18 g 6    aspirin 81 MG Chew Take 1 tablet (81 mg total) by mouth once daily. 90 tablet 3    budesonide (PULMICORT) 0.5 mg/2 mL nebulizer solution Take 2 mLs (0.5 mg total) by nebulization 2 (two) times daily. Controller 120 mL 3    droNABinol (MARINOL) 2.5 MG capsule Take 1 capsule (2.5 mg total) by mouth 2 (two) times daily before meals. 60 capsule 3    EScitalopram oxalate (LEXAPRO) 10 MG tablet TAKE 1 TABLET BY MOUTH EVERY DAY 90 tablet 3    formoterol (PERFOROMIST) 20 mcg/2 mL Nebu Take 2 mLs (20 mcg total) by nebulization 2 (two) times a day. Controller 90 mL 3    ibuprofen (ADVIL,MOTRIN) 600 MG tablet Take 1 tablet (600 mg total) by mouth every 6 (six) hours as needed for Pain. 20 tablet 0    melatonin 5 mg Cap Take 5 mg by mouth nightly as needed. TAKE AS SCHEDULED      nintedanib (OFEV) 150 mg Cap Take 1 capsule (150 mg total) by mouth 2 (two) times a day. 60 capsule 6    ondansetron (ZOFRAN-ODT) 4 MG TbDL Take 1 tablet (4 mg total) by mouth every 6 (six) hours as needed (Nausea). 10 tablet 0    pantoprazole (PROTONIX) 40 MG tablet TAKE 1 TABLET BY MOUTH EVERY DAY 90 tablet 3    pregabalin (LYRICA) 75 MG capsule Take 1 capsule (75 mg total) by mouth every evening. 90 capsule 0    rosuvastatin (CRESTOR) 40 MG Tab TAKE 1 TABLET BY MOUTH EVERY DAY 90 tablet 3    torsemide (DEMADEX) 10 MG Tab Take 1 tablet (10 mg total) by mouth once daily. 30 tablet 11    traMADoL (ULTRAM) 50  "mg tablet Take 1 tablet (50 mg total) by mouth every 24 hours as needed for Pain. 30 tablet 2    triamcinolone acetonide 0.1% (KENALOG) 0.1 % cream APPLY TOPICALLY 2 TIMES DAILY AS NEEDED. 45 g 5    verapamiL (CALAN-SR) 240 MG CR tablet Take 1 tablet (240 mg total) by mouth every evening. 30 tablet 11    clopidogreL (PLAVIX) 75 mg tablet Take 75 mg by mouth once daily.       Current Facility-Administered Medications   Medication Dose Route Frequency Provider Last Rate Last Admin    sodium chloride 0.9% flush 10 mL  10 mL Intravenous PRN Nitin Wheat MD         Facility-Administered Medications Ordered in Other Visits   Medication Dose Route Frequency Provider Last Rate Last Admin    ondansetron injection 4 mg  4 mg Intravenous Daily PRN Gustavo Chu MD        sodium chloride 0.9% flush 10 mL  10 mL Intravenous PRN Braulio Best MD   5 mL at 10/23/24 1130       Review of Systems   Constitutional:  Negative for chills, diaphoresis, fatigue, fever and unexpected weight change.   Respiratory:  Negative for cough and shortness of breath.    Cardiovascular:  Negative for chest pain and palpitations.   Gastrointestinal:  Negative for abdominal pain, constipation, diarrhea, nausea and vomiting.   Skin:  Negative for color change and rash.   Neurological:  Negative for headaches.   Hematological:  Negative for adenopathy. Does not bruise/bleed easily.       ECOG Performance Status: 1   Objective:      Vitals:   Vitals:    04/07/25 1141   BP: (!) 107/59   BP Location: Left arm   Patient Position: Sitting   Pulse: (!) 50   Resp: 20   Temp: 97.4 °F (36.3 °C)   TempSrc: Temporal   SpO2: 98%   Weight: 88.1 kg (194 lb 3.6 oz)   Height: 5' 10" (1.778 m)       Physical Exam  Constitutional:       General: He is not in acute distress.     Appearance: He is well-developed. He is not diaphoretic.   HENT:      Head: Normocephalic and atraumatic.   Cardiovascular:      Rate and Rhythm: Normal rate and regular rhythm.      " Heart sounds: Normal heart sounds. No murmur heard.     No friction rub. No gallop.   Pulmonary:      Effort: Pulmonary effort is normal. No respiratory distress.      Breath sounds: Normal breath sounds. No wheezing or rales.   Chest:      Chest wall: No tenderness.   Abdominal:      General: Bowel sounds are normal. There is no distension.      Palpations: Abdomen is soft. There is no mass.      Tenderness: There is no abdominal tenderness. There is no rebound.   Musculoskeletal:      Cervical back: Normal range of motion.   Lymphadenopathy:      Cervical: No cervical adenopathy.      Upper Body:      Right upper body: No supraclavicular or axillary adenopathy.      Left upper body: No supraclavicular or axillary adenopathy.   Skin:     General: Skin is warm and dry.      Findings: No erythema or rash.   Neurological:      Mental Status: He is alert and oriented to person, place, and time.   Psychiatric:         Behavior: Behavior normal.         Laboratory Data:  Lab Visit on 04/03/2025   Component Date Value Ref Range Status    Sodium 04/03/2025 136  136 - 145 mmol/L Final    Potassium 04/03/2025 4.3  3.5 - 5.1 mmol/L Final    Chloride 04/03/2025 104  95 - 110 mmol/L Final    CO2 04/03/2025 23  23 - 29 mmol/L Final    Glucose 04/03/2025 98  70 - 110 mg/dL Final    BUN 04/03/2025 17  8 - 23 mg/dL Final    Creatinine 04/03/2025 1.2  0.5 - 1.4 mg/dL Final    Calcium 04/03/2025 9.2  8.7 - 10.5 mg/dL Final    Protein Total 04/03/2025 6.5  6.0 - 8.4 gm/dL Final    Albumin 04/03/2025 3.6  3.5 - 5.2 g/dL Final    Bilirubin Total 04/03/2025 0.8  0.1 - 1.0 mg/dL Final    For infants and newborns, interpretation of results should be based   on gestational age, weight and in agreement with clinical   observations.    Premature Infant recommended reference ranges:   0-24 hours:  <8.0 mg/dL   24-48 hours: <12.0 mg/dL   3-5 days:    <15.0 mg/dL   6-29 days:   <15.0 mg/dL    ALP 04/03/2025 105  40 - 150 unit/L Final    AST  04/03/2025 22  11 - 45 unit/L Final    ALT 04/03/2025 21  10 - 44 unit/L Final    Anion Gap 04/03/2025 9  8 - 16 mmol/L Final    eGFR 04/03/2025 >60  >60 mL/min/1.73/m2 Final    Estimated GFR calculated using the CKD-EPI creatinine (2021) equation.    Protein Total 04/03/2025 6.3  6.0 - 8.4 gm/dL Final    *Result may be interfered by visible hemolysis  Serum protein electrophoresis and immunofixation results should be interpreted in clinical context in that some therapeutic agents can result in false positive results (example, daratumumab). Correlation with the patient's therapeutic regimen is required.    Alpha 1 Glob 04/03/2025 0.26  0.17 - 0.41 gm/dl Final    Alpha 2 Glob 04/03/2025 0.97  0.43 - 0.99 gm/dl Final    Beta Glob 04/03/2025 0.70  0.50 - 1.10 gm/dl Final    Gamma Globulin 04/03/2025 0.65 (L)  0.67 - 1.58 gm/dl Final    Albumin, SPE 04/03/2025 3.72  3.35 - 5.55 g/dL Final    Pathologist Interpretation MEME 04/03/2025 No monoclonal peaks identified.          Interpreting Pathologist: Vera Herrera MD       Final    Kappa Free Light Chain 04/03/2025 3.07 (H)  0.33 - 1.94 mg/dL Final    Kappa/Lambda FLC Ratio 04/03/2025 1.42  0.26 - 1.65 Final    Lambda Free Light Chain 04/03/2025 2.16  0.57 - 2.63 mg/dL Final    WBC 04/03/2025 9.01  3.90 - 12.70 K/uL Final    RBC 04/03/2025 4.09 (L)  4.60 - 6.20 M/uL Final    HGB 04/03/2025 13.7 (L)  14.0 - 18.0 gm/dL Final    HCT 04/03/2025 39.9 (L)  40.0 - 54.0 % Final    MCV 04/03/2025 98  82 - 98 fL Final    MCH 04/03/2025 33.5 (H)  27.0 - 31.0 pg Final    MCHC 04/03/2025 34.3  32.0 - 36.0 g/dL Final    RDW 04/03/2025 13.2  11.5 - 14.5 % Final    Platelet Count 04/03/2025 205  150 - 450 K/uL Final    MPV 04/03/2025 10.1  9.2 - 12.9 fL Final    Nucleated RBC 04/03/2025 0  <=0 /100 WBC Final    Neut % 04/03/2025 65.0  38 - 73 % Final    Lymph % 04/03/2025 18.6  18 - 48 % Final    Mono % 04/03/2025 10.3  4 - 15 % Final    Eos % 04/03/2025 5.1  <=8 % Final     Basophil % 04/03/2025 0.7  <=1.9 % Final    Imm Grans % 04/03/2025 0.3  0.0 - 0.5 % Final    Neut # 04/03/2025 5.85  1.8 - 7.7 K/uL Final    Lymph # 04/03/2025 1.68  1 - 4.8 K/uL Final    Mono # 04/03/2025 0.93  0.3 - 1 K/uL Final    Eos # 04/03/2025 0.46  <=0.5 K/uL Final    Baso # 04/03/2025 0.06  <=0.2 K/uL Final    Imm Grans # 04/03/2025 0.03  0.00 - 0.04 K/uL Final    Mild elevation in immature granulocytes is non specific and can be seen in a variety of conditions including stress response, acute inflammation, trauma and pregnancy. Correlation with other laboratory and clinical findings is essential.    Pathologist Interpretation SPE 04/03/2025 Normal total protein, normal pattern.           Interpreting Pathologist: Vera Herrera MD       Final         Imaging:     CT Chest 6/11/24  Base of Neck: No significant abnormality.     Thoracic soft tissues: Unremarkable.     Aorta: Left-sided aortic arch. No aneurysm and no significant atherosclerosis     Heart: Normal size. No effusion. Marked calcification of the coronary arteries. Calcification of the mitral valve annulus.     Pulmonary vasculature: Pulmonary arteries distribute normally. No discrete filling defects identified within the proximal pulmonary arterial branches to suggest pulmonary thromboembolism. There are four pulmonary veins.     Tran/Mediastinum: Multiple enlarged lymph nodes throughout the mediastinum, increased in size as compared to the prior PET-CT on 03/22/2024. Index right lower paratracheal lymph node measures 12 mm in short axis dimension (series 2, image 40), previously 10 mm when directly remeasured. Prevascular lymph node measures 11 mm in short axis dimension (series 2, image 40), previously 8 mm when directly remeasured. Subcarinal lymph node now measures approximately 18 mm in short axis dimension (series 2, image 49), previously 15 mm.     Airways: Patent.     Lungs/Pleura: Lungs are expanded again demonstrate centrilobular  and paraseptal emphysematous changes with an upper lobe predominance as well as extensive subpleural reticulation, interlobular septal thickening, scattered bronchiectasis throughout both lungs, mildly progressed at the lung bases in comparison to the prior CT on 02/27/2024, with superimposed interstitial edema not excluded, noting interval development of small bilateral dependent pleural effusions. No discrete new suspicious pulmonary nodules or masses. No lobar consolidation. No pneumothorax.     Esophagus: Unremarkable.     Upper Abdomen: No abnormality of the partially imaged upper abdomen. Cholecystectomy.     Bones: Chronic anterior wedge compression deformity of the T12 vertebral body. No definite acute fracture. No suspicious lytic or sclerotic lesions. Partially imaged anterior fusion hardware in the lower cervical spine. Chronic healed and nonunited posterior right rib fractures again noted.       Assessment:       1. Elevated serum immunoglobulin free light chain level    2. Iron deficiency anemia, unspecified iron deficiency anemia type    3. Pulmonary fibrosis    4. Localized enlarged lymph nodes    5. Normocytic anemia    6. History of bladder cancer                     Plan:     Positive TIF1 Gamma Antibody - can be associated with risk for pulmonary fibrosis and for occult malignancy  -Pt with h/o non invasive bladder cancer  -PSA on 1/08/24 was normal at 0.49ng/mL  -CT C/A/P on 2/27/24 showed thickening of the colon and mediastinal, paraesophageal LAD  -Colonoscopy 3/04/24 showed MEG  -Cystoscopy on 3/21/24 showed no evidence of recurrence  -PET/CT 3/22/24 showed enlarged mediastinal LN's  lymph nodes and superior mediastinum, anterior mediastinum, periaortic region, -EUS on 05/08/2024 with biopsy of subcarinal mediastinum station 7 lymph node had path showing positive for lm material but negative for malignant cells  -CT chest on 6/11/24 showed increasing mediastinal LAD  -EBUS 7/15/24 showed no  malignancy in station 4R an 7    Pulmonary Fibrosis - managed by Dr Arellano  -Unclear etiology but possibly related to Positive TIF1 Gamma Antibody as above  -Pt seen by rheumatology with no concern for rheumatologic disease    Lymphadenopathy - see above    Iron Deficiency Anemia - pt given iron infusions 4/02/24 and 4/09/24 with injectafer  -Ferritin normal at 3/05ng/mL on 6/11/24  Lab Results   Component Value Date    HGB 13.7 (L) 04/03/2025   -Will repeat iron studies today  -Unclear cause as colonoscopy and EUS showed no significant findings    Bladder Cancer -  history of noninvasive bladder cancer under the care of Dr. Lr with prior T1 disease  -Pt status post bladder installation of mitomycin-C and BCG  -Last cystoscopy on 08/17/2023 with no lesions seen  -Repeat cystoscopy 1/05/25 showed a possible recurrent lesion in the posterior wall which was fulgurated  -Pt to undergo repeat cystoscopy 7/10/25     Elevated Free Light Chains - Labs on 06/11/2024 showed oligoclonal banding pattern on immunofixation electrophoresis and free light chain assay with elevated kappa light chains at 4.18 mg/dL, elevated lambda light chains at 3.21 mg/dL, and normal kappa lambda ratio 1.3  -Lab work on 4/03/25 showing no monoclonal protein on SPEP or MEME.  Free light chain assay showed elevated kappa light chains at 3.07 mg/dL, normal lambda light chains at 1.42mg/dL, and normal kappa lambda ratio 2.16  -Will monitor in 6 months    Route Chart for Scheduling    Med Onc Chart Routing      Follow up with physician 3 months. Pt needs a ferritin adn iron/TIBC today.  Pt needs CBC, CMP ferritin and iron/TIBC in 3 months with an appt with [facility] day after the labs   Follow up with [facility]    Infusion scheduling note    Injection scheduling note    Labs    Imaging    Pharmacy appointment    Other referrals                Supportive Plan Information  OP FERRIC CARBOXYMALTOSE Q2W Fredi West MD   Associated Diagnosis: Iron  deficiency anemia   noted on 3/8/2024   Line of treatment: Supportive Care   Treatment goal: Supportive     Upcoming Treatment Dates - OP FERRIC CARBOXYMALTOSE Q2W    No upcoming days in selected categories.      Fredi West MD  [facility]  Hematology and Oncology  [facility]   [address] [place] [address]   O: [phone]  F: [phone]

## 2025-04-09 ENCOUNTER — PROCEDURE VISIT (OUTPATIENT)
Dept: OTOLARYNGOLOGY | Facility: CLINIC | Age: 70
End: 2025-04-09
Payer: MEDICARE

## 2025-04-09 VITALS — WEIGHT: 195.75 LBS | BODY MASS INDEX: 28.02 KG/M2 | HEIGHT: 70 IN

## 2025-04-09 DIAGNOSIS — G47.33 OSA (OBSTRUCTIVE SLEEP APNEA): Primary | ICD-10-CM

## 2025-04-09 PROCEDURE — 99499 UNLISTED E&M SERVICE: CPT | Mod: S$PBB,,, | Performed by: OTOLARYNGOLOGY

## 2025-04-09 NOTE — PROGRESS NOTES
Subjective:      Patient ID: Moustapha Murray is a 70 y.o. male.    Chief Complaint: Follow-up    HPI    Rheumatologic History:      - Diagnosis/es: -  - Family History: No autoimmune conditions  - Social History: Former smoker 1.5 PPD x 40 years, quit 2022; drinks roughly 2 beers per week  - Positive serologies: AUGUSTA 1:80 homogenous and speckled, TIF1 gamma  - Negative serologies: dsDNA, SSA, SSB, Sm, RNP, SCL-70, ANCA, RF, CCP, myomarker panel   - Pathology:   - EBUS with biopsy of subcarinal mediastinum station 7 lymph node (05/08/2024) positive for lm material but negative for malignant cells  - EBUS (7/15/24) no malignancy in station 4R an 7  - Infectious screening labs: -  - Imaging:   - DEXA (10/2024) No evidence of significant bone density loss   - CT C/A/P (2/27/24) thickening of the colon and mediastinal, paraesophageal LAD  - PET/CT (3/22/24) enlarged mediastinal lymph nodes and superior mediastinum, anterior mediastinum, periaortic region  - CT chest (6/11/24) increasing mediastinal LAD               - CT cardiac (01/10/2023) interstitial pulmonary abnormalities, traction bronchiectasis, increased peripheral reticular markings.  - Procedures:              - Colonoscopy (3/04/24) MEG  - Cystoscopy (3/21/24) no evidence of recurrence  - PFTs (10/24/2023) mild restrictive pattern with a TLC of 4.56 L or 63% of predicted with spirometry revealing underlying obstruction with an FEV1 of 1.95 L or 58% of predicted.  Level of obstruction unable to be determined as occurs in the setting of restrictive lung disease.  DLCO is moderately reduced at 13.05 or 49% of predicted which may be secondary to pulmonary parenchymal abnormalities, pulmonary hypertension, or anemia.  - RHC (08/05/2024) post capillary pressures elevated  - Previous Treatments: -  - Current Treatments:               - Ofev 150mg OD (did not tolerate BID dosing due to diarrhea)  Initial History (10/10/24):   This is a 70 year old man with  history of CAD with PCI X 3 (01/31/2023), GERD, HTN, T1 urothelial bladder cancer with recurrence - first in 2020 treated with mitomycin - C (intravescicular) then again around 2021 treated with BCG therapy (intravescicular), cervical surgery 20 years ago for herniated disc, TASIA not on CPAP, BELLE on iron infusions, elevated free light chains of unclear etiology, RLS, fatty liver, LAD in the superior mediastinum, anterior mediastinum, periaortic region, pretracheal subcarinal, and left paraesophageal region of the diaphragm hiatus (EBUS x 2 without malignancy), and pulmonary fibrosis with positive TIF1 gamma antibody and positive AUGUSTA 1:80 homogenous and speckled currently on Ofev 150mg OD. He reports some scaling on his scalp but no other rashes. He reports joint pain in both wrists without swelling, and his lower back, morning stiffness lasting 30 minutes, shortness of breath with exertion (stable), productive cough, and diarrhea from Ofev. Extensive rheum ROS including proximal weakness is negative. Physical examination shows rales to the lung bases but is otherwise normal.  Does not have symptoms or physical exam findings suggestive of active connective tissue disease.  The TIF1 gamma antibody is strongly associated with malignancy and I am concerned for underlying malignancy.  ALP is elevated but CT of the abdomen did not comment on the biliary tree.  Will obtain an ultrasound of the abdomen.     In terms of his back pain, MRI of the lumbar spine shows compression fractures and x-rays show low bone density.  Suspect that he will need to go on Reclast.  Will obtain a DEXA scan.     Interval History:  He reports mild polyarthralgia and stable breathing with rare chest pain related to his pulmonary fibrosis, but denies rashes, patchy hair loss, recurrent ears/sinus infections, vision changes, hearing changes, oral/nasal ulcers, dysphagia, joint swelling, and weakness.    Objective:   /64 (Patient Position:  "Sitting)   Pulse (!) 47   Ht 5' 10" (1.778 m)   Wt 87.2 kg (192 lb 3.9 oz)   SpO2 98%   BMI 27.58 kg/m²   Physical Exam   Constitutional: normal appearance.   HENT:   Head: Normocephalic and atraumatic.   Cardiovascular: Normal rate, regular rhythm and normal heart sounds.   Pulmonary/Chest: Effort normal and breath sounds normal.   Musculoskeletal:      Comments: No synovitis, dactylitis, enthesitis, effusions     Neurological: He is alert.   Skin:   No skin thickening, telangiectasias, calcinosis, psoriasiform lesions, lupoid lesions         Right Side Rheumatological Exam     Muscle Strength (0-5 scale):  Deltoid:  5  Biceps: 5/5   Triceps:  5  : 5/5   Iliopsoas: 5  Quadriceps:  5   Distal Lower Extremity: 5    Left Side Rheumatological Exam     Muscle Strength (0-5 scale):  Deltoid:  5  Biceps: 5/5   Triceps:  5  :  5/5   Iliopsoas: 5  Quadriceps:  5   Distal Lower Extremity: 5          No data to display     Labs (4/3/25)   CBC HGB 13.7, WBC, PLT WNL   CMP CR, ALP, AST, ALT WNL   SPEP normal total protein and pattern   MEME no monoclonal peaks    Assessment:     1. Positive AUGUSTA (antinuclear antibody)    2. Interstitial lung disease    3. Bilateral wrist pain    4. Primary osteoarthritis involving multiple joints      This is a 70 year old man with history of CAD with PCI X 3 (01/31/2023), GERD, HTN, T1 urothelial bladder cancer with recurrence - first in 2020 treated with mitomycin - C (intravescicular) then again around 2021 treated with BCG therapy (intravescicular), cervical surgery 20 years ago for herniated disc, TASIA not on CPAP, BELLE on iron infusions, elevated free light chains of unclear etiology, RLS, fatty liver, LAD in the superior mediastinum, anterior mediastinum, periaortic region, pretracheal subcarinal, and left paraesophageal region of the diaphragm hiatus (EBUS x 2 without malignancy), and pulmonary fibrosis with positive TIF1 gamma antibody and positive AUGUSTA 1:80 homogenous and " speckled currently on Ofev 150mg OD. The TIF1 gamma antibody is strongly associated with malignancy but we have not found any evidence of this. I have also not found evidence of inflammatory myositis or any other systemic CTD.    Plan:     Problem List Items Addressed This Visit          Immunology/Multi System    Positive AUGUSTA (antinuclear antibody) - Primary    Relevant Orders    Anti-DNA Ab, Double-Stranded    C3 Complement    C4 Complement    CK    Aldolase    Urinalysis    Protein/Creatinine Ratio, Urine    Anti-DNA Ab, Double-Stranded    C3 Complement    C4 Complement    CBC Auto Differential    Sedimentation rate    Protein/Creatinine Ratio, Urine    C-Reactive Protein    Comprehensive Metabolic Panel    Urinalysis    CK    Aldolase     Other Visit Diagnoses         Interstitial lung disease        Relevant Orders    Anti-DNA Ab, Double-Stranded    C3 Complement    C4 Complement    CK    Aldolase    Urinalysis    Protein/Creatinine Ratio, Urine    Anti-DNA Ab, Double-Stranded    C3 Complement    C4 Complement    CBC Auto Differential    Sedimentation rate    Protein/Creatinine Ratio, Urine    C-Reactive Protein    Comprehensive Metabolic Panel    Urinalysis    CK    Aldolase      Bilateral wrist pain        Relevant Medications    traMADoL (ULTRAM) 50 mg tablet      Primary osteoarthritis involving multiple joints              1.) Positive AUGUSTA and TIF1 y  2.) ILD  - Check lupus labs   - Pulm (Dr Arellano)  - Heme (Dr West)    3.) OA  - Tramadol PRN     Follow up IN 6 MONTHS    30 minutes of total time spent on the encounter, which includes face to face time and non-face to face time preparing to see the patient (eg, review of tests), Obtaining and/or reviewing separately obtained history, Documenting clinical information in the electronic or other health record, Independently interpreting results (not separately reported) and communicating results to the patient/family/caregiver, or Care coordination (not  separately reported).     This note was prepared with [email or web address] Direct voice recognition transcription software. Garbled syntax, mangled pronouns, and other bizarre constructions may be attributed to that software system       Farzana Griffiths M.D.  Rheumatology Dept  [place], [place]

## 2025-04-10 ENCOUNTER — OFFICE VISIT (OUTPATIENT)
Dept: RHEUMATOLOGY | Facility: CLINIC | Age: 70
End: 2025-04-10
Payer: MEDICARE

## 2025-04-10 ENCOUNTER — PATIENT MESSAGE (OUTPATIENT)
Dept: HEMATOLOGY/ONCOLOGY | Facility: CLINIC | Age: 70
End: 2025-04-10
Payer: MEDICARE

## 2025-04-10 VITALS
SYSTOLIC BLOOD PRESSURE: 106 MMHG | WEIGHT: 192.25 LBS | OXYGEN SATURATION: 98 % | HEIGHT: 70 IN | BODY MASS INDEX: 27.52 KG/M2 | HEART RATE: 47 BPM | DIASTOLIC BLOOD PRESSURE: 64 MMHG

## 2025-04-10 DIAGNOSIS — M15.0 PRIMARY OSTEOARTHRITIS INVOLVING MULTIPLE JOINTS: ICD-10-CM

## 2025-04-10 DIAGNOSIS — M25.532 BILATERAL WRIST PAIN: ICD-10-CM

## 2025-04-10 DIAGNOSIS — M25.531 BILATERAL WRIST PAIN: ICD-10-CM

## 2025-04-10 DIAGNOSIS — R76.8 POSITIVE ANA (ANTINUCLEAR ANTIBODY): Primary | ICD-10-CM

## 2025-04-10 DIAGNOSIS — J84.9 INTERSTITIAL LUNG DISEASE: ICD-10-CM

## 2025-04-10 PROCEDURE — 99214 OFFICE O/P EST MOD 30 MIN: CPT | Mod: PBBFAC,PO | Performed by: STUDENT IN AN ORGANIZED HEALTH CARE EDUCATION/TRAINING PROGRAM

## 2025-04-10 PROCEDURE — 99999 PR PBB SHADOW E&M-EST. PATIENT-LVL IV: CPT | Mod: PBBFAC,,, | Performed by: STUDENT IN AN ORGANIZED HEALTH CARE EDUCATION/TRAINING PROGRAM

## 2025-04-10 RX ORDER — TRAMADOL HYDROCHLORIDE 50 MG/1
50 TABLET ORAL
Qty: 30 TABLET | Refills: 2 | Status: SHIPPED | OUTPATIENT
Start: 2025-04-10

## 2025-04-10 ASSESSMENT — ROUTINE ASSESSMENT OF PATIENT INDEX DATA (RAPID3)
TOTAL RAPID3 SCORE: 2.44
PAIN SCORE: 4
PATIENT GLOBAL ASSESSMENT SCORE: 2
MDHAQ FUNCTION SCORE: 0.4
PSYCHOLOGICAL DISTRESS SCORE: 0
FATIGUE SCORE: 0

## 2025-04-11 NOTE — PROGRESS NOTES
Was seen by rep for advanced IPG / sensor / stimulator mapping.  Was not seen by me other than to say hi

## 2025-04-12 ENCOUNTER — PATIENT MESSAGE (OUTPATIENT)
Dept: CARDIOLOGY | Facility: CLINIC | Age: 70
End: 2025-04-12
Payer: MEDICARE

## 2025-04-14 RX ORDER — PANTOPRAZOLE SODIUM 40 MG/1
40 TABLET, DELAYED RELEASE ORAL DAILY
Qty: 90 TABLET | Refills: 3 | Status: SHIPPED | OUTPATIENT
Start: 2025-04-14

## 2025-04-21 DIAGNOSIS — Z78.9 INTOLERANCE OF CONTINUOUS POSITIVE AIRWAY PRESSURE (CPAP) VENTILATION: ICD-10-CM

## 2025-04-21 DIAGNOSIS — T85.121S: ICD-10-CM

## 2025-04-21 DIAGNOSIS — G47.33 OSA (OBSTRUCTIVE SLEEP APNEA): Primary | ICD-10-CM

## 2025-04-22 ENCOUNTER — LAB VISIT (OUTPATIENT)
Dept: LAB | Facility: HOSPITAL | Age: 70
End: 2025-04-22
Attending: STUDENT IN AN ORGANIZED HEALTH CARE EDUCATION/TRAINING PROGRAM
Payer: MEDICARE

## 2025-04-22 ENCOUNTER — OFFICE VISIT (OUTPATIENT)
Dept: PAIN MEDICINE | Facility: CLINIC | Age: 70
End: 2025-04-22
Payer: MEDICARE

## 2025-04-22 VITALS — BODY MASS INDEX: 27.49 KG/M2 | WEIGHT: 192 LBS | HEIGHT: 70 IN

## 2025-04-22 DIAGNOSIS — R76.8 POSITIVE ANA (ANTINUCLEAR ANTIBODY): ICD-10-CM

## 2025-04-22 DIAGNOSIS — M54.16 LUMBAR RADICULOPATHY: Primary | ICD-10-CM

## 2025-04-22 DIAGNOSIS — J84.9 INTERSTITIAL LUNG DISEASE: ICD-10-CM

## 2025-04-22 LAB
ABSOLUTE EOSINOPHIL (OHS): 0.39 K/UL
ABSOLUTE MONOCYTE (OHS): 0.7 K/UL (ref 0.3–1)
ABSOLUTE NEUTROPHIL COUNT (OHS): 4.57 K/UL (ref 1.8–7.7)
ALBUMIN SERPL BCP-MCNC: 3.4 G/DL (ref 3.5–5.2)
ALP SERPL-CCNC: 111 UNIT/L (ref 40–150)
ALT SERPL W/O P-5'-P-CCNC: 17 UNIT/L (ref 10–44)
ANION GAP (OHS): 9 MMOL/L (ref 8–16)
AST SERPL-CCNC: 24 UNIT/L (ref 11–45)
BASOPHILS # BLD AUTO: 0.08 K/UL
BASOPHILS NFR BLD AUTO: 1.1 %
BILIRUB SERPL-MCNC: 0.6 MG/DL (ref 0.1–1)
BILIRUB UR QL STRIP.AUTO: NEGATIVE
BUN SERPL-MCNC: 14 MG/DL (ref 8–23)
C3 SERPL-MCNC: 132 MG/DL (ref 50–180)
C4 COMPLEMENT (OHS): 27 MG/DL (ref 11–44)
CALCIUM SERPL-MCNC: 8.6 MG/DL (ref 8.7–10.5)
CHLORIDE SERPL-SCNC: 106 MMOL/L (ref 95–110)
CK SERPL-CCNC: 66 U/L (ref 20–200)
CLARITY UR: CLEAR
CO2 SERPL-SCNC: 26 MMOL/L (ref 23–29)
COLOR UR AUTO: YELLOW
CREAT SERPL-MCNC: 1 MG/DL (ref 0.5–1.4)
CREAT UR-MCNC: 90 MG/DL (ref 23–375)
CRP SERPL-MCNC: 1.9 MG/L
ERYTHROCYTE [DISTWIDTH] IN BLOOD BY AUTOMATED COUNT: 13.1 % (ref 11.5–14.5)
ERYTHROCYTE [SEDIMENTATION RATE] IN BLOOD BY PHOTOMETRIC METHOD: 5 MM/HR
GFR SERPLBLD CREATININE-BSD FMLA CKD-EPI: >60 ML/MIN/1.73/M2
GLUCOSE SERPL-MCNC: 87 MG/DL (ref 70–110)
GLUCOSE UR QL STRIP: NEGATIVE
HCT VFR BLD AUTO: 38.3 % (ref 40–54)
HGB BLD-MCNC: 12.6 GM/DL (ref 14–18)
HGB UR QL STRIP: NEGATIVE
IMM GRANULOCYTES # BLD AUTO: 0.02 K/UL (ref 0–0.04)
IMM GRANULOCYTES NFR BLD AUTO: 0.3 % (ref 0–0.5)
KETONES UR QL STRIP: NEGATIVE
LEUKOCYTE ESTERASE UR QL STRIP: NEGATIVE
LYMPHOCYTES # BLD AUTO: 1.29 K/UL (ref 1–4.8)
MCH RBC QN AUTO: 32.9 PG (ref 27–31)
MCHC RBC AUTO-ENTMCNC: 32.9 G/DL (ref 32–36)
MCV RBC AUTO: 100 FL (ref 82–98)
NITRITE UR QL STRIP: NEGATIVE
NUCLEATED RBC (/100WBC) (OHS): 0 /100 WBC
PH UR STRIP: 6 [PH]
PLATELET # BLD AUTO: 194 K/UL (ref 150–450)
PMV BLD AUTO: 11.5 FL (ref 9.2–12.9)
POTASSIUM SERPL-SCNC: 5.2 MMOL/L (ref 3.5–5.1)
PROT SERPL-MCNC: 6.6 GM/DL (ref 6–8.4)
PROT UR QL STRIP: ABNORMAL
PROT UR-MCNC: 36 MG/DL
PROT/CREAT UR: 0.4 MG/G{CREAT}
RBC # BLD AUTO: 3.83 M/UL (ref 4.6–6.2)
RELATIVE EOSINOPHIL (OHS): 5.5 %
RELATIVE LYMPHOCYTE (OHS): 18.3 % (ref 18–48)
RELATIVE MONOCYTE (OHS): 9.9 % (ref 4–15)
RELATIVE NEUTROPHIL (OHS): 64.9 % (ref 38–73)
SODIUM SERPL-SCNC: 141 MMOL/L (ref 136–145)
SP GR UR STRIP: 1.02
WBC # BLD AUTO: 7.05 K/UL (ref 3.9–12.7)

## 2025-04-22 PROCEDURE — 86160 COMPLEMENT ANTIGEN: CPT | Mod: 59

## 2025-04-22 PROCEDURE — 81003 URINALYSIS AUTO W/O SCOPE: CPT | Mod: PO

## 2025-04-22 PROCEDURE — 82570 ASSAY OF URINE CREATININE: CPT

## 2025-04-22 PROCEDURE — 99213 OFFICE O/P EST LOW 20 MIN: CPT | Mod: PBBFAC,PO | Performed by: STUDENT IN AN ORGANIZED HEALTH CARE EDUCATION/TRAINING PROGRAM

## 2025-04-22 PROCEDURE — 36415 COLL VENOUS BLD VENIPUNCTURE: CPT | Mod: PO

## 2025-04-22 PROCEDURE — 82085 ASSAY OF ALDOLASE: CPT

## 2025-04-22 PROCEDURE — 80053 COMPREHEN METABOLIC PANEL: CPT

## 2025-04-22 PROCEDURE — 86225 DNA ANTIBODY NATIVE: CPT

## 2025-04-22 PROCEDURE — 99999 PR PBB SHADOW E&M-EST. PATIENT-LVL III: CPT | Mod: PBBFAC,,, | Performed by: STUDENT IN AN ORGANIZED HEALTH CARE EDUCATION/TRAINING PROGRAM

## 2025-04-22 PROCEDURE — 85652 RBC SED RATE AUTOMATED: CPT

## 2025-04-22 PROCEDURE — 85025 COMPLETE CBC W/AUTO DIFF WBC: CPT

## 2025-04-22 PROCEDURE — 82550 ASSAY OF CK (CPK): CPT

## 2025-04-22 PROCEDURE — 86140 C-REACTIVE PROTEIN: CPT

## 2025-04-22 PROCEDURE — 99213 OFFICE O/P EST LOW 20 MIN: CPT | Mod: S$PBB,,, | Performed by: STUDENT IN AN ORGANIZED HEALTH CARE EDUCATION/TRAINING PROGRAM

## 2025-04-22 PROCEDURE — 86160 COMPLEMENT ANTIGEN: CPT

## 2025-04-22 NOTE — PROGRESS NOTES
[facility] - Department    Alex, Colton GARCIA MD      First Office Visit: 6/20/24  Today' Date: 4/22/2025  Last Office Visit: 3/13/25    Chief complaint: back pain     HPI: Pt is a pleasant 70 y.o., who presents for evaluation. Referred by Dr. Johnson. Pt seen previously for back pain and L leg pain for years.  Previously endorsed sharp, shooting pain going down the back of the left leg to the calf. Pt seen today for f/u from ILESI L4-5 and states he has had great relief from this CHANEL. Overall is doing much better. Does have some pain going down the back of the L leg to the calf but is significantly improved. No BB changes. Is doing HEP.       Pain disability index score: 63  Pain score: 6    Relevant Imaging/ Testing:   MR L-spine 6/24 - chronic T12 and L1 compression fx, grade 1 anterolisthesis L5 on S1, L L4-5 synovial cyst and disc protrusion through annular fissure affecting L L5 and S1 nerve roots, synovial cyst affecting R L1 nerve root   CT L-spine 12/23  CT T-spine 12/23  XR L-spine complete 2/23    Procedures:   ILESI L4-5 3/27/25 - >80% relief   L TFESI L5-S1 and S1 7/11/24, 1/30/25 - no relief    Date of board of pharmacy review:4/22/2025  Date of opioid risk screening/ pain psych: None  Date of opioid agreement and consent: None  Date of urine drug screen: None  Date of random pill count: None     was reviewed today: reviewed, no concerns    Prescribed medications: None    See EHR for  PMH, PSH, FH, SH, Medications and Allergy    ROS:  Positive for pain  ROS     PE:  There were no vitals filed for this visit.  General: Pleasant, no distress  HEENT: NC/ AT. PERRLA  CV: Radial pulses intact  Pulm: No distress  Ext: No edema    Physical Exam     Neuromusculoskeletal:  Head: NC, AT. PERRLA  Neck: Intact range of motions  Shoulder: Intact range of motion  Lumbar: Intact range of motion. Bilat Facet loading. Min Tenderness. Neg SL. No pain with flexion. No pain with extension.   Hip: Intact range of  motion  SI: Level  Knee: Intact range of motion  Reflexes: normal Knee  Strength: 5/5 globally   Sensory: Grossly intact   Skin: No bruising, erythema  Gait: Normal      Impression:  Back pain  L leg pain   Lumbar radiculopathy   Relevant History  BMI 30.13  RLS  Pulm fibrosis  CAD s/p stent   PVC's  H/o bladder cancer    Assessment:  Lumbar radiculopathy - disc protrusion through annular fissure at L4-5 affecting L L5 and S1 nerve roots. Great relief of pain s/p ILESI L4-5    Plan:  Discussed options  Imaging/ relevant records viewed/ reviewed/ discussed  Imaging results viewed and reviewed (noted above)/ reviewed with patient   reviewed  Cont HEP  Cont Lyrica 75 mg BID   Pt doing well s/o ILESI L4-5. L leg pain significantly improved. Will f/u in 2 mos. If pt continues to have pain, will plan on repeat ILESI L4-5  Can consider L TFESI L5-S1 and S1 if non response to ILESI L4-5   Spine surgery referral if no response to CHANEL       Prescribed medications:  1. None    The impression and plan were discussed and explained in detail. All the questions were answered. Education was provided accordingly.         Follow-up:  2 mos or sooner if needed with Cinthia Soto MD

## 2025-04-23 ENCOUNTER — RESULTS FOLLOW-UP (OUTPATIENT)
Dept: RHEUMATOLOGY | Facility: CLINIC | Age: 70
End: 2025-04-23

## 2025-04-23 LAB
ALDOLASE (OHS): 2.8 U/L (ref 1.2–7.6)
DSDNA ANTIBODY (OHS): NORMAL
DSDNA ANTIBODY TITER (OHS): NORMAL

## 2025-04-23 NOTE — PROGRESS NOTES
Discussed w pt that we need to revise the implant to see why non functional.    He is in agreement.    I discussed the risks of hypoglossal nerve stimulation including: scar, bleeding, numbness of incision, numbness or weakness of tongue or marginal mandibular nerve, irritation of tongue from stimulation, implant failure, inadequate improvement, need for further procedures, need for removal of implant, infection, pneumothorax, conditional MRI compatibility.

## 2025-05-08 ENCOUNTER — PATIENT MESSAGE (OUTPATIENT)
Dept: CARDIOLOGY | Facility: CLINIC | Age: 70
End: 2025-05-08
Payer: MEDICARE

## 2025-05-08 DIAGNOSIS — R93.1 AGATSTON CAC SCORE, >400: ICD-10-CM

## 2025-05-08 RX ORDER — ROSUVASTATIN CALCIUM 40 MG/1
40 TABLET, COATED ORAL NIGHTLY
Qty: 90 TABLET | Refills: 0 | Status: SHIPPED | OUTPATIENT
Start: 2025-05-08

## 2025-05-12 ENCOUNTER — HOSPITAL ENCOUNTER (OUTPATIENT)
Dept: CARDIOLOGY | Facility: HOSPITAL | Age: 70
Discharge: HOME | End: 2025-05-12
Attending: INTERNAL MEDICINE
Payer: MEDICARE

## 2025-05-12 DIAGNOSIS — I49.3 PVC'S (PREMATURE VENTRICULAR CONTRACTIONS): ICD-10-CM

## 2025-05-12 PROCEDURE — 93242 EXT ECG>48HR<7D RECORDING: CPT | Mod: PO

## 2025-05-18 DIAGNOSIS — M25.531 BILATERAL WRIST PAIN: ICD-10-CM

## 2025-05-18 DIAGNOSIS — G25.81 RESTLESS LEGS SYNDROME (RLS): ICD-10-CM

## 2025-05-18 DIAGNOSIS — M25.532 BILATERAL WRIST PAIN: ICD-10-CM

## 2025-05-19 RX ORDER — PREGABALIN 75 MG/1
75 CAPSULE ORAL NIGHTLY
Qty: 90 CAPSULE | Refills: 0 | Status: SHIPPED | OUTPATIENT
Start: 2025-05-19 | End: 2025-08-18

## 2025-05-19 RX ORDER — TRAMADOL HYDROCHLORIDE 50 MG/1
50 TABLET, FILM COATED ORAL
Qty: 30 TABLET | Refills: 2 | Status: SHIPPED | OUTPATIENT
Start: 2025-05-19

## 2025-05-23 ENCOUNTER — RESULTS FOLLOW-UP (OUTPATIENT)
Dept: CARDIOLOGY | Facility: CLINIC | Age: 70
End: 2025-05-23

## 2025-05-30 ENCOUNTER — OFFICE VISIT (OUTPATIENT)
Dept: NEUROLOGY | Facility: CLINIC | Age: 70
End: 2025-05-30
Payer: MEDICARE

## 2025-05-30 VITALS
DIASTOLIC BLOOD PRESSURE: 63 MMHG | SYSTOLIC BLOOD PRESSURE: 112 MMHG | BODY MASS INDEX: 26.94 KG/M2 | HEIGHT: 70 IN | HEART RATE: 53 BPM | WEIGHT: 188.19 LBS

## 2025-05-30 DIAGNOSIS — G25.81 RESTLESS LEGS SYNDROME (RLS): ICD-10-CM

## 2025-05-30 PROCEDURE — 99999 PR PBB SHADOW E&M-EST. PATIENT-LVL IV: CPT | Mod: PBBFAC,GC,,

## 2025-05-30 PROCEDURE — 99214 OFFICE O/P EST MOD 30 MIN: CPT | Mod: PBBFAC

## 2025-05-30 RX ORDER — PREGABALIN 75 MG/1
75 CAPSULE ORAL NIGHTLY
Qty: 90 CAPSULE | Refills: 3 | Status: SHIPPED | OUTPATIENT
Start: 2025-08-14 | End: 2026-08-14

## 2025-05-30 NOTE — PROGRESS NOTES
"[facility] - NEUROLOGY [address]TH [address] [facility], [place]    Date: 5/30/25  Patient Name: Moustapha Murray   MRN: [id number]   PCP: Colton Johnson  Referring Provider: No ref. provider found    Assessment:   Moustapha Murray is a 70 y.o. male presenting for follow up of RLS. He is currently doing well on lyrica 75mg QHS without any issues. Will plan to continue on 75mg QHS. He would like to see a neurologist closer to his home on the [facility] so encouraged him to contact the [facility] neurology department in [place]. Otherwise will plan to see him back in 1 year.     Plan:     Problem List Items Addressed This Visit       Restless legs syndrome (RLS)    Relevant Medications    pregabalin (LYRICA) 75 MG capsule (Start on 8/14/2025)     Kade Prather MD      Subjective:   Interval History 5/30/25:  Returns for follow up  Last visit: continue to f/u w/ hematology for iron, switched to rotigotine patch, increased lyrica to 75mg QHS, increase magnesium to 400mg    Had inspire for TASIA but has to get another procedure to get it fixed  Has gotten iron infusions and iron level has improved  RLS has been well managed on only lyrica  Couldn't get the rotigotine patch, has stopped taking mirapex  Overall doing well with no significant complaints    Interval Hx 5/17/24:  Plan from last visit:  "Decrease Mirapex to 1mg twice a day  Continue with your iron infusions  Check labs as above  Start taking lyrica (pregabalin) only at night time - starting at 50mg and can increasae  Start taking magnesium glycinate and ensure that it is at least 400mg  RTC in 6 weeks"    Patient underwent iron infusions on 04/02/2024 and 04/09/2024.     From hematology:  "-Will repeat iron studies next month  -Hemoglobin improving to 11.1g/dL on 4/17/24 (from 9.8)  -Unclear cause as colonoscopy and EUS showed no significant findings"    Started lyrica   Got iron infusions pending repeat studies  Can't " "tell difference w/ lyrica but no side effects  Current meds: mirapex 1mg QHS and lyrica 50mg QHS; still takes magnesium but only 200mg    Denies gambling/impulsive behaviors       HPI 4/3/24:   Mr. Moustapha Murray is a 70 y.o. male presenting for evaluation of RLS    Feels like legs have "add" and always need to move  No tingling, not much pain, sometimes sensitive to touch  If up walking around gets better  Present all the time but worse at night  Hasn't sleep all night in many years- not sure if from legs or overactive mind  Temporary relief from moving/taking aleeve     Dx w/ RLS over 25 years ago- used to take requip (ropinerole) - wasn't working well- changed to mirapex in 2014 - was controlled on 0.5g mirapex from 2014 to 2021 when his dose was increased to 1mg   12/2023 was having worsening symptoms and was changed from mirapex to gabapentin - gabapentin made him too sleepy and messed up sleep schedule so he switched back to mirapex - was increased to 1.5mg BID and referred to neurology    Had fall while on gabapentin    Takes magnesium BID     Can't tell much difference    Wears compression stockings- helps w/ long car rides    Has BELLE- sees hematology- started on iron infusions yesterday    Lost 30lbs last 3 months- unknown why- ordered appetite stimulants    +TIF1 gamma - unclear from what    Iron labs:  Iron 45 - 160 ug/dL 30 Low    Transferrin 200 - 375 mg/dL 344   TIBC 250 - 450 ug/dL 509 High    Saturated Iron 20 - 50 % 6 Low      Ferritin: 127      PAST MEDICAL HISTORY:    Pulmonary fibrosis/ILD  TASIA  Past Medical History:   Diagnosis Date    Anticoagulant long-term use     Bladder cancer 08/2020    Colon polyps     COPD (chronic obstructive pulmonary disease)     Coronary artery disease 01/31/2023    CORONARY STENTSx3    DDD (degenerative disc disease), lumbar     Depression     Fatty liver     GERD (gastroesophageal reflux disease)     H/O motion sickness     Hypertension     Mediastinal " lymphadenopathy 07/2024    PONV (postoperative nausea and vomiting)     Pulmonary fibrosis     RLS (restless legs syndrome)     Sleep apnea, unspecified     can't use cpap       PAST SURGICAL HISTORY:  Past Surgical History:   Procedure Laterality Date    ANGIOGRAM, CORONARY, WITH LEFT HEART CATHETERIZATION N/A 1/31/2023    Procedure: Left Heart Cath;  Surgeon: Nitin Wheat MD;  Location: [facility] CATH;  Service: Cardiology;  Laterality: N/A;    ANGIOGRAM, CORONARY, WITH LEFT HEART CATHETERIZATION  4/17/2024    Procedure: Left Heart Cath;  Surgeon: Nitin Wheat MD;  Location: [facility] CATH;  Service: Cardiology;;    APPENDECTOMY      CARPAL TUNNEL RELEASE      CERVICAL FUSION      CHOLECYSTECTOMY      CIRCUMCISION      COLONOSCOPY N/A 3/16/2020    Procedure: COLONOSCOPY;  Surgeon: Braulio Best MD;  Location: [facility];  Service: Endoscopy;  Laterality: N/A; 3 colon polyps removed; biopsy: tubular adenoma and hyperplastic; repeat in 5 years for surveillance    COLONOSCOPY N/A 3/4/2024    Procedure: COLONOSCOPY;  Surgeon: Braulio Best MD;  Location: [facility];  Service: Gastroenterology;  Laterality: N/A;    CORONARY ANGIOGRAPHY  2/13/2023    Procedure: ANGIOGRAM, CORONARY ARTERY;  Surgeon: Nitin Wheat MD;  Location: [facility] CATH;  Service: Cardiology;;    CORONARY ANGIOPLASTY WITH STENT PLACEMENT N/A 1/31/2023    Procedure: ANGIOPLASTY, CORONARY ARTERY, WITH STENT INSERTION;  Surgeon: Nitin Wheat MD;  Location: [facility] CATH;  Service: Cardiology;  Laterality: N/A;    CYSTOSCOPY W/ RETROGRADES Bilateral 8/24/2020    Procedure: CYSTOSCOPY, WITH RETROGRADE PYELOGRAM -WITH URETHRAL DILATION;  Surgeon: Greg Claudio MD;  Location: [facility] OR;  Service: Urology;  Laterality: Bilateral;    CYSTOSCOPY W/ URETERAL STENT PLACEMENT Right 8/24/2020    Procedure: CYSTOSCOPY, WITH URETERAL STENT INSERTION;  Surgeon: Greg Claudio MD;  Location: [facility] OR;  Service: Urology;  Laterality: Right;    ENDOBRONCHIAL ULTRASOUND  Bilateral 7/15/2024    Procedure: ENDOBRONCHIAL ULTRASOUND (EBUS);  Surgeon: Umm Arellano MD;  Location: [facility] OR;  Service: Pulmonary;  Laterality: Bilateral;    ENDOSCOPIC ULTRASOUND OF UPPER GASTROINTESTINAL TRACT Left 5/8/2024    Procedure: ULTRASOUND, UPPER GI TRACT, ENDOSCOPIC;  Surgeon: Brad Ochoa MD;  Location: [facility] ENDO;  Service: Endoscopy;  Laterality: Left;    EPIDURAL STEROID INJECTION INTO LUMBAR SPINE N/A 3/27/2025    Procedure: Injection-steroid-epidural-lumbar   L4/5;  Surgeon: Ann Soto MD;  Location: [facility] OR;  Service: Anesthesiology;  Laterality: N/A;  normal    ESOPHAGOGASTRODUODENOSCOPY N/A 5/8/2024    Procedure: EGD (ESOPHAGOGASTRODUODENOSCOPY);  Surgeon: Brad Ochoa MD;  Location: [facility] ENDO;  Service: Endoscopy;  Laterality: N/A;    INSERTION, NEUROSTIMULATOR, HYPOGLOSSAL Right 11/22/2024    Procedure: INSERTION,NEUROSTIMULATOR,HYPOGLOSSAL;  Surgeon: Hector Lindquist MD;  Location: [facility] OR;  Service: ENT;  Laterality: Right;    INSTILLATION OF URINARY BLADDER N/A 8/24/2020    Procedure: INSTILLATION, BLADDER - Mitomycin;  Surgeon: Greg Claudio MD;  Location: [facility] OR;  Service: Urology;  Laterality: N/A;    IVUS, CORONARY  1/31/2023    Procedure: IVUS, Coronary;  Surgeon: Nitin Wheat MD;  [facility];  Service: Cardiology;;    IVUS, CORONARY  2/13/2023    Procedure: IVUS, Coronary;  Surgeon: Nitin Wheat MD;  [facility];  Service: Cardiology;;    PERCUTANEOUS CORONARY INTERVENTION, ARTERY  2/13/2023    Procedure: Percutaneous coronary intervention;  Surgeon: Nitin Wheat MD;  Location: [facility] CATH;  Service: Cardiology;;    RIGHT HEART CATHETERIZATION  8/21/2024    Procedure: Right heart cath;  Surgeon: Nitin Wheat MD;  Location: [facility] CATH;  Service: Cardiology;;    SLEEP ENDOSCOPY, DRUG-INDUCED Bilateral 10/23/2024    Procedure: SLEEP ENDOSCOPY,DRUG-INDUCED;  Surgeon: Hector Lindquist MD;  Location: [facility] OR;  Service: ENT;  Laterality:  Bilateral;    TRANSFORAMINAL EPIDURAL INJECTION OF STEROID Right 7/17/2019    Procedure: Injection,steroid,epidural,transforaminal approach L3-4, L4-5;  Surgeon: Chris Palencia MD;  Location: [facility] OR;  Service: Pain Management;  Laterality: Right;    TRANSFORAMINAL EPIDURAL INJECTION OF STEROID Right 8/28/2019    Procedure: Injection,steroid,epidural,transforaminal approach;  Surgeon: Chris Palencia MD;  Location: [facility] OR;  Service: Pain Management;  Laterality: Right;  L3-4, L4-L5    TRANSFORAMINAL EPIDURAL INJECTION OF STEROID Left 7/11/2024    Procedure: Injection,steroid,epidural,transforaminal approach l5-s1 and s1;  Surgeon: Ann Soto MD;  Location: [facility] OR;  Service: Anesthesiology;  Laterality: Left;    TRANSFORAMINAL EPIDURAL INJECTION OF STEROID Left 1/30/2025    Procedure: Injection,steroid,epidural,transforaminal approach l5-s1 and s1 ( Iv sedation);  Surgeon: Ann Soto MD;  Location: [facility] OR;  Service: Pain Management;  Laterality: Left;    WISDOM TOOTH EXTRACTION         CURRENT MEDS:  Current Outpatient Medications   Medication Sig Dispense Refill    albuterol (PROVENTIL/VENTOLIN HFA) 90 mcg/actuation inhaler Inhale 2 puffs into the lungs every 6 (six) hours as needed for Wheezing. Rescue 18 g 6    aspirin 81 MG Chew Take 1 tablet (81 mg total) by mouth once daily. 90 tablet 3    budesonide (PULMICORT) 0.5 mg/2 mL nebulizer solution Take 2 mLs (0.5 mg total) by nebulization 2 (two) times daily. Controller 120 mL 3    droNABinol (MARINOL) 2.5 MG capsule Take 1 capsule (2.5 mg total) by mouth 2 (two) times daily before meals. 60 capsule 3    EScitalopram oxalate (LEXAPRO) 10 MG tablet TAKE 1 TABLET BY MOUTH EVERY DAY 90 tablet 3    formoterol (PERFOROMIST) 20 mcg/2 mL Nebu Take 2 mLs (20 mcg total) by nebulization 2 (two) times a day. Controller 90 mL 3    ibuprofen (ADVIL,MOTRIN) 600 MG tablet Take 1 tablet (600 mg total) by mouth every 6 (six) hours as needed for Pain. 20 tablet 0    melatonin 5 mg Cap  Take 5 mg by mouth nightly as needed. TAKE AS SCHEDULED      nintedanib (OFEV) 150 mg Cap Take 1 capsule (150 mg total) by mouth 2 (two) times a day. 60 capsule 6    ondansetron (ZOFRAN-ODT) 4 MG TbDL Take 1 tablet (4 mg total) by mouth every 6 (six) hours as needed (Nausea). 10 tablet 0    pantoprazole (PROTONIX) 40 MG tablet Take 1 tablet (40 mg total) by mouth once daily. 90 tablet 3    rosuvastatin (CRESTOR) 40 MG Tab Take 1 tablet (40 mg total) by mouth every evening. 90 tablet 0    torsemide (DEMADEX) 10 MG Tab Take 1 tablet (10 mg total) by mouth once daily. 30 tablet 11    traMADoL (ULTRAM) 50 mg tablet Take 1 tablet (50 mg total) by mouth every 24 hours as needed for Pain. 30 tablet 2    triamcinolone acetonide 0.1% (KENALOG) 0.1 % cream APPLY TOPICALLY 2 TIMES DAILY AS NEEDED. (Patient taking differently: as needed.) 45 g 5    verapamiL (CALAN-SR) 240 MG CR tablet Take 1 tablet (240 mg total) by mouth every evening. 30 tablet 11    [START ON 8/14/2025] pregabalin (LYRICA) 75 MG capsule Take 1 capsule (75 mg total) by mouth every evening. 90 capsule 3     Current Facility-Administered Medications   Medication Dose Route Frequency Provider Last Rate Last Admin    sodium chloride 0.9% flush 10 mL  10 mL Intravenous PRN Nitin Wheat MD         Facility-Administered Medications Ordered in Other Visits   Medication Dose Route Frequency Provider Last Rate Last Admin    ondansetron injection 4 mg  4 mg Intravenous Daily PRN Gustavo Chu MD        sodium chloride 0.9% flush 10 mL  10 mL Intravenous PRN Braulio Best MD   5 mL at 10/23/24 1130       ALLERGIES:  Review of patient's allergies indicates:   Allergen Reactions    Sulfa (sulfonamide antibiotics) Anaphylaxis       FAMILY HISTORY:  Family History   Problem Relation Name Age of Onset    Stroke Mother      Ulcerative colitis Mother      Kidney cancer Mother      Heart disease Father      Heart attack Father      Cancer Neg Hx      Colon cancer  "Neg Hx      Crohn's disease Neg Hx      Celiac disease Neg Hx         SOCIAL HISTORY:  Social History     Tobacco Use    Smoking status: Former     Current packs/day: 0.00     Types: Cigarettes     Quit date: 10/16/2013     Years since quittin.6    Smokeless tobacco: Never   Substance Use Topics    Alcohol use: Yes     Alcohol/week: 2.0 standard drinks of alcohol     Types: 2 Cans of beer per week     Comment: couple beers weekly    Drug use: Not Currently       Review of Systems:  12 system review of systems is negative except for the symptoms mentioned in HPI.      Objective:     Vitals:    25 1309   BP: 112/63   Pulse: (!) 53   Weight: 85.3 kg (188 lb 2.6 oz)   Height: 5' 10" (1.778 m)         General: NAD, well-appearing  Eyes: no discharge or erythema   ENT: mucous membranes moist  Neck: Supple, full range of motion  Lungs: Normal work of breathing, not in respiratory distress  Skin: No rash or lesions  Psychiatry: Mood and affect are appropriate   Abdomen: flat, non-distended  Extremeties: No cyanosis or edema.    Neurological Exam:  MENTAL STATUS: Alert and oriented to person, place, and time. Attention and concentration within normal limits. Speech without dysarthria, able to name and repeat without difficulty. Recent and remote memory within normal limits.   CRANIAL NERVES: CN II-XII grossly intact.  SENSORY: Sensation is intact to light touch throughout.  MOTOR: Normal bulk. Moves all extremities spontaneously.  CEREBELLAR/COORDINATION/GAIT: Gait steady with normal arm swing and stride length.    Labs/Imaging:  Lab Results   Component Value Date    IRON 81 2025    TRANSFERRIN 223 2025    TIBC 330 2025    LABIRON 25 2025    FESATURATED 55 (H) 2025      Lab Results   Component Value Date    FERRITIN 354.0 (H) 2025            "

## 2025-06-02 ENCOUNTER — TELEPHONE (OUTPATIENT)
Dept: SURGERY | Facility: HOSPITAL | Age: 70
End: 2025-06-02
Payer: MEDICARE

## 2025-06-05 ENCOUNTER — ANESTHESIA EVENT (OUTPATIENT)
Dept: SURGERY | Facility: HOSPITAL | Age: 70
End: 2025-06-05
Payer: MEDICARE

## 2025-06-06 ENCOUNTER — ANESTHESIA (OUTPATIENT)
Dept: SURGERY | Facility: HOSPITAL | Age: 70
End: 2025-06-06
Payer: MEDICARE

## 2025-06-06 ENCOUNTER — HOSPITAL ENCOUNTER (OUTPATIENT)
Facility: HOSPITAL | Age: 70
Discharge: HOME | End: 2025-06-06
Attending: OTOLARYNGOLOGY | Admitting: OTOLARYNGOLOGY
Payer: MEDICARE

## 2025-06-06 VITALS
WEIGHT: 180 LBS | BODY MASS INDEX: 25.77 KG/M2 | SYSTOLIC BLOOD PRESSURE: 123 MMHG | DIASTOLIC BLOOD PRESSURE: 60 MMHG | HEART RATE: 64 BPM | OXYGEN SATURATION: 97 % | RESPIRATION RATE: 19 BRPM | TEMPERATURE: 98 F | HEIGHT: 70 IN

## 2025-06-06 DIAGNOSIS — Z78.9 INTOLERANCE OF CONTINUOUS POSITIVE AIRWAY PRESSURE (CPAP) VENTILATION: ICD-10-CM

## 2025-06-06 DIAGNOSIS — T85.121S: ICD-10-CM

## 2025-06-06 DIAGNOSIS — G47.33 OSA (OBSTRUCTIVE SLEEP APNEA): Primary | ICD-10-CM

## 2025-06-06 PROCEDURE — 61888 REVISE/REMOVE NEURORECEIVER: CPT | Mod: XU,RT,, | Performed by: OTOLARYNGOLOGY

## 2025-06-06 PROCEDURE — 63600175 PHARM REV CODE 636 W HCPCS: Mod: PO | Performed by: NURSE ANESTHETIST, CERTIFIED REGISTERED

## 2025-06-06 PROCEDURE — D9220A PRA ANESTHESIA: Mod: CRNA,,, | Performed by: NURSE ANESTHETIST, CERTIFIED REGISTERED

## 2025-06-06 PROCEDURE — 36000706: Mod: PO | Performed by: OTOLARYNGOLOGY

## 2025-06-06 PROCEDURE — 71000015 HC POSTOP RECOV 1ST HR: Mod: PO | Performed by: OTOLARYNGOLOGY

## 2025-06-06 PROCEDURE — 71000033 HC RECOVERY, INTIAL HOUR: Mod: PO | Performed by: OTOLARYNGOLOGY

## 2025-06-06 PROCEDURE — 63600175 PHARM REV CODE 636 W HCPCS: Mod: PO | Performed by: ANESTHESIOLOGY

## 2025-06-06 PROCEDURE — 25000003 PHARM REV CODE 250: Mod: PO | Performed by: NURSE ANESTHETIST, CERTIFIED REGISTERED

## 2025-06-06 PROCEDURE — 37000008 HC ANESTHESIA 1ST 15 MINUTES: Mod: PO | Performed by: OTOLARYNGOLOGY

## 2025-06-06 PROCEDURE — 63600175 PHARM REV CODE 636 W HCPCS: Mod: PO | Performed by: OTOLARYNGOLOGY

## 2025-06-06 PROCEDURE — 37000009 HC ANESTHESIA EA ADD 15 MINS: Mod: PO | Performed by: OTOLARYNGOLOGY

## 2025-06-06 PROCEDURE — 36000707: Mod: PO | Performed by: OTOLARYNGOLOGY

## 2025-06-06 PROCEDURE — 25000003 PHARM REV CODE 250: Mod: PO | Performed by: ANESTHESIOLOGY

## 2025-06-06 PROCEDURE — 64583 REV/RPLCT HPGLSL NSTM ARY PG: CPT | Mod: 52,,, | Performed by: OTOLARYNGOLOGY

## 2025-06-06 PROCEDURE — D9220A PRA ANESTHESIA: Mod: ANES,,, | Performed by: ANESTHESIOLOGY

## 2025-06-06 PROCEDURE — 27201423 OPTIME MED/SURG SUP & DEVICES STERILE SUPPLY: Mod: PO | Performed by: OTOLARYNGOLOGY

## 2025-06-06 RX ORDER — MIDAZOLAM HYDROCHLORIDE 1 MG/ML
INJECTION INTRAMUSCULAR; INTRAVENOUS
Status: DISCONTINUED | OUTPATIENT
Start: 2025-06-06 | End: 2025-06-06

## 2025-06-06 RX ORDER — PROPOFOL 10 MG/ML
VIAL (ML) INTRAVENOUS
Status: DISCONTINUED | OUTPATIENT
Start: 2025-06-06 | End: 2025-06-06

## 2025-06-06 RX ORDER — LIDOCAINE HYDROCHLORIDE 10 MG/ML
1 INJECTION, SOLUTION EPIDURAL; INFILTRATION; INTRACAUDAL; PERINEURAL ONCE
Status: DISCONTINUED | OUTPATIENT
Start: 2025-06-06 | End: 2025-06-06 | Stop reason: HOSPADM

## 2025-06-06 RX ORDER — EPHEDRINE SULFATE 50 MG/ML
INJECTION, SOLUTION INTRAVENOUS
Status: DISCONTINUED | OUTPATIENT
Start: 2025-06-06 | End: 2025-06-06

## 2025-06-06 RX ORDER — ROCURONIUM BROMIDE 10 MG/ML
INJECTION, SOLUTION INTRAVENOUS
Status: DISCONTINUED | OUTPATIENT
Start: 2025-06-06 | End: 2025-06-06

## 2025-06-06 RX ORDER — FENTANYL CITRATE 50 UG/ML
INJECTION, SOLUTION INTRAMUSCULAR; INTRAVENOUS
Status: DISCONTINUED | OUTPATIENT
Start: 2025-06-06 | End: 2025-06-06

## 2025-06-06 RX ORDER — HYDROMORPHONE HYDROCHLORIDE 2 MG/ML
0.2 INJECTION, SOLUTION INTRAMUSCULAR; INTRAVENOUS; SUBCUTANEOUS EVERY 5 MIN PRN
Status: DISCONTINUED | OUTPATIENT
Start: 2025-06-06 | End: 2025-06-06 | Stop reason: HOSPADM

## 2025-06-06 RX ORDER — CEFAZOLIN SODIUM 1 G/3ML
2 INJECTION, POWDER, FOR SOLUTION INTRAMUSCULAR; INTRAVENOUS ONCE
Status: COMPLETED | OUTPATIENT
Start: 2025-06-06 | End: 2025-06-06

## 2025-06-06 RX ORDER — LIDOCAINE HYDROCHLORIDE 20 MG/ML
INJECTION INTRAVENOUS
Status: DISCONTINUED | OUTPATIENT
Start: 2025-06-06 | End: 2025-06-06

## 2025-06-06 RX ORDER — GLUCAGON 1 MG
1 KIT INJECTION
Status: DISCONTINUED | OUTPATIENT
Start: 2025-06-06 | End: 2025-06-06 | Stop reason: HOSPADM

## 2025-06-06 RX ORDER — DEXAMETHASONE SODIUM PHOSPHATE 4 MG/ML
8 INJECTION, SOLUTION INTRA-ARTICULAR; INTRALESIONAL; INTRAMUSCULAR; INTRAVENOUS; SOFT TISSUE
Status: COMPLETED | OUTPATIENT
Start: 2025-06-06 | End: 2025-06-06

## 2025-06-06 RX ORDER — OXYCODONE HYDROCHLORIDE 5 MG/1
5 TABLET ORAL
Status: DISCONTINUED | OUTPATIENT
Start: 2025-06-06 | End: 2025-06-06 | Stop reason: HOSPADM

## 2025-06-06 RX ORDER — ONDANSETRON 4 MG/1
4 TABLET, ORALLY DISINTEGRATING ORAL EVERY 6 HOURS PRN
Qty: 10 TABLET | Refills: 0 | Status: SHIPPED | OUTPATIENT
Start: 2025-06-06

## 2025-06-06 RX ORDER — FENTANYL CITRATE 50 UG/ML
25 INJECTION, SOLUTION INTRAMUSCULAR; INTRAVENOUS EVERY 5 MIN PRN
Status: DISCONTINUED | OUTPATIENT
Start: 2025-06-06 | End: 2025-06-06 | Stop reason: HOSPADM

## 2025-06-06 RX ORDER — HYDROCODONE BITARTRATE AND ACETAMINOPHEN 5; 325 MG/1; MG/1
1 TABLET ORAL EVERY 6 HOURS PRN
Qty: 15 TABLET | Refills: 0 | Status: SHIPPED | OUTPATIENT
Start: 2025-06-06

## 2025-06-06 RX ORDER — SODIUM CHLORIDE, SODIUM LACTATE, POTASSIUM CHLORIDE, CALCIUM CHLORIDE 600; 310; 30; 20 MG/100ML; MG/100ML; MG/100ML; MG/100ML
INJECTION, SOLUTION INTRAVENOUS CONTINUOUS
Status: DISCONTINUED | OUTPATIENT
Start: 2025-06-06 | End: 2025-06-06 | Stop reason: HOSPADM

## 2025-06-06 RX ORDER — ONDANSETRON HYDROCHLORIDE 2 MG/ML
INJECTION, SOLUTION INTRAVENOUS
Status: DISCONTINUED | OUTPATIENT
Start: 2025-06-06 | End: 2025-06-06

## 2025-06-06 RX ORDER — SUCCINYLCHOLINE CHLORIDE 20 MG/ML
INJECTION INTRAMUSCULAR; INTRAVENOUS
Status: DISCONTINUED | OUTPATIENT
Start: 2025-06-06 | End: 2025-06-06

## 2025-06-06 RX ORDER — LIDOCAINE HYDROCHLORIDE AND EPINEPHRINE 10; 10 UG/ML; MG/ML
INJECTION, SOLUTION INFILTRATION; PERINEURAL
Status: DISCONTINUED | OUTPATIENT
Start: 2025-06-06 | End: 2025-06-06 | Stop reason: HOSPADM

## 2025-06-06 RX ADMIN — SODIUM CHLORIDE, POTASSIUM CHLORIDE, SODIUM LACTATE AND CALCIUM CHLORIDE: 600; 310; 30; 20 INJECTION, SOLUTION INTRAVENOUS at 12:06

## 2025-06-06 RX ADMIN — ONDANSETRON 4 MG: 2 INJECTION, SOLUTION INTRAMUSCULAR; INTRAVENOUS at 01:06

## 2025-06-06 RX ADMIN — EPHEDRINE SULFATE 20 MG: 50 INJECTION INTRAVENOUS at 01:06

## 2025-06-06 RX ADMIN — PROPOFOL 100 MG: 10 INJECTION, EMULSION INTRAVENOUS at 01:06

## 2025-06-06 RX ADMIN — VASOPRESSIN 2 UNITS: 20 INJECTION INTRAVENOUS at 01:06

## 2025-06-06 RX ADMIN — SODIUM CHLORIDE: 0.9 INJECTION, SOLUTION INTRAVENOUS at 01:06

## 2025-06-06 RX ADMIN — GLYCOPYRROLATE 0.2 MG: 0.2 INJECTION, SOLUTION INTRAMUSCULAR; INTRAVENOUS at 01:06

## 2025-06-06 RX ADMIN — FENTANYL CITRATE 50 MCG: 0.05 INJECTION, SOLUTION INTRAMUSCULAR; INTRAVENOUS at 01:06

## 2025-06-06 RX ADMIN — VASOPRESSIN 2 UNITS: 20 INJECTION INTRAVENOUS at 03:06

## 2025-06-06 RX ADMIN — ROCURONIUM BROMIDE 5 MG: 10 INJECTION, SOLUTION INTRAVENOUS at 01:06

## 2025-06-06 RX ADMIN — SODIUM CHLORIDE: 0.9 INJECTION, SOLUTION INTRAVENOUS at 12:06

## 2025-06-06 RX ADMIN — OXYCODONE 5 MG: 5 TABLET ORAL at 03:06

## 2025-06-06 RX ADMIN — MIDAZOLAM HYDROCHLORIDE 2 MG: 1 INJECTION, SOLUTION INTRAMUSCULAR; INTRAVENOUS at 12:06

## 2025-06-06 RX ADMIN — PROPOFOL 180 MG: 10 INJECTION, EMULSION INTRAVENOUS at 01:06

## 2025-06-06 RX ADMIN — LIDOCAINE HYDROCHLORIDE 60 MG: 20 INJECTION INTRAVENOUS at 01:06

## 2025-06-06 RX ADMIN — PROPOFOL 20 MG: 10 INJECTION, EMULSION INTRAVENOUS at 01:06

## 2025-06-06 RX ADMIN — VASOPRESSIN 2 UNITS: 20 INJECTION INTRAVENOUS at 02:06

## 2025-06-06 RX ADMIN — CEFAZOLIN 2 G: 330 INJECTION, POWDER, FOR SOLUTION INTRAMUSCULAR; INTRAVENOUS at 12:06

## 2025-06-06 RX ADMIN — DEXAMETHASONE SODIUM PHOSPHATE 8 MG: 4 INJECTION INTRA-ARTICULAR; INTRALESIONAL; INTRAMUSCULAR; INTRAVENOUS; SOFT TISSUE at 12:06

## 2025-06-06 RX ADMIN — SUCCINYLCHOLINE CHLORIDE 160 MG: 20 INJECTION, SOLUTION INTRAMUSCULAR; INTRAVENOUS at 01:06

## 2025-06-06 NOTE — ANESTHESIA PROCEDURE NOTES
Intubation    Date/Time: 6/6/2025 1:01 PM    Performed by: Agnes Mathis CRNA  Authorized by: Agnes Mathis CRNA    Intubation:     Induction:  Intravenous    Intubated:  Postinduction    Mask Ventilation:  Unsatisfactory mask ventilation - proceeded to laryngoscopy    Attempts:  1    Attempted By:  CRNA    Method of Intubation:  Direct    Blade:  Alvarado 2    Laryngeal View Grade: Grade I - full view of cords      Difficult Airway Encountered?: No      Complications:  None    Airway Device:  Oral endotracheal tube    Airway Device Size:  8.0    Style/Cuff Inflation:  Cuffed    Tube secured:  23    Secured at:  The lips    Placement Verified By:  Capnometry    Complicating Factors:  None    Findings Post-Intubation:  Atraumatic/condition of teeth unchanged and BS equal bilateral

## 2025-06-06 NOTE — TRANSFER OF CARE
"Anesthesia Transfer of Care Note    Patient: Moustapha Murray    Procedure(s) Performed: Procedure(s) (LRB):  REVISION NEUROSTIMULATOR,HYPOGLOSSAL (Right)    Patient location: PACU    Anesthesia Type: general    Transport from OR: Transported from OR on room air with adequate spontaneous ventilation    Post pain: adequate analgesia    Post assessment: no apparent anesthetic complications and tolerated procedure well    Post vital signs: stable    Level of consciousness: sedated    Nausea/Vomiting: no nausea/vomiting    Complications: none    Transfer of care protocol was followed      Last vitals: Visit Vitals  /69 (BP Location: Right arm, Patient Position: Lying)   Pulse (!) 58   Temp 36.3 °C (97.4 °F) (Skin)   Resp 18   Ht 5' 10" (1.778 m)   Wt 81.6 kg (180 lb)   SpO2 97%   BMI 25.83 kg/m²     "

## 2025-06-06 NOTE — OP NOTE
06/06/2025     Moustapha Murray  [id number]  1955    PRE-OPERATIVE DIAGNOSIS  1. TASIA (obstructive sleep apnea)    2. BMI 27.0-27.9,adult    3. Intolerance of continuous positive airway pressure (CPAP) ventilation    4. Displacement of electrode lead of implanted electronic neurostimulator of peripheral nerve, sequela        POST-OPERATIVE DIAGNOSIS  1. TASIA (obstructive sleep apnea)    2. BMI 27.0-27.9,adult    3. Intolerance of continuous positive airway pressure (CPAP) ventilation    4. Displacement of electrode lead of implanted electronic neurostimulator of peripheral nerve, sequela      PROCEDURES PERFORMED  Revision of hypoglossal nerve stimulator electrode     SURGEON: Hector Lindquist MD    ASSISTANT: Michael Brar    ANESTHESIA: General    COMPLICATIONS: None    BLOOD LOSS: 10 mL    SPECIMENS  None    DISPOSITION  PACU    OPERATIVE FINDINGS  Reversal of the cuff electrode flaps that was corrected.   Diagnostic evaluation showing appropriate tongue response.    INDICATIONS  Moustapha Murray is a 70 y.o. male patient with a history of  obstructive sleep apnea and had a hypoglossal nerve stimulator placed 11/2024. During the post-operative visits, he was noted to have loss of stimulation. Analysis was done on the implant and it was deemed that a revision should be performed to evaluate the implant generator and possibly the stimulator cuff: Risks, benefits, and alternatives were explained.    PROCEDURE IN DETAIL  The patient was seen in the pre-operative holding area, and consent was signed. They were wheeled into the operating room and placed supine on the table. Anesthesia was induced via general endotracheal tube and this was secured to the left. The bed was turned. A shoulder roll was placed and the patient was prepped and draped in usual sterile fashion with the head turned to the left. Prior to prepping and draping, electrodes were placed in the genioglossus and hyo/styloglossus muscle and  continuous EMG monitoring was undertaken.     I first began by opening the chest incision. I opened down all the way to the generator. The generator was delivered from the wound and it was replaced with a new generator. Diagnostic evaluation was run, and there was no tongue movement with a new generator. Therefore it was deemed that we needed to explore the stimulator cuff and stimulator lead.    A modified sub-mandibular incision was made in the right upper neck approximately 1 cm below the mandible in the natural skin crease. Dissection was carried down through the subcutaneous tissue and platysma. There was dense scar tissue. I carefully identified loops of stimulator lead and identified landmarks including the inferior border of the submandibular gland was identified as well as the digastric tendon. Dissection was carried down into the digastric triangle I identified the stimulator cuff and took down the capsule overlying. It was noted that the cuff flaps were reversed. I corrected the placement of the cuff flaps and irrigated the wound. Diagnostic evaluation confirmed activation of the genioglossus nerve, resulting in genioglossal activation and tongue protrusion, confirmed visually. The stimulation electrode was then looped under and secured to the digastric tendon on its lateral surface with the provided anchor.     I replaced the generator back to the old, and diagnostic evaluation was run again. The implantable pulse generator was placed in the subclavicular pocket and secured loosely to the pectoralis fascia at the superior aspect using non-resorbable sutures. All the wounds were thoroughly irrigated with bacitracin irrigation. The wounds were then closed in three layers with 3-0 Vicryl, 4-0 Vicryl and Dermabond. Pressure dressings were then applied.      This marked the end of the procedure. The patient was turned back over to the Anesthesia team.  They were awoken and transported back to the PACU in  stable condition. Counts were correct. I performed the entire procedure.

## 2025-06-06 NOTE — ANESTHESIA PREPROCEDURE EVALUATION
06/06/2025  Moustapha Murray is a 70 y.o., male.      Pre-op Assessment    I have reviewed the Patient Summary Reports.     I have reviewed the Nursing Notes. I have reviewed the NPO Status.   I have reviewed the Medications.     Review of Systems  Social:  Former Smoker       Cardiovascular:     Hypertension   CAD   CABG/stent   Angina             Cardiovascular Symptoms: Angina       Coronary Artery Disease:                            Hypertension         Pulmonary:   COPD     Sleep Apnea    Chronic Obstructive Pulmonary Disease (COPD):           Obstructive Sleep Apnea (TASIA).           Hepatic/GI:     GERD Liver Disease,        Gerd       Liver Disease        Musculoskeletal:  Arthritis               Neurological:    Neuromuscular Disease,                                 Neuromuscular Disease   Psych:  Psychiatric History                  Physical Exam  General: Well nourished, Cooperative, Alert and Oriented    Airway:  Mallampati: II   Mouth Opening: Normal  TM Distance: Normal  Tongue: Normal  Neck ROM: Normal ROM    Dental:  Dentures    Chest/Lungs:  Normal Respiratory Rate    Heart:  Rate: Normal        Anesthesia Plan  Type of Anesthesia, risks & benefits discussed:    Anesthesia Type: Gen ETT  Intra-op Monitoring Plan: Standard ASA Monitors  Post Op Pain Control Plan: multimodal analgesia and IV/PO Opioids PRN  Induction:  IV  Airway Plan: Video  Informed Consent: Informed consent signed with the Patient and all parties understand the risks and agree with anesthesia plan.  All questions answered.   ASA Score: 3  Day of Surgery Review of History & Physical: H&P Update referred to the surgeon/provider.    Ready For Surgery From Anesthesia Perspective.     .

## 2025-06-06 NOTE — H&P
Subjective:       Patient ID: Moustapha Murray is a 70 y.o. male.    Chief Complaint: No chief complaint on file.      Moustapha is here for revision of hypoglossal nerve stimulator. No changes since clinic visit     Review of Systems   Constitutional: Negative for activity change and appetite change.   Eyes: Negative for discharge.   Respiratory: Negative for difficulty breathing and wheezing   Cardiovascular: Negative for chest pain.   Gastrointestinal: Negative for abdominal distention and abdominal pain.   Endocrine: Negative for cold intolerance and heat intolerance.   Genitourinary: Negative for dysuria.   Musculoskeletal: Negative for gait problem and joint swelling.   Skin: Negative for color change and pallor.   Neurological: Negative for syncope and weakness.   Psychiatric/Behavioral: Negative for agitation and confusion.     Objective:        Constitutional:   He is oriented to person, place, and time. He appears well-developed and well-nourished. He appears alert. He is active. Normal speech.      Head:  Normocephalic and atraumatic. Head is without TMJ tenderness. No scars. Salivary glands normal.  Facial strength is normal.      Ears:    Right Ear: No drainage or swelling. No middle ear effusion.   Left Ear: No drainage or swelling.  No middle ear effusion.     Nose:  No mucosal edema, rhinorrhea or sinus tenderness. No turbinate hypertrophy.      Mouth/Throat  Oropharynx clear and moist without lesions or asymmetry, normal uvula midline and mirror exam normal. Normal dentition. No uvula swelling, lacerations or trismus. No oropharyngeal exudate. Tonsillar erythema, tonsillar exudate.      Neck:  Full range of motion with neck supple and no adenopathy. Thyroid tenderness is present. No tracheal deviation, no edema, no erythema, normal range of motion, no stridor, no crepitus and no neck rigidity present. No thyroid mass present.     Cardiovascular:    Intact distal pulses and normal pulses.               Pulmonary/Chest:   Effort normal and breath sounds normal. No stridor.     Psychiatric:   His speech is normal and behavior is normal. His mood appears not anxious. His affect is not labile.     Neurological:   He is alert and oriented to person, place, and time. No sensory deficit.     Skin:   No abrasions, lacerations, lesions, or rashes. No abrasion and no bruising noted.         Tests / Results:  Reviewed     Assessment:       1. TASIA (obstructive sleep apnea)    2. BMI 27.0-27.9,adult    3. Intolerance of continuous positive airway pressure (CPAP) ventilation    4. Displacement of electrode lead of implanted electronic neurostimulator of peripheral nerve, sequela          Plan:         Revision of implant as planned

## 2025-06-06 NOTE — DISCHARGE INSTRUCTIONS
Post-op Insertion of Hypoglossal Nerve Stimulator  Hector Lindquist MD  Otolaryngology - [facility] - [phone]  Cell Phone (after hours) - [phone]    WHAT TO DO    1. You should follow-up in 1 week. We will schedule this appointment, but call if you need to change.   2. Use the recommended medications / treatments as described  3. Familiarize yourself with the information in this pamphlet  4. Call me with questions. Also, please call me the day after surgery to let me know how you are doing.  5. Cell phone: [phone], please call me with any concerns or questions. I am not always available to answer my cell phone, so for any urgent or important issues, use the other numbers provided above.    Pain and Activity  You will have pain from the incision sites for 1-2 weeks. This will decrease between day 3-7.  You will have moderate swelling under the jaw which may become a little firm before it softens and goes away. If you believe this is more than expected, you can reach out for advice.   You will have pain in the back of the tongue and right sided ear pain. This will gradually improve.    NO repetitive or strenuous activity for 4 weeks. You should especially avoid vigorous movement of the right upper extremity. No lifting more than 10 lb from the right extremity for 4 weeks. An exception is the exercises for the NECK, listed below.  Avoid heavy backpack or straps for 4 weeks, particularly on the right side.  You should perform circular neck rolls (10 clockwise and 10 counter clockwise) three times daily, beginning on post-operative day 1. This will reduce the scarring between the lead stimulator and the generator in the chest.     Incision Care  You may remove the bandages after 24 hours  You can shower after the bandages have been removed. You can get the incisions wet with warm,soapy water. Do not scrub the incisions, but it is OK to wash around them.  The incision is closed with skin  glue and this will dissolve  Do not apply ointment, lotion, or cream to the incision until your follow-up  A small amount of redness and swelling over the incision is normal. Call me if this is continuing to enlarge or the pain begins to increase after it had started to feel better.      Diet  Make sure to get enough fluids and nutrients. Food and drink guidelines include:  Take lots of fluids. Good choices are smoothies, water, and mild juices. Hydration is the MOST IMPORTANT factor in your nutrition during the healing process. Other examples include Boost, Ensure, and electrolyte containing juices)  No diet restrictions. You may want to eat more of a modified diet (with softer foods) but I am fine with you eating anything in your normal diet.  You may want to avoid spicy/acidic and hard foods during this time, strictly for comfort.     Medication  Give only medications approved by your doctor. Follow directions closely when giving medications.  Pain medication  You should alternate the pain medications so that you are taking one medication up to every 3 hours. An example is Ibuprofen, then Oxycodone 3 hours later.   Oxycodone / Acetaminophen - this can be taken every 6 hours as needed for pain. It is usually needed for the first few days regularly. Do not take plain Tylenol (acetaminophen) within 6 hours of this medication.   You should also take an anti-inflammatory for pain in addition to the narcotic, including Ibuprofen/Advil/Motrin. You can take 600 mg every 6 hours as needed for pain (You can take 800 mg if weight > 175 lb..)       Medication Tips  Avoid fish oil (omega acids) and vitamin E for 1 week.  As your pain lessens, you may Replace doses of prescription pain medications with acetaminophen (Tylenol). However, Do Not take doses of prescription pain medications at the same time as Tylenol because this could cause an overdose of Tylenol.  Do Not drive or operate machinery if taking prescription pain  medications  Read each medication label carefully, ask your pharmacist if you have any questions regarding warnings on the label  Do not measure liquid with a kitchen spoon. There are pediatric measuring devices available at the pharmacy. Ask for one when you get your prescription filled.  Store all mediations out of reach of children.      When to Call the Doctor  Mild pain and a slight fever are normal after surgery. But call the doctor right if you have any of the following:  Fever: temperature greater than 101  Trouble breathing  Bright red bleeding  Any other concerns

## 2025-06-06 NOTE — BRIEF OP NOTE
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, sequela [T85.121S]  BMI 27.0-27.9,adult [Z68.27]  Intolerance of continuous positive airway pressure (CPAP) ventilation [Z78.9]    Post-op Diagnosis:  Post-Op Diagnosis Codes:     * TASIA (obstructive sleep apnea) [G47.33]     * Displacement of electrode lead of implanted electronic neurostimulator of peripheral nerve, sequela [T85.121S]     * BMI 27.0-27.9,adult [Z68.27]     * Intolerance of continuous positive airway pressure (CPAP) ventilation [Z78.9]    Procedure(s) (LRB):  REVISION NEUROSTIMULATOR,HYPOGLOSSAL (Right)    Anesthesia: General    Description of the findings of the procedure: Insertion of hypoglossal nerve stimulator    Findings/Key Components: Successful implantation of Inspire    Estimated Blood Loss: * No values recorded between 6/6/2025 12:49 PM and 6/6/2025  3:20 PM *         Specimens:   Specimen (24h ago, onward)      None            Discharge Note    SUMMARY     Admit Date: 6/6/2025    Discharge Date and Time:  06/06/2025 3:20 PM    Hospital Course (synopsis of major diagnoses, care, treatment, and services provided during the course of the hospital stay): Did well following surgery and was discharged uneventfully     Final Diagnosis: Post-Op Diagnosis Codes:     * TASIA (obstructive sleep apnea) [G47.33]     * Displacement of electrode lead of implanted electronic neurostimulator of peripheral nerve, sequela [T85.121S]     * BMI 27.0-27.9,adult [Z68.27]     * Intolerance of continuous positive airway pressure (CPAP) ventilation [Z78.9]    Disposition: Home or Self Care    Follow Up/Patient Instructions: Regular diet, Follow-up 1 week. Activity light    Medications:  Reconciled  Home Medications:   Current Discharge Medication List        CONTINUE these medications which have NOT CHANGED    Details   albuterol (PROVENTIL/VENTOLIN HFA) 90 mcg/actuation inhaler Inhale 2 puffs into the lungs every 6 (six) hours as needed for Wheezing. Rescue  Qty: 18 g, Refills: 6    Associated Diagnoses: Chronic obstructive pulmonary disease, unspecified COPD type      aspirin 81 MG Chew Take 1 tablet (81 mg total) by mouth once daily.  Qty: 90 tablet, Refills: 3    Associated Diagnoses: Agatston CAC score, >400      budesonide (PULMICORT) 0.5 mg/2 mL nebulizer solution Take 2 mLs (0.5 mg total) by nebulization 2 (two) times daily. Controller  Qty: 120 mL, Refills: 3    Comments: Run under part B j44.9 copd  Associated Diagnoses: Chronic obstructive pulmonary disease, unspecified COPD type      droNABinol (MARINOL) 2.5 MG capsule Take 1 capsule (2.5 mg total) by mouth 2 (two) times daily before meals.  Qty: 60 capsule, Refills: 3    Associated Diagnoses: Anorexia      formoterol (PERFOROMIST) 20 mcg/2 mL Nebu Take 2 mLs (20 mcg total) by nebulization 2 (two) times a day. Controller  Qty: 90 mL, Refills: 3    Comments: Run under part B j44.9 copd  Associated Diagnoses: Chronic obstructive pulmonary disease, unspecified COPD type      ibuprofen (ADVIL,MOTRIN) 600 MG tablet Take 1 tablet (600 mg total) by mouth every 6 (six) hours as needed for Pain.  Qty: 20 tablet, Refills: 0      ondansetron (ZOFRAN-ODT) 4 MG TbDL Take 1 tablet (4 mg total) by mouth every 6 (six) hours as needed (Nausea).  Qty: 10 tablet, Refills: 0      pantoprazole (PROTONIX) 40 MG tablet Take 1 tablet (40 mg total) by mouth once daily.  Qty: 90 tablet, Refills: 3      pregabalin (LYRICA) 75 MG capsule Take 1 capsule (75 mg total) by mouth every evening.  Qty: 90 capsule, Refills: 3    Comments: Dispense in August at time of next refill  Associated Diagnoses: Restless legs syndrome (RLS)      rosuvastatin (CRESTOR) 40 MG Tab Take 1 tablet  (40 mg total) by mouth every evening.  Qty: 90 tablet, Refills: 0    Associated Diagnoses: Agatston CAC score, >400      torsemide (DEMADEX) 10 MG Tab Take 1 tablet (10 mg total) by mouth once daily.  Qty: 30 tablet, Refills: 11      traMADoL (ULTRAM) 50 mg tablet Take 1 tablet (50 mg total) by mouth every 24 hours as needed for Pain.  Qty: 30 tablet, Refills: 2    Comments: Quantity prescribed more than 7 day supply? Yes, quantity medically necessary  Associated Diagnoses: Bilateral wrist pain      EScitalopram oxalate (LEXAPRO) 10 MG tablet TAKE 1 TABLET BY MOUTH EVERY DAY  Qty: 90 tablet, Refills: 3    Associated Diagnoses: Depression, unspecified depression type      melatonin 5 mg Cap Take 5 mg by mouth nightly as needed. TAKE AS SCHEDULED      nintedanib (OFEV) 150 mg Cap Take 1 capsule (150 mg total) by mouth 2 (two) times a day.  Qty: 60 capsule, Refills: 6    Associated Diagnoses: Pulmonary fibrosis      triamcinolone acetonide 0.1% (KENALOG) 0.1 % cream APPLY TOPICALLY 2 TIMES DAILY AS NEEDED.  Qty: 45 g, Refills: 5    Associated Diagnoses: Dermatitis      verapamiL (CALAN-SR) 240 MG CR tablet Take 1 tablet (240 mg total) by mouth every evening.  Qty: 30 tablet, Refills: 11           No discharge procedures on file.

## 2025-06-09 DIAGNOSIS — R93.1 AGATSTON CAC SCORE, >400: ICD-10-CM

## 2025-06-09 RX ORDER — ROSUVASTATIN CALCIUM 40 MG/1
40 TABLET, COATED ORAL NIGHTLY
Qty: 90 TABLET | Refills: 0 | Status: SHIPPED | OUTPATIENT
Start: 2025-06-09

## 2025-06-09 NOTE — ANESTHESIA POSTPROCEDURE EVALUATION
Anesthesia Post Evaluation    Patient: Moustapha Murray    Procedure(s) Performed: Procedure(s) (LRB):  REVISION NEUROSTIMULATOR,HYPOGLOSSAL (Right)    Final Anesthesia Type: general      Patient location during evaluation: PACU  Patient participation: Yes- Able to Participate  Level of consciousness: awake and alert  Post-procedure vital signs: reviewed and stable  Pain management: adequate  Airway patency: patent    PONV status at discharge: No PONV  Anesthetic complications: no      Cardiovascular status: hemodynamically stable  Respiratory status: unassisted and room air  Hydration status: euvolemic  Follow-up not needed.              Vitals Value Taken Time   /60 06/06/25 16:07   Temp 36.5 °C (97.7 °F) 06/06/25 15:35   Pulse 64 06/06/25 16:07   Resp 19 06/06/25 16:07   SpO2 97 % 06/06/25 16:07         Event Time   Out of Recovery 15:50:00         Pain/Myles Score: No data recorded

## 2025-06-19 ENCOUNTER — OFFICE VISIT (OUTPATIENT)
Dept: CARDIOLOGY | Facility: CLINIC | Age: 70
End: 2025-06-19
Attending: INTERNAL MEDICINE
Payer: MEDICARE

## 2025-06-19 ENCOUNTER — OFFICE VISIT (OUTPATIENT)
Dept: OTOLARYNGOLOGY | Facility: CLINIC | Age: 70
End: 2025-06-19
Payer: MEDICARE

## 2025-06-19 VITALS
DIASTOLIC BLOOD PRESSURE: 74 MMHG | WEIGHT: 179.44 LBS | SYSTOLIC BLOOD PRESSURE: 105 MMHG | HEIGHT: 70 IN | HEART RATE: 70 BPM | BODY MASS INDEX: 25.69 KG/M2

## 2025-06-19 VITALS — BODY MASS INDEX: 25.69 KG/M2 | WEIGHT: 179.44 LBS | HEIGHT: 70 IN

## 2025-06-19 DIAGNOSIS — G47.33 OSA (OBSTRUCTIVE SLEEP APNEA): ICD-10-CM

## 2025-06-19 DIAGNOSIS — I27.20 PULMONARY HYPERTENSION: ICD-10-CM

## 2025-06-19 DIAGNOSIS — I25.110 ATHEROSCLEROSIS OF NATIVE CORONARY ARTERY OF NATIVE HEART WITH UNSTABLE ANGINA PECTORIS: ICD-10-CM

## 2025-06-19 DIAGNOSIS — I35.0 NONRHEUMATIC AORTIC VALVE STENOSIS: ICD-10-CM

## 2025-06-19 DIAGNOSIS — I50.32 CHRONIC HEART FAILURE WITH PRESERVED EJECTION FRACTION (HFPEF): ICD-10-CM

## 2025-06-19 DIAGNOSIS — I10 PRIMARY HYPERTENSION: ICD-10-CM

## 2025-06-19 DIAGNOSIS — Z95.5 S/P CORONARY ARTERY STENT PLACEMENT: ICD-10-CM

## 2025-06-19 DIAGNOSIS — Z98.890 POST-OPERATIVE STATE: Primary | ICD-10-CM

## 2025-06-19 DIAGNOSIS — I25.10 CORONARY ARTERY DISEASE INVOLVING NATIVE CORONARY ARTERY OF NATIVE HEART WITHOUT ANGINA PECTORIS: ICD-10-CM

## 2025-06-19 DIAGNOSIS — E78.00 HYPERCHOLESTEROLEMIA: ICD-10-CM

## 2025-06-19 DIAGNOSIS — I49.3 PVC'S (PREMATURE VENTRICULAR CONTRACTIONS): Primary | ICD-10-CM

## 2025-06-19 PROBLEM — I25.118 CORONARY ARTERY DISEASE OF NATIVE ARTERY OF NATIVE HEART WITH STABLE ANGINA PECTORIS: Status: RESOLVED | Noted: 2024-06-04 | Resolved: 2025-06-19

## 2025-06-19 PROCEDURE — 99999 PR PBB SHADOW E&M-EST. PATIENT-LVL III: CPT | Mod: PBBFAC,,, | Performed by: OTOLARYNGOLOGY

## 2025-06-19 PROCEDURE — 99214 OFFICE O/P EST MOD 30 MIN: CPT | Mod: PBBFAC,25,27,PO | Performed by: INTERNAL MEDICINE

## 2025-06-19 PROCEDURE — 99213 OFFICE O/P EST LOW 20 MIN: CPT | Mod: PBBFAC,PO | Performed by: OTOLARYNGOLOGY

## 2025-06-19 PROCEDURE — 93010 ELECTROCARDIOGRAM REPORT: CPT | Mod: ,,, | Performed by: INTERNAL MEDICINE

## 2025-06-19 PROCEDURE — 93005 ELECTROCARDIOGRAM TRACING: CPT | Mod: PO

## 2025-06-19 PROCEDURE — 99999 PR PBB SHADOW E&M-EST. PATIENT-LVL IV: CPT | Mod: PBBFAC,,, | Performed by: INTERNAL MEDICINE

## 2025-06-19 PROCEDURE — 99214 OFFICE O/P EST MOD 30 MIN: CPT | Mod: S$PBB,25,, | Performed by: INTERNAL MEDICINE

## 2025-06-19 RX ORDER — VERAPAMIL HCL 240 MG
240 TABLET, EXTENDED RELEASE ORAL NIGHTLY
Qty: 90 TABLET | Refills: 3 | Status: SHIPPED | OUTPATIENT
Start: 2025-06-19 | End: 2026-06-19

## 2025-06-19 NOTE — PATIENT INSTRUCTIONS
Dietary restrictions including foods high in saturated fat (beef, pork, processed meats such as bass and hot dogs), cholesterol (eggs, shellfish), trans fats (cookies, crackers margarine), sugary drinks, alcohol. Would recommend visiting the below website which discusses good foods and foods to avoid:    [email or web address]-management/heart-health-foods-to-buy-foods-to-avoid

## 2025-06-19 NOTE — PROGRESS NOTES
Moustapha is here for a post-operative visit following   Hypoglossal nerve stimulator revision    No issues, doing well    Exam:  Alert, active, appropriate  Incisions c/d/I  Expected amount of swelling; no hematoma or seroma  Tongue midline with protrusion and strong  No facial weakness    Sensation 0.7  Functional 1.3  Range 1.3 - 2.3    Healing well  Post-op phone call in 2 weeks and fu with Lele in 6-8 wks

## 2025-06-19 NOTE — PROGRESS NOTES
SUBJECTIVE:    Patient ID:  Moustapha Murray is a 70 y.o. male who presents to the electrophysiology clinic for 6 month follow-up regarding PVCs and CAD.    Focused EP-relevant history:  PVCs  CAD s/p PCI (most recently 2/2023)  HTN  Mild-moderate AS  Mild pulmonary HTN (mPAP 30 by RHC)  HFpEF  TASIA on Inspire  Pulmonary fibrosis (moderate restriction with severe reduction DLCO)  Elevated free light chains  Mediastinal lymphadenopathy  COPD  Bladder CA     Reported to have low HR's during colonoscopy.  Notes occasional chest pain over the last couple of months. Notes fatigue since initial CAD dx. Denies syncope.  3/7/24 PVC burden 31.4% >> Toprol    48 hr holter 5/13/24 22.5% burden     [facility] with PCI LAD 1/31/23 and staged PCI 2/13/23 to large right posterior AV branch  Most recent LHC 4/2024 with patent stents. No PCI performed.     RHC 8/24: elevated right and left filling pressures.   RA: 13 RV: 44/ 18 PA: 42/ 24/ 30 PWP: 22 .      TTE 4/4/24: LVEF 55-60%. No significant vavlulopathy.      12/14/2024  Started on verapamil 6/24 with reduction PVCs on most recent monitor. Holter 15% PVC burden  Overall he is feeling well.  Reports improved symptoms with the verapamil.     ECGs with PVCs reviewed which appears to start around 3/2024:  6/5/24: LS axis, RB + in precordium with QR in V1.   4/17/24: LS axis, RB with reverse pattern break (+V2, -V3) which eventually transitions + at V5  4/4/24: LS, LB with reverse pattern -V3-V6.  3/7/24: LS, RB with V3 transition  3/4/24: LS, RB with V3 transition    06/19/2025  3 day Zio 5/25 with <1% PVC burden  Labs reviewed including LDL 80, Cr 1, hgb 12.6    The ECG with rhythm strip performed today in clinic was personally reviewed; my interpretation is normal sinus rhythm 70 beats per minute.  Frequent PVCs on ECG and rhythm strip today which shows a a right inferior axis with a right bundle and V5 transitioned.    Past Medical History:   Diagnosis Date    Anticoagulant  long-term use     Bladder cancer 08/2020    Colon polyps     COPD (chronic obstructive pulmonary disease)     Coronary artery disease 01/31/2023    CORONARY STENTSx3    DDD (degenerative disc disease), lumbar     Depression     Fatty liver     GERD (gastroesophageal reflux disease)     H/O motion sickness     Hypertension     Mediastinal lymphadenopathy 07/2024    PONV (postoperative nausea and vomiting)     Pulmonary fibrosis     RLS (restless legs syndrome)     Sleep apnea, unspecified     can't use cpap       Past Surgical History:   Procedure Laterality Date    ANGIOGRAM, CORONARY, WITH LEFT HEART CATHETERIZATION N/A 1/31/2023    Procedure: Left Heart Cath;  Surgeon: Nitin Wheat MD;  Location: [facility] CATH;  Service: Cardiology;  Laterality: N/A;    ANGIOGRAM, CORONARY, WITH LEFT HEART CATHETERIZATION  4/17/2024    Procedure: Left Heart Cath;  Surgeon: Nitin Wheat MD;  Location: [facility] CATH;  Service: Cardiology;;    APPENDECTOMY      CARPAL TUNNEL RELEASE      CERVICAL FUSION      CHOLECYSTECTOMY      CIRCUMCISION      COLONOSCOPY N/A 3/16/2020    Procedure: COLONOSCOPY;  Surgeon: Braulio Best MD;  Location: [facility];  Service: Endoscopy;  Laterality: N/A; 3 colon polyps removed; biopsy: tubular adenoma and hyperplastic; repeat in 5 years for surveillance    COLONOSCOPY N/A 3/4/2024    Procedure: COLONOSCOPY;  Surgeon: Braulio Best MD;  Location: [facility];  Service: Gastroenterology;  Laterality: N/A;    CORONARY ANGIOGRAPHY  2/13/2023    Procedure: ANGIOGRAM, CORONARY ARTERY;  Surgeon: Nitin Wheat MD;  Location: [facility] CATH;  Service: Cardiology;;    CORONARY ANGIOPLASTY WITH STENT PLACEMENT N/A 1/31/2023    Procedure: ANGIOPLASTY, CORONARY ARTERY, WITH STENT INSERTION;  Surgeon: Nitin Wheat MD;  Location: [facility] CATH;  Service: Cardiology;  Laterality: N/A;    CYSTOSCOPY W/ RETROGRADES Bilateral 8/24/2020    Procedure: CYSTOSCOPY, WITH RETROGRADE PYELOGRAM -WITH URETHRAL DILATION;  Surgeon: Greg RENEE  MD Shanon;  Location: [facility] OR;  Service: Urology;  Laterality: Bilateral;    CYSTOSCOPY W/ URETERAL STENT PLACEMENT Right 8/24/2020    Procedure: CYSTOSCOPY, WITH URETERAL STENT INSERTION;  Surgeon: Greg Claudio MD;  Location: [facility] OR;  Service: Urology;  Laterality: Right;    ENDOBRONCHIAL ULTRASOUND Bilateral 7/15/2024    Procedure: ENDOBRONCHIAL ULTRASOUND (EBUS);  Surgeon: Umm Arellano MD;  Location: [facility] OR;  Service: Pulmonary;  Laterality: Bilateral;    ENDOSCOPIC ULTRASOUND OF UPPER GASTROINTESTINAL TRACT Left 5/8/2024    Procedure: ULTRASOUND, UPPER GI TRACT, ENDOSCOPIC;  Surgeon: Brad Ochoa MD;  Location: [facility] ENDO;  Service: Endoscopy;  Laterality: Left;    EPIDURAL STEROID INJECTION INTO LUMBAR SPINE N/A 3/27/2025    Procedure: Injection-steroid-epidural-lumbar   L4/5;  Surgeon: Ann Soto MD;  Location: [facility] OR;  Service: Anesthesiology;  Laterality: N/A;  normal    ESOPHAGOGASTRODUODENOSCOPY N/A 5/8/2024    Procedure: EGD (ESOPHAGOGASTRODUODENOSCOPY);  Surgeon: Brad Ochoa MD;  Location: [facility] ENDO;  Service: Endoscopy;  Laterality: N/A;    INSERTION, NEUROSTIMULATOR, HYPOGLOSSAL Right 11/22/2024    Procedure: INSERTION,NEUROSTIMULATOR,HYPOGLOSSAL;  Surgeon: eHctor Lindquist MD;  Location: [facility] OR;  Service: ENT;  Laterality: Right;    INSTILLATION OF URINARY BLADDER N/A 8/24/2020    Procedure: INSTILLATION, BLADDER - Mitomycin;  Surgeon: Greg Claudio MD;  Location: [facility] OR;  Service: Urology;  Laterality: N/A;    IVUS, CORONARY  1/31/2023    Procedure: IVUS, Coronary;  Surgeon: Nitin Wheat MD;  Location: [facility] CATH;  Service: Cardiology;;    IVUS, CORONARY  2/13/2023    Procedure: IVUS, Coronary;  Surgeon: Nitin Wheat MD;  Location: [facility] CATH;  Service: Cardiology;;    PERCUTANEOUS CORONARY INTERVENTION, ARTERY  2/13/2023    Procedure: Percutaneous coronary intervention;  Surgeon: Nitin Wheat MD;  Location: [facility] CATH;  Service: Cardiology;;    REVISION OR  REPLACEMENT, NEUROSTIMULATOR, HYPOGLOSSAL Right 6/6/2025    Procedure: REVISION NEUROSTIMULATOR,HYPOGLOSSAL;  Surgeon: Hector Lindquist MD;  Location: [facility] OR;  Service: ENT;  Laterality: Right;    RIGHT HEART CATHETERIZATION  8/21/2024    Procedure: Right heart cath;  Surgeon: Nitin Wheat MD;  Location: [facility] CATH;  Service: Cardiology;;    SLEEP ENDOSCOPY, DRUG-INDUCED Bilateral 10/23/2024    Procedure: SLEEP ENDOSCOPY,DRUG-INDUCED;  Surgeon: Hector Lindquist MD;  Location: [facility] OR;  Service: ENT;  Laterality: Bilateral;    TRANSFORAMINAL EPIDURAL INJECTION OF STEROID Right 7/17/2019    Procedure: Injection,steroid,epidural,transforaminal approach L3-4, L4-5;  Surgeon: Chris Palencia MD;  [facility];  Service: Pain Management;  Laterality: Right;    TRANSFORAMINAL EPIDURAL INJECTION OF STEROID Right 8/28/2019    Procedure: Injection,steroid,epidural,transforaminal approach;  Surgeon: Chris Palencia MD;  [facility];  Service: Pain Management;  Laterality: Right;  L3-4, L4-L5    TRANSFORAMINAL EPIDURAL INJECTION OF STEROID Left 7/11/2024    Procedure: Injection,steroid,epidural,transforaminal approach l5-s1 and s1;  Surgeon: Ann Soto MD;  Location: [facility] OR;  Service: Anesthesiology;  Laterality: Left;    TRANSFORAMINAL EPIDURAL INJECTION OF STEROID Left 1/30/2025    Procedure: Injection,steroid,epidural,transforaminal approach l5-s1 and s1 ( Iv sedation);  Surgeon: Ann Soto MD;  Location: [facility] OR;  Service: Pain Management;  Laterality: Left;    WISDOM TOOTH EXTRACTION         Family History   Problem Relation Name Age of Onset    Stroke Mother      Ulcerative colitis Mother      Kidney cancer Mother      Heart disease Father      Heart attack Father      Cancer Neg Hx      Colon cancer Neg Hx      Crohn's disease Neg Hx      Celiac disease Neg Hx         Social History     Socioeconomic History    Marital status:    Occupational History    Occupation:    Tobacco Use     Smoking status: Former     Current packs/day: 0.00     Types: Cigarettes     Quit date: 10/16/2013     Years since quittin.6    Smokeless tobacco: Never   Substance and Sexual Activity    Alcohol use: Not Currently     Comment: couple beers weekly    Drug use: Not Currently     Social Drivers of Health     Financial Resource Strain: Low Risk  (2024)    Overall Financial Resource Strain (CARDIA)     Difficulty of Paying Living Expenses: Not hard at all   Food Insecurity: No Food Insecurity (2024)    Hunger Vital Sign     Worried About Running Out of Food in the Last Year: Never true     Ran Out of Food in the Last Year: Never true   Transportation Needs: No Transportation Needs (2023)    PRAPARE - Transportation     Lack of Transportation (Medical): No     Lack of Transportation (Non-Medical): No   Physical Activity: Unknown (2024)    Exercise Vital Sign     Days of Exercise per Week: 0 days   Stress: Stress Concern Present (2024)    [employer] - Occupational Stress Questionnaire     Feeling of Stress : To some extent   Housing Stability: Low Risk  (2023)    Housing Stability Vital Sign     Unable to Pay for Housing in the Last Year: No     Number of Places Lived in the Last Year: 1     Unstable Housing in the Last Year: No       Review of patient's allergies indicates:   Allergen Reactions    Sulfa (sulfonamide antibiotics) Anaphylaxis       Review of Systems   All other systems reviewed and are negative.         OBJECTIVE:     Vitals:    25 1037   BP: 105/74   Pulse: 70         BP Readings from Last 5 Encounters:   25 123/60   25 112/63   04/10/25 106/64   25 (!) 107/59   25 128/62        Physical Exam  Vitals reviewed.   Constitutional:       General: He is not in acute distress.     Appearance: Normal appearance. He is not ill-appearing.   HENT:      Head: Normocephalic and atraumatic.   Eyes:      Extraocular  Movements: Extraocular movements intact.      Conjunctiva/sclera: Conjunctivae normal.   Cardiovascular:      Rate and Rhythm: Normal rate. Rhythm irregular.   Pulmonary:      Effort: Pulmonary effort is normal. No respiratory distress.   Musculoskeletal:         General: No swelling or deformity.   Skin:     Findings: No erythema or rash.   Neurological:      Mental Status: He is alert.             Current Outpatient Medications   Medication Instructions    albuterol (PROVENTIL/VENTOLIN HFA) 90 mcg/actuation inhaler 2 puffs, Inhalation, Every 6 hours PRN, Rescue    aspirin 81 mg, Oral, Daily    budesonide (PULMICORT) 0.5 mg, Nebulization, 2 times daily, Controller    droNABinol (MARINOL) 2.5 mg, Oral, 2 times daily before meals    EScitalopram oxalate (LEXAPRO) 10 mg, Oral    formoterol (PERFOROMIST) 20 mcg, Nebulization, 2 times daily, Controller    HYDROcodone-acetaminophen (NORCO) 5-325 mg per tablet 1 tablet, Oral, Every 6 hours PRN    ibuprofen (ADVIL,MOTRIN) 600 mg, Oral, Every 6 hours PRN    melatonin 5 mg, Nightly PRN    OFEV 150 mg, Oral, 2 times daily    ondansetron (ZOFRAN-ODT) 4 mg, Oral, Every 6 hours PRN    pantoprazole (PROTONIX) 40 mg, Oral, Daily    [START ON 8/14/2025] pregabalin (LYRICA) 75 mg, Oral, Nightly    rosuvastatin (CRESTOR) 40 mg, Oral, Nightly    torsemide (DEMADEX) 10 mg, Oral, Daily    traMADoL (ULTRAM) 50 mg, Oral, Every 24 hours as needed    triamcinolone acetonide 0.1% (KENALOG) 0.1 % cream APPLY TOPICALLY 2 TIMES DAILY AS NEEDED.    verapamiL (CALAN-SR) 240 mg, Oral, Nightly       Lipid Panel:   Lab Results   Component Value Date    CHOL 148 01/09/2025    HDL 52 01/09/2025    LDLCALC 79.6 01/09/2025    TRIG 82 01/09/2025    CHOLHDL 35.1 01/09/2025       The 10-year ASCVD risk score (Elier DESIR, et al., 2019) is: 14.2%    Values used to calculate the score:      Age: 70 years      Sex: Male      Is Non- : No      Diabetic: No      Tobacco smoker: No       Systolic Blood Pressure: 123 mmHg      Is BP treated: Yes      HDL Cholesterol: 52 mg/dL      Total Cholesterol: 148 mg/dL    Most Recent EKG Results  Results for orders placed or performed during the hospital encounter of 08/21/24   EKG 12-lead    Collection Time: 08/21/24 11:17 AM   Result Value Ref Range    QRS Duration 94 ms    [facility] QTC Calculation 450 ms    Narrative    Test Reason : I10,    Vent. Rate : 048 BPM     Atrial Rate : 048 BPM     P-R Int : 174 ms          QRS Dur : 094 ms      QT Int : 504 ms       P-R-T Axes : 057 022 028 degrees     QTc Int : 450 ms    Sinus bradycardia with sinus arrhythmia  Otherwise normal ECG  When compared with ECG of 05-JUN-2024 10:47,  Premature ventricular complexes are no longer Present  Vent. rate has decreased BY  26 BPM  Confirmed by Babar CISSE, Jose De Jesus SHERIDAN ([id number]) on 8/21/2024 2:54:29 PM    Referred By: Nitin Wheat MD           Confirmed By:Jose De Jesus Eckert MD       Most Recent Echocardiogram Results  Results for orders placed during the hospital encounter of 12/18/24    Echo    Interpretation Summary    Left Ventricle: The left ventricle is normal in size. Normal wall thickness. There is normal systolic function with a visually estimated ejection fraction of 55 - 60%. There is normal diastolic function. Normal left ventricular filling pressure.    Right Ventricle: Normal right ventricular cavity size. Systolic function is normal.    Left Atrium: Left atrium is moderately dilated.    Aortic Valve: The aortic valve is a trileaflet valve. There is moderate aortic valve sclerosis. Moderately restricted motion. There is mild to moderate stenosis. Aortic valve area by VTI is 1.8 cm². Aortic valve peak velocity is 2.4 m/s. Mean gradient is 13.1 mmHg. The dimensionless index is 0.47.    Pulmonary Artery: There is mild pulmonary hypertension. The estimated pulmonary artery systolic pressure is 31 mmHg.    IVC/SVC: Normal venous pressure at 3 mmHg.      Most Recent Nuclear Stress  Test Results  Results for orders placed during the hospital encounter of 04/04/24    Nuclear Stress - Cardiology Interpreted    Interpretation Summary    The patient exercised for 4 minutes 35 seconds on a high ramp protocol, corresponding to a functional capacity of 7.0 METS, achieving a peak heart rate of 134 bpm, which is 88 % of the age predicted maximum heart rate. Their exercise capacity was average.    Abnormal myocardial perfusion scan.    There is a moderate intensity, moderate to large sized, reversible perfusion abnormality that is consistent with ischemia in the basal to apical anterior wall(s) in the typical distribution of the LAD territory.    There are no other significant perfusion abnormalities.    The gated perfusion images showed an ejection fraction of 62% post stress.    LV cavity size is normal at rest and mildly enlarged at stress.    The ECG portion of the study is negative for ischemia.    Stress ECG: During rest and stress, frequent PVCs are noted including dimorphic couplets.    This is a high risk study due to size of defect and ischemic dilation.      Most Recent Cardiac PET Stress Test Results  No results found for this or any previous visit.      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  Mean PA 30, PVR 1.6 woods, PA wedge 22  Low normal cardiac index 2.35    Recommendations:  Start torsemide 10 mg once daily  BMP in 1 week  Risk factor modification  Follow-up as currently scheduled with Dr. Arellano and myself          Description of procedure:  Access was obtained in the right basilic vein by exchanging a peripheral IV.  There was difficulty advancing through the subclavian area but was traversed successfully with a  200  wire.    See details below.      Hemostasis:  Manual pressure        Complications:  None  Estimated blood loss:  Minimal      The patient tolerated the procedure well and left the cath lab in stable condition.      Nitin Wheat MD, [facility]  Interventional Cardiology/Structural Heart Disease  [facility]  Office: [phone]    The clinically important portion of this report has been dictated in the section above. Our Epic reporting system does not allow us to provide a clinically meaningful report without this dictation. Please beware that there may be errors and discrepancies in the computer transcription and all of the clicks required to finalize the report.  The procedure log was documented by Documenter: Baldomero Martinez RT and verified by Nitin Wheat MD.    Date: 8/21/2024  Time: 3:23 PM      Other Most Recent Cardiology Results  Results for orders placed during the hospital encounter of 05/12/25    Cardiac Monitor - 3-15 Day Adult (Cupid Only)    Interpretation Summary    3 day Zio monitor    Predomiant rhythm was sinus rhythm with an average HR of 66 bpm    Rare PACs and PVCs <1% each    No clinically significant tachy or bradyarrhythmias, including AF/AFL and AV block, respectively    No patient triggered events    Patient had a min HR of 46 bpm, max HR of 164 bpm, and avg HR of 66 bpm. Predominant underlying rhythm was Sinus Rhythm. 1 run of Supraventricular Tachycardia occurred lasting 6 beats with a max rate of 164 bpm (avg 138 bpm). Isolated SVEs were rare (<1.0%), SVE Couplets were rare (<1.0%), and SVE Triplets were rare (<1.0%). Isolated VEs were frequent (13.7%, 12597), VE Couplets were rare (<1.0%, 292), and VE Triplets were rare (<1.0%, 4). Ventricular Bigeminy and Trigeminy were present.        All pertinent data including labs, imaging, EKGs, and studies listed above were reviewed.  All of the patients ECG tracings since most recent visit were personally  interpreted by this provider    ASSESSMENT:   Moustapha Murray is a 70 y.o. male who presents for follow of PVCs and CAD.    1. Primary hypertension        2. Pulmonary hypertension        3. Coronary artery disease involving native coronary artery of native heart without angina pectoris        4. S/P coronary artery stent placement        5. Hypercholesterolemia        6. Chronic heart failure with preserved ejection fraction (HFpEF)        7. Atherosclerosis of native coronary artery of native heart with unstable angina pectoris        8. TASIA (obstructive sleep apnea)        9. Nonrheumatic aortic valve stenosis          PLAN FOR TREATMENT OF ABOVE DIAGNOSES:     LV PVCs: well controlled on meds  Continue verapamil 240 mg QD  Continue to monitor    CAD s/p PCI: stable. No angina.  Continue aspirin  Continue statin & AVN blocking agent(s)  BP control, exercise, healthy BMI, no tobacco use    PHT: likely WHO class II and III  Volume management  BP control  Monitor valvular disease    Mild-moderate AS: stable  Continue aspirin  Routine echo surveillance or if symptoms develop  BP control, healthy BMI, exercise     HFpEF: euvolemic  Continue torsemide  Daily standing weights. We discussed increasing lasix if patient gains 2-3 lbs in 2-3 days or 5 lbs in 1 week. They were instructed to continue monitoring weights daily until weight returns to dry weight  Low sodium diet (<2000 mg/day)  Recommend >150 minutes/week of moderate intensity exercise  Recommend Mediterranean diet (high in fruits, vegetables, whole grains, healthy fats)    Dyslipidemia: LDL above goal.  Continue statin therapy  Mediterranean diet (high in fruits, vegetables, whole grains, healthy fats) & avoidance of trans fats and processed foods  Encourage regular aerobic exercise (>=150 minutes/week) and healthy BMI 18.5-24.9 kg/m2  Monitor lipid panel and statin-associated side effects (e.g., myalgias, elevated LFTs)    TASIA: Is currently treated with  James  Discussed importance of strict treatment of TASIA and adverse outcomes a/w untreated or inadequately treated TASIA  Addressed maintaining a healthy BMI (18.5-24.9 kg/m2)  Follow up with sleep medicine      F/u in 1 year for PVC management. Will get him established with cardiology for management and monitoring of CAD and valvular disease.    Nabor Melton MD  [facility]    This note was partially generated using voice recognition and generative artificial intelligence software. While every effort has been made to ensure its accuracy, transcription errors may exist. Please reach out to me with any questions or if clarification is needed.

## 2025-06-20 LAB
OHS QRS DURATION: 84 MS
OHS QTC CALCULATION: 447 MS

## 2025-06-23 ENCOUNTER — OFFICE VISIT (OUTPATIENT)
Dept: PAIN MEDICINE | Facility: CLINIC | Age: 70
End: 2025-06-23
Payer: MEDICARE

## 2025-06-23 VITALS
WEIGHT: 178.13 LBS | HEART RATE: 65 BPM | BODY MASS INDEX: 25.5 KG/M2 | SYSTOLIC BLOOD PRESSURE: 125 MMHG | HEIGHT: 70 IN | DIASTOLIC BLOOD PRESSURE: 87 MMHG

## 2025-06-23 DIAGNOSIS — M54.16 LUMBAR RADICULOPATHY: Primary | ICD-10-CM

## 2025-06-23 DIAGNOSIS — M54.17 LUMBOSACRAL RADICULOPATHY: ICD-10-CM

## 2025-06-23 PROCEDURE — 99999 PR PBB SHADOW E&M-EST. PATIENT-LVL IV: CPT | Mod: PBBFAC,,, | Performed by: PHYSICIAN ASSISTANT

## 2025-06-23 PROCEDURE — 99214 OFFICE O/P EST MOD 30 MIN: CPT | Mod: PBBFAC,PO | Performed by: PHYSICIAN ASSISTANT

## 2025-06-23 PROCEDURE — 99214 OFFICE O/P EST MOD 30 MIN: CPT | Mod: S$PBB,,, | Performed by: PHYSICIAN ASSISTANT

## 2025-06-25 ENCOUNTER — TELEPHONE (OUTPATIENT)
Dept: PAIN MEDICINE | Facility: CLINIC | Age: 70
End: 2025-06-25
Payer: MEDICARE

## 2025-06-25 NOTE — TELEPHONE ENCOUNTER
Level of Service:57869     [place] OFFICE/OUTPT VISIT, EST, LEVL IV, 30-39 MIN      Types of orders made on 06/25/2025: Procedure Request      Order Date:6/25/2025   Ordering User:CINTHIA RAMIREZ [[id number]]   Encounter Provider:Cinthia Ramirez PA-C [[id number]]   Authorizing Provider: Cinthia Ramirez PA-C [[id number]]   Supervising Provider:VIVIAN VIEIRA [[id number]]   Type of Supervision:Supervision Required   Department:[facility] PAIN MANAGEMENT[[id number]]      Common Order Information   Procedure -> Epidural Injection (specify level) Cmt: L4/5 [place] CHANEL (cov)      Order Specific Information   Order: Procedure Order to Pain Management [Custom: [id number]]  Order #:          [id number]: 1 FUTURE     Priority: Routine  Class: Clinic Performed     Future Order Information       Expires on:06/25/2026            Expected by:06/25/2025                   Comment:3-4 weeks follow up     Associated Diagnoses       M54.16 Lumbar radiculopathy       M54.17 Lumbosacral radiculopathy       Physician -> Dr. Soto          Facility Name: -> [place]              Priority: Routine  Class: Clinic Performed     Future Order Information       Expires on:06/25/2026            Expected by:06/25/2025                   Comment:3-4 weeks follow up     Associated Diagnoses       M54.16 Lumbar radiculopathy       M54.17 Lumbosacral radiculopathy       Procedure -> Epidural Injection (specify level) Cmt: L4/5 IL CHANEL (cov)          Physician -> Dr. Soto

## 2025-06-25 NOTE — PROGRESS NOTES
[facility] New Patient Evaluation      Referred by: No ref. provider found    PCP:     CC:   Chief Complaint   Patient presents with    Low-back Pain          HPI:   Moustapha Murray is a 70 y.o. year old male patient who has a past medical history of Anticoagulant long-term use, Bladder cancer, Colon polyps, COPD (chronic obstructive pulmonary disease), Coronary artery disease, DDD (degenerative disc disease), lumbar, Depression, Fatty liver, GERD (gastroesophageal reflux disease), H/O motion sickness, Hypertension, Mediastinal lymphadenopathy, PONV (postoperative nausea and vomiting), Pulmonary fibrosis, RLS (restless legs syndrome), and Sleep apnea, unspecified. He presents for follow up of back and leg pain. He is new to me and previously seen by Dr. Soto.    He last had an epidural steroid injection 03/27/2025 L4-5 interlaminar that provided greater than 80% pain relief.  Today he describes increasing pain into the lower back with radiation into the left lateral leg to the foot.  He feels numbness from the left knee to the foot.  Pain increases with transitioning from sitting or lying to standing.  This feels similar to pain experienced prior to last CHANEL    Denies bowel/ bladder incontinence.    HPI 4-22-25:  HPI: Pt is a pleasant 70 y.o., who presents for evaluation. Referred by Dr. Johnson. Pt seen previously for back pain and L leg pain for years.  Previously endorsed sharp, shooting pain going down the back of the left leg to the calf. Pt seen today for f/u from ILESI L4-5 and states he has had great relief from this CHANEL. Overall is doing much better. Does have some pain going down the back of the L leg to the calf but is significantly improved. No BB changes. Is doing HEP.       Past and current medications:  Antineuropathics: lyrica for restless legs  NSAIDs:  Antidepressants:  Muscle relaxers:  Opioids: tramadol (prescribed by oncology)  Antiplatelets/Anticoagulants:  Others:    Physical  Therapy/ Chiropractic care:  Home exercise program    Pain Intervention History:  ILESI L4-5 3/27/25 - >80% relief   L TFESI L5-S1 and S1 7/11/24, 1/30/25 - no relief    Past Spine Surgical History:  none        History:  Current Medications[1]    Past Medical History:   Diagnosis Date    Anticoagulant long-term use     Bladder cancer 08/2020    Colon polyps     COPD (chronic obstructive pulmonary disease)     Coronary artery disease 01/31/2023    CORONARY STENTSx3    DDD (degenerative disc disease), lumbar     Depression     Fatty liver     GERD (gastroesophageal reflux disease)     H/O motion sickness     Hypertension     Mediastinal lymphadenopathy 07/2024    PONV (postoperative nausea and vomiting)     Pulmonary fibrosis     RLS (restless legs syndrome)     Sleep apnea, unspecified     can't use cpap       Past Surgical History:   Procedure Laterality Date    ANGIOGRAM, CORONARY, WITH LEFT HEART CATHETERIZATION N/A 1/31/2023    Procedure: Left Heart Cath;  Surgeon: Nitin Wheat MD;  Location: [facility] CATH;  Service: Cardiology;  Laterality: N/A;    ANGIOGRAM, CORONARY, WITH LEFT HEART CATHETERIZATION  4/17/2024    Procedure: Left Heart Cath;  Surgeon: Nitin Wheat MD;  Location: [facility] CATH;  Service: Cardiology;;    APPENDECTOMY      CARPAL TUNNEL RELEASE      CERVICAL FUSION      CHOLECYSTECTOMY      CIRCUMCISION      COLONOSCOPY N/A 3/16/2020    Procedure: COLONOSCOPY;  Surgeon: Braulio Best MD;  Location: [facility];  Service: Endoscopy;  Laterality: N/A; 3 colon polyps removed; biopsy: tubular adenoma and hyperplastic; repeat in 5 years for surveillance    COLONOSCOPY N/A 3/4/2024    Procedure: COLONOSCOPY;  Surgeon: Braulio Best MD;  Location: [facility];  Service: Gastroenterology;  Laterality: N/A;    CORONARY ANGIOGRAPHY  2/13/2023    Procedure: ANGIOGRAM, CORONARY ARTERY;  Surgeon: Nitin Wheat MD;  Location: [facility] CATH;  Service: Cardiology;;    CORONARY ANGIOPLASTY WITH STENT PLACEMENT N/A  1/31/2023    Procedure: ANGIOPLASTY, CORONARY ARTERY, WITH STENT INSERTION;  Surgeon: Nitin Wheat MD;  Location: [facility] CATH;  Service: Cardiology;  Laterality: N/A;    CYSTOSCOPY W/ RETROGRADES Bilateral 8/24/2020    Procedure: CYSTOSCOPY, WITH RETROGRADE PYELOGRAM -WITH URETHRAL DILATION;  Surgeon: Greg Claudio MD;  Location: [facility] OR;  Service: Urology;  Laterality: Bilateral;    CYSTOSCOPY W/ URETERAL STENT PLACEMENT Right 8/24/2020    Procedure: CYSTOSCOPY, WITH URETERAL STENT INSERTION;  Surgeon: Greg Claudio MD;  Location: [facility] OR;  Service: Urology;  Laterality: Right;    ENDOBRONCHIAL ULTRASOUND Bilateral 7/15/2024    Procedure: ENDOBRONCHIAL ULTRASOUND (EBUS);  Surgeon: Umm Arellano MD;  Location: [facility] OR;  Service: Pulmonary;  Laterality: Bilateral;    ENDOSCOPIC ULTRASOUND OF UPPER GASTROINTESTINAL TRACT Left 5/8/2024    Procedure: ULTRASOUND, UPPER GI TRACT, ENDOSCOPIC;  Surgeon: Brad Ochoa MD;  Location: [facility] ENDO;  Service: Endoscopy;  Laterality: Left;    EPIDURAL STEROID INJECTION INTO LUMBAR SPINE N/A 3/27/2025    Procedure: Injection-steroid-epidural-lumbar   L4/5;  Surgeon: Ann Soto MD;  Location: [facility] OR;  Service: Anesthesiology;  Laterality: N/A;  normal    ESOPHAGOGASTRODUODENOSCOPY N/A 5/8/2024    Procedure: EGD (ESOPHAGOGASTRODUODENOSCOPY);  Surgeon: Brad Ochoa MD;  Location: [facility] ENDO;  Service: Endoscopy;  Laterality: N/A;    INSERTION, NEUROSTIMULATOR, HYPOGLOSSAL Right 11/22/2024    Procedure: INSERTION,NEUROSTIMULATOR,HYPOGLOSSAL;  Surgeon: Hector Lindquist MD;  Location: [facility] OR;  Service: ENT;  Laterality: Right;    INSTILLATION OF URINARY BLADDER N/A 8/24/2020    Procedure: INSTILLATION, BLADDER - Mitomycin;  Surgeon: Greg Claudio MD;  Location: [facility] OR;  Service: Urology;  Laterality: N/A;    IVUS, CORONARY  1/31/2023    Procedure: IVUS, Coronary;  Surgeon: Nitin Wheat MD;  Location: [facility] CATH;  Service: Cardiology;;    IVUS,  CORONARY  2/13/2023    Procedure: IVUS, Coronary;  Surgeon: Nitin Wheat MD;  Location: [facility] CATH;  Service: Cardiology;;    PERCUTANEOUS CORONARY INTERVENTION, ARTERY  2/13/2023    Procedure: Percutaneous coronary intervention;  Surgeon: Nitin Wheat MD;  Location: [facility] CATH;  Service: Cardiology;;    REVISION OR REPLACEMENT, NEUROSTIMULATOR, HYPOGLOSSAL Right 6/6/2025    Procedure: REVISION NEUROSTIMULATOR,HYPOGLOSSAL;  Surgeon: Hector Lindquist MD;  Location: [facility] OR;  Service: ENT;  Laterality: Right;    RIGHT HEART CATHETERIZATION  8/21/2024    Procedure: Right heart cath;  Surgeon: Nitin Wheat MD;  Location: [facility] CATH;  Service: Cardiology;;    SLEEP ENDOSCOPY, DRUG-INDUCED Bilateral 10/23/2024    Procedure: SLEEP ENDOSCOPY,DRUG-INDUCED;  Surgeon: Hector Lindquist MD;  Location: [facility] OR;  Service: ENT;  Laterality: Bilateral;    TRANSFORAMINAL EPIDURAL INJECTION OF STEROID Right 7/17/2019    Procedure: Injection,steroid,epidural,transforaminal approach L3-4, L4-5;  Surgeon: Chris Palencia MD;  Location: [facility] OR;  Service: Pain Management;  Laterality: Right;    TRANSFORAMINAL EPIDURAL INJECTION OF STEROID Right 8/28/2019    Procedure: Injection,steroid,epidural,transforaminal approach;  Surgeon: Chris Palencia MD;  Location: [facility] OR;  Service: Pain Management;  Laterality: Right;  L3-4, L4-L5    TRANSFORAMINAL EPIDURAL INJECTION OF STEROID Left 7/11/2024    Procedure: Injection,steroid,epidural,transforaminal approach l5-s1 and s1;  Surgeon: Ann Soto MD;  Location: [facility] OR;  Service: Anesthesiology;  Laterality: Left;    TRANSFORAMINAL EPIDURAL INJECTION OF STEROID Left 1/30/2025    Procedure: Injection,steroid,epidural,transforaminal approach l5-s1 and s1 ( Iv sedation);  Surgeon: Ann Soto MD;  Location: [facility] OR;  Service: Pain Management;  Laterality: Left;    WISDOM TOOTH EXTRACTION         Family History   Problem Relation Name Age of Onset    Stroke Mother      Ulcerative colitis Mother      Kidney cancer  Mother      Heart disease Father      Heart attack Father      Cancer Neg Hx      Colon cancer Neg Hx      Crohn's disease Neg Hx      Celiac disease Neg Hx         Social History     Socioeconomic History    Marital status:    Occupational History    Occupation: Order Mapper   Tobacco Use    Smoking status: Former     Current packs/day: 0.00     Types: Cigarettes     Quit date: 10/16/2013     Years since quittin.6    Smokeless tobacco: Never   Substance and Sexual Activity    Alcohol use: Not Currently     Comment: couple beers weekly    Drug use: Not Currently     Social Drivers of Health     Financial Resource Strain: Low Risk  (2024)    Overall Financial Resource Strain (CARDIA)     Difficulty of Paying Living Expenses: Not hard at all   Food Insecurity: No Food Insecurity (2024)    Hunger Vital Sign     Worried About Running Out of Food in the Last Year: Never true     Ran Out of Food in the Last Year: Never true   Transportation Needs: No Transportation Needs (2023)    PRAPARE - Transportation     Lack of Transportation (Medical): No     Lack of Transportation (Non-Medical): No   Physical Activity: Unknown (2024)    Exercise Vital Sign     Days of Exercise per Week: 0 days   Stress: Stress Concern Present (2024)    [employer] - Occupational Stress Questionnaire     Feeling of Stress : To some extent   Housing Stability: Low Risk  (2023)    Housing Stability Vital Sign     Unable to Pay for Housing in the Last Year: No     Number of Places Lived in the Last Year: 1     Unstable Housing in the Last Year: No       Review of patient's allergies indicates:   Allergen Reactions    Sulfa (sulfonamide antibiotics) Anaphylaxis       Labs:  Lab Results   Component Value Date    HGBA1C 5.9 (H) 2020       Lab Results   Component Value Date    WBC 7.05 2025    HGB 12.6 (L) 2025    HCT 38.3 (L) 2025     (H)  "04/22/2025     04/22/2025           Review of Systems:  Lower back pain.  Left leg pain and numbness. .  Balance of review of systems is negative.    Physical Exam:  Vitals:    06/23/25 1005   BP: 125/87   Pulse: 65   Weight: 80.8 kg (178 lb 2.1 oz)   Height: 5' 10" (1.778 m)   PainSc:   3   PainLoc: Back     Body mass index is 25.56 kg/m².    Pain disability index:      6/23/2025    10:05 AM 4/22/2025    11:25 AM 3/13/2025     1:53 PM   Last 3 PDI Scores   Pain Disability Index (PDI) 15 28 32       Gen: NAD  Psych: mood appropriate for given condition  HEENT: eyes anicteric   CV: RRR, 2+ radial pulse  Respiratory: non-labored, no signs of respiratory distress  Abd: non-distended  Skin: warm, dry and intact.  Gait: Able to heel walk, toe walk. No antalgic gait.     Cervical spine: ROM is full in flexion, extension and lateral rotation without increased pain.  Spurling's maneuver causes no neck pain to either side.  Myofascial exam: No Tenderness to palpation across cervical paraspinous region bilaterally.    Lumbar spine:  Lumbar spine: ROM is full with flexion extension and oblique extension with no increased pain.    Scot's test causes no increased pain on either side.    Supine straight leg raise is negative bilaterally.    Internal and external rotation of the hip causes no increased pain on either side.  Myofascial exam: No tenderness to palpation across lumbar paraspinous muscles. No tenderness to palpation over the bilateral greater trochanters and bilateral SI joint    Sensory:  Intact and symmetrical to light touch in C4-T1 dermatomes bilaterally. Intact and symmetrical to light touch in L1-S1 dermatomes bilaterally.    Motor:    Right Left   C4 Shoulder Abduction  5  5   C5 Elbow Flexion    5  5   C6 Wrist Extension  5  5   C7 Elbow Extension   5  5   C8/T1 Hand Intrinsics   5  5        Right Left   L2/3 Iliacus Hip flexion  5  5   L3/4 Qudratus Femoris Knee Extension  5  5   L4/5 Tib Anterior " Ankle Dorsiflexion   5  5   L5/S1 Extensor Hallicus Longus Great toe extension  5  5   S1/S2 Gastroc/Soleus Plantar Flexion  5  5      Right Left   Triceps DTR 2+ 2+   Biceps DTR 2+ 2+   Brachioradialis DTR 2+ 2+   Patellar DTR 2+ 2+   Achilles DTR 2+ 2+   Underwood Absent  Absent   Clonus Absent Absent   Babinski Absent Absent       Imaging:    MR L-spine 6/24 - chronic T12 and L1 compression fx, grade 1 anterolisthesis L5 on S1, L L4-5 synovial cyst and disc protrusion through annular fissure affecting L L5 and S1 nerve roots, synovial cyst affecting R L1 nerve root     CT L-spine 12/23    CT T-spine 12/23    XR L-spine complete 2/23      Assessment:   Moustapha Murray is a 70 y.o. year old male patient who has a past medical history of Anticoagulant long-term use, Bladder cancer, Colon polyps, COPD (chronic obstructive pulmonary disease), Coronary artery disease, DDD (degenerative disc disease), lumbar, Depression, Fatty liver, GERD (gastroesophageal reflux disease), H/O motion sickness, Hypertension, Mediastinal lymphadenopathy, PONV (postoperative nausea and vomiting), Pulmonary fibrosis, RLS (restless legs syndrome), and Sleep apnea, unspecified. He presents in referral from No ref. provider found for lower back and left leg pain.    MRI lumbar spine from 07/02/2024 personally reviewed.  Multilevel degenerative changes.  L3-4 central canal stenosis.  L4-5 left facet cyst and left lateral recess stenosis is present.  L5-S1 anterolisthesis and right lateral recess disk the possible free fragment component causes right lateral recess stenosis.    - currently experiencing most symptoms from L4-5 degenerative changes and facet cyst with lateral recess stenosis of the left side.  Left L5 radiculopathy.    Plan:  - lower back and left L5 radiculopathy recurring after good relief with L4-5 interlaminar CHANEL 03/27/2025.  - with pain recurring despite continuing medications, we discussed repeat L4-5 interlaminar CHANEL  and given he has had such a good response to the 1 in March.  He would like to proceed.  - follow-up after procedure.  -continue with Lyrica not only to help with restless legs but also lumbar radiculopathy.    Problem List Items Addressed This Visit    None  Visit Diagnoses         Lumbar radiculopathy    -  Primary    Relevant Orders    Procedure Order to Pain Management      Lumbosacral radiculopathy        Relevant Orders    Procedure Order to Pain Management          Follow Up: RTC after CHANEL    : Reviewed and consistent with medication use as prescribed.          Cinthia Ramirez PA-C  [facility]           [1]   Current Outpatient Medications:     albuterol (PROVENTIL/VENTOLIN HFA) 90 mcg/actuation inhaler, Inhale 2 puffs into the lungs every 6 (six) hours as needed for Wheezing. Rescue, Disp: 18 g, Rfl: 6    aspirin 81 MG Chew, Take 1 tablet (81 mg total) by mouth once daily., Disp: 90 tablet, Rfl: 3    budesonide (PULMICORT) 0.5 mg/2 mL nebulizer solution, Take 2 mLs (0.5 mg total) by nebulization 2 (two) times daily. Controller, Disp: 120 mL, Rfl: 3    droNABinol (MARINOL) 2.5 MG capsule, Take 1 capsule (2.5 mg total) by mouth 2 (two) times daily before meals., Disp: 60 capsule, Rfl: 3    EScitalopram oxalate (LEXAPRO) 10 MG tablet, TAKE 1 TABLET BY MOUTH EVERY DAY, Disp: 90 tablet, Rfl: 3    formoterol (PERFOROMIST) 20 mcg/2 mL Nebu, Take 2 mLs (20 mcg total) by nebulization 2 (two) times a day. Controller, Disp: 90 mL, Rfl: 3    HYDROcodone-acetaminophen (NORCO) 5-325 mg per tablet, Take 1 tablet by mouth every 6 (six) hours as needed for Pain., Disp: 15 tablet, Rfl: 0    ibuprofen (ADVIL,MOTRIN) 600 MG tablet, Take 1 tablet (600 mg total) by mouth every 6 (six) hours as needed for Pain., Disp: 20 tablet, Rfl: 0    melatonin 5 mg Cap, Take 5 mg by mouth nightly as needed. TAKE AS SCHEDULED, Disp: , Rfl:     nintedanib (OFEV) 150 mg Cap, Take 1 capsule (150 mg total) by mouth 2 (two)  times a day., Disp: 60 capsule, Rfl: 6    ondansetron (ZOFRAN-ODT) 4 MG TbDL, Take 1 tablet (4 mg total) by mouth every 6 (six) hours as needed (Nausea)., Disp: 10 tablet, Rfl: 0    pantoprazole (PROTONIX) 40 MG tablet, Take 1 tablet (40 mg total) by mouth once daily., Disp: 90 tablet, Rfl: 3    [START ON 8/14/2025] pregabalin (LYRICA) 75 MG capsule, Take 1 capsule (75 mg total) by mouth every evening., Disp: 90 capsule, Rfl: 3    rosuvastatin (CRESTOR) 40 MG Tab, Take 1 tablet (40 mg total) by mouth every evening., Disp: 90 tablet, Rfl: 0    torsemide (DEMADEX) 10 MG Tab, Take 1 tablet (10 mg total) by mouth once daily., Disp: 30 tablet, Rfl: 11    traMADoL (ULTRAM) 50 mg tablet, Take 1 tablet (50 mg total) by mouth every 24 hours as needed for Pain., Disp: 30 tablet, Rfl: 2    triamcinolone acetonide 0.1% (KENALOG) 0.1 % cream, APPLY TOPICALLY 2 TIMES DAILY AS NEEDED. (Patient taking differently: as needed.), Disp: 45 g, Rfl: 5    verapamiL (CALAN-SR) 240 MG CR tablet, Take 1 tablet (240 mg total) by mouth every evening., Disp: 90 tablet, Rfl: 3    Current Facility-Administered Medications:     sodium chloride 0.9% flush 10 mL, 10 mL, Intravenous, PRN, Nitin Wheat MD    Facility-Administered Medications Ordered in Other Visits:     ondansetron injection 4 mg, 4 mg, Intravenous, Daily PRN, Gustavo Chu MD    sodium chloride 0.9% flush 10 mL, 10 mL, Intravenous, PRN, Braulio Best MD, 5 mL at 10/23/24 1136

## 2025-06-26 DIAGNOSIS — M54.16 LUMBAR RADICULOPATHY: Primary | ICD-10-CM

## 2025-06-26 RX ORDER — SODIUM CHLORIDE, SODIUM LACTATE, POTASSIUM CHLORIDE, CALCIUM CHLORIDE 600; 310; 30; 20 MG/100ML; MG/100ML; MG/100ML; MG/100ML
INJECTION, SOLUTION INTRAVENOUS CONTINUOUS
OUTPATIENT
Start: 2025-06-26

## 2025-07-07 ENCOUNTER — TELEPHONE (OUTPATIENT)
Dept: SURGERY | Facility: HOSPITAL | Age: 70
End: 2025-07-07
Payer: MEDICARE

## 2025-07-07 NOTE — TELEPHONE ENCOUNTER
Pt is scheduled for Lumbar CHANEL L4/5 on Mon., 7/14/2025. Pt is currently taking ASA 81mg, daily. Does pt need to hold ASA/NSAIDS? Please advise. Thank you.

## 2025-07-10 ENCOUNTER — PROCEDURE VISIT (OUTPATIENT)
Dept: UROLOGY | Facility: CLINIC | Age: 70
End: 2025-07-10
Payer: MEDICARE

## 2025-07-10 VITALS — HEIGHT: 70 IN | WEIGHT: 177.94 LBS | BODY MASS INDEX: 25.47 KG/M2

## 2025-07-10 DIAGNOSIS — C67.2 MALIGNANT NEOPLASM OF LATERAL WALL OF URINARY BLADDER: ICD-10-CM

## 2025-07-10 PROCEDURE — 52000 CYSTOURETHROSCOPY: CPT | Mod: PBBFAC,PO | Performed by: UROLOGY

## 2025-07-10 NOTE — PROCEDURES
Cystoscopy    Date/Time: 7/10/2025 10:00 AM    Performed by: ORSA Lr MD  Authorized by: ROSA Lr MD    Consent Done?:  Yes (Written)  Timeout: prior to procedure the correct patient, procedure, and site was verified    Prep: patient was prepped and draped in usual sterile fashion    Anesthesia:  Lidocaine jelly  Indications: history bladder cancer    Position:  Supine  Anesthesia:  Lidocaine jelly  Patient sedated?: No    Preparation: Patient was prepped and draped in usual sterile fashion    Scope type:  Flexible cystoscope   patient tolerated the procedure well with no immediate complications    Blood Loss:  None     70-year-old with a history of T1 urothelial cancer.  He underwent TURBT with instillation of mitomycin-C in 08/2020. He has a recurrence and underwent repeat TURBT 03/2022.  Pepeat path again shows low-grade noninvasive urothelial carcinoma.  He then completed an induction course of BCG.  He had a small suspected recurrence fulgurated in the office in January 2025.  He has no complaints today.  He is here for cystoscopy        The flexible cystoscope was placed into the urethra and carefully advanced into the bladder.  A careful cystoscopic exam was then performed.  The entire bladder mucosa was systematically visualized.  Findings include moderate bladder wall trabeculation.  There were no lesions, masses foreign bodies or stones.   TUR scar was evident on the right lateral wall.  The right ureteral orifice was deviated laterally.  Both ureteral orifices were visualized and both had clear efflux of urine.  On retroflexion there was a small intravesical gland.  The cystoscope was then removed and I examined the entire length of the urethra.  There was moderate bilobar enlargement of the prostate.  In the anterior urethra there was multiple areas of narrowing which were not thought to be clinically significant.  He tolerated the procedure well.  There were no complications.      Impression:  No evidence of recurrence     Plan:  Follow-up 6 months for cystoscopy

## 2025-07-11 ENCOUNTER — TELEPHONE (OUTPATIENT)
Dept: PAIN MEDICINE | Facility: CLINIC | Age: 70
End: 2025-07-11
Payer: MEDICARE

## 2025-07-11 ENCOUNTER — LAB VISIT (OUTPATIENT)
Dept: LAB | Facility: HOSPITAL | Age: 70
End: 2025-07-11
Attending: INTERNAL MEDICINE
Payer: MEDICARE

## 2025-07-11 DIAGNOSIS — D50.9 IRON DEFICIENCY ANEMIA, UNSPECIFIED IRON DEFICIENCY ANEMIA TYPE: Primary | ICD-10-CM

## 2025-07-11 DIAGNOSIS — D50.9 IRON DEFICIENCY ANEMIA, UNSPECIFIED IRON DEFICIENCY ANEMIA TYPE: ICD-10-CM

## 2025-07-11 LAB
ABSOLUTE EOSINOPHIL (OHS): 0.92 K/UL
ABSOLUTE MONOCYTE (OHS): 0.59 K/UL (ref 0.3–1)
ABSOLUTE NEUTROPHIL COUNT (OHS): 3.12 K/UL (ref 1.8–7.7)
ALBUMIN SERPL BCP-MCNC: 3.4 G/DL (ref 3.5–5.2)
ALP SERPL-CCNC: 104 UNIT/L (ref 40–150)
ALT SERPL W/O P-5'-P-CCNC: 16 UNIT/L (ref 10–44)
ANION GAP (OHS): 9 MMOL/L (ref 8–16)
AST SERPL-CCNC: 21 UNIT/L (ref 11–45)
BASOPHILS # BLD AUTO: 0.09 K/UL
BASOPHILS NFR BLD AUTO: 1.4 %
BILIRUB SERPL-MCNC: 0.8 MG/DL (ref 0.1–1)
BUN SERPL-MCNC: 16 MG/DL (ref 8–23)
CALCIUM SERPL-MCNC: 9.1 MG/DL (ref 8.7–10.5)
CHLORIDE SERPL-SCNC: 107 MMOL/L (ref 95–110)
CO2 SERPL-SCNC: 24 MMOL/L (ref 23–29)
CREAT SERPL-MCNC: 1 MG/DL (ref 0.5–1.4)
ERYTHROCYTE [DISTWIDTH] IN BLOOD BY AUTOMATED COUNT: 13.8 % (ref 11.5–14.5)
FERRITIN SERPL-MCNC: 611 NG/ML (ref 20–300)
GFR SERPLBLD CREATININE-BSD FMLA CKD-EPI: >60 ML/MIN/1.73/M2
GLUCOSE SERPL-MCNC: 129 MG/DL (ref 70–110)
HCT VFR BLD AUTO: 40 % (ref 40–54)
HGB BLD-MCNC: 14 GM/DL (ref 14–18)
IMM GRANULOCYTES # BLD AUTO: 0.01 K/UL (ref 0–0.04)
IMM GRANULOCYTES NFR BLD AUTO: 0.2 % (ref 0–0.5)
IRON SATN MFR SERPL: 61 % (ref 20–50)
IRON SERPL-MCNC: 178 UG/DL (ref 45–160)
LYMPHOCYTES # BLD AUTO: 1.91 K/UL (ref 1–4.8)
MCH RBC QN AUTO: 33.2 PG (ref 27–31)
MCHC RBC AUTO-ENTMCNC: 35 G/DL (ref 32–36)
MCV RBC AUTO: 95 FL (ref 82–98)
NUCLEATED RBC (/100WBC) (OHS): 0 /100 WBC
PLATELET # BLD AUTO: 249 K/UL (ref 150–450)
PMV BLD AUTO: 10.3 FL (ref 9.2–12.9)
POTASSIUM SERPL-SCNC: 3.8 MMOL/L (ref 3.5–5.1)
PROT SERPL-MCNC: 6.5 GM/DL (ref 6–8.4)
RBC # BLD AUTO: 4.22 M/UL (ref 4.6–6.2)
RELATIVE EOSINOPHIL (OHS): 13.9 %
RELATIVE LYMPHOCYTE (OHS): 28.8 % (ref 18–48)
RELATIVE MONOCYTE (OHS): 8.9 % (ref 4–15)
RELATIVE NEUTROPHIL (OHS): 46.8 % (ref 38–73)
SODIUM SERPL-SCNC: 140 MMOL/L (ref 136–145)
TIBC SERPL-MCNC: 292 UG/DL (ref 250–450)
TRANSFERRIN SERPL-MCNC: 197 MG/DL (ref 200–375)
WBC # BLD AUTO: 6.64 K/UL (ref 3.9–12.7)

## 2025-07-11 PROCEDURE — 82040 ASSAY OF SERUM ALBUMIN: CPT | Mod: PN

## 2025-07-11 PROCEDURE — 36415 COLL VENOUS BLD VENIPUNCTURE: CPT | Mod: PN

## 2025-07-11 PROCEDURE — 85025 COMPLETE CBC W/AUTO DIFF WBC: CPT | Mod: PN

## 2025-07-11 PROCEDURE — 82728 ASSAY OF FERRITIN: CPT

## 2025-07-11 PROCEDURE — 83540 ASSAY OF IRON: CPT

## 2025-07-11 NOTE — TELEPHONE ENCOUNTER
Copied from CRM #[id number]. Topic: Appointments - Appointment Access  >> Jul 11, 2025  9:56 AM Tsering wrote:  Type: Needs Medical Advice    Who Called: pt  Best Call Back Number:[phone]  Additional Information: Requesting a call back regarding pt needs to reschedule procedure . Pt needs a later date, end of mth  . Pt asking for a call please,   Please Advise- Thank you

## 2025-07-12 NOTE — PROGRESS NOTES
PATIENT: Moustapha Murray  MRN: [id number]  DATE: 7/14/2025      Diagnosis:   1. Elevated ferritin    2. Other nonspecific abnormal finding of lung field    3. Chromosomal abnormality, unspecified    4. Iron deficiency anemia, unspecified iron deficiency anemia type    5. Pulmonary fibrosis    6. Elevated serum immunoglobulin free light chain level    7. Normocytic anemia    8. History of bladder cancer                    Chief Complaint: Follow-up (Elevated Ferritin)      Oncologic History:      Oncologic History     Oncologic Treatment     Pathology           Subjective:    Interval History: Mr. Murray is a 70 y.o. male with CAD, Depression, fatty liver, GERD, HTN, RLS, pulmonary fibrosis, pulmonary fibrosis, h/o bladder cancer who presents for positive TIF1 gamma antibody.   Since the last clinic visit  the patient underwent labs of 07/11/2025 showed a ferritin of 611 ng/mL.  The patient endorses continued weight loss.    The patient also has occasional diarrhea.  The patient denies CP, cough, SOB, abdominal pain, nausea, vomiting, constipation.  The patient denies fever, chills, night sweats, new lumps or bumps, easy bruising or bleeding.    Prior History:  Patient with a history of noninvasive bladder cancer under the care of Dr. Lr.  Patient last underwent cystoscopy on 08/17/2023 with no lesions seen.  Patient with history of T1 disease status post bladder installation of mitomycin-C and BCG.  The patient is currently being followed by Dr. Arellano for pulmonary fibrosis and underwent laboratory testing on 1/08/24 showing positive TIF1 gamma antibody.  The patient is currently scheduled for colonoscopy 3/04/2024 and cystoscopy 3/21/2024.  PSA on 1/08/24 was normal at 0.49ng/mL.   The patient underwent a CT of the chest, abdomen, and pelvis on 02/27/2024 showing a lower right paratracheal enlarged lymph node measuring 10 mm; enlarged subcarinal lymph node measuring 14 mm; mildly enlarged lymph node  adjacent to the distal esophagus; interstitial thickening bilaterally in the subpleural pattern suggesting fibrosis; diffuse fatty infiltration of the liver; anterior wedge compression fracture of L1 and T12; wall thickening in the region of the ascending colon and cecum.  CT of the neck showed no acute findings.   The patient underwent colonoscopy on 03/04/2024 which was normal.  Cystoscopy on 3/21/24 showed no evidence of recurrence.  PET/CT 3/22/24 showed enlarged lymph nodes and superior mediastinum, anterior mediastinum, periaortic region, pretracheal subcarinal, and left paraesophageal region of the diaphragm hiatus; moderate prominent peripheral distribution of interlobular interstitial thickening; area of uptake in the region of right posterior prostate.  Patient underwent iron infusions on 04/02/2024 and 04/09/2024.  Patient underwent EUS on 05/08/2024 with biopsy of subcarinal mediastinum station 7 lymph node with path showing positive for lm material but negative for malignant cells.    the patient underwent CT chest on 06/11/2024 showing multiple large lymph nodes throughout the mediastinum which have increasing size; centrilobular and paraseptal emphysematous changes with upper lobe predominance as well as extensive subpleural reticulation, interlobular septal thickening, scattered bronchiectasis throughout both lungs mildly progressed at the bases when compared to scan from February, 2024.  Labs on 06/11/2024 showed oligoclonal banding pattern on immunofixation electrophoresis and free light chain assay with elevated kappa light chains at 4.18 mg/dL, elevated lambda light chains at 3.21 mg/dL, and normal kappa lambda ratio 1.3.   The patient underwent EBUS on 07/15/2024 with biopsy of station 4R and 7 which was negative for malignancy and showed lm material.  Lab work on 9/10/24 showing no monoclonal protein on SPEP or MEME.  Free light chain assay showed elevated kappa light chains at 2.65 mg/dL,  normal lambda light chains at 1.76 mg/dL, and normal kappa lambda ratio 1.51.  The patient was seen by rheumatology on 10/10/24 and was felt not to have a rheumatologic condition.  Pt underwent cystoscopy on 1/05/25 with Dr Lr showing a suspicious area measuring 1 cm in diameter in the posterior wall which was completely fulgurated.    Past Medical History:   Past Medical History:   Diagnosis Date    Anticoagulant long-term use     Bladder cancer 08/2020    Colon polyps     COPD (chronic obstructive pulmonary disease)     Coronary artery disease 01/31/2023    CORONARY STENTSx3    DDD (degenerative disc disease), lumbar     Depression     Fatty liver     GERD (gastroesophageal reflux disease)     H/O motion sickness     Hypertension     Mediastinal lymphadenopathy 07/2024    PONV (postoperative nausea and vomiting)     Pulmonary fibrosis     RLS (restless legs syndrome)     Sleep apnea, unspecified     can't use cpap       Past Surgical HIstory:   Past Surgical History:   Procedure Laterality Date    ANGIOGRAM, CORONARY, WITH LEFT HEART CATHETERIZATION N/A 1/31/2023    Procedure: Left Heart Cath;  Surgeon: Nitin Wheat MD;  Location: [facility] CATH;  Service: Cardiology;  Laterality: N/A;    ANGIOGRAM, CORONARY, WITH LEFT HEART CATHETERIZATION  4/17/2024    Procedure: Left Heart Cath;  Surgeon: Nitin Wheat MD;  Location: [facility] CATH;  Service: Cardiology;;    APPENDECTOMY      CARPAL TUNNEL RELEASE      CERVICAL FUSION      CHOLECYSTECTOMY      CIRCUMCISION      COLONOSCOPY N/A 3/16/2020    Procedure: COLONOSCOPY;  Surgeon: Braulio Best MD;  Location: [facility];  Service: Endoscopy;  Laterality: N/A; 3 colon polyps removed; biopsy: tubular adenoma and hyperplastic; repeat in 5 years for surveillance    COLONOSCOPY N/A 3/4/2024    Procedure: COLONOSCOPY;  Surgeon: Braulio Best MD;  Location: [facility];  Service: Gastroenterology;  Laterality: N/A;    CORONARY ANGIOGRAPHY  2/13/2023    Procedure: ANGIOGRAM,  CORONARY ARTERY;  Surgeon: Nitin Wheat MD;  Location: [facility] CATH;  Service: Cardiology;;    CORONARY ANGIOPLASTY WITH STENT PLACEMENT N/A 1/31/2023    Procedure: ANGIOPLASTY, CORONARY ARTERY, WITH STENT INSERTION;  Surgeon: Nitin Wheat MD;  Location: [facility] CATH;  Service: Cardiology;  Laterality: N/A;    CYSTOSCOPY W/ RETROGRADES Bilateral 8/24/2020    Procedure: CYSTOSCOPY, WITH RETROGRADE PYELOGRAM -WITH URETHRAL DILATION;  Surgeon: Greg Claudio MD;  Location: [facility] OR;  Service: Urology;  Laterality: Bilateral;    CYSTOSCOPY W/ URETERAL STENT PLACEMENT Right 8/24/2020    Procedure: CYSTOSCOPY, WITH URETERAL STENT INSERTION;  Surgeon: Greg Claudio MD;  Location: [facility] OR;  Service: Urology;  Laterality: Right;    ENDOBRONCHIAL ULTRASOUND Bilateral 7/15/2024    Procedure: ENDOBRONCHIAL ULTRASOUND (EBUS);  Surgeon: Umm Arellano MD;  Location: [facility] OR;  Service: Pulmonary;  Laterality: Bilateral;    ENDOSCOPIC ULTRASOUND OF UPPER GASTROINTESTINAL TRACT Left 5/8/2024    Procedure: ULTRASOUND, UPPER GI TRACT, ENDOSCOPIC;  Surgeon: Brad Ochoa MD;  Location: [facility] ENDO;  Service: Endoscopy;  Laterality: Left;    EPIDURAL STEROID INJECTION INTO LUMBAR SPINE N/A 3/27/2025    Procedure: Injection-steroid-epidural-lumbar   L4/5;  Surgeon: Ann Soto MD;  Location: [facility] OR;  Service: Anesthesiology;  Laterality: N/A;  normal    ESOPHAGOGASTRODUODENOSCOPY N/A 5/8/2024    Procedure: EGD (ESOPHAGOGASTRODUODENOSCOPY);  Surgeon: Brad Ochoa MD;  Location: [facility] ENDO;  Service: Endoscopy;  Laterality: N/A;    INSERTION, NEUROSTIMULATOR, HYPOGLOSSAL Right 11/22/2024    Procedure: INSERTION,NEUROSTIMULATOR,HYPOGLOSSAL;  Surgeon: Hector Lindquist MD;  Location: [facility] OR;  Service: ENT;  Laterality: Right;    INSTILLATION OF URINARY BLADDER N/A 8/24/2020    Procedure: INSTILLATION, BLADDER - Mitomycin;  Surgeon: Greg Claudio MD;  Location: [facility] OR;  Service: Urology;  Laterality: N/A;    IVUS,  CORONARY  1/31/2023    Procedure: IVUS, Coronary;  Surgeon: Nitin Wheat MD;  Location: [facility] CATH;  Service: Cardiology;;    IVUS, CORONARY  2/13/2023    Procedure: IVUS, Coronary;  Surgeon: Nitin Wheat MD;  Location: [facility] CATH;  Service: Cardiology;;    PERCUTANEOUS CORONARY INTERVENTION, ARTERY  2/13/2023    Procedure: Percutaneous coronary intervention;  Surgeon: Nitin Wheat MD;  Location: [facility] CATH;  Service: Cardiology;;    REVISION OR REPLACEMENT, NEUROSTIMULATOR, HYPOGLOSSAL Right 6/6/2025    Procedure: REVISION NEUROSTIMULATOR,HYPOGLOSSAL;  Surgeon: Hector Lindquist MD;  Location: [facility] OR;  Service: ENT;  Laterality: Right;    RIGHT HEART CATHETERIZATION  8/21/2024    Procedure: Right heart cath;  Surgeon: Nitin Wheat MD;  Location: [facility] CATH;  Service: Cardiology;;    SLEEP ENDOSCOPY, DRUG-INDUCED Bilateral 10/23/2024    Procedure: SLEEP ENDOSCOPY,DRUG-INDUCED;  Surgeon: Hector Lindquist MD;  Location: [facility] OR;  Service: ENT;  Laterality: Bilateral;    TRANSFORAMINAL EPIDURAL INJECTION OF STEROID Right 7/17/2019    Procedure: Injection,steroid,epidural,transforaminal approach L3-4, L4-5;  Surgeon: Chris Palencia MD;  Location: [facility] OR;  Service: Pain Management;  Laterality: Right;    TRANSFORAMINAL EPIDURAL INJECTION OF STEROID Right 8/28/2019    Procedure: Injection,steroid,epidural,transforaminal approach;  Surgeon: Chris Palencia MD;  Location: [facility] OR;  Service: Pain Management;  Laterality: Right;  L3-4, L4-L5    TRANSFORAMINAL EPIDURAL INJECTION OF STEROID Left 7/11/2024    Procedure: Injection,steroid,epidural,transforaminal approach l5-s1 and s1;  Surgeon: Ann Soto MD;  Location: [facility] OR;  Service: Anesthesiology;  Laterality: Left;    TRANSFORAMINAL EPIDURAL INJECTION OF STEROID Left 1/30/2025    Procedure: Injection,steroid,epidural,transforaminal approach l5-s1 and s1 ( Iv sedation);  Surgeon: Ann Soto MD;  Location: [facility] OR;  Service: Pain Management;  Laterality: Left;    WISDOM TOOTH  EXTRACTION         Family History:   Family History   Problem Relation Name Age of Onset    Stroke Mother      Ulcerative colitis Mother      Kidney cancer Mother      Heart disease Father      Heart attack Father      Cancer Neg Hx      Colon cancer Neg Hx      Crohn's disease Neg Hx      Celiac disease Neg Hx         Social History:  reports that he quit smoking about 11 years ago. His smoking use included cigarettes. He has never used smokeless tobacco. He reports current alcohol use. He reports that he does not currently use drugs.    Allergies:  Review of patient's allergies indicates:   Allergen Reactions    Sulfa (sulfonamide antibiotics) Anaphylaxis       Medications:  Current Outpatient Medications   Medication Sig Dispense Refill    albuterol (PROVENTIL/VENTOLIN HFA) 90 mcg/actuation inhaler Inhale 2 puffs into the lungs every 6 (six) hours as needed for Wheezing. Rescue 18 g 6    aspirin 81 MG Chew Take 1 tablet (81 mg total) by mouth once daily. 90 tablet 3    droNABinol (MARINOL) 2.5 MG capsule Take 1 capsule (2.5 mg total) by mouth 2 (two) times daily before meals. 60 capsule 3    EScitalopram oxalate (LEXAPRO) 10 MG tablet TAKE 1 TABLET BY MOUTH EVERY DAY 90 tablet 3    formoterol (PERFOROMIST) 20 mcg/2 mL Nebu Take 2 mLs (20 mcg total) by nebulization 2 (two) times a day. Controller 90 mL 3    ibuprofen (ADVIL,MOTRIN) 600 MG tablet Take 1 tablet (600 mg total) by mouth every 6 (six) hours as needed for Pain. 20 tablet 0    melatonin 5 mg Cap Take 5 mg by mouth nightly as needed. TAKE AS SCHEDULED      nintedanib (OFEV) 150 mg Cap Take 1 capsule (150 mg total) by mouth 2 (two) times a day. 60 capsule 6    ondansetron (ZOFRAN-ODT) 4 MG TbDL Take 1 tablet (4 mg total) by mouth every 6 (six) hours as needed (Nausea). 10 tablet 0    pantoprazole (PROTONIX) 40 MG tablet Take 1 tablet (40 mg total) by mouth once daily. 90 tablet 3    [START ON 8/14/2025] pregabalin (LYRICA) 75 MG capsule Take 1 capsule (75  mg total) by mouth every evening. 90 capsule 3    rosuvastatin (CRESTOR) 40 MG Tab Take 1 tablet (40 mg total) by mouth every evening. 90 tablet 0    torsemide (DEMADEX) 10 MG Tab Take 1 tablet (10 mg total) by mouth once daily. 30 tablet 11    traMADoL (ULTRAM) 50 mg tablet Take 1 tablet (50 mg total) by mouth every 24 hours as needed for Pain. 30 tablet 2    triamcinolone acetonide 0.1% (KENALOG) 0.1 % cream APPLY TOPICALLY 2 TIMES DAILY AS NEEDED. (Patient taking differently: as needed.) 45 g 5    verapamiL (CALAN-SR) 240 MG CR tablet Take 1 tablet (240 mg total) by mouth every evening. 90 tablet 3    budesonide (PULMICORT) 0.5 mg/2 mL nebulizer solution Take 2 mLs (0.5 mg total) by nebulization 2 (two) times daily. Controller (Patient not taking: Reported on 7/14/2025) 120 mL 3    HYDROcodone-acetaminophen (NORCO) 5-325 mg per tablet Take 1 tablet by mouth every 6 (six) hours as needed for Pain. (Patient not taking: Reported on 7/14/2025) 15 tablet 0     Current Facility-Administered Medications   Medication Dose Route Frequency Provider Last Rate Last Admin    sodium chloride 0.9% flush 10 mL  10 mL Intravenous PRN Nitin Wheat MD         Facility-Administered Medications Ordered in Other Visits   Medication Dose Route Frequency Provider Last Rate Last Admin    ondansetron injection 4 mg  4 mg Intravenous Daily PRN Gustavo Chu MD        sodium chloride 0.9% flush 10 mL  10 mL Intravenous PRN Braulio Best MD   5 mL at 10/23/24 1130       Review of Systems   Constitutional:  Positive for unexpected weight change. Negative for chills, diaphoresis, fatigue and fever.   Respiratory:  Negative for cough and shortness of breath.    Cardiovascular:  Negative for chest pain and palpitations.   Gastrointestinal:  Positive for diarrhea. Negative for abdominal pain, constipation, nausea and vomiting.   Skin:  Negative for color change and rash.   Neurological:  Negative for headaches.   Hematological:   "Negative for adenopathy. Does not bruise/bleed easily.       ECOG Performance Status: 1   Objective:      Vitals:   Vitals:    07/14/25 0941   BP: 138/76   BP Location: Left arm   Patient Position: Sitting   Pulse: 69   Resp: 12   Temp: 98.2 °F (36.8 °C)   TempSrc: Temporal   SpO2: 96%   Weight: 80.2 kg (176 lb 12.9 oz)   Height: 5' 9.29" (1.76 m)         Physical Exam  Constitutional:       General: He is not in acute distress.     Appearance: He is well-developed. He is not diaphoretic.   HENT:      Head: Normocephalic and atraumatic.   Cardiovascular:      Rate and Rhythm: Normal rate and regular rhythm.      Heart sounds: Normal heart sounds. No murmur heard.     No friction rub. No gallop.   Pulmonary:      Effort: Pulmonary effort is normal. No respiratory distress.      Breath sounds: Normal breath sounds. No wheezing or rales.   Chest:      Chest wall: No tenderness.   Abdominal:      General: Bowel sounds are normal. There is no distension.      Palpations: Abdomen is soft. There is no mass.      Tenderness: There is no abdominal tenderness. There is no rebound.   Musculoskeletal:      Cervical back: Normal range of motion.   Lymphadenopathy:      Cervical: No cervical adenopathy.      Upper Body:      Right upper body: No supraclavicular or axillary adenopathy.      Left upper body: No supraclavicular or axillary adenopathy.   Skin:     General: Skin is warm and dry.      Findings: No erythema or rash.   Neurological:      Mental Status: He is alert and oriented to person, place, and time.   Psychiatric:         Behavior: Behavior normal.         Laboratory Data:  Lab Visit on 07/11/2025   Component Date Value Ref Range Status    Sodium 07/11/2025 140  136 - 145 mmol/L Final    Potassium 07/11/2025 3.8  3.5 - 5.1 mmol/L Final    Chloride 07/11/2025 107  95 - 110 mmol/L Final    CO2 07/11/2025 24  23 - 29 mmol/L Final    Glucose 07/11/2025 129 (H)  70 - 110 mg/dL Final    BUN 07/11/2025 16  8 - 23 mg/dL " Final    Creatinine 07/11/2025 1.0  0.5 - 1.4 mg/dL Final    Calcium 07/11/2025 9.1  8.7 - 10.5 mg/dL Final    Protein Total 07/11/2025 6.5  6.0 - 8.4 gm/dL Final    Albumin 07/11/2025 3.4 (L)  3.5 - 5.2 g/dL Final    Bilirubin Total 07/11/2025 0.8  0.1 - 1.0 mg/dL Final    For infants and newborns, interpretation of results should be based   on gestational age, weight and in agreement with clinical   observations.    Premature Infant recommended reference ranges:   0-24 hours:  <8.0 mg/dL   24-48 hours: <12.0 mg/dL   3-5 days:    <15.0 mg/dL   6-29 days:   <15.0 mg/dL    ALP 07/11/2025 104  40 - 150 unit/L Final    AST 07/11/2025 21  11 - 45 unit/L Final    ALT 07/11/2025 16  10 - 44 unit/L Final    Anion Gap 07/11/2025 9  8 - 16 mmol/L Final    eGFR 07/11/2025 >60  >60 mL/min/1.73/m2 Final    Estimated GFR calculated using the CKD-EPI creatinine (2021) equation.    Ferritin 07/11/2025 611.0 (H)  20.0 - 300.0 ng/mL Final    Iron Level 07/11/2025 178 (H)  45 - 160 ug/dL Final    Transferrin 07/11/2025 197 (L)  200 - 375 mg/dL Final    Iron Binding Capacity Total 07/11/2025 292  250 - 450 ug/dL Final    Iron Saturation 07/11/2025 61 (H)  20 - 50 % Final    WBC 07/11/2025 6.64  3.90 - 12.70 K/uL Final    RBC 07/11/2025 4.22 (L)  4.60 - 6.20 M/uL Final    HGB 07/11/2025 14.0  14.0 - 18.0 gm/dL Final    HCT 07/11/2025 40.0  40.0 - 54.0 % Final    MCV 07/11/2025 95  82 - 98 fL Final    MCH 07/11/2025 33.2 (H)  27.0 - 31.0 pg Final    MCHC 07/11/2025 35.0  32.0 - 36.0 g/dL Final    RDW 07/11/2025 13.8  11.5 - 14.5 % Final    Platelet Count 07/11/2025 249  150 - 450 K/uL Final    MPV 07/11/2025 10.3  9.2 - 12.9 fL Final    Nucleated RBC 07/11/2025 0  <=0 /100 WBC Final    Neut % 07/11/2025 46.8  38 - 73 % Final    Lymph % 07/11/2025 28.8  18 - 48 % Final    Mono % 07/11/2025 8.9  4 - 15 % Final    Eos % 07/11/2025 13.9 (H)  <=8 % Final    Basophil % 07/11/2025 1.4  <=1.9 % Final    Imm Grans % 07/11/2025 0.2  0.0 - 0.5 %  Final    Neut # 07/11/2025 3.12  1.8 - 7.7 K/uL Final    Lymph # 07/11/2025 1.91  1 - 4.8 K/uL Final    Mono # 07/11/2025 0.59  0.3 - 1 K/uL Final    Eos # 07/11/2025 0.92 (H)  <=0.5 K/uL Final    Baso # 07/11/2025 0.09  <=0.2 K/uL Final    Imm Grans # 07/11/2025 0.01  0.00 - 0.04 K/uL Final    Mild elevation in immature granulocytes is non specific and can be seen in a variety of conditions including stress response, acute inflammation, trauma and pregnancy. Correlation with other laboratory and clinical findings is essential.         Imaging:     CT Chest 6/11/24  Base of Neck: No significant abnormality.     Thoracic soft tissues: Unremarkable.     Aorta: Left-sided aortic arch. No aneurysm and no significant atherosclerosis     Heart: Normal size. No effusion. Marked calcification of the coronary arteries. Calcification of the mitral valve annulus.     Pulmonary vasculature: Pulmonary arteries distribute normally. No discrete filling defects identified within the proximal pulmonary arterial branches to suggest pulmonary thromboembolism. There are four pulmonary veins.     Tran/Mediastinum: Multiple enlarged lymph nodes throughout the mediastinum, increased in size as compared to the prior PET-CT on 03/22/2024. Index right lower paratracheal lymph node measures 12 mm in short axis dimension (series 2, image 40), previously 10 mm when directly remeasured. Prevascular lymph node measures 11 mm in short axis dimension (series 2, image 40), previously 8 mm when directly remeasured. Subcarinal lymph node now measures approximately 18 mm in short axis dimension (series 2, image 49), previously 15 mm.     Airways: Patent.     Lungs/Pleura: Lungs are expanded again demonstrate centrilobular and paraseptal emphysematous changes with an upper lobe predominance as well as extensive subpleural reticulation, interlobular septal thickening, scattered bronchiectasis throughout both lungs, mildly progressed at the lung bases in  comparison to the prior CT on 02/27/2024, with superimposed interstitial edema not excluded, noting interval development of small bilateral dependent pleural effusions. No discrete new suspicious pulmonary nodules or masses. No lobar consolidation. No pneumothorax.     Esophagus: Unremarkable.     Upper Abdomen: No abnormality of the partially imaged upper abdomen. Cholecystectomy.     Bones: Chronic anterior wedge compression deformity of the T12 vertebral body. No definite acute fracture. No suspicious lytic or sclerotic lesions. Partially imaged anterior fusion hardware in the lower cervical spine. Chronic healed and nonunited posterior right rib fractures again noted.       Assessment:       1. Elevated ferritin    2. Other nonspecific abnormal finding of lung field    3. Chromosomal abnormality, unspecified    4. Iron deficiency anemia, unspecified iron deficiency anemia type    5. Pulmonary fibrosis    6. Elevated serum immunoglobulin free light chain level    7. Normocytic anemia    8. History of bladder cancer                       Plan:     Elevated Ferritin -   In the setting of pulmonary fibrosis, bladder cancer, and lymphadenopathy   -Will have patient undergo repeat PET-CT and bone marrow biopsy with return visit 3 weeks after the bone marrow    Positive TIF1 Gamma Antibody - can be associated with risk for pulmonary fibrosis and for occult malignancy  -Pt with h/o non invasive bladder cancer  -PSA on 1/08/24 was normal at 0.49ng/mL  -CT C/A/P on 2/27/24 showed thickening of the colon and mediastinal, paraesophageal LAD  -Colonoscopy 3/04/24 showed MEG  -Cystoscopy on 3/21/24 showed no evidence of recurrence  -PET/CT 3/22/24 showed enlarged mediastinal LN's  lymph nodes and superior mediastinum, anterior mediastinum, periaortic region, -EUS on 05/08/2024 with biopsy of subcarinal mediastinum station 7 lymph node had path showing positive for lm material but negative for malignant cells  -CT chest on  6/11/24 showed increasing mediastinal LAD  -EBUS 7/15/24 showed no malignancy in station 4R an 7    Pulmonary Fibrosis - managed by Dr Arellano  -Unclear etiology but possibly related to Positive TIF1 Gamma Antibody as above  -Pt seen by rheumatology with no concern for rheumatologic disease  -Pt on Ofev    Lymphadenopathy - see above    Iron Deficiency Anemia - pt given iron infusions 4/02/24 and 4/09/24 with injectafer  -Ferritin normal at 3/05ng/mL on 6/11/24  Lab Results   Component Value Date    HGB 14.0 07/11/2025   -Unclear cause as colonoscopy and EUS showed no significant findings    Bladder Cancer -  history of noninvasive bladder cancer under the care of Dr. Lr with prior T1 disease  -Pt status post bladder installation of mitomycin-C and BCG  -Last cystoscopy on 08/17/2023 with no lesions seen  -Repeat cystoscopy 1/05/25 showed a possible recurrent lesion in the posterior wall which was fulgurated  -PT following with Urology with negative cytoscopy 7/10/25    Elevated Free Light Chains - Labs on 06/11/2024 showed oligoclonal banding pattern on immunofixation electrophoresis and free light chain assay with elevated kappa light chains at 4.18 mg/dL, elevated lambda light chains at 3.21 mg/dL, and normal kappa lambda ratio 1.3  -Lab work on 4/03/25 showing no monoclonal protein on SPEP or MEME.  Free light chain assay showed elevated kappa light chains at 3.07 mg/dL, normal lambda light chains at 1.42mg/dL, and normal kappa lambda ratio 2.16  -Will monitor in 6 months    Route Chart for Scheduling    Med Onc Chart Routing      Follow up with physician Other. BM biopsy shcedueld in august with return apt with me 3 weeks after jama bone amrrow biopsy.   Follow up with [facility]    Infusion scheduling note    Injection scheduling note    Labs    Imaging    Pharmacy appointment    Other referrals              Supportive Plan Information  OP FERRIC CARBOXYMALTOSE Q2W Fredi West MD   Associated Diagnosis: Iron  deficiency anemia   noted on 3/8/2024   Line of treatment: Supportive Care   Treatment goal: Supportive     Upcoming Treatment Dates - OP FERRIC CARBOXYMALTOSE Q2W    No upcoming days in selected categories.      Fredi West MD  [facility]  Hematology and Oncology  [facility]   [address] [place] [address]   O: [phone]  F: [phone]

## 2025-07-14 ENCOUNTER — LAB VISIT (OUTPATIENT)
Dept: LAB | Facility: HOSPITAL | Age: 70
End: 2025-07-14
Attending: INTERNAL MEDICINE
Payer: MEDICARE

## 2025-07-14 ENCOUNTER — OFFICE VISIT (OUTPATIENT)
Dept: HEMATOLOGY/ONCOLOGY | Facility: CLINIC | Age: 70
End: 2025-07-14
Payer: MEDICARE

## 2025-07-14 VITALS
OXYGEN SATURATION: 96 % | RESPIRATION RATE: 12 BRPM | BODY MASS INDEX: 26.19 KG/M2 | DIASTOLIC BLOOD PRESSURE: 76 MMHG | HEART RATE: 69 BPM | WEIGHT: 176.81 LBS | SYSTOLIC BLOOD PRESSURE: 138 MMHG | HEIGHT: 69 IN | TEMPERATURE: 98 F

## 2025-07-14 DIAGNOSIS — Z85.51 HISTORY OF BLADDER CANCER: ICD-10-CM

## 2025-07-14 DIAGNOSIS — R79.89 ELEVATED FERRITIN: Primary | ICD-10-CM

## 2025-07-14 DIAGNOSIS — Q99.9 CHROMOSOMAL ABNORMALITY, UNSPECIFIED: ICD-10-CM

## 2025-07-14 DIAGNOSIS — J84.10 PULMONARY FIBROSIS: ICD-10-CM

## 2025-07-14 DIAGNOSIS — D50.9 IRON DEFICIENCY ANEMIA, UNSPECIFIED IRON DEFICIENCY ANEMIA TYPE: ICD-10-CM

## 2025-07-14 DIAGNOSIS — D64.9 NORMOCYTIC ANEMIA: ICD-10-CM

## 2025-07-14 DIAGNOSIS — R79.89 ELEVATED FERRITIN: ICD-10-CM

## 2025-07-14 DIAGNOSIS — R76.8 ELEVATED SERUM IMMUNOGLOBULIN FREE LIGHT CHAIN LEVEL: ICD-10-CM

## 2025-07-14 DIAGNOSIS — R91.8 OTHER NONSPECIFIC ABNORMAL FINDING OF LUNG FIELD: ICD-10-CM

## 2025-07-14 PROCEDURE — 36415 COLL VENOUS BLD VENIPUNCTURE: CPT | Mod: PN

## 2025-07-14 PROCEDURE — 99214 OFFICE O/P EST MOD 30 MIN: CPT | Mod: S$PBB,,, | Performed by: INTERNAL MEDICINE

## 2025-07-14 PROCEDURE — 99215 OFFICE O/P EST HI 40 MIN: CPT | Mod: PBBFAC,PN | Performed by: INTERNAL MEDICINE

## 2025-07-14 PROCEDURE — 99999 PR PBB SHADOW E&M-EST. PATIENT-LVL V: CPT | Mod: PBBFAC,,, | Performed by: INTERNAL MEDICINE

## 2025-07-17 LAB
GENETIC VARIANT DETAILS BLD/T: NORMAL
GENETICIST REVIEW: NORMAL
LAB TEST METHOD: NORMAL
MOL DX INTERP BLD/T QL: NEGATIVE
PROVIDER SIGNING NAME: NORMAL
SPECIMEN SOURCE: NORMAL

## 2025-07-28 ENCOUNTER — TELEPHONE (OUTPATIENT)
Dept: HEMATOLOGY/ONCOLOGY | Facility: CLINIC | Age: 70
End: 2025-07-28
Payer: MEDICARE

## 2025-07-28 NOTE — TELEPHONE ENCOUNTER
----- Message from Fredi West MD sent at 7/27/2025  2:49 PM CDT -----  PT needs an appt with me the end of the week of 8/18/25.

## 2025-07-31 ENCOUNTER — HOSPITAL ENCOUNTER (OUTPATIENT)
Facility: HOSPITAL | Age: 70
Discharge: HOME | End: 2025-07-31
Attending: STUDENT IN AN ORGANIZED HEALTH CARE EDUCATION/TRAINING PROGRAM | Admitting: STUDENT IN AN ORGANIZED HEALTH CARE EDUCATION/TRAINING PROGRAM
Payer: MEDICARE

## 2025-07-31 ENCOUNTER — HOSPITAL ENCOUNTER (OUTPATIENT)
Dept: RADIOLOGY | Facility: HOSPITAL | Age: 70
Discharge: HOME | End: 2025-07-31
Attending: STUDENT IN AN ORGANIZED HEALTH CARE EDUCATION/TRAINING PROGRAM
Payer: MEDICARE

## 2025-07-31 DIAGNOSIS — M54.16 LUMBAR RADICULOPATHY: ICD-10-CM

## 2025-07-31 DIAGNOSIS — M54.50 LOWER BACK PAIN: ICD-10-CM

## 2025-07-31 PROCEDURE — 62323 NJX INTERLAMINAR LMBR/SAC: CPT | Mod: PO | Performed by: STUDENT IN AN ORGANIZED HEALTH CARE EDUCATION/TRAINING PROGRAM

## 2025-07-31 PROCEDURE — 63600175 PHARM REV CODE 636 W HCPCS: Mod: PO | Performed by: STUDENT IN AN ORGANIZED HEALTH CARE EDUCATION/TRAINING PROGRAM

## 2025-07-31 PROCEDURE — 62323 NJX INTERLAMINAR LMBR/SAC: CPT | Mod: ,,, | Performed by: STUDENT IN AN ORGANIZED HEALTH CARE EDUCATION/TRAINING PROGRAM

## 2025-07-31 RX ORDER — SODIUM CHLORIDE, SODIUM LACTATE, POTASSIUM CHLORIDE, CALCIUM CHLORIDE 600; 310; 30; 20 MG/100ML; MG/100ML; MG/100ML; MG/100ML
INJECTION, SOLUTION INTRAVENOUS CONTINUOUS
Status: DISCONTINUED | OUTPATIENT
Start: 2025-07-31 | End: 2025-07-31 | Stop reason: HOSPADM

## 2025-07-31 RX ORDER — MIDAZOLAM HYDROCHLORIDE 1 MG/ML
INJECTION INTRAMUSCULAR; INTRAVENOUS
Status: DISCONTINUED | OUTPATIENT
Start: 2025-07-31 | End: 2025-07-31 | Stop reason: HOSPADM

## 2025-07-31 RX ADMIN — SODIUM CHLORIDE, POTASSIUM CHLORIDE, SODIUM LACTATE AND CALCIUM CHLORIDE: 600; 310; 30; 20 INJECTION, SOLUTION INTRAVENOUS at 08:07

## 2025-07-31 NOTE — OP NOTE
Patient: Moustapha Murray                                                    MRN: [id number]  : 1955                                              Date of procedure: 2025    Pre Procedure Diagnosis: Lumbar, lumbosacral radiculopathy    Post Procedure Diagnosis: Same    Procedure: Lumbar Epidural Steroid Injection Under Fluoroscopy at L4-5    Attending: Ann Soto MD    Local Anesthetic Injected: Lidocaine 1% 3 ml    Sedation Medications: See RN note    Estimated Blood Loss: None    Complication: None        Procedure:  After informed consent was obtained, patient was taken to the fluoroscopy suite and placed in a prone position.  The skin was prepped and draped in the usual sterile fashion using chlorhexidine. The skin and subcutaneous tissue overlying the Lumbar vertebral level was infiltrated with 3mLs of 1% Lidocaine using a 25 gauge 1.5 inch needle.  The 20-gauge Tuoehy needle was advanced with the aid of fluoroscopy using the water drop loss of resistance technique at the L4-5 interspinous space using an intralaminer approach.  Proper placement into the epidural space was confirmed by the loss of resistance.  There was no CSF, heme or paresthesias.  After negative aspiration, 0.5mL of contrast was injected.  The contrast confirmed the needle placement by spread delineating the epidural space in multiple views.  Then after negative aspiration, an injectate consisting of 6mLs of 0.5mL 10mg/mL of Dexamethasone, 0.5mL of preservative free lidocaine and 5mL of preservative free normal saline was injected.  Patient tolerated the procedure well and all needles were removed intact.  There were no complications.    Patient was observed in recovery and discharged home under supervision with discharge instructions in stable condition.

## 2025-07-31 NOTE — H&P
CC: back pain    HPI: The patient is a 70 y.o. male with a history of back pain here for ILESI L4-5. There are no major changes in history and physical from 6/25/25 by Cinthia Ramirez PA-C.    Past Medical History:   Diagnosis Date    Anticoagulant long-term use     Bladder cancer 08/2020    Colon polyps     COPD (chronic obstructive pulmonary disease)     Coronary artery disease 01/31/2023    CORONARY STENTSx3    DDD (degenerative disc disease), lumbar     Depression     Fatty liver     GERD (gastroesophageal reflux disease)     H/O motion sickness     Hypertension     Mediastinal lymphadenopathy 07/2024    PONV (postoperative nausea and vomiting)     Pulmonary fibrosis     RLS (restless legs syndrome)     Sleep apnea, unspecified     can't use cpap       Past Surgical History:   Procedure Laterality Date    ANGIOGRAM, CORONARY, WITH LEFT HEART CATHETERIZATION N/A 1/31/2023    Procedure: Left Heart Cath;  Surgeon: Nitin Wheat MD;  Location: [facility] CATH;  Service: Cardiology;  Laterality: N/A;    ANGIOGRAM, CORONARY, WITH LEFT HEART CATHETERIZATION  4/17/2024    Procedure: Left Heart Cath;  Surgeon: Nitin Wheat MD;  Location: [facility] CATH;  Service: Cardiology;;    APPENDECTOMY      CARPAL TUNNEL RELEASE      CERVICAL FUSION      CHOLECYSTECTOMY      CIRCUMCISION      COLONOSCOPY N/A 3/16/2020    Procedure: COLONOSCOPY;  Surgeon: Braulio Best MD;  Location: [facility];  Service: Endoscopy;  Laterality: N/A; 3 colon polyps removed; biopsy: tubular adenoma and hyperplastic; repeat in 5 years for surveillance    COLONOSCOPY N/A 3/4/2024    Procedure: COLONOSCOPY;  Surgeon: Braulio Best MD;  Location: [facility] ENDO;  Service: Gastroenterology;  Laterality: N/A;    CORONARY ANGIOGRAPHY  2/13/2023    Procedure: ANGIOGRAM, CORONARY ARTERY;  Surgeon: Nitin Wheat MD;  Location: [facility] CATH;  Service: Cardiology;;    CORONARY ANGIOPLASTY WITH STENT PLACEMENT N/A 1/31/2023    Procedure: ANGIOPLASTY, CORONARY ARTERY, WITH  STENT INSERTION;  Surgeon: Nitin Wheat MD;  Location: [facility] CATH;  Service: Cardiology;  Laterality: N/A;    CYSTOSCOPY W/ RETROGRADES Bilateral 8/24/2020    Procedure: CYSTOSCOPY, WITH RETROGRADE PYELOGRAM -WITH URETHRAL DILATION;  Surgeon: Greg Claudio MD;  Location: [facility] OR;  Service: Urology;  Laterality: Bilateral;    CYSTOSCOPY W/ URETERAL STENT PLACEMENT Right 8/24/2020    Procedure: CYSTOSCOPY, WITH URETERAL STENT INSERTION;  Surgeon: Greg Claudio MD;  Location: [facility] OR;  Service: Urology;  Laterality: Right;    ENDOBRONCHIAL ULTRASOUND Bilateral 7/15/2024    Procedure: ENDOBRONCHIAL ULTRASOUND (EBUS);  Surgeon: Umm Arellano MD;  Location: [facility] OR;  Service: Pulmonary;  Laterality: Bilateral;    ENDOSCOPIC ULTRASOUND OF UPPER GASTROINTESTINAL TRACT Left 5/8/2024    Procedure: ULTRASOUND, UPPER GI TRACT, ENDOSCOPIC;  Surgeon: Brad Ochoa MD;  Location: [facility] ENDO;  Service: Endoscopy;  Laterality: Left;    EPIDURAL STEROID INJECTION INTO LUMBAR SPINE N/A 3/27/2025    Procedure: Injection-steroid-epidural-lumbar   L4/5;  Surgeon: Ann Soto MD;  Location: [facility] OR;  Service: Anesthesiology;  Laterality: N/A;  normal    ESOPHAGOGASTRODUODENOSCOPY N/A 5/8/2024    Procedure: EGD (ESOPHAGOGASTRODUODENOSCOPY);  Surgeon: Brad Ochoa MD;  Location: [facility] ENDO;  Service: Endoscopy;  Laterality: N/A;    INSERTION, NEUROSTIMULATOR, HYPOGLOSSAL Right 11/22/2024    Procedure: INSERTION,NEUROSTIMULATOR,HYPOGLOSSAL;  Surgeon: Hector Lindquist MD;  Location: [facility] OR;  Service: ENT;  Laterality: Right;    INSTILLATION OF URINARY BLADDER N/A 8/24/2020    Procedure: INSTILLATION, BLADDER - Mitomycin;  Surgeon: Greg Claudio MD;  Location: [facility] OR;  Service: Urology;  Laterality: N/A;    IVUS, CORONARY  1/31/2023    Procedure: IVUS, Coronary;  Surgeon: Nitin Wheat MD;  Location: [facility] CATH;  Service: Cardiology;;    IVUS, CORONARY  2/13/2023    Procedure: IVUS, Coronary;  Surgeon: Nitin  MD Mary Alice;  Location: [facility] CATH;  Service: Cardiology;;    PERCUTANEOUS CORONARY INTERVENTION, ARTERY  2/13/2023    Procedure: Percutaneous coronary intervention;  Surgeon: Nitin Wheat MD;  Location: [facility] CATH;  Service: Cardiology;;    REVISION OR REPLACEMENT, NEUROSTIMULATOR, HYPOGLOSSAL Right 6/6/2025    Procedure: REVISION NEUROSTIMULATOR,HYPOGLOSSAL;  Surgeon: Hector Lindquist MD;  Location: [facility] OR;  Service: ENT;  Laterality: Right;    RIGHT HEART CATHETERIZATION  8/21/2024    Procedure: Right heart cath;  Surgeon: Nitin Wheat MD;  Location: [facility] CATH;  Service: Cardiology;;    SLEEP ENDOSCOPY, DRUG-INDUCED Bilateral 10/23/2024    Procedure: SLEEP ENDOSCOPY,DRUG-INDUCED;  Surgeon: Hector Lindquist MD;  Location: [facility] OR;  Service: ENT;  Laterality: Bilateral;    TRANSFORAMINAL EPIDURAL INJECTION OF STEROID Right 7/17/2019    Procedure: Injection,steroid,epidural,transforaminal approach L3-4, L4-5;  Surgeon: Chris Palencia MD;  Location: [facility] OR;  Service: Pain Management;  Laterality: Right;    TRANSFORAMINAL EPIDURAL INJECTION OF STEROID Right 8/28/2019    Procedure: Injection,steroid,epidural,transforaminal approach;  Surgeon: Chris Palencia MD;  Location: [facility] OR;  Service: Pain Management;  Laterality: Right;  L3-4, L4-L5    TRANSFORAMINAL EPIDURAL INJECTION OF STEROID Left 7/11/2024    Procedure: Injection,steroid,epidural,transforaminal approach l5-s1 and s1;  Surgeon: Ann Soto MD;  Location: [facility] OR;  Service: Anesthesiology;  Laterality: Left;    TRANSFORAMINAL EPIDURAL INJECTION OF STEROID Left 1/30/2025    Procedure: Injection,steroid,epidural,transforaminal approach l5-s1 and s1 ( Iv sedation);  Surgeon: Ann Soto MD;  Location: [facility] OR;  Service: Pain Management;  Laterality: Left;    WISDOM TOOTH EXTRACTION         Family History   Problem Relation Name Age of Onset    Stroke Mother      Ulcerative colitis Mother      Kidney cancer Mother      Heart disease Father      Heart attack Father       "Cancer Neg Hx      Colon cancer Neg Hx      Crohn's disease Neg Hx      Celiac disease Neg Hx         Social History     Socioeconomic History    Marital status:    Occupational History    Occupation:    Tobacco Use    Smoking status: Former     Current packs/day: 0.00     Types: Cigarettes     Quit date: 10/16/2013     Years since quittin.7    Smokeless tobacco: Never   Substance and Sexual Activity    Alcohol use: Yes     Comment: couple beers weekly    Drug use: Not Currently     Social Drivers of Health     Financial Resource Strain: Low Risk  (2024)    Overall Financial Resource Strain (CARDIA)     Difficulty of Paying Living Expenses: Not hard at all   Food Insecurity: No Food Insecurity (2024)    Hunger Vital Sign     Worried About Running Out of Food in the Last Year: Never true     Ran Out of Food in the Last Year: Never true   Transportation Needs: No Transportation Needs (2023)    PRAPARE - Transportation     Lack of Transportation (Medical): No     Lack of Transportation (Non-Medical): No   Physical Activity: Unknown (2024)    Exercise Vital Sign     Days of Exercise per Week: 0 days   Stress: Stress Concern Present (2024)    [employer] [place] [employer] - Occupational Stress Questionnaire     Feeling of Stress : To some extent   Housing Stability: Low Risk  (2023)    Housing Stability Vital Sign     Unable to Pay for Housing in the Last Year: No     Number of Places Lived in the Last Year: 1     Unstable Housing in the Last Year: No       Current Medications[1]    Review of patient's allergies indicates:   Allergen Reactions    Sulfa (sulfonamide antibiotics) Anaphylaxis       Vitals:    25 1126 25 1304 25 0757   BP:   123/72   Pulse:   (!) 56   Resp:   15   Temp:   97.3 °F (36.3 °C)   TempSrc:   Skin   SpO2:   95%   Weight: 80.7 kg (178 lb) 79.4 kg (175 lb)    Height: 5' 10" (1.778 m) 5' 10" (1.778 m)  "       REVIEW OF SYSTEMS:     GENERAL: No weight loss, malaise or fevers.  HEENT:  No recent changes in vision or hearing  NECK: Negative for lumps, no difficulty with swallowing.  RESPIRATORY: Negative for cough, wheezing or shortness of breath, patient denies any recent URI.  CARDIOVASCULAR: Negative for chest pain, leg swelling or palpitations.  GI: Negative for abdominal discomfort, blood in stools or black stools or change in bowel habits.  MUSCULOSKELETAL: See HPI.  SKIN: Negative for lesions, rash, and itching.  PSYCH: No suicidal or homicidal ideations, no current mood disturbances.  HEMATOLOGY/LYMPHOLOGY: Negative for prolonged bleeding, bruising easily or swollen nodes. Patient is not currently taking any anti-coagulants  ENDO: No history of diabetes or thyroid dysfunction  NEURO: No history of syncope, paralysis, seizures or tremors.All other reviewed and negative other than HPI.    Physical exam:  Gen: A and O x3, pleasant, well-groomed  Skin: No rashes or obvious lesions  HEENT: PERRLA, no obvious deformities on ears or in canals. No thyroid masses, trachea midline, no palpable lymph nodes in neck, axilla.  CVS: Regular rate and rhythm, normal S1 and S2, no murmurs.  Resp: Clear to auscultation bilaterally.  Abdomen: Soft, NT/ND, normal bowel sounds present.  Musculoskeletal/Neuro: Moving all extremities    Assessment:  Lumbar radiculopathy  -     Place in Outpatient; Standing  -     Vital signs; Standing  -     [facility] ; Standing  -     Verify informed consent; Standing  -     Notify physician ; Standing  -     Notify physician ; Standing  -     Notify physician (specify); Standing  -     Notify physician (specify); Standing  -     Notify physician (specify); Standing  -     Diet NPO; Standing  -     lactated ringers infusion    Other orders  -     IP VTE HIGH RISK PATIENT; Standing          PLAN: ILESI L4-5      This patient has been cleared for surgery in an ambulatory  surgical facility    ASA 3,  Mallampatti Score 3  No history of anesthetic complications  Plan for RN IV sedation        [1]   Current Facility-Administered Medications   Medication Dose Route Frequency Provider Last Rate Last Admin    lactated ringers infusion   Intravenous Continuous Ann Soto MD         Facility-Administered Medications Ordered in Other Encounters   Medication Dose Route Frequency Provider Last Rate Last Admin    ondansetron injection 4 mg  4 mg Intravenous Daily PRN Gustavo Chu MD        sodium chloride 0.9% flush 10 mL  10 mL Intravenous PRN Braulio Best MD   5 mL at 10/23/24 2428

## 2025-08-01 VITALS
HEIGHT: 70 IN | TEMPERATURE: 97 F | OXYGEN SATURATION: 96 % | WEIGHT: 175 LBS | RESPIRATION RATE: 16 BRPM | HEART RATE: 57 BPM | BODY MASS INDEX: 25.05 KG/M2 | DIASTOLIC BLOOD PRESSURE: 68 MMHG | SYSTOLIC BLOOD PRESSURE: 115 MMHG

## 2025-08-07 ENCOUNTER — OFFICE VISIT (OUTPATIENT)
Dept: PAIN MEDICINE | Facility: CLINIC | Age: 70
End: 2025-08-07
Payer: MEDICARE

## 2025-08-07 VITALS
BODY MASS INDEX: 25.61 KG/M2 | DIASTOLIC BLOOD PRESSURE: 71 MMHG | HEIGHT: 70 IN | HEART RATE: 60 BPM | SYSTOLIC BLOOD PRESSURE: 111 MMHG | WEIGHT: 178.88 LBS

## 2025-08-07 DIAGNOSIS — M54.17 LUMBOSACRAL RADICULOPATHY: Primary | ICD-10-CM

## 2025-08-07 DIAGNOSIS — M47.816 LUMBAR SPONDYLOSIS: ICD-10-CM

## 2025-08-07 DIAGNOSIS — M54.16 LUMBAR RADICULOPATHY: ICD-10-CM

## 2025-08-07 PROCEDURE — 99213 OFFICE O/P EST LOW 20 MIN: CPT | Mod: S$PBB,,, | Performed by: PHYSICIAN ASSISTANT

## 2025-08-07 PROCEDURE — 99999 PR PBB SHADOW E&M-EST. PATIENT-LVL IV: CPT | Mod: PBBFAC,,, | Performed by: PHYSICIAN ASSISTANT

## 2025-08-07 PROCEDURE — 99214 OFFICE O/P EST MOD 30 MIN: CPT | Mod: PBBFAC,PO | Performed by: PHYSICIAN ASSISTANT

## 2025-08-07 NOTE — PROGRESS NOTES
[facility]        PCP:  Colton Johnson MD    CC:   Chief Complaint   Patient presents with    Low-back Pain          HPI:   Moustapha Murray is a 70 y.o. year old male patient who has a past medical history of Anticoagulant long-term use, Bladder cancer, Colon polyps, COPD (chronic obstructive pulmonary disease), Coronary artery disease, DDD (degenerative disc disease), lumbar, Depression, Fatty liver, GERD (gastroesophageal reflux disease), H/O motion sickness, Hypertension, Mediastinal lymphadenopathy, PONV (postoperative nausea and vomiting), Pulmonary fibrosis, RLS (restless legs syndrome), and Sleep apnea, unspecified.     Mr. Dee presents to clinic for follow up of lower back and left leg pain.  With most recent 07/31/2025 L4-5 interlaminar CHANEL, he reports 0% improvement.  He continues with pain into the left lower back, hip, towards the knee area.  Below-the-knee to the foot on the left side he experiences a numbing sensation.  He is known to have degenerative changes at L4-5 with cystic presents in the central canal at L4-5 to the left and there is spondylolisthesis at L5-S1.    HPI 6-23-25  He presents for follow up of back and leg pain. He is new to me and previously seen by Dr. Soto.  He last had an epidural steroid injection 03/27/2025 L4-5 interlaminar that provided greater than 80% pain relief.  Today he describes increasing pain into the lower back with radiation into the left lateral leg to the foot.  He feels numbness from the left knee to the foot.  Pain increases with transitioning from sitting or lying to standing.  This feels similar to pain experienced prior to last CHANEL    Denies bowel/ bladder incontinence.    HPI 4-22-25:  HPI: Pt is a pleasant 70 y.o., who presents for evaluation. Referred by Dr. Johnson. Pt seen previously for back pain and L leg pain for years.  Previously endorsed sharp, shooting pain going down the back of the left leg to the calf. Pt seen today for f/u  from ILESI L4-5 and states he has had great relief from this CHANEL. Overall is doing much better. Does have some pain going down the back of the L leg to the calf but is significantly improved. No BB changes. Is doing HEP.       Past and current medications:  Antineuropathics: lyrica for restless legs  NSAIDs:  Antidepressants:  Muscle relaxers:  Opioids: tramadol (prescribed by oncology)  Antiplatelets/Anticoagulants:  Others:    Physical Therapy/ Chiropractic care:  Home exercise program    Pain Intervention History:  L TFESI L5-S1 and S1 7/11/24  ILESI L4-5 3/27/25 - >80% relief   L TFESI L5-S1 and S1 1/30/25 - no relief  L4/5 IL CHANEL 7/31/25 with no relief    Past Spine Surgical History:  none        History:  Current Medications[1]    Past Medical History:   Diagnosis Date    Anticoagulant long-term use     Bladder cancer 08/2020    Colon polyps     COPD (chronic obstructive pulmonary disease)     Coronary artery disease 01/31/2023    CORONARY STENTSx3    DDD (degenerative disc disease), lumbar     Depression     Fatty liver     GERD (gastroesophageal reflux disease)     H/O motion sickness     Hypertension     Mediastinal lymphadenopathy 07/2024    PONV (postoperative nausea and vomiting)     Pulmonary fibrosis     RLS (restless legs syndrome)     Sleep apnea, unspecified     can't use cpap       Past Surgical History:   Procedure Laterality Date    ANGIOGRAM, CORONARY, WITH LEFT HEART CATHETERIZATION N/A 1/31/2023    Procedure: Left Heart Cath;  Surgeon: Nitin Wheat MD;  [facility] C[facility];  Service: Cardiology;  Laterality: N/A;    ANGIOGRAM, CORONARY, WITH LEFT HEART CATHETERIZATION  4/17/2024    Procedure: Left Heart Cath;  Surgeon: Nitin Wheat MD;  Location: [facility] CATH;  Service: Cardiology;;    APPENDECTOMY      CARPAL TUNNEL RELEASE      CERVICAL FUSION      CHOLECYSTECTOMY      CIRCUMCISION      COLONOSCOPY N/A 3/16/2020    Procedure: COLONOSCOPY;  Surgeon: Braulio Best MD;  Location: [facility] ENDO;   Service: Endoscopy;  Laterality: N/A; 3 colon polyps removed; biopsy: tubular adenoma and hyperplastic; repeat in 5 years for surveillance    COLONOSCOPY N/A 3/4/2024    Procedure: COLONOSCOPY;  Surgeon: Braulio Best MD;  Location: [facility] ENDO;  Service: Gastroenterology;  Laterality: N/A;    CORONARY ANGIOGRAPHY  2/13/2023    Procedure: ANGIOGRAM, CORONARY ARTERY;  Surgeon: Nitin Wheat MD;  Location: [facility] CATH;  Service: Cardiology;;    CORONARY ANGIOPLASTY WITH STENT PLACEMENT N/A 1/31/2023    Procedure: ANGIOPLASTY, CORONARY ARTERY, WITH STENT INSERTION;  Surgeon: Nitin Wheat MD;  [facility];  Service: Cardiology;  Laterality: N/A;    CYSTOSCOPY W/ RETROGRADES Bilateral 8/24/2020    Procedure: CYSTOSCOPY, WITH RETROGRADE PYELOGRAM -WITH URETHRAL DILATION;  Surgeon: Greg Claudio MD;  Location: [facility] OR;  Service: Urology;  Laterality: Bilateral;    CYSTOSCOPY W/ URETERAL STENT PLACEMENT Right 8/24/2020    Procedure: CYSTOSCOPY, WITH URETERAL STENT INSERTION;  Surgeon: Greg Claudio MD;  Location: [facility] OR;  Service: Urology;  Laterality: Right;    ENDOBRONCHIAL ULTRASOUND Bilateral 7/15/2024    Procedure: ENDOBRONCHIAL ULTRASOUND (EBUS);  Surgeon: Umm Arellano MD;  Location: [facility] OR;  Service: Pulmonary;  Laterality: Bilateral;    ENDOSCOPIC ULTRASOUND OF UPPER GASTROINTESTINAL TRACT Left 5/8/2024    Procedure: ULTRASOUND, UPPER GI TRACT, ENDOSCOPIC;  Surgeon: Brad Ochoa MD;  Location: [facility] ENDO;  Service: Endoscopy;  Laterality: Left;    EPIDURAL STEROID INJECTION INTO LUMBAR SPINE N/A 3/27/2025    Procedure: Injection-steroid-epidural-lumbar   L4/5;  Surgeon: Ann Soto MD;  Location: [facility] OR;  Service: Anesthesiology;  Laterality: N/A;  normal    EPIDURAL STEROID INJECTION INTO LUMBAR SPINE N/A 7/31/2025    Procedure: Injection-steroid-epidural-lumbar l4/5;  Surgeon: Ann Soto MD;  Location: [facility] OR;  Service: Pain Management;  Laterality: N/A;     ESOPHAGOGASTRODUODENOSCOPY N/A 5/8/2024    Procedure: EGD (ESOPHAGOGASTRODUODENOSCOPY);  Surgeon: Brad Ochoa MD;  Location: [facility] ENDO;  Service: Endoscopy;  Laterality: N/A;    INSERTION, NEUROSTIMULATOR, HYPOGLOSSAL Right 11/22/2024    Procedure: INSERTION,NEUROSTIMULATOR,HYPOGLOSSAL;  Surgeon: Hector Lindquist MD;  Location: [facility] OR;  Service: ENT;  Laterality: Right;    INSTILLATION OF URINARY BLADDER N/A 8/24/2020    Procedure: INSTILLATION, BLADDER - Mitomycin;  Surgeon: Greg Claudio MD;  Location: [facility] OR;  Service: Urology;  Laterality: N/A;    IVUS, CORONARY  1/31/2023    Procedure: IVUS, Coronary;  Surgeon: Nitin Wheat MD;  [facility];  Service: Cardiology;;    IVUS, CORONARY  2/13/2023    Procedure: IVUS, Coronary;  Surgeon: Nitin Wheat MD;  Location: [facility] CATH;  Service: Cardiology;;    PERCUTANEOUS CORONARY INTERVENTION, ARTERY  2/13/2023    Procedure: Percutaneous coronary intervention;  Surgeon: Nitin Wheat MD;  Location: [facility] CATH;  Service: Cardiology;;    REVISION OR REPLACEMENT, NEUROSTIMULATOR, HYPOGLOSSAL Right 6/6/2025    Procedure: REVISION NEUROSTIMULATOR,HYPOGLOSSAL;  Surgeon: Hector Lindquist MD;  Location: [facility] OR;  Service: ENT;  Laterality: Right;    RIGHT HEART CATHETERIZATION  8/21/2024    Procedure: Right heart cath;  Surgeon: Nitin Wheat MD;  Location: [facility] CATH;  Service: Cardiology;;    SLEEP ENDOSCOPY, DRUG-INDUCED Bilateral 10/23/2024    Procedure: SLEEP ENDOSCOPY,DRUG-INDUCED;  Surgeon: Hector Lindquist MD;  Location: [facility] OR;  Service: ENT;  Laterality: Bilateral;    TRANSFORAMINAL EPIDURAL INJECTION OF STEROID Right 7/17/2019    Procedure: Injection,steroid,epidural,transforaminal approach L3-4, L4-5;  Surgeon: Chris Palencia MD;  Location: [facility] OR;  Service: Pain Management;  Laterality: Right;    TRANSFORAMINAL EPIDURAL INJECTION OF STEROID Right 8/28/2019    Procedure: Injection,steroid,epidural,transforaminal approach;  Surgeon: Chris Palencia MD;   Location: [facility] OR;  Service: Pain Management;  Laterality: Right;  L3-4, L4-L5    TRANSFORAMINAL EPIDURAL INJECTION OF STEROID Left 2024    Procedure: Injection,steroid,epidural,transforaminal approach l5-s1 and s1;  Surgeon: Ann Soto MD;  Location: [facility] OR;  Service: Anesthesiology;  Laterality: Left;    TRANSFORAMINAL EPIDURAL INJECTION OF STEROID Left 2025    Procedure: Injection,steroid,epidural,transforaminal approach l5-s1 and s1 ( Iv sedation);  Surgeon: Ann Soto MD;  Location: [facility] OR;  Service: Pain Management;  Laterality: Left;    WISDOM TOOTH EXTRACTION         Family History   Problem Relation Name Age of Onset    Stroke Mother      Ulcerative colitis Mother      Kidney cancer Mother      Heart disease Father      Heart attack Father      Cancer Neg Hx      Colon cancer Neg Hx      Crohn's disease Neg Hx      Celiac disease Neg Hx         Social History     Socioeconomic History    Marital status:    Occupational History    Occupation: [employer]   Tobacco Use    Smoking status: Former     Current packs/day: 0.00     Types: Cigarettes     Quit date: 10/16/2013     Years since quittin.8    Smokeless tobacco: Never   Substance and Sexual Activity    Alcohol use: Yes     Comment: couple beers weekly    Drug use: Not Currently     Social Drivers of Health     Financial Resource Strain: Low Risk  (2024)    Overall Financial Resource Strain (CARDIA)     Difficulty of Paying Living Expenses: Not hard at all   Food Insecurity: No Food Insecurity (2024)    Hunger Vital Sign     Worried About Running Out of Food in the Last Year: Never true     Ran Out of Food in the Last Year: Never true   Transportation Needs: No Transportation Needs (2023)    PRAPARE - Transportation     Lack of Transportation (Medical): No     Lack of Transportation (Non-Medical): No   Physical Activity: Unknown (2024)    Exercise Vital Sign     Days of Exercise per Week: 0 days  "  Stress: Stress Concern Present (12/8/2024)    [employer] - Occupational Stress Questionnaire     Feeling of Stress : To some extent   Housing Stability: Low Risk  (12/4/2023)    Housing Stability Vital Sign     Unable to Pay for Housing in the Last Year: No     Number of Places Lived in the Last Year: 1     Unstable Housing in the Last Year: No       Review of patient's allergies indicates:   Allergen Reactions    Sulfa (sulfonamide antibiotics) Anaphylaxis       Labs:  Lab Results   Component Value Date    HGBA1C 5.9 (H) 12/11/2020       Lab Results   Component Value Date    WBC 8.72 08/04/2025    HGB 14.7 08/04/2025    HCT 43.1 08/04/2025    MCV 99 (H) 08/04/2025     08/04/2025           Review of Systems:  Lower back pain.  Left leg pain and numbness. .  Balance of review of systems is negative.    Physical Exam:  Vitals:    08/07/25 1424   BP: 111/71   Pulse: 60   Weight: 81.1 kg (178 lb 14.5 oz)   Height: 5' 10" (1.778 m)   PainSc:   4   PainLoc: Back     Body mass index is 25.67 kg/m².    Pain disability index:      8/7/2025     2:23 PM 6/23/2025    10:05 AM 4/22/2025    11:25 AM   Last 3 PDI Scores   Pain Disability Index (PDI) 21 15 28       Gen: NAD  Psych: mood appropriate for given condition  HEENT: eyes anicteric   CV: RRR, 2+ radial pulse  Respiratory: non-labored, no signs of respiratory distress  Abd: non-distended  Skin: warm, dry and intact.  Gait: Able to heel walk, toe walk. No antalgic gait.     Cervical spine: ROM is full in flexion, extension and lateral rotation without increased pain.  Spurling's maneuver causes no neck pain to either side.  Myofascial exam: No Tenderness to palpation across cervical paraspinous region bilaterally.    Lumbar spine:  Lumbar spine: ROM is full with flexion extension and oblique extension with no increased pain.    Scot's test causes no increased pain on either side.    Supine straight leg raise is negative bilaterally.  "   Internal and external rotation of the hip causes no increased pain on either side.  Myofascial exam: No tenderness to palpation across lumbar paraspinous muscles. No tenderness to palpation over the bilateral greater trochanters and bilateral SI joint    Sensory:  Intact and symmetrical to light touch in C4-T1 dermatomes bilaterally. Intact and symmetrical to light touch in L1-S1 dermatomes bilaterally.    Motor:    Right Left   C4 Shoulder Abduction  5  5   C5 Elbow Flexion    5  5   C6 Wrist Extension  5  5   C7 Elbow Extension   5  5   C8/T1 Hand Intrinsics   5  5        Right Left   L2/3 Iliacus Hip flexion  5  5   L3/4 Qudratus Femoris Knee Extension  5  5   L4/5 Tib Anterior Ankle Dorsiflexion   5  5   L5/S1 Extensor Hallicus Longus Great toe extension  5  5   S1/S2 Gastroc/Soleus Plantar Flexion  5  5      Right Left   Triceps DTR 2+ 2+   Biceps DTR 2+ 2+   Brachioradialis DTR 2+ 2+   Patellar DTR 2+ 2+   Achilles DTR 2+ 2+   Underwood Absent  Absent   Clonus Absent Absent   Babinski Absent Absent       Imaging:    MR L-spine 6/24 - chronic T12 and L1 compression fx, grade 1 anterolisthesis L5 on S1, L L4-5 synovial cyst and disc protrusion through annular fissure affecting L L5 and S1 nerve roots, synovial cyst affecting R L1 nerve root     CT L-spine 12/23    CT T-spine 12/23    XR L-spine complete 2/23      Assessment:   Moustapha Murray is a 70 y.o. year old male patient who has a past medical history of Anticoagulant long-term use, Bladder cancer, Colon polyps, COPD (chronic obstructive pulmonary disease), Coronary artery disease, DDD (degenerative disc disease), lumbar, Depression, Fatty liver, GERD (gastroesophageal reflux disease), H/O motion sickness, Hypertension, Mediastinal lymphadenopathy, PONV (postoperative nausea and vomiting), Pulmonary fibrosis, RLS (restless legs syndrome), and Sleep apnea, unspecified. He presents in referral from No ref. provider found for lower back and left leg  pain.    MRI lumbar spine from 07/02/2024 personally reviewed.  Multilevel degenerative changes.  L3-4 central canal stenosis.  L4-5 left facet cyst and left lateral recess stenosis is present.  L5-S1 anterolisthesis and right lateral recess disk the possible free fragment component causes right lateral recess stenosis.    - currently experiencing Left L5, S1 radiculopathy.  He is known to have significant L4-5 degenerative change with left facet cyst and left lateral recess stenosis.  He is also known to have L5-S1 anterolisthesis with left foraminal narrowing as well.  With L4-5 interlaminar CHANEL not providing significant relief, his current symptoms may be more generated from L5-S1.    Plan:  - l we discussed current options of physical therapy trial versus L5-S1 interlaminar CHANEL as he has not tried an injection at this level yet versus updating MRI lumbar spine with intent to pursue neurosurgical consult.  - he would like to try physical therapy.  - he has already had 3 epidural steroid injections within the last 12 months and we like to hold as long as possible on another.    Problem List Items Addressed This Visit    None  Visit Diagnoses         Lumbosacral radiculopathy    -  Primary    Relevant Orders    Ambulatory Referral/Consult to Physical Therapy      Lumbar radiculopathy        Relevant Orders    Ambulatory Referral/Consult to Physical Therapy      Lumbar spondylosis        Relevant Orders    Ambulatory Referral/Consult to Physical Therapy          Follow Up: RTC in 6-8 weeks to monitor progress with PT.    : Reviewed and consistent with medication use as prescribed.          Cinthia Ramirez PA-C  [facility]           [1]   Current Outpatient Medications:     albuterol (PROVENTIL/VENTOLIN HFA) 90 mcg/actuation inhaler, Inhale 2 puffs into the lungs every 6 (six) hours as needed for Wheezing. Rescue, Disp: 18 g, Rfl: 6    aspirin 81 MG Chew, Take 1 tablet (81 mg total) by mouth  once daily., Disp: 90 tablet, Rfl: 3    budesonide (PULMICORT) 0.5 mg/2 mL nebulizer solution, Take 2 mLs (0.5 mg total) by nebulization 2 (two) times daily. Controller, Disp: 120 mL, Rfl: 3    droNABinol (MARINOL) 2.5 MG capsule, Take 1 capsule (2.5 mg total) by mouth 2 (two) times daily before meals., Disp: 60 capsule, Rfl: 3    EScitalopram oxalate (LEXAPRO) 10 MG tablet, TAKE 1 TABLET BY MOUTH EVERY DAY, Disp: 90 tablet, Rfl: 3    formoterol (PERFOROMIST) 20 mcg/2 mL Nebu, Take 2 mLs (20 mcg total) by nebulization 2 (two) times a day. Controller, Disp: 90 mL, Rfl: 3    ibuprofen (ADVIL,MOTRIN) 600 MG tablet, Take 1 tablet (600 mg total) by mouth every 6 (six) hours as needed for Pain., Disp: 20 tablet, Rfl: 0    melatonin 5 mg Cap, Take 5 mg by mouth nightly as needed. TAKE AS SCHEDULED, Disp: , Rfl:     nintedanib (OFEV) 150 mg Cap, Take 1 capsule (150 mg total) by mouth 2 (two) times a day., Disp: 60 capsule, Rfl: 6    ondansetron (ZOFRAN-ODT) 4 MG TbDL, Take 1 tablet (4 mg total) by mouth every 6 (six) hours as needed (Nausea)., Disp: 10 tablet, Rfl: 0    pantoprazole (PROTONIX) 40 MG tablet, Take 1 tablet (40 mg total) by mouth once daily., Disp: 90 tablet, Rfl: 3    [START ON 8/14/2025] pregabalin (LYRICA) 75 MG capsule, Take 1 capsule (75 mg total) by mouth every evening., Disp: 90 capsule, Rfl: 3    rosuvastatin (CRESTOR) 40 MG Tab, Take 1 tablet (40 mg total) by mouth every evening., Disp: 90 tablet, Rfl: 0    torsemide (DEMADEX) 10 MG Tab, Take 1 tablet (10 mg total) by mouth once daily., Disp: 30 tablet, Rfl: 11    traMADoL (ULTRAM) 50 mg tablet, Take 1 tablet (50 mg total) by mouth every 24 hours as needed for Pain., Disp: 30 tablet, Rfl: 2    triamcinolone acetonide 0.1% (KENALOG) 0.1 % cream, APPLY TOPICALLY 2 TIMES DAILY AS NEEDED. (Patient taking differently: as needed.), Disp: 45 g, Rfl: 5    verapamiL (CALAN-SR) 240 MG CR tablet, Take 1 tablet (240 mg total) by mouth every evening., Disp: 90  tablet, Rfl: 3    HYDROcodone-acetaminophen (NORCO) 5-325 mg per tablet, Take 1 tablet by mouth every 6 (six) hours as needed for Pain. (Patient not taking: Reported on 7/14/2025), Disp: 15 tablet, Rfl: 0    Current Facility-Administered Medications:     sodium chloride 0.9% flush 10 mL, 10 mL, Intravenous, PRN, Nitin Wheat MD    Facility-Administered Medications Ordered in Other Visits:     ondansetron injection 4 mg, 4 mg, Intravenous, Daily PRN, Gustavo Chu MD    sodium chloride 0.9% flush 10 mL, 10 mL, Intravenous, PRN, Braulio Best MD, 5 mL at 10/23/24 1478

## 2025-08-13 DIAGNOSIS — R93.1 AGATSTON CAC SCORE, >400: ICD-10-CM

## 2025-08-15 RX ORDER — ROSUVASTATIN CALCIUM 40 MG/1
40 TABLET, COATED ORAL NIGHTLY
Qty: 90 TABLET | Refills: 0 | Status: SHIPPED | OUTPATIENT
Start: 2025-08-15

## 2025-08-19 DIAGNOSIS — I49.3 PVC'S (PREMATURE VENTRICULAR CONTRACTIONS): ICD-10-CM

## 2025-08-21 RX ORDER — VERAPAMIL HCL 240 MG
240 TABLET, EXTENDED RELEASE ORAL NIGHTLY
Qty: 90 TABLET | Refills: 3 | Status: SHIPPED | OUTPATIENT
Start: 2025-08-21 | End: 2026-08-21

## 2025-08-26 ENCOUNTER — OFFICE VISIT (OUTPATIENT)
Dept: HEMATOLOGY/ONCOLOGY | Facility: CLINIC | Age: 70
End: 2025-08-26
Payer: MEDICARE

## 2025-08-26 VITALS
SYSTOLIC BLOOD PRESSURE: 118 MMHG | TEMPERATURE: 98 F | DIASTOLIC BLOOD PRESSURE: 71 MMHG | HEART RATE: 55 BPM | BODY MASS INDEX: 25.67 KG/M2 | RESPIRATION RATE: 12 BRPM | HEIGHT: 70 IN | OXYGEN SATURATION: 95 %

## 2025-08-26 DIAGNOSIS — R79.89 ELEVATED FERRITIN: Primary | ICD-10-CM

## 2025-08-26 DIAGNOSIS — D50.9 IRON DEFICIENCY ANEMIA, UNSPECIFIED IRON DEFICIENCY ANEMIA TYPE: ICD-10-CM

## 2025-08-26 DIAGNOSIS — R91.8 OTHER NONSPECIFIC ABNORMAL FINDING OF LUNG FIELD: ICD-10-CM

## 2025-08-26 DIAGNOSIS — J84.10 PULMONARY FIBROSIS: ICD-10-CM

## 2025-08-26 DIAGNOSIS — R76.8 ELEVATED SERUM IMMUNOGLOBULIN FREE LIGHT CHAIN LEVEL: ICD-10-CM

## 2025-08-26 DIAGNOSIS — D64.9 NORMOCYTIC ANEMIA: ICD-10-CM

## 2025-08-26 DIAGNOSIS — Z85.51 HISTORY OF BLADDER CANCER: ICD-10-CM

## 2025-08-26 PROCEDURE — 99999 PR PBB SHADOW E&M-EST. PATIENT-LVL IV: CPT | Mod: PBBFAC,,, | Performed by: INTERNAL MEDICINE

## 2025-08-26 PROCEDURE — 99213 OFFICE O/P EST LOW 20 MIN: CPT | Mod: S$PBB,,, | Performed by: INTERNAL MEDICINE

## 2025-08-26 PROCEDURE — 99214 OFFICE O/P EST MOD 30 MIN: CPT | Mod: PBBFAC,PN | Performed by: INTERNAL MEDICINE

## 2025-08-28 ENCOUNTER — HOSPITAL ENCOUNTER (OUTPATIENT)
Dept: RADIOLOGY | Facility: HOSPITAL | Age: 70
Discharge: HOME | End: 2025-08-28
Attending: INTERNAL MEDICINE
Payer: MEDICARE

## 2025-08-28 DIAGNOSIS — J44.9 CHRONIC OBSTRUCTIVE PULMONARY DISEASE, UNSPECIFIED COPD TYPE: ICD-10-CM

## 2025-08-28 DIAGNOSIS — J84.9 INTERSTITIAL PULMONARY DISEASE, UNSPECIFIED: ICD-10-CM

## 2025-08-28 PROCEDURE — 71046 X-RAY EXAM CHEST 2 VIEWS: CPT | Mod: TC,PO

## 2025-08-28 PROCEDURE — 71046 X-RAY EXAM CHEST 2 VIEWS: CPT | Mod: 26,,, | Performed by: RADIOLOGY

## 2025-09-02 ENCOUNTER — OFFICE VISIT (OUTPATIENT)
Dept: CARDIOLOGY | Facility: CLINIC | Age: 70
End: 2025-09-02
Payer: MEDICARE

## 2025-09-02 VITALS
SYSTOLIC BLOOD PRESSURE: 126 MMHG | BODY MASS INDEX: 26.16 KG/M2 | WEIGHT: 182.75 LBS | DIASTOLIC BLOOD PRESSURE: 69 MMHG | HEART RATE: 57 BPM | HEIGHT: 70 IN

## 2025-09-02 DIAGNOSIS — R93.1 AGATSTON CAC SCORE, >400: ICD-10-CM

## 2025-09-02 DIAGNOSIS — I27.20 PULMONARY HYPERTENSION: ICD-10-CM

## 2025-09-02 DIAGNOSIS — I49.3 PVC'S (PREMATURE VENTRICULAR CONTRACTIONS): ICD-10-CM

## 2025-09-02 DIAGNOSIS — I21.9: ICD-10-CM

## 2025-09-02 DIAGNOSIS — I10 PRIMARY HYPERTENSION: ICD-10-CM

## 2025-09-02 DIAGNOSIS — I25.110 ATHEROSCLEROSIS OF NATIVE CORONARY ARTERY OF NATIVE HEART WITH UNSTABLE ANGINA PECTORIS: ICD-10-CM

## 2025-09-02 DIAGNOSIS — E78.00 HYPERCHOLESTEROLEMIA: ICD-10-CM

## 2025-09-02 DIAGNOSIS — I35.0 NONRHEUMATIC AORTIC VALVE STENOSIS: ICD-10-CM

## 2025-09-02 DIAGNOSIS — I50.32 CHRONIC HEART FAILURE WITH PRESERVED EJECTION FRACTION (HFPEF): Primary | ICD-10-CM

## 2025-09-02 LAB
OHS QRS DURATION: 76 MS
OHS QTC CALCULATION: 426 MS

## 2025-09-02 PROCEDURE — 99213 OFFICE O/P EST LOW 20 MIN: CPT | Mod: PBBFAC,PO | Performed by: STUDENT IN AN ORGANIZED HEALTH CARE EDUCATION/TRAINING PROGRAM

## 2025-09-02 PROCEDURE — 99999 PR PBB SHADOW E&M-EST. PATIENT-LVL III: CPT | Mod: PBBFAC,,, | Performed by: STUDENT IN AN ORGANIZED HEALTH CARE EDUCATION/TRAINING PROGRAM

## 2025-09-02 RX ORDER — ROSUVASTATIN CALCIUM 40 MG/1
40 TABLET, COATED ORAL NIGHTLY
Qty: 90 TABLET | Refills: 3 | Status: SHIPPED | OUTPATIENT
Start: 2025-09-02

## (undated) DEVICE — NDL SPINAL SPINOCAN 22GX3.5

## (undated) DEVICE — TRAY NERVE BLOCK

## (undated) DEVICE — SPONGE KITTNER 1/4X 5/8 L STRL

## (undated) DEVICE — GLOVE SURG ULTRA TOUCH 7.5

## (undated) DEVICE — DRAPE INVISISHIELD TWL 18X24IN

## (undated) DEVICE — STRAP OR TABLE 5IN X 72IN

## (undated) DEVICE — SYS LABEL CORRECT MED

## (undated) DEVICE — MARKER SKIN RULER STERILE

## (undated) DEVICE — SUT 2/0 30IN SILK BLK BRAI

## (undated) DEVICE — CORD BIPOLAR 12 FOOT

## (undated) DEVICE — GLOVE SURGEONS ULTRA TOUCH 6.5

## (undated) DEVICE — BLADE SURG #15 CARBON STEEL

## (undated) DEVICE — DRAPE EENT SPLIT STERILE

## (undated) DEVICE — KIT SAHARA DRAPE DRAW/LIFT

## (undated) DEVICE — DRAPE STERI INSTRUMENT 1018

## (undated) DEVICE — KIT ANTIFOG

## (undated) DEVICE — CONTAINER SPECIMEN OR STER 4OZ

## (undated) DEVICE — DRESSING TRANS 2X2 TEGADERM

## (undated) DEVICE — SLEEP REMOTE MODEL 2580

## (undated) DEVICE — TOWEL OR DISP STRL BLUE 4/PK

## (undated) DEVICE — NDL HYPODERMIC BLUNT 18G 1.5IN

## (undated) DEVICE — DRESSING MEPORE ISLAND 31/2X4

## (undated) DEVICE — PROBE BPLR SIDE BY SIDE STIMTN

## (undated) DEVICE — GLOVE SENSICARE PI MICRO 6

## (undated) DEVICE — SUT MONOCRYL 4-0 UND RB-1

## (undated) DEVICE — TUBING MINIBORE EXTENSION

## (undated) DEVICE — ADHESIVE DERMABOND ADVANCED

## (undated) DEVICE — Device

## (undated) DEVICE — TRAY SKIN SCRUB WET PREMIUM

## (undated) DEVICE — CATH IV INTROCAN 18G X 1 1/4

## (undated) DEVICE — PASSER

## (undated) DEVICE — LOOP VESSEL BLUE MAXI

## (undated) DEVICE — NDL TUOHY EPIDURAL 20G X 3.5

## (undated) DEVICE — ELECTRODE EMG NEEDLE

## (undated) DEVICE — SPONGE PATTY SURGICAL .5X3IN

## (undated) DEVICE — SEE MEDLINE ITEM 153688

## (undated) DEVICE — COVER PROBE US 5.5X58L NON LTX

## (undated) DEVICE — HANDLE SURG LIGHT NONRIGID

## (undated) DEVICE — GLOVE SURG ULTRA TOUCH 6

## (undated) DEVICE — NDL 27G X 1 1/4

## (undated) DEVICE — BLADE TONGUE DEPRESSOR STRL

## (undated) DEVICE — SUT 3-0 VICRYL / SH (J416)

## (undated) DEVICE — DRAPE INCISE IOBAN 2 23X17IN

## (undated) DEVICE — APPLICATOR CHLORAPREP CLR 10.5

## (undated) DEVICE — PENCIL ROCKER SWITCH 10FT CORD

## (undated) DEVICE — CLIPPER BLADE MOD 4406 (CAREF)

## (undated) DEVICE — NDL SAFETY 25G X 1.5 ECLIPSE

## (undated) DEVICE — MICROSCOPE DRAPE FOR ZEISS S7

## (undated) DEVICE — SYR 10CC LUER LOCK

## (undated) DEVICE — STAPLER SKIN ROTATING HEAD

## (undated) DEVICE — SYR DISP LL 5CC

## (undated) DEVICE — ELECTRODE BLADE W/SLEEVE 2.75

## (undated) DEVICE — SYR PLASTIC 8ML PERIFIX LL

## (undated) DEVICE — DRAPE HEAD

## (undated) DEVICE — GLOVE PROTEXIS PI CLASSIC 7.5

## (undated) DEVICE — GLOVE PROTEXIS HYDROGEL SZ7

## (undated) DEVICE — CHLORAPREP 10.5 ML APPLICATOR

## (undated) DEVICE — SUT SILK 3-0 RB-1 30IN BLK

## (undated) DEVICE — GAUZE SPONGE 4X4 12PLY

## (undated) DEVICE — SUT VICRYL 3-0 27 SH

## (undated) DEVICE — GLOVE SENSICARE PI ALOE 7